# Patient Record
Sex: MALE | Race: BLACK OR AFRICAN AMERICAN | Employment: OTHER | URBAN - METROPOLITAN AREA
[De-identification: names, ages, dates, MRNs, and addresses within clinical notes are randomized per-mention and may not be internally consistent; named-entity substitution may affect disease eponyms.]

---

## 2017-05-22 ENCOUNTER — HOSPITAL ENCOUNTER (INPATIENT)
Age: 72
LOS: 3 days | Discharge: HOME HEALTH | DRG: 287 | End: 2017-05-25
Attending: EMERGENCY MEDICINE | Admitting: INTERNAL MEDICINE
Payer: MEDICARE

## 2017-05-22 ENCOUNTER — APPOINTMENT (OUTPATIENT)
Dept: GENERAL RADIOLOGY | Age: 72
DRG: 287 | End: 2017-05-22
Attending: EMERGENCY MEDICINE
Payer: MEDICARE

## 2017-05-22 DIAGNOSIS — I50.21 ACUTE SYSTOLIC CONGESTIVE HEART FAILURE (HCC): Primary | ICD-10-CM

## 2017-05-22 PROBLEM — I50.9 CHF EXACERBATION (HCC): Status: ACTIVE | Noted: 2017-05-22

## 2017-05-22 PROBLEM — J81.1 PULMONARY EDEMA: Status: ACTIVE | Noted: 2017-05-22

## 2017-05-22 LAB
ALBUMIN SERPL BCP-MCNC: 2.5 G/DL (ref 3.5–5)
ALBUMIN/GLOB SERPL: 0.5 {RATIO} (ref 1.1–2.2)
ALP SERPL-CCNC: 72 U/L (ref 45–117)
ALT SERPL-CCNC: 20 U/L (ref 12–78)
ANION GAP BLD CALC-SCNC: 6 MMOL/L (ref 5–15)
AST SERPL W P-5'-P-CCNC: 19 U/L (ref 15–37)
ATRIAL RATE: 80 BPM
BASOPHILS # BLD AUTO: 0 K/UL (ref 0–0.1)
BASOPHILS # BLD: 0 % (ref 0–1)
BILIRUB SERPL-MCNC: 0.6 MG/DL (ref 0.2–1)
BNP SERPL-MCNC: 1592 PG/ML (ref 0–125)
BUN SERPL-MCNC: 16 MG/DL (ref 6–20)
BUN/CREAT SERPL: 18 (ref 12–20)
CALCIUM SERPL-MCNC: 8.3 MG/DL (ref 8.5–10.1)
CALCULATED P AXIS, ECG09: 75 DEGREES
CALCULATED R AXIS, ECG10: -38 DEGREES
CALCULATED T AXIS, ECG11: 101 DEGREES
CHLORIDE SERPL-SCNC: 106 MMOL/L (ref 97–108)
CO2 SERPL-SCNC: 28 MMOL/L (ref 21–32)
CREAT SERPL-MCNC: 0.88 MG/DL (ref 0.7–1.3)
DIAGNOSIS, 93000: NORMAL
EOSINOPHIL # BLD: 0.1 K/UL (ref 0–0.4)
EOSINOPHIL NFR BLD: 2 % (ref 0–7)
ERYTHROCYTE [DISTWIDTH] IN BLOOD BY AUTOMATED COUNT: 13.3 % (ref 11.5–14.5)
EST. AVERAGE GLUCOSE BLD GHB EST-MCNC: 209 MG/DL
GLOBULIN SER CALC-MCNC: 5.2 G/DL (ref 2–4)
GLUCOSE BLD STRIP.AUTO-MCNC: 219 MG/DL (ref 65–100)
GLUCOSE BLD STRIP.AUTO-MCNC: 244 MG/DL (ref 65–100)
GLUCOSE BLD STRIP.AUTO-MCNC: 89 MG/DL (ref 65–100)
GLUCOSE SERPL-MCNC: 276 MG/DL (ref 65–100)
HBA1C MFR BLD: 8.9 % (ref 4.2–6.3)
HCT VFR BLD AUTO: 36.4 % (ref 36.6–50.3)
HGB BLD-MCNC: 11.6 G/DL (ref 12.1–17)
LYMPHOCYTES # BLD AUTO: 27 % (ref 12–49)
LYMPHOCYTES # BLD: 1.6 K/UL (ref 0.8–3.5)
MCH RBC QN AUTO: 32.1 PG (ref 26–34)
MCHC RBC AUTO-ENTMCNC: 31.9 G/DL (ref 30–36.5)
MCV RBC AUTO: 100.8 FL (ref 80–99)
MONOCYTES # BLD: 0.5 K/UL (ref 0–1)
MONOCYTES NFR BLD AUTO: 8 % (ref 5–13)
NEUTS SEG # BLD: 3.8 K/UL (ref 1.8–8)
NEUTS SEG NFR BLD AUTO: 63 % (ref 32–75)
P-R INTERVAL, ECG05: 166 MS
PLATELET # BLD AUTO: 228 K/UL (ref 150–400)
POTASSIUM SERPL-SCNC: 4.1 MMOL/L (ref 3.5–5.1)
PROT SERPL-MCNC: 7.7 G/DL (ref 6.4–8.2)
Q-T INTERVAL, ECG07: 378 MS
QRS DURATION, ECG06: 106 MS
QTC CALCULATION (BEZET), ECG08: 435 MS
RBC # BLD AUTO: 3.61 M/UL (ref 4.1–5.7)
SERVICE CMNT-IMP: ABNORMAL
SERVICE CMNT-IMP: ABNORMAL
SERVICE CMNT-IMP: NORMAL
SODIUM SERPL-SCNC: 140 MMOL/L (ref 136–145)
TROPONIN I SERPL-MCNC: 0.04 NG/ML
TSH SERPL DL<=0.05 MIU/L-ACNC: 2.35 UIU/ML (ref 0.36–3.74)
VENTRICULAR RATE, ECG03: 80 BPM
WBC # BLD AUTO: 6 K/UL (ref 4.1–11.1)

## 2017-05-22 PROCEDURE — 74011250636 HC RX REV CODE- 250/636: Performed by: INTERNAL MEDICINE

## 2017-05-22 PROCEDURE — 71010 XR CHEST PORT: CPT

## 2017-05-22 PROCEDURE — 82962 GLUCOSE BLOOD TEST: CPT

## 2017-05-22 PROCEDURE — 93005 ELECTROCARDIOGRAM TRACING: CPT

## 2017-05-22 PROCEDURE — 99284 EMERGENCY DEPT VISIT MOD MDM: CPT

## 2017-05-22 PROCEDURE — 74011250637 HC RX REV CODE- 250/637: Performed by: INTERNAL MEDICINE

## 2017-05-22 PROCEDURE — 74011000250 HC RX REV CODE- 250: Performed by: INTERNAL MEDICINE

## 2017-05-22 PROCEDURE — 74011250636 HC RX REV CODE- 250/636: Performed by: EMERGENCY MEDICINE

## 2017-05-22 PROCEDURE — C8924 2D TTE W OR W/O FOL W/CON,FU: HCPCS

## 2017-05-22 PROCEDURE — 74011636637 HC RX REV CODE- 636/637: Performed by: INTERNAL MEDICINE

## 2017-05-22 PROCEDURE — 36415 COLL VENOUS BLD VENIPUNCTURE: CPT | Performed by: EMERGENCY MEDICINE

## 2017-05-22 PROCEDURE — 83036 HEMOGLOBIN GLYCOSYLATED A1C: CPT | Performed by: INTERNAL MEDICINE

## 2017-05-22 PROCEDURE — 96374 THER/PROPH/DIAG INJ IV PUSH: CPT

## 2017-05-22 PROCEDURE — 85025 COMPLETE CBC W/AUTO DIFF WBC: CPT | Performed by: EMERGENCY MEDICINE

## 2017-05-22 PROCEDURE — 80053 COMPREHEN METABOLIC PANEL: CPT | Performed by: EMERGENCY MEDICINE

## 2017-05-22 PROCEDURE — 84484 ASSAY OF TROPONIN QUANT: CPT | Performed by: EMERGENCY MEDICINE

## 2017-05-22 PROCEDURE — 84443 ASSAY THYROID STIM HORMONE: CPT | Performed by: NURSE PRACTITIONER

## 2017-05-22 PROCEDURE — 74011250637 HC RX REV CODE- 250/637: Performed by: EMERGENCY MEDICINE

## 2017-05-22 PROCEDURE — 94762 N-INVAS EAR/PLS OXIMTRY CONT: CPT

## 2017-05-22 PROCEDURE — 83880 ASSAY OF NATRIURETIC PEPTIDE: CPT | Performed by: EMERGENCY MEDICINE

## 2017-05-22 PROCEDURE — 74011250636 HC RX REV CODE- 250/636: Performed by: SPECIALIST

## 2017-05-22 PROCEDURE — 65660000000 HC RM CCU STEPDOWN

## 2017-05-22 PROCEDURE — 74011636637 HC RX REV CODE- 636/637

## 2017-05-22 RX ORDER — DEXTROSE 50 % IN WATER (D50W) INTRAVENOUS SYRINGE
12.5-25 AS NEEDED
Status: DISCONTINUED | OUTPATIENT
Start: 2017-05-22 | End: 2017-05-25 | Stop reason: HOSPADM

## 2017-05-22 RX ORDER — LISINOPRIL 5 MG/1
2.5 TABLET ORAL DAILY
Status: DISCONTINUED | OUTPATIENT
Start: 2017-05-22 | End: 2017-05-23

## 2017-05-22 RX ORDER — BUMETANIDE 0.25 MG/ML
1 INJECTION INTRAMUSCULAR; INTRAVENOUS 3 TIMES DAILY
Status: DISCONTINUED | OUTPATIENT
Start: 2017-05-22 | End: 2017-05-25 | Stop reason: HOSPADM

## 2017-05-22 RX ORDER — FUROSEMIDE 10 MG/ML
40 INJECTION INTRAMUSCULAR; INTRAVENOUS
Status: COMPLETED | OUTPATIENT
Start: 2017-05-22 | End: 2017-05-22

## 2017-05-22 RX ORDER — ROSUVASTATIN CALCIUM 10 MG/1
10 TABLET, COATED ORAL
Status: DISCONTINUED | OUTPATIENT
Start: 2017-05-22 | End: 2017-05-25 | Stop reason: HOSPADM

## 2017-05-22 RX ORDER — HEPARIN SODIUM 5000 [USP'U]/ML
5000 INJECTION, SOLUTION INTRAVENOUS; SUBCUTANEOUS EVERY 8 HOURS
Status: DISCONTINUED | OUTPATIENT
Start: 2017-05-22 | End: 2017-05-25 | Stop reason: HOSPADM

## 2017-05-22 RX ORDER — SERTRALINE HYDROCHLORIDE 50 MG/1
50 TABLET, FILM COATED ORAL DAILY
Status: DISCONTINUED | OUTPATIENT
Start: 2017-05-22 | End: 2017-05-25 | Stop reason: HOSPADM

## 2017-05-22 RX ORDER — INSULIN LISPRO 100 [IU]/ML
INJECTION, SOLUTION INTRAVENOUS; SUBCUTANEOUS
Status: DISCONTINUED | OUTPATIENT
Start: 2017-05-22 | End: 2017-05-25 | Stop reason: HOSPADM

## 2017-05-22 RX ORDER — ASPIRIN 325 MG
325 TABLET ORAL DAILY
Status: DISCONTINUED | OUTPATIENT
Start: 2017-05-22 | End: 2017-05-25 | Stop reason: HOSPADM

## 2017-05-22 RX ORDER — INSULIN LISPRO 100 [IU]/ML
INJECTION, SOLUTION INTRAVENOUS; SUBCUTANEOUS
Status: COMPLETED
Start: 2017-05-22 | End: 2017-05-22

## 2017-05-22 RX ORDER — MAGNESIUM SULFATE 100 %
4 CRYSTALS MISCELLANEOUS AS NEEDED
Status: DISCONTINUED | OUTPATIENT
Start: 2017-05-22 | End: 2017-05-25 | Stop reason: HOSPADM

## 2017-05-22 RX ORDER — SODIUM CHLORIDE 0.9 % (FLUSH) 0.9 %
5-10 SYRINGE (ML) INJECTION EVERY 8 HOURS
Status: DISCONTINUED | OUTPATIENT
Start: 2017-05-22 | End: 2017-05-25 | Stop reason: HOSPADM

## 2017-05-22 RX ORDER — SODIUM CHLORIDE 0.9 % (FLUSH) 0.9 %
5-10 SYRINGE (ML) INJECTION AS NEEDED
Status: DISCONTINUED | OUTPATIENT
Start: 2017-05-22 | End: 2017-05-25 | Stop reason: HOSPADM

## 2017-05-22 RX ORDER — SODIUM CHLORIDE 0.9 % (FLUSH) 0.9 %
5-10 SYRINGE (ML) INJECTION AS NEEDED
Status: DISCONTINUED | OUTPATIENT
Start: 2017-05-22 | End: 2017-05-25 | Stop reason: SDUPTHER

## 2017-05-22 RX ORDER — PANTOPRAZOLE SODIUM 40 MG/1
40 TABLET, DELAYED RELEASE ORAL
Status: DISCONTINUED | OUTPATIENT
Start: 2017-05-22 | End: 2017-05-25 | Stop reason: HOSPADM

## 2017-05-22 RX ADMIN — PERFLUTREN 2 ML: 6.52 INJECTION, SUSPENSION INTRAVENOUS at 14:45

## 2017-05-22 RX ADMIN — INSULIN LISPRO 3 UNITS: 100 INJECTION, SOLUTION INTRAVENOUS; SUBCUTANEOUS at 17:26

## 2017-05-22 RX ADMIN — ROSUVASTATIN CALCIUM 10 MG: 10 TABLET, FILM COATED ORAL at 21:54

## 2017-05-22 RX ADMIN — FUROSEMIDE 40 MG: 10 INJECTION, SOLUTION INTRAMUSCULAR; INTRAVENOUS at 12:21

## 2017-05-22 RX ADMIN — NITROGLYCERIN 1 INCH: 20 OINTMENT TOPICAL at 12:21

## 2017-05-22 RX ADMIN — BUMETANIDE 1 MG: 0.25 INJECTION INTRAMUSCULAR; INTRAVENOUS at 22:49

## 2017-05-22 RX ADMIN — Medication 10 ML: at 21:58

## 2017-05-22 RX ADMIN — ASPIRIN 325 MG: 325 TABLET ORAL at 20:07

## 2017-05-22 RX ADMIN — HEPARIN SODIUM 5000 UNITS: 5000 INJECTION, SOLUTION INTRAVENOUS; SUBCUTANEOUS at 21:55

## 2017-05-22 RX ADMIN — SERTRALINE 50 MG: 50 TABLET, FILM COATED ORAL at 20:06

## 2017-05-22 RX ADMIN — INSULIN LISPRO 3 UNITS: 100 INJECTION, SOLUTION INTRAVENOUS; SUBCUTANEOUS at 14:22

## 2017-05-22 RX ADMIN — BUMETANIDE 1 MG: 0.25 INJECTION INTRAMUSCULAR; INTRAVENOUS at 17:26

## 2017-05-22 RX ADMIN — Medication 10 ML: at 14:00

## 2017-05-22 RX ADMIN — HEPARIN SODIUM 5000 UNITS: 5000 INJECTION, SOLUTION INTRAVENOUS; SUBCUTANEOUS at 14:07

## 2017-05-22 RX ADMIN — PANTOPRAZOLE SODIUM 40 MG: 40 TABLET, DELAYED RELEASE ORAL at 20:06

## 2017-05-22 RX ADMIN — INSULIN DETEMIR 65 UNITS: 100 INJECTION, SOLUTION SUBCUTANEOUS at 21:54

## 2017-05-22 RX ADMIN — LISINOPRIL 2.5 MG: 5 TABLET ORAL at 20:06

## 2017-05-22 NOTE — PROGRESS NOTES
[facility]: MED RECONCILIATION    Comments/Recommendations:     Patient awake, alert, and oriented to medications.  Patient reported to be taking only long acting insulin. Called Dr. Sangeetha Christiansen ([phone]) to confirm insulin regimen and was informed that patient is only on long acting insulin but should be switching from insulin detemir to insulin degludec 96 units per day starting today. Patient had not switched medications yet.  Patient confirmed no allergies.  Patient reported good medication adherence.  Patient confirmed preferred pharmacy to be [facility] [email or web address] [facility]    Medications added:   · none    Medications removed:  · Insulin lispro 100 units/mL 12 units SubQ before breakfast, lunch, and dinner  · Oxycodone-acetaminophen 5-325 mg 1 tab by mouth every 4 hours as needed for up to 20 doses. Max daily amount: 6   Tabs    Medications adjusted:  · Insulin detemir from 50 units to 65 units    Information obtained from: Patient, Rx Query, and endocrinologist    Allergies: Review of patient's allergies indicates no known allergies. Prior to Admission Medications:     Prior to Admission Medications   Prescriptions Last Dose Informant Patient Reported? Taking?   aspirin (ASPIRIN) 325 mg tablet 2017 at 1000 Self Yes Yes   Sig: Take 325 mg by mouth daily. furosemide (LASIX) 40 mg tablet 2017 at 1000 Self No Yes   Sig: Take 1 Tab by mouth daily. insulin detemir (LEVEMIR FLEXTOUCH) 100 unit/mL (3 mL) inpn 2017 at 1100 Self Yes Yes   Si Units by SubCUTAneous route every twelve (12) hours. 1100 and 2300   lisinopril (PRINIVIL, ZESTRIL) 2.5 mg tablet 2017 at am Self No Yes   Sig: Take 1 Tab by mouth daily. omeprazole (PRILOSEC) 20 mg capsule 2017 at am Self Yes Yes   Sig: Take 20 mg by mouth daily.    potassium chloride (K-DUR, KLOR-CON) 20 mEq tablet 2017 Self No Yes   Sig: Take one tablet daily   rosuvastatin (CRESTOR) 10 mg tablet 2017 at am Self No Yes   Sig: Take 1 Tab by mouth nightly. sertraline (ZOLOFT) 50 mg tablet 5/21/2017 at am Self No Yes   Sig: Take 1 Tab by mouth daily.       Facility-Administered Medications: None     Thank you,    Fransico Jacobsen, Pharmacy Student, Class of 3746

## 2017-05-22 NOTE — PROGRESS NOTES
Primary Nurse Blanca Garzon RN and HANNAH FELIX performed a dual skin assessment on this patient Impairment noted- see wound doc flow sheet  Shane score is 20

## 2017-05-22 NOTE — H&P
[place] [address]  [phone]    Admission History and Physical      NAME:  Guillermina Dutta. :   1945   MRN:  [id number]     PCP:  Zak Douglass MD     Date/Time:  2017         Subjective:     CHIEF COMPLAINT: SOB     HISTORY OF PRESENT ILLNESS:     Mr. Tyrone Winters is a 80 y.o.  male who is admitted with CHF exacerbation. Mr. Tyrone Winters with PMH of CHF, CAD s/p CABG, DM, GERD, morbid obesity presented to ER c/o SOB, which has been progressively worsening the last few days. The SOB is exertional. Denies chest pain or cough. Denies nausea, vomiting. Has weight gain about 20 lb in 3 months. Pt stated that he is compliant with his diet. His last evaluation by his cardiologist was more than a year ago. Past Medical History:   Diagnosis Date    CAD (coronary artery disease)     Cancer ([facility]     PROSTATE    CHF (congestive heart failure) ([facility]     Diabetes ([facility]     5yrs    GERD (gastroesophageal reflux disease)     Morbid obesity (HCC)     Prostate cancer (HCC)         Past Surgical History:   Procedure Laterality Date    HX CORONARY ARTERY BYPASS GRAFT      HX HEART CATHETERIZATION      HX ORTHOPAEDIC      right wrist carpal tunnel repair       Social History   Substance Use Topics    Smoking status: Former Smoker     Packs/day: 1.00     Years: 7.00     Quit date: 1991    Smokeless tobacco: Never Used    Alcohol use Yes      Comment: VERY RARE        Family History   Problem Relation Age of Onset    Heart Disease Father     Diabetes Father     Cancer Sister      BREAST        No Known Allergies     Prior to Admission medications    Medication Sig Start Date End Date Taking? Authorizing Provider   lisinopril (PRINIVIL, ZESTRIL) 2.5 mg tablet Take 1 Tab by mouth daily.  16   Yonny Jacob MD   oxyCODONE-acetaminophen (PERCOCET) 5-325 mg per tablet Take 1 Tab by mouth every four (4) hours as needed for up to 20 doses. Max Daily Amount: 6 Tabs. 3/29/16   Vel Carias MD   insulin detemir (LEVEMIR FLEXTOUCH) 100 unit/mL (3 mL) inpn 50U daily  Indications: TYPE 2 DIABETES MELLITUS 3/29/16   Vel Carias MD   aspirin (ASPIRIN) 325 mg tablet Take 325 mg by mouth daily. Leonidas Cervantes MD   omeprazole (PRILOSEC) 20 mg capsule Take 20 mg by mouth daily. Historical Provider   furosemide (LASIX) 40 mg tablet Take 1 Tab by mouth daily. 2/29/16   [place]ell Bradford MD   sertraline (ZOLOFT) 50 mg tablet Take 1 Tab by mouth daily. 8/25/15   Armen Kelsey MD   rosuvastatin (CRESTOR) 10 mg tablet Take 1 Tab by mouth nightly.  8/25/15   Armen Kelsey MD   potassium chloride (K-DUR, KLOR-CON) 20 mEq tablet Take one tablet daily 8/25/15   Armen Kelsey MD   insulin lispro (HUMALOG) 100 unit/mL kwikpen 12 Units before breakfast, lunch, and dinner 2/6/15   [place] Bradford MD         Review of Systems:  (bold if positive, if negative)    Gen:  Eyes:  ENT:  CVS:  Pulm:   dyspnea,GI:    :    MS:  Skin:  Psych:  Endo:    Hem:  Renal:    Neuro:            Objective:      VITALS:    Vital signs reviewed; most recent are:    Visit Vitals    /69    Pulse 62    Temp 97.8 °F (36.6 °C)    Resp 20    Ht 5' 9\" (1.753 m)    Wt (!) 165.6 kg (365 lb)    SpO2 96%    BMI 53.9 kg/m2     SpO2 Readings from Last 6 Encounters:   05/22/17 96%   11/18/16 96%   03/30/16 96%   08/06/15 97%   05/05/15 94%   04/17/15 98%        No intake or output data in the 24 hours ending 05/22/17 1310         Exam:     Physical Exam:    Gen:  Morbidly obese, in no acute distress  HEENT:  Pink conjunctivae, PERRL, hearing intact to voice, moist mucous membranes  Neck:  Supple, without masses, thyroid non-tender  Resp:  No accessory muscle use,  rales BL  Card:  No murmurs, normal S1, S2 without thrills, bruits or peripheral edema  Abd:  Soft, non-tender, non-distended, normoactive bowel sounds are present, no palpable organomegaly and no detectable hernias  Lymph:  No cervical or inguinal adenopathy  Musc:  No cyanosis or clubbing  Skin:  No rashes or ulcers, skin turgor is good  Neuro:  Cranial nerves are grossly intact, no focal motor weakness, follows commands appropriately  Psych:  Good insight, oriented to person, place and time, alert       Labs:    Recent Labs      05/22/17   1126   WBC  6.0   HGB  11.6*   HCT  36.4*   PLT  228     Recent Labs      05/22/17   1126   NA  140   K  4.1   CL  106   CO2  28   GLU  276*   BUN  16   CREA  0.88   CA  8.3*   ALB  2.5*   TBILI  0.6   SGOT  19   ALT  20     Lab Results   Component Value Date/Time    Glucose (POC) 236 03/30/2016 11:16 AM    Glucose (POC) 164 03/30/2016 07:16 AM     No results for input(s): PH, PCO2, PO2, HCO3, FIO2 in the last 72 hours. No results for input(s): INR in the last 72 hours. No lab exists for component: INREXT    Telemetry reviewed:   frequent PVCs       Assessment/Plan:    1. Acute on chronic systolic CHF exacerbation (HCC) (5/22/2017)/ acute pulmonary edema. Admit to telemetry. His last Ecocardiogram in 2014 EF of 45%. Will check echo and start IV bumex. Monitor I/O and daily weight. Continue ACEi. Consult Dr Erika Rosales, cardiology. 2.    Coronary atherosclerosis of native coronary artery (11/21/2011)  S/P CABG x 2 (12/14/2011). Continue ASA          3. Type 2 diabetes mellitus without complication ([facility] (7/08/7201). Continue levemir and cover with SSI. Check A1C     4. HTN. Continue lisinopril. BP stable. 5.  Hyperlipidemia. On statin     6. PAF. Rate is controlled. Continue ASA.       7. Morbid obesity. Counseled on weight loss. Nutrition consult.      Code status: Full          Previous medical records reviewed     Risk of deterioration: high      Total time spent with patient: [address] discussed with: Patient, Nursing Staff and >50% of time spent in counseling and coordination of care    Discussed:  Care Plan    Prophylaxis:  Hep SQ    Probable Disposition:  Home w/Family           ___________________________________________________    Attending Physician: Vanessa Chino MD

## 2017-05-22 NOTE — IP AVS SNAPSHOT
Current Discharge Medication List  
  
START taking these medications Dose & Instructions Dispensing Information Comments Morning Noon Evening Bedtime  
 bumetanide 1 mg tablet Commonly known as:  Kristina Leung Your last dose was: Your next dose is:    
   
   
 Dose:  1 mg Take 1 Tab by mouth two (2) times a day. Quantity:  60 Tab Refills:  0  
     
   
   
   
  
 carvedilol 6.25 mg tablet Commonly known as:  Justin Nixon Your last dose was: Your next dose is:    
   
   
 Dose:  6.25 mg Take 1 Tab by mouth two (2) times daily (with meals). Quantity:  60 Tab Refills:  0  
     
   
   
   
  
 valsartan 80 mg tablet Commonly known as:  DIOVAN Your last dose was: Your next dose is:    
   
   
 Dose:  80 mg Take 1 Tab by mouth daily. Quantity:  30 Tab Refills:  0 CONTINUE these medications which have NOT CHANGED Dose & Instructions Dispensing Information Comments Morning Noon Evening Bedtime  
 aspirin 325 mg tablet Commonly known as:  ASPIRIN Your last dose was: Your next dose is:    
   
   
 Dose:  325 mg Take 325 mg by mouth daily. Refills:  0  
     
   
   
   
  
 insulin detemir 100 unit/mL (3 mL) Inpn Commonly known as:  Andrew Martinez Your last dose was: Your next dose is:    
   
   
 Dose:  65 Units 65 Units by SubCUTAneous route every twelve (12) hours. 1100 and 2300 Refills:  0  
     
   
   
   
  
 omeprazole 20 mg capsule Commonly known as:  PRILOSEC Your last dose was: Your next dose is:    
   
   
 Dose:  20 mg Take 20 mg by mouth daily. Refills:  0  
     
   
   
   
  
 potassium chloride 20 mEq tablet Commonly known as:  K-DUR, KLOR-CON Your last dose was: Your next dose is: Take one tablet daily Quantity:  30 Tab Refills:  1  
     
   
   
   
  
 rosuvastatin 10 mg tablet Commonly known as:  CRESTOR Your last dose was: Your next dose is:    
   
   
 Dose:  10 mg Take 1 Tab by mouth nightly. Quantity:  30 Tab Refills:  1  
     
   
   
   
  
 sertraline 50 mg tablet Commonly known as:  ZOLOFT Your last dose was: Your next dose is:    
   
   
 Dose:  50 mg Take 1 Tab by mouth daily. Quantity:  30 Tab Refills:  1 STOP taking these medications   
 furosemide 40 mg tablet Commonly known as:  LASIX  
   
  
 lisinopril 2.5 mg tablet Commonly known as:  Ruthy Vickers Where to Get Your Medications These medications were sent to [address], [address] [place] [id number] Phone:  [phone]  
  bumetanide 1 mg tablet  
 carvedilol 6.25 mg tablet  
 valsartan 80 mg tablet
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
## 2017-05-22 NOTE — Clinical Note
Status[de-identified] Inpatient [101] Type of Bed: Telemetry [19] Inpatient Hospitalization Certified Necessary for the Following Reasons: 3. Patient receiving treatment that can only be provided in an inpatient setting (further clarification in H&P documentation) Admitting Diagnosis: CHF exacerbation ([facility] [[id number]] Admitting Physician: Teresa Atkinson Attending Physician: Teresa Atkinson Estimated Length of Stay: > or = to 2 Midnights Discharge Plan[de-identified] Home with Office Follow-up

## 2017-05-22 NOTE — PROGRESS NOTES
Bedside and Verbal shift change report given to [facility] RN (oncoming nurse) by Shanna Martinez RN (offgoing nurse). Report included the following information SBAR, Kardex, MAR and Recent Results.

## 2017-05-22 NOTE — IP AVS SNAPSHOT
[address] [facility] 
 
 
 [address] [facility] [address] 1007 [facility] 
[phone] Patient: Marcia Rodriguez. MRN: [id number] :1945 You are allergic to the following No active allergies Recent Documentation Height Weight BMI Smoking Status 1.753 m (!) 161 kg 52.42 kg/m2 Former Smoker Emergency Contacts Name Discharge Info Relation Home Work Mobile Edna Castro DISCHARGE CAREGIVER [3] Spouse [3] [phone] Christal Medina DISCHARGE CAREGIVER [3] Daughter [21] About your hospitalization You were admitted on:  May 22, 2017 You last received care in the:  [facility] 3 PRO CARE TELE 2 You were discharged on:  May 25, 2017 Unit phone number:  [phone] Why you were hospitalized Your primary diagnosis was:  Systolic Chf, Acute On Chronic (Hcc) Your diagnoses also included:  Type 2 Diabetes Mellitus Without Complication (Hcc), Paf (Paroxysmal Atrial Fibrillation) (Hcc), Hyperlipidemia, Coronary Atherosclerosis Of Native Coronary Artery, Anemia Providers Seen During Your Hospitalizations Provider Role Specialty Primary office phone Bria Olguin MD Attending Provider Emergency Medicine [phone] Kin Garsia MD Attending Provider Hospitalist [phone] Marylene Police, MD Attending Provider Internal Medicine [phone] Your Primary Care Physician (PCP) Primary Care Physician Office Phone Office Fax Donis LANDIS [phone] Follow-up Information Follow up With Details Comments Contact Info Betsey Ford MD On 2017 9:20 am  566 [facility] 600 1007 [facility] 
[phone] Anel Rivero MD In 2 weeks  [address] [place] [address] 
[phone]  [facility]  nocturnal 02 Call when you get home to make arrangements for delivery [phone] [place] [address] 
[phone] Orelia Hatchet, MD  call to arrange sleep testing [address] [facility] [address] [place] 
[phone] AT [address] and RN  777 [facility] [address] 
[phone] Your Appointments Thursday June 01, 2017  9:20 AM EDT Office Visit with Jamel Rivera MD  
[facility] ([address]) 320 [facility] [address] 600 1007 [facility]  
[phone] Current Discharge Medication List  
  
START taking these medications Dose & Instructions Dispensing Information Comments Morning Noon Evening Bedtime  
 bumetanide 1 mg tablet Commonly known as:  Tura Martha Your last dose was: Your next dose is:    
   
   
 Dose:  1 mg Take 1 Tab by mouth two (2) times a day. Quantity:  60 Tab Refills:  0  
     
   
   
   
  
 carvedilol 6.25 mg tablet Commonly known as:  Yoshi Banger Your last dose was: Your next dose is:    
   
   
 Dose:  6.25 mg Take 1 Tab by mouth two (2) times daily (with meals). Quantity:  60 Tab Refills:  0  
     
   
   
   
  
 valsartan 80 mg tablet Commonly known as:  DIOVAN Your last dose was: Your next dose is:    
   
   
 Dose:  80 mg Take 1 Tab by mouth daily. Quantity:  30 Tab Refills:  0 CONTINUE these medications which have NOT CHANGED Dose & Instructions Dispensing Information Comments Morning Noon Evening Bedtime  
 aspirin 325 mg tablet Commonly known as:  ASPIRIN Your last dose was: Your next dose is:    
   
   
 Dose:  325 mg Take 325 mg by mouth daily. Refills:  0  
     
   
   
   
  
 insulin detemir 100 unit/mL (3 mL) Inpn Commonly known as:  Erling Davin Your last dose was: Your next dose is: Dose:  65 Units 65 Units by SubCUTAneous route every twelve (12) hours. 1100 and 2300 Refills:  0  
     
   
   
   
  
 omeprazole 20 mg capsule Commonly known as:  PRILOSEC Your last dose was: Your next dose is:    
   
   
 Dose:  20 mg Take 20 mg by mouth daily. Refills:  0  
     
   
   
   
  
 potassium chloride 20 mEq tablet Commonly known as:  K-DUR, KLOR-CON Your last dose was: Your next dose is: Take one tablet daily Quantity:  30 Tab Refills:  1  
     
   
   
   
  
 rosuvastatin 10 mg tablet Commonly known as:  CRESTOR Your last dose was: Your next dose is:    
   
   
 Dose:  10 mg Take 1 Tab by mouth nightly. Quantity:  30 Tab Refills:  1  
     
   
   
   
  
 sertraline 50 mg tablet Commonly known as:  ZOLOFT Your last dose was: Your next dose is:    
   
   
 Dose:  50 mg Take 1 Tab by mouth daily. Quantity:  30 Tab Refills:  1 STOP taking these medications   
 furosemide 40 mg tablet Commonly known as:  LASIX  
   
  
 lisinopril 2.5 mg tablet Commonly known as:  Raymond Marcelino Where to Get Your Medications These medications were sent to [place] [id number] Phone:  [phone]  
  bumetanide 1 mg tablet  
 carvedilol 6.25 mg tablet  
 valsartan 80 mg tablet Discharge Instructions HOSPITALIST DISCHARGE INSTRUCTIONS 
NAME: oPp Linares. :  1945 MRN:  [id number] Date/Time:  2017 10:47 AM 
 
ADMIT DATE: 2017 DISCHARGE DATE: 2017 DISCHARGE DIAGNOSIS: 
CHF MEDICATIONS: 
· It is important that you take the medication exactly as they are prescribed.   
· Keep your medication in the bottles provided by the pharmacist and keep a list of the medication names, dosages, and times to be taken in your wallet. · Do not take other medications without consulting your doctor. Pain Management: per above medications What to do at [facility] Your MUST wear your oxygen at night and when you are sleeping. You must follow up to have sleep testing for sleep apnea. Recommended diet:  Cardiac Diet and Diabetic Diet Recommended activity: Activity as tolerated If you experience any of the following symptoms then please call your primary care physician or return to the emergency room if you cannot get hold of your doctor: 
Fever, chills, nausea, vomiting, diarrhea, change in mentation, falling, bleeding, shortness of breath Follow Up: Follow-up Information Follow up With Details Comments Contact Info Ryan Tidwell MD On 6/1/2017 9:20 am  566 [facility] 600 1007 [facility] 
[phone] Burgess Kristina MD In 2 weeks  [address] [place] [address] 
[phone] [place] [facility]  nocturnal 02 Call when you get home to make arrangements for delivery [phone] [address] [address] [phone] Kirsten King MD  call to arrange sleep testing 3003 [facility] [address] [place] [address] 
[phone] Information obtained by : 
I understand that if any problems occur once I am at home I am to contact my physician. I understand and acknowledge receipt of the instructions indicated above. Physician's or R.N.'s Signature                                                                  Date/Time Patient or Representative Signature Date/Time Avoiding Triggers with Heart Failure: Your Care Instructions Triggers are anything that make your heart failure flare up. A flare-up is also called \"sudden heart failure\" or \"acute heart failure. \" When you have a flare-up, fluid builds up in your lungs, and you have problems breathing. You might need to go to the hospital. By watching for changes in your condition and avoiding triggers, you can prevent heart failure flare-ups. Follow-up care is a key part of your treatment and safety. Be sure to make and go to all appointments, and call your doctor if you are having problems. It's also a good idea to know your test results and keep a list of the medicines you take. How can you care for yourself at home? Watch for changes in your weight and condition · Weigh yourself without clothing at the same time each day. Record your weight. Call your doctor if you gain 3 pounds or more in 24 hrs or 5 pounds in one week. A sudden weight gain may mean that your heart failure is getting worse. · Keep a daily record of your symptoms. Write down any changes in how you feel, such as new shortness of breath, cough, or problems eating. Also record if your ankles are more swollen than usual and if you have to urinate in the night more often. Note anything that you ate or did that could have triggered these changes. Limit sodium Sodium causes your body to hold on to water, making it harder for your heart to pump. People get most of their sodium from processed foods. Fast food and restaurant meals also tend to be very high in sodium. · Your doctor may suggest that you limit sodium to 1,500 milligrams (mg) a day. That is less than 1 teaspoon of salt a day, including all the salt you eat in cooking or in packaged foods. · Read food labels on cans and food packages. They tell you how much sodium you get in one serving. Check the serving size.  If you eat more than one serving, you are getting more sodium. · Be aware that sodium can come in forms other than salt, including monosodium glutamate (MSG), sodium citrate, and sodium bicarbonate (baking soda). MSG is often added to Asian food. You can sometimes ask for food without MSG or salt. · Slowly reducing salt will help you adjust to the taste. Take the salt shaker off the table. · Flavor your food with garlic, lemon juice, onion, vinegar, herbs, and spices instead of salt. Do not use soy sauce, steak sauce, onion salt, garlic salt, mustard, or ketchup on your food, unless it is labeled \"low-sodium\" or \"low-salt. \" 
· Make your own salad dressings, sauces, and ketchup without adding salt. · Use fresh or frozen ingredients, instead of canned ones, whenever you can. Choose low-sodium canned goods. · Eat less processed food and food from restaurants, including fast food. Exercise as directed Moderate, regular exercise is very good for your heart. It improves your blood flow and helps control your weight. But too much exercise can stress your heart and cause a heart failure flare-up. · Check with your doctor before you start an exercise program. 
· Walking is an easy way to get exercise. Start out slowly. Gradually increase the length and pace of your walk. Swimming, riding a bike, and using a treadmill are also good forms of exercise. · When you exercise, watch for signs that your heart is working too hard. You are pushing yourself too hard if you cannot talk while you are exercising. If you become short of breath or dizzy or have chest pain, stop, sit down, and rest. 
· Do not exercise when you do not feel well. Take medicines correctly · Take your medicines exactly as prescribed. Call your doctor if you think you are having a problem with your medicine. · Make a list of all the medicines you take.  Include those prescribed to you by other doctors and any over-the-counter medicines, vitamins, or supplements you take. Take this list with you when you go to any doctor. · Take your medicines at the same time every day. It may help you to post a list of all the medicines you take every day and what time of day you take them. · Make taking your medicine as simple as you can. Plan times to take your medicines when you are doing other things, such as eating a meal or getting ready for bed. This will make it easier to remember to take your medicines. · Get organized. Use helpful tools, such as daily or weekly pill containers. When should you call for help? Call 911 if you have symptoms of sudden heart failure such as: 
· You have severe trouble breathing. · You cough up pink, foamy mucus. · You have a new irregular or rapid heartbeat. Call your doctor now or seek immediate medical care if: 
· You have new or increased shortness of breath. · You are dizzy or lightheaded, or you feel like you may faint. · You have sudden weight gain, such as 3 pounds in 24 hours, or 5 pounds in one week. · You have increased swelling in your legs, ankles, or feet. · You are suddenly so tired or weak that you cannot do your usual activities. Watch closely for changes in your health, and be sure to contact your doctor if you develop new symptoms. Where can you learn more? Go to http://anuja-anderson.info/ Enter H494 in the search box to learn more about \"Avoiding Triggers With Heart Failure: Care Instructions. \" 
© 0499-1608 [employer]wise, [employer]. Care instructions adapted under license by [employer] (which disclaims liability or warranty for this information). This care instruction is for use with your licensed healthcare professional. If you have questions about a medical condition or this instruction, always ask your healthcare professional. [address] any warranty or liability for your use of this information. Content Version: 63.2.467394; Current as of: January 27, 2016 (modified 10/10/16). Discharge Instructions Attachments/References CORONARY ANGIOGRAM: POST-OP (ENGLISH) MEFS - CARVEDILOL (COREG, COREG CR, HYPERTENEVIDE-12.5) - (BY MOUTH) (ENGLISH) MEFS - BUMETANIDE (BUMEX) - (BY MOUTH) (ENGLISH) MEFS - VALSARTAN (DIOVAN) - (BY MOUTH) (ENGLISH) Discharge Orders None [facility] Announcement We are excited to announce that we are making your provider's discharge notes available to you in Intelipost. You will see these notes when they are completed and signed by the physician that discharged you from your recent hospital stay. If you have any questions or concerns about any information you see in Intelipost, please call the Health Information Department where you were seen or reach out to your Primary Care Provider for more information about your plan of care. Introducing [facility] [place] [facility]! [employer] introduces Intelipost patient portal. Now you can access parts of your medical record, email your doctor's office, and request medication refills online. 1. In your internet browser, go to [email or web address] 2. Click on the First Time User? Click Here link in the Sign In box. You will see the New Member Sign Up page. 3. Enter your Intelipost Access Code exactly as it appears below. You will not need to use this code after youve completed the sign-up process. If you do not sign up before the expiration date, you must request a new code. · Intelipost Access Code: [email or web address] Expires: 8/21/2017 11:08 AM 
 
4. Enter the last four digits of your Social Security Number (xxxx) and Date of Birth (mm/dd/yyyy) as indicated and click Submit. You will be taken to the next sign-up page. 5. Create a Kalibrrt ID. This will be your Intelipost login ID and cannot be changed, so think of one that is secure and easy to remember. 6. Create a "[employer]" password. You can change your password at any time. 7. Enter your Password Reset Question and Answer. This can be used at a later time if you forget your password. 8. Enter your e-mail address. You will receive e-mail notification when new information is available in [address] 9. Click Sign Up. You can now view and download portions of your medical record. 10. Click the Download Summary menu link to download a portable copy of your medical information. If you have questions, please visit the Frequently Asked Questions section of the "[employer]" website. Remember, "[employer]" is NOT to be used for urgent needs. For medical emergencies, dial 911. Now available from your iPhone and Android! General Information Please provide this summary of care documentation to your next provider. Patient Signature:  ____________________________________________________________ Date:  ____________________________________________________________  
  
Wendy Siddiqi Provider Signature:  ____________________________________________________________ Date:  ____________________________________________________________ More Information Coronary Angiogram: What to Expect at [facility] A coronary angiogram is a test to examine the large blood vessel of your heart (coronary artery). The doctor inserted a thin, flexible tube (catheter) into a blood vessel in your groin. In some cases, the catheter is placed in a blood vessel in the arm. Your groin or arm may have a bruise and feel sore for a day or two after a coronary angiogram. You can do light activities around the house but nothing strenuous for several days. This care sheet gives you a general idea about how long it will take for you to recover. But each person recovers at a different pace. Follow the steps below to feel better as quickly as possible. How can you care for yourself at home? Activity · Do not do strenuous exercise and do not lift, pull, or push anything heavy until your doctor says it is okay. This may be for a day or two. You can walk around the house and do light activity, such as cooking. · You may shower 24 to 48 hours after the procedure, if your doctor okays it. Pat the incision dry. Do not take a bath for 1 week, or until your doctor tells you it is okay. · If the catheter was placed in your groin, try not to walk up stairs for the first couple of days. · If the catheter was placed in your arm near your wrist, do not bend your wrist deeply for the first couple of days. Be careful using your hand to get into and out of a chair or bed. · If your doctor recommends it, get more exercise. Walking is a good choice. Bit by bit, increase the amount you walk every day. Try for at least 30 minutes on most days of the week. Diet · Drink plenty of fluids to help your body flush out the dye. If you have kidney, heart, or liver disease and have to limit fluids, talk with your doctor before you increase the amount of fluids you drink. · Keep eating a heart-healthy diet that has lots of fruits, vegetables, and whole grains. If you have not been eating this way, talk to your doctor. You also may want to talk to a dietitian. This expert can help you to learn about healthy foods and plan meals. Medicines · Your doctor will tell you if and when you can restart your medicines. He or she will also give you instructions about taking any new medicines. · If you take blood thinners, such as warfarin (Coumadin), clopidogrel (Plavix), or aspirin, be sure to talk to your doctor. He or she will tell you if and when to start taking those medicines again. Make sure that you understand exactly what your doctor wants you to do. · Your doctor may prescribe a blood-thinning medicine like aspirin or clopidogrel (Plavix).  It is very important that you take these medicines exactly as directed in order to keep the coronary artery open and reduce your risk of a heart attack. Be safe with medicines. Call your doctor if you think you are having a problem with your medicine. Care of the catheter site · For the first 3 days, keep a bandage over the spot where the catheter was inserted. · Put ice or a cold pack on the area for 10 to 20 minutes at a time to help with soreness or swelling. Put a thin cloth between the ice and your skin. Follow-up care is a key part of your treatment and safety. Be sure to make and go to all appointments, and call your doctor if you are having problems. It's also a good idea to know your test results and keep a list of the medicines you take. When should you call for help? Call 911 anytime you think you may need emergency care. For example, call if: 
· You passed out (lost consciousness). · You have severe trouble breathing. · You have sudden chest pain and shortness of breath, or you cough up blood. · You have symptoms of a heart attack. These may include: ¨ Chest pain or pressure, or a strange feeling in the chest. 
¨ Sweating. ¨ Shortness of breath. ¨ Nausea or vomiting. ¨ Pain, pressure, or a strange feeling in the back, neck, jaw, or upper belly, or in one or both shoulders or arms. ¨ Lightheadedness or sudden weakness. ¨ A fast or irregular heartbeat. After you call 911, the  may tel you to chew 1 adult-strength or 2 to 4 low-dose aspirin. Wait for an ambulance. Do not try to drive yourself. · You have been diagnosed with angina, and you have symptoms that do not go away with rest or are not getting better within 5 minutes after you take a dose of nitroglycerin. Call your doctor now or seek immediate medical care if: 
· You are bleeding from the area where the catheter was put in your artery. · You have a fast-growing, painful lump at the catheter site. · You have signs of infection, such as: ¨ Increased pain, swelling, warmth, or redness. ¨ Red streaks leading from the catheter site. ¨ Pus draining from the catheter site. ¨ A fever. · Your leg or arm looks blue or feels cold, numb, or tingly. Watch closely for changes in your health, and be sure to contact your doctor if you have any problems. Where can you learn more? Go to http://anuja-anderson.info/. Enter Y214 in the search box to learn more about \"Coronary Angiogram: What to Expect at Home. \" Current as of: January 27, 2016 Content Version: 11.2 © 3256-4730 [employer]. Care instructions adapted under license by [employer] (which disclaims liability or warranty for this information). If you have questions about a medical condition or this instruction, always ask your healthcare professional. [address] any warranty or liability for your use of this information. Carvedilol (Coreg, Coreg CR, Hypertenevide-12.5) - (By mouth) Why this medicine is used:  
Treats high blood pressure and heart failure. Contact a nurse or doctor right away if you have: 
· Change in how much or how often you urinate · Leg pain when you walk, legs and feet that feel cold or numb · Lightheadedness, dizziness, fainting · Rapid weight gain, swelling in your hands, ankles, or feet Common side effects: · Mild dizziness · Tiredness · Trouble having sex · Diarrhea © 2017 [address] Information is for End User's use only and may not be sold, redistributed or otherwise used for commercial purposes. Bumetanide (Bumex) - (By mouth) Why this medicine is used:  
Treats edema (fluid retention) and high blood pressure. Contact a nurse or doctor right away if you have: · Blistering, peeling, red skin rash · Lightheadedness, fainting · Dry mouth, increased thirst, problems urinating · Nausea or vomiting · Hearing loss, ringing in the ears Common side effects: · Muscle cramps © 2017 [facility] Information is for End User's use only and may not be sold, redistributed or otherwise used for commercial purposes. Valsartan (Diovan) - (By mouth) Why this medicine is used:  
Treats high blood pressure and heart failure. Contact a nurse or doctor right away if you have: 
· Change in how much or how often you urinate · Lightheadedness, dizziness, fainting Common side effects: 
· Diarrhea 
· Headache © 2017 [facility] Information is for End User's use only and may not be sold, redistributed or otherwise used for commercial purposes.

## 2017-05-22 NOTE — ED TRIAGE NOTES
Shortness of breath, hx of CHF, work of breathing with exertion. Also c/o chest pain that began this morning.

## 2017-05-22 NOTE — ED NOTES
TRANSFER - OUT REPORT:    Verbal report given to [facility] RN(name) on [facility].  being transferred to [facility] (unit) for routine progression of care       Report consisted of patients Situation, Background, Assessment and   Recommendations(SBAR). Information from the following report(s) SBAR, ED Summary, [facility] and Recent Results was reviewed with the receiving nurse. Lines:   Peripheral IV 05/22/17 Right Forearm (Active)   Site Assessment Clean, dry, & intact 5/22/2017 11:25 AM   Phlebitis Assessment 0 5/22/2017 11:25 AM   Infiltration Assessment 0 5/22/2017 11:25 AM   Dressing Status Clean, dry, & intact 5/22/2017 11:25 AM   Dressing Type Tape;Transparent 5/22/2017 11:25 AM   Hub Color/Line Status Pink;Flushed;Patent 5/22/2017 11:25 AM   Action Taken Blood drawn 5/22/2017 11:25 AM        Opportunity for questions and clarification was provided.       Patient transported with:   [employer]

## 2017-05-22 NOTE — ED NOTES
In room to medicate patient with heparin, noted that patient is eating a tray of food. Blood glucose checked and is 244, medicated with mealtime insulin per orders at 3 units.

## 2017-05-22 NOTE — CARDIO/PULMONARY
[facility]:  5/22/2017 @1800:  Received cardiac rehab consult for CHF education. Will f/u.  5/23/2017 @ 1125:   Heart Failure education folder given to Saumya Ocampo. Prior to admission cardiac meds include:  Lisinopril,  mg, Lasix, Crestor, potassium CL. Educated using teach back method. Discussed diagnosis definition of sHF with LVEF of 35% and assessed patient understanding. Pt was aware of need to following low Na diet, weigh daily and take meds however he was not clear why. Discussed importance of right/left cardiac cath to f/u with heart function however pt will need to eliminate more fluid before cath can be done. Reviewed importance of daily weight monitoring. Pt reported he has not been weighing daily and has gain \"lots of weight\" (50 lbs) over short time. He reported being \"very SOB. \"  Discussed importance of daily weight to monitor for fluid volume and calling MD with 3 lb weight gain overnight or 5-7 lbs in a week. Pt reported he did not know to do that. Also discussed reported s/s to MD for f/u if needed. Discussed heart healthy/low sodium diet (less than 1500 mg. daily). Pt reported he eats 2 meals per day mostly because he does not like to cook. (hgb A1c-8.9; wt-354 lbs; BMI-52.3). His wife is presently in [facility] and she usually does th e cooking. Encourage 3-4 small meals daily with proper PRO/fat/CHO balance as well as low Na. Encouraged activity and rest periods within symptom limitations and as ordered by physician. Reviewed Bumex and Diovan, purpose of medication, potential side effects and what to do if dose is missed. Also discussed poss starting of Entresto upon D/C. Discussed stopping lisinopril and Lasix for new meds. Discussed importance of reporting signs and symptoms of exacerbation and when to report them to the doctor to prevent re-hospitalization. Saumya Ocampo. was encouraged to keep all appointments with doctor.     Discussed ability to obtain prescription meds and encouraged conversations with physician if unable to do so. Smoking history assessed. Pt is a former smoker, quit 1991. HF teach back questions answered by patient. Kyleigh Curran. could benefit from further education on the following HF topics. Cardiac cath procedure, heart health/low Na diet, daily weights. 5/25/2017 @ 1055: Et with pt sittng up in chair on [facility]. Pt is for d/c today, later as his dgt will be transporting him home. Purpose of visit was to f/u with CHF goals, discuss post-cath instruction and answer any questions. Discussed pt's understanding of procedure  Pt reported \"everything good no problem. \"    Explained cardiac cath results with native artery occlusion and 2/2 SVG patent. Reviewed post cath instructions via right femoral site, with emphasis on temporary restrictions, signs/symptoms of infection, f/u with MD, what to do if bleeding occurs, and importance of taking all meds as prescribed. Reviewed CHF goals. Pt has peanut butter, and alberto crackers at bedside and was good use of label reading and proper adherence to low Na diet management. Reviewed Na content in both produces and need for more nutritious snacking. Suggested carrot, celery, humus, supplement drink such as Glucerna. Reinforced need for daily weighs and recording to monitor for fluid volume. Pt reported awareness of his meds and stated he has \"way of organizing my meds. \"  Reviewed new d/c meds - Bumex, Diovan and Coreg - purpose and side effects. Pt reported wife is still in [facility] and hopefully will come home tomorrow. Spoke with ART Suazo and pt will need HH since both he and wife have been hospitalized (especially need to monitoring right femoral site post-cath). Dgt and granddgt are available but working and school in the day time. Pt verbalized understanding of information provided and all questions answered.

## 2017-05-22 NOTE — ED PROVIDER NOTES
HPI Comments: 80 y.o. male with past medical history significant for CHF, morbid obesity, diabetes, prostate cancer, CAD, and GERD who presents with chief complaint of SOB. Earlier this morning, patient began developing increased SOB. He especially complains of TALLEY, noting SOB, nausea, and lightheadedness after walking ~15 feet. Patient reports h/o chronic leg swelling secondary to h/o CHF. Patient has noticed increased leg swelling today. Patient denies being on any chronic O2 at home. Patient mentions h/o cardiac bypass several years ago. Patient denies any chest pain or LOC. There are no other acute medical concerns at this time. Social hx: former tobacco smoker; +EtOH (rare)  PCP: Mikhail Street MD  Cardiologist: Christopher Hooker MD    Note written by Abhishek Schulz, as dictated by Stephanie Orellana MD 11:14 AM    The history is provided by the patient. Past Medical History:   Diagnosis Date    CAD (coronary artery disease)     Cancer ([facility]     PROSTATE    CHF (congestive heart failure) ([facility] 2013    Diabetes ([facility]     5yrs    GERD (gastroesophageal reflux disease)     Morbid obesity (HCC)     Prostate cancer ([facility]        Past Surgical History:   Procedure Laterality Date    HX CORONARY ARTERY BYPASS GRAFT      HX HEART CATHETERIZATION      HX ORTHOPAEDIC      right wrist carpal tunnel repair         Family History:   Problem Relation Age of Onset    Heart Disease Father     Diabetes Father     Cancer Sister      BREAST       Social History     Social History    Marital status:      Spouse name: N/A    Number of children: N/A    Years of education: N/A     Occupational History    Not on file.      Social History Main Topics    Smoking status: Former Smoker     Packs/day: 1.00     Years: 7.00     Quit date: 11/8/1991    Smokeless tobacco: Never Used    Alcohol use Yes      Comment: VERY RARE    Drug use: No    Sexual activity: Not on file     Other Topics Concern    Not on file     Social History Narrative         ALLERGIES: Review of patient's allergies indicates no known allergies. Review of Systems   Constitutional: Negative for fever. Eyes: Negative for visual disturbance. Respiratory: Positive for shortness of breath. Negative for cough and wheezing. Cardiovascular: Positive for leg swelling. Negative for chest pain. Gastrointestinal: Positive for nausea. Negative for abdominal pain, diarrhea and vomiting. Genitourinary: Negative for dysuria. Musculoskeletal: Negative. Negative for back pain and neck stiffness. Skin: Negative for rash. Neurological: Positive for light-headedness. Negative for syncope and headaches. Psychiatric/Behavioral: Negative for confusion. All other systems reviewed and are negative. Vitals:    05/22/17 1100 05/22/17 1125 05/22/17 1200   BP: 174/80 157/73 178/89   Pulse: 80 71 69   Resp: 22 15 20   Temp: 97.8 °F (36.6 °C)     SpO2: 92% 96% 96%   Weight: (!) 165.6 kg (365 lb)     Height: 5' 9\" (1.753 m)              Physical Exam   Constitutional: He appears well-developed and well-nourished. No distress. Obese. HENT:   Head: Normocephalic. Eyes: Pupils are equal, round, and reactive to light. Neck: Normal range of motion. Cardiovascular: Normal rate and regular rhythm. No murmur heard. Pulmonary/Chest: He is in respiratory distress (mild). He has rales. Abdominal: Soft. There is no tenderness. Musculoskeletal: Normal range of motion. He exhibits edema. 3+ leg edema. Neurological: He is alert. He has normal strength. No cranial nerve deficit. Skin: Skin is warm and dry. Psychiatric: He has a normal mood and affect. His behavior is normal.   Nursing note and vitals reviewed. Note written by Abhishek Mccabe, as dictated by Sunil Torres MD 11:14 AM    [facility]  ED Course       Procedures      ED EKG interpretation:  Rhythm: normal sinus rhythm with frequent, unifocal PVC's.  Rate (approx.): 80; ST/T wave: no acute ST changes; no acute changes when compared to ECG from 18-NOV-2016. Note written by Abhishek Rowley, as dictated by Gilberto Argueta MD 11:00 AM      12:10 PM  Discussed with patient and his daughter regarding plans for admission. They are agreeable to this plan. Will place consult out to Hospitalist service. CONSULT NOTE:  12:15 PM Gilberto Argueta MD spoke with Dr. Cristopher Gutierrez, Consult for Hospitalist.  Discussed available diagnostic tests and clinical findings. Dr. Cristopher Gutierrze will admit the patient.

## 2017-05-23 LAB
ANION GAP BLD CALC-SCNC: 7 MMOL/L (ref 5–15)
BUN SERPL-MCNC: 16 MG/DL (ref 6–20)
BUN/CREAT SERPL: 16 (ref 12–20)
CALCIUM SERPL-MCNC: 8.3 MG/DL (ref 8.5–10.1)
CHLORIDE SERPL-SCNC: 105 MMOL/L (ref 97–108)
CO2 SERPL-SCNC: 30 MMOL/L (ref 21–32)
CREAT SERPL-MCNC: 0.97 MG/DL (ref 0.7–1.3)
ERYTHROCYTE [DISTWIDTH] IN BLOOD BY AUTOMATED COUNT: 12.9 % (ref 11.5–14.5)
GLUCOSE BLD STRIP.AUTO-MCNC: 104 MG/DL (ref 65–100)
GLUCOSE BLD STRIP.AUTO-MCNC: 120 MG/DL (ref 65–100)
GLUCOSE BLD STRIP.AUTO-MCNC: 128 MG/DL (ref 65–100)
GLUCOSE BLD STRIP.AUTO-MCNC: 58 MG/DL (ref 65–100)
GLUCOSE BLD STRIP.AUTO-MCNC: 81 MG/DL (ref 65–100)
GLUCOSE SERPL-MCNC: 119 MG/DL (ref 65–100)
HCT VFR BLD AUTO: 33.9 % (ref 36.6–50.3)
HGB BLD-MCNC: 11.2 G/DL (ref 12.1–17)
MCH RBC QN AUTO: 31.8 PG (ref 26–34)
MCHC RBC AUTO-ENTMCNC: 33 G/DL (ref 30–36.5)
MCV RBC AUTO: 96.3 FL (ref 80–99)
PLATELET # BLD AUTO: 227 K/UL (ref 150–400)
POTASSIUM SERPL-SCNC: 3.4 MMOL/L (ref 3.5–5.1)
RBC # BLD AUTO: 3.52 M/UL (ref 4.1–5.7)
SERVICE CMNT-IMP: ABNORMAL
SERVICE CMNT-IMP: NORMAL
SODIUM SERPL-SCNC: 142 MMOL/L (ref 136–145)
WBC # BLD AUTO: 6.1 K/UL (ref 4.1–11.1)

## 2017-05-23 PROCEDURE — 74011250637 HC RX REV CODE- 250/637: Performed by: INTERNAL MEDICINE

## 2017-05-23 PROCEDURE — 74011250637 HC RX REV CODE- 250/637: Performed by: NURSE PRACTITIONER

## 2017-05-23 PROCEDURE — 36415 COLL VENOUS BLD VENIPUNCTURE: CPT | Performed by: INTERNAL MEDICINE

## 2017-05-23 PROCEDURE — 74011250636 HC RX REV CODE- 250/636: Performed by: INTERNAL MEDICINE

## 2017-05-23 PROCEDURE — 85027 COMPLETE CBC AUTOMATED: CPT | Performed by: INTERNAL MEDICINE

## 2017-05-23 PROCEDURE — 74011000250 HC RX REV CODE- 250: Performed by: INTERNAL MEDICINE

## 2017-05-23 PROCEDURE — 82962 GLUCOSE BLOOD TEST: CPT

## 2017-05-23 PROCEDURE — 80048 BASIC METABOLIC PNL TOTAL CA: CPT | Performed by: INTERNAL MEDICINE

## 2017-05-23 PROCEDURE — 65660000000 HC RM CCU STEPDOWN

## 2017-05-23 PROCEDURE — 74011636637 HC RX REV CODE- 636/637: Performed by: INTERNAL MEDICINE

## 2017-05-23 RX ORDER — CARVEDILOL 6.25 MG/1
6.25 TABLET ORAL 2 TIMES DAILY WITH MEALS
Status: DISCONTINUED | OUTPATIENT
Start: 2017-05-23 | End: 2017-05-25 | Stop reason: HOSPADM

## 2017-05-23 RX ORDER — POTASSIUM CHLORIDE 750 MG/1
40 TABLET, FILM COATED, EXTENDED RELEASE ORAL
Status: COMPLETED | OUTPATIENT
Start: 2017-05-23 | End: 2017-05-23

## 2017-05-23 RX ORDER — VALSARTAN 40 MG/1
40 TABLET ORAL DAILY
Status: DISCONTINUED | OUTPATIENT
Start: 2017-05-23 | End: 2017-05-25

## 2017-05-23 RX ADMIN — CARVEDILOL 6.25 MG: 6.25 TABLET, FILM COATED ORAL at 16:01

## 2017-05-23 RX ADMIN — VALSARTAN 40 MG: 40 TABLET ORAL at 21:38

## 2017-05-23 RX ADMIN — ROSUVASTATIN CALCIUM 10 MG: 10 TABLET, FILM COATED ORAL at 21:38

## 2017-05-23 RX ADMIN — HEPARIN SODIUM 5000 UNITS: 5000 INJECTION, SOLUTION INTRAVENOUS; SUBCUTANEOUS at 13:59

## 2017-05-23 RX ADMIN — BUMETANIDE 1 MG: 0.25 INJECTION INTRAMUSCULAR; INTRAVENOUS at 21:38

## 2017-05-23 RX ADMIN — Medication 10 ML: at 14:00

## 2017-05-23 RX ADMIN — POTASSIUM CHLORIDE 40 MEQ: 750 TABLET, FILM COATED, EXTENDED RELEASE ORAL at 07:57

## 2017-05-23 RX ADMIN — Medication 10 ML: at 21:40

## 2017-05-23 RX ADMIN — INSULIN DETEMIR 45 UNITS: 100 INJECTION, SOLUTION SUBCUTANEOUS at 11:27

## 2017-05-23 RX ADMIN — HEPARIN SODIUM 5000 UNITS: 5000 INJECTION, SOLUTION INTRAVENOUS; SUBCUTANEOUS at 05:15

## 2017-05-23 RX ADMIN — INSULIN DETEMIR 45 UNITS: 100 INJECTION, SOLUTION SUBCUTANEOUS at 21:38

## 2017-05-23 RX ADMIN — Medication 10 ML: at 05:15

## 2017-05-23 RX ADMIN — BUMETANIDE 1 MG: 0.25 INJECTION INTRAMUSCULAR; INTRAVENOUS at 16:00

## 2017-05-23 RX ADMIN — ASPIRIN 325 MG: 325 TABLET ORAL at 07:58

## 2017-05-23 RX ADMIN — BUMETANIDE 1 MG: 0.25 INJECTION INTRAMUSCULAR; INTRAVENOUS at 07:57

## 2017-05-23 RX ADMIN — PANTOPRAZOLE SODIUM 40 MG: 40 TABLET, DELAYED RELEASE ORAL at 07:57

## 2017-05-23 RX ADMIN — SERTRALINE 50 MG: 50 TABLET, FILM COATED ORAL at 07:58

## 2017-05-23 RX ADMIN — HEPARIN SODIUM 5000 UNITS: 5000 INJECTION, SOLUTION INTRAVENOUS; SUBCUTANEOUS at 21:39

## 2017-05-23 NOTE — PROGRESS NOTES
Bedside and Verbal shift change report given to Amando Mcdonald RN (oncoming nurse) by Thiago Parra RN (offgoing nurse). Report included the following information SBAR, Kardex, MAR and Recent Results.

## 2017-05-23 NOTE — NURSE NAVIGATOR
Chart reviewed by Heart Failure Nurse Navigator. Heart Failure database completed. Current Echos shows  EF 35% with LV dilated and hypokinesis noted. ACEi/ARB: valsartan 40 mg daily. BB: plan to begin carvedilol noted    CRT not currenlty indicated. QRS width 106 ms. NYHA Functional Class III-IV. Heart Failure Teach Back in Patient Education. Heart Failure Avoiding Triggers on Discharge Instructions.   Usual cardiologist: Dr Erika Rosales

## 2017-05-23 NOTE — DIABETES MGMT
NURSING: HYPOGLYCEMIC RISK ASSESSMENT    Dario Gan has an increased risk for hypoglycemia due to to the following conditions: past episodes of hypoglycemima    Noted glucose events: 58 on 5/23 3725    Please continue to monitor BG levels, document po intake and follow the hypoglycemia protocol for treatment. Please document treatment, rechecked value, and physician notified in progress notes. You can use the smart text \". hypoglyce\" to document each episode. Any questions please call [facility] at [phone] (pager). Thank you. Nik Palma.  Arthur Yeung, RN, BSN, MPH  Diabetes [id number] [facility]

## 2017-05-23 NOTE — PROGRESS NOTES
8093  Patient woke up diaphoretic and feeling \"off\", asked for blood sugar to be checked. Blood sugar 58, gave patient two orange juices which brought sugar up to 81. Patient received breakfast shortly after. 0730  Bedside and Verbal shift change report given to Kailash Adler (oncoming nurse) by Shaggy Barrera (offgoing nurse). Report included the following information SBAR, Kardex, Intake/Output, Accordion and Cardiac Rhythm NSR.

## 2017-05-23 NOTE — PROGRESS NOTES
NUTRITION    RECOMMENDATIONS:     1. Encourage to follow Low Salt and Diabetic diet at discharge    ASSESSMENT:   Consult received for diet education. Admitted with CHF exacerbation. Pt has had ADA diet education int the past.  Visited with pt, who states he cooks at home, eats 3 meals per day. He likes to snack on Pop crackers. I reviewed 2 Gm Na diet, encouraging pt to eat more fresh food and less pre-packaged food. Written information provided to patient. Portion Control also stressed with pt for weight control. Unsure of patient's UBW: possibly 340 lbs, noted ~30 lb weight gain past several months. Contact information provided if needed. Past Medical History:   Diagnosis Date    CAD (coronary artery disease)     Cancer ([facility]     PROSTATE    CHF (congestive heart failure) ([facility] 2013    Diabetes ([facility]     5yrs    GERD (gastroesophageal reflux disease)     Morbid obesity ([facility]     Prostate cancer ([facility]        Diet: Diabetic Consistent Carb 2 Gm NA    Abd:  Intact, obese, active bs          BM: 5/22    Skin Integrity: [x]Intact  []Other  Edema: []None [x]Other: LLE, RLE    Nutritionally Significant Medications: [x] Reviewed & Includes:Bumex, Levemir, SSI, Protonix    Labs:    Lab Results   Component Value Date/Time    Sodium 142 05/23/2017 01:45 AM    Potassium 3.4 05/23/2017 01:45 AM    Chloride 105 05/23/2017 01:45 AM    CO2 30 05/23/2017 01:45 AM    Anion gap 7 05/23/2017 01:45 AM    Glucose 119 05/23/2017 01:45 AM    BUN 16 05/23/2017 01:45 AM    Creatinine 0.97 05/23/2017 01:45 AM    Calcium 8.3 05/23/2017 01:45 AM    Magnesium 2.2 08/25/2015 12:00 AM    Phosphorus 3.3 11/26/2014 04:20 AM    Albumin 2.5 05/22/2017 11:26 AM       Anthropometrics:   Weight Source: Standing scale (comment)  Height: 5' 9\" (175.3 cm),    Body mass index is 52.42 kg/(m^2).   IBW : 72.6 kg (160 lb), % IBW (Calculated): 221.84 %, Usual Body Weight:  (?340 lbs, weight last Thursday 365 lbs),    Wt Readings from Last 5 Encounters:   05/23/17 (!) 161 kg (354 lb 15.1 oz)   11/18/16 154.9 kg (341 lb 8 oz)   03/30/16 158.2 kg (348 lb 11.2 oz)   08/06/15 (!) 160.6 kg (354 lb)   05/05/15 158 kg (348 lb 6.4 oz)       Estimated Daily Nutrition Requirements:   Weight Used: UBW (340 lbs/154 kg)  Kcals: 2200 Kcals/day Based on:[address] (x 1.2 - 500 for weight loss)  Protein: 83 g (15% of kcals)   Fluid:  (per MD)      Education & Discharge Needs:   [] Pt discussed in ID rounds     Nutrition related discharge needs addressed:     [] Supplements (on d/c instruction &/or coupons provided)    [] Tube Feedings     [x] Education DONE   []No nutrition related discharge needs at this time     Cultural, Orthodoxy and ethnic food preferences identified    [] None   [] Yes     NUTRITION DIAGNOSIS:     Overweight/obesity related to excess kcal intake  as evidenced by BMI: 52.4 9 BMI with UBW: 50.3                     Pt is at Nutrition Risk:  [x]    No Nutrition Risk Identified:  []    RD INTERVENTION / PT GOALS:     Food/Nutrient Delivery:   ,  ,  ,  ,    Nutrition Education:Initial/Brief Nutrition Education: Purpose of nutrition education (ADA/Low Salt diet),    Nutrition Counseling:    Coordination of Care:      Goal: pt will have a good understanding of ADA/Low salt diet prior to discharge    MONITORING & EVALUATION:           Behavioral-Environmental Outcomes: Readiness to change, Food/nutrition knowledge  Previous Nutrition Goals:  Previous Goal Met: N/A  Previous Recommendations:      Previous Recommendations Implemented: N/A       Lizbeth Llanos RD

## 2017-05-23 NOTE — PROGRESS NOTES
I met with patient and discussed role of case management. Pt lives with his wife in a one story home with 3 steps enter. Pt drives and is independent with all of her ADLs. Pt has prescription coverage under his insurance plan, he gets his prescriptions filled at [facility]. Pt has never had home health before. DME - pt has access to a walker, w/c and a cane. Pt's PCP is Dr. Sally Ray. No other issues or concerns at this time. Thanks Zheng Salazar MSW  Care Management Interventions  PCP Verified by CM:  Yes  MyChart Signup: No  Discharge Durable Medical Equipment: No  Physical Therapy Consult: No  Occupational Therapy Consult: No  Speech Therapy Consult: No  Current Support Network: Lives with Spouse  Confirm Follow Up Transport: Family  Plan discussed with Pt/Family/Caregiver: Yes  Discharge Location  Discharge Placement: Home

## 2017-05-23 NOTE — PROGRESS NOTES
Danilo [address]ome[address] [place] [address]  [address] [facility], [address] [place] [address]  [phone]      Medical Progress Note      NAME: Shanti Almendarez. :  1945  MRM:  [id number]    Date/Time: 2017  7:08 AM       Assessment and Plan:   1. Acute on chronic systolic CHF exacerbation (HCC) (2017)/ acute pulmonary edema. His last Ecocardiogram in  EF of 45% and echo from yesterday EF of 35%. Continue IV bumex. Monitor I/O and daily weight. Continue ACEi. Cardiology evaluation appreciated.      2. Coronary atherosclerosis of native coronary artery (2011)  S/P CABG x 2 (2011). Continue ASA.        3. Type 2 diabetes mellitus without complication ([facility] (). Continue levemir and cover with SSI. A1C 8.9. Episode of hypoglycemia this morning. Adjust insulin dose.       4. HTN. Continue lisinopril. BP stable.      5. Hyperlipidemia. On statin      6. PAF. Rate is controlled. Continue ASA.       7. Morbid obesity. Counseled on weight loss. Nutrition consult.                Subjective:     Chief Complaint:  Follow up of pt with CHF exacerbation. SOB is a little better     ROS:  (bold if positive, if negative)    SOB/  Tolerating PT  Tolerating Diet        Objective:     Last 24hrs VS reviewed since prior progress note.  Most recent are:    Visit Vitals    /53    Pulse 60    Temp 98.1 °F (36.7 °C)    Resp 20    Ht 5' 9\" (1.753 m)    Wt (!) 162.4 kg (358 lb)    SpO2 97%    BMI 52.87 kg/m2     SpO2 Readings from Last 6 Encounters:   17 97%   16 96%   16 96%   08/06/15 97%   05/05/15 94%   04/17/15 98%          Intake/Output Summary (Last 24 hours) at 17 0708  Last data filed at 17 0400   Gross per 24 hour   Intake              350 ml   Output             2650 ml   Net            -2300 ml        Physical Exam:    Gen:  Morbidly obese, in no acute distress  HEENT:  Pink conjunctivae, PERRL, hearing intact to voice, moist mucous membranes  Neck: Supple, without masses, thyroid non-tender  Resp:  No accessory muscle use,  rales BL  Card:  No murmurs, normal S1, S2 without thrills, bruits or peripheral edema  Abd:  Soft, non-tender, non-distended, normoactive bowel sounds are present, no palpable organomegaly and no detectable hernias  Lymph:  No cervical or inguinal adenopathy  Musc:  No cyanosis or clubbing  Skin:  No rashes or ulcers, skin turgor is good  Neuro:  Cranial nerves are grossly intact, no focal motor weakness, follows commands appropriately  Psych:  Good insight, oriented to person, place and time, alert  __________________________________________________________________  Medications Reviewed: (see below)  Medications:     Current Facility-Administered Medications   Medication Dose Route Frequency    potassium chloride SR (KLOR-CON 10) tablet 40 mEq  40 mEq Oral NOW    sodium chloride (NS) flush 5-10 mL  5-10 mL IntraVENous PRN    sodium chloride (NS) flush 5-10 mL  5-10 mL IntraVENous Q8H    sodium chloride (NS) flush 5-10 mL  5-10 mL IntraVENous PRN    heparin (porcine) injection 5,000 Units  5,000 Units SubCUTAneous Q8H    bumetanide (BUMEX) injection 1 mg  1 mg IntraVENous TID    insulin lispro (HUMALOG) injection   SubCUTAneous AC&HS    glucose chewable tablet 16 g  4 Tab Oral PRN    dextrose (D50W) injection syrg 12.5-25 g  12.5-25 g IntraVENous PRN    glucagon (GLUCAGEN) injection 1 mg  1 mg IntraMUSCular PRN    aspirin (ASPIRIN) tablet 325 mg  325 mg Oral DAILY    insulin detemir (LEVEMIR) injection 65 Units  65 Units SubCUTAneous Q12H    lisinopril (PRINIVIL, ZESTRIL) tablet 2.5 mg  2.5 mg Oral DAILY    pantoprazole (PROTONIX) tablet 40 mg  40 mg Oral ACB    rosuvastatin (CRESTOR) tablet 10 mg  10 mg Oral QHS    sertraline (ZOLOFT) tablet 50 mg  50 mg Oral DAILY        Lab Data Reviewed: (see below)  Lab Review:     Recent Labs      05/23/17   0145  05/22/17   1126   WBC  6.1  6.0   HGB  11.2*  11.6*   HCT  33.9*  36.4* PLT  227  228     Recent Labs      05/23/17   0145  05/22/17   1126   NA  142  140   K  3.4*  4.1   CL  105  106   CO2  30  28   GLU  119*  276*   BUN  16  16   CREA  0.97  0.88   CA  8.3*  8.3*   ALB   --   2.5*   TBILI   --   0.6   SGOT   --   19   ALT   --   20     Lab Results   Component Value Date/Time    Glucose (POC) 58 05/23/2017 06:37 AM    Glucose (POC) 89 05/22/2017 10:01 PM    Glucose (POC) 219 05/22/2017 04:35 PM    Glucose (POC) 244 05/22/2017 02:06 PM    Glucose (POC) 236 03/30/2016 11:16 AM     No results for input(s): PH, PCO2, PO2, HCO3, FIO2 in the last 72 hours. No results for input(s): INR in the last 72 hours. No lab exists for component: INREXT  All Micro Results     None          I have reviewed notes of prior 24hr. Other pertinent lab:       Total time spent with patient: Brooke 59 discussed with: Patient, Nursing Staff and >50% of time spent in counseling and coordination of care    Discussed:  Care Plan    Prophylaxis:  Hep SQ    Disposition:  Home w/Family           ___________________________________________________    Attending Physician: Vargas James MD

## 2017-05-23 NOTE — CONSULTS
Name: Kellie Camilo Hospital: [facility] Cornelia Syed   : 1945 Admit Date: 2017   Phone: [phone]  Room: [facility]/01   PCP: Andreia Riley MD  MRN: [id number]   Date: 2017  Code: Full Code        HPI:    Chart and notes reviewed. Data reviewed. I review the patient's current medications in the medical record at each encounter. I have evaluated and examined the patient. 11:59 AM       History was obtained from patient. I was asked by Maury Cantrell NP to see Kellie Camilo in consultation for a chief complaint of potential [place]. Mr. Lc Jamil is a pleasant 80year old male admitted with acute/chronic biventricular heart failure with reduced EF. He also has history of CAD s/p CABG, ICM, and HTN. He reports history of loud snoring per his wife's complaints. Also reports nocturnal gasping/choking/coughig. States he is tired/fatigued during the day. Denies previous diagnosis or eval for [place]. Denies known history of lung disease. He is a former smoker, quitting over 25 years ago. Does not use home O2, nebs, or inhalers. He has not been hypoxic this admission. CXR is personally visualized. Images limited by body habitus. There is cardiomegaly and probable pulmonary edema.     Hgb 11.2  K 3.4  proBNP 1592  TSH 2.35    17 ECHO: LV dilated; EF 35%;  technically difficult study limited by poor acoustic window    Past Medical History:   Diagnosis Date    CAD (coronary artery disease)     Cancer ([address]     PROSTATE    CHF (congestive heart failure) ([address]     Diabetes ([address]     5yrs    GERD (gastroesophageal reflux disease)     Morbid obesity ([address]     Prostate cancer ([address]        Past Surgical History:   Procedure Laterality Date    HX CORONARY ARTERY BYPASS GRAFT      HX HEART CATHETERIZATION      HX ORTHOPAEDIC      right wrist carpal tunnel repair       Family History   Problem Relation Age of Onset    Heart Disease Father     Diabetes Father    [facility] Cancer Sister      BREAST       Social History   Substance Use Topics    Smoking status: Former Smoker     Packs/day: 1.00     Years: 7.00     Quit date: 11/8/1991    Smokeless tobacco: Never Used    Alcohol use Yes      Comment: VERY RARE       No Known Allergies    Current Facility-Administered Medications   Medication Dose Route Frequency    insulin detemir (LEVEMIR) injection 45 Units  45 Units SubCUTAneous Q12H    valsartan (DIOVAN) tablet 40 mg  40 mg Oral DAILY    carvedilol (COREG) tablet 6.25 mg  6.25 mg Oral BID WITH MEALS    sodium chloride (NS) flush 5-10 mL  5-10 mL IntraVENous PRN    sodium chloride (NS) flush 5-10 mL  5-10 mL IntraVENous Q8H    sodium chloride (NS) flush 5-10 mL  5-10 mL IntraVENous PRN    heparin (porcine) injection 5,000 Units  5,000 Units SubCUTAneous Q8H    bumetanide (BUMEX) injection 1 mg  1 mg IntraVENous TID    insulin lispro (HUMALOG) injection   SubCUTAneous AC&HS    glucose chewable tablet 16 g  4 Tab Oral PRN    dextrose (D50W) injection syrg 12.5-25 g  12.5-25 g IntraVENous PRN    glucagon (GLUCAGEN) injection 1 mg  1 mg IntraMUSCular PRN    aspirin (ASPIRIN) tablet 325 mg  325 mg Oral DAILY    pantoprazole (PROTONIX) tablet 40 mg  40 mg Oral ACB    rosuvastatin (CRESTOR) tablet 10 mg  10 mg Oral QHS    sertraline (ZOLOFT) tablet 50 mg  50 mg Oral DAILY         REVIEW OF SYSTEMS   Negative except as stated in the HPI. Physical Exam:   Visit Vitals    /75    Pulse (!) 56    Temp 97.9 °F (36.6 °C)    Resp 18    Ht 5' 9\" (1.753 m)    Wt (!) 161 kg (354 lb 15.1 oz)    SpO2 100%    BMI 52.42 kg/m2   on RA    General:  Alert, cooperative, no distress, appears stated age. Head:  Normocephalic, without obvious abnormality, atraumatic. Eyes:  Conjunctivae/corneas clear. Nose: Nares normal. Septum midline. Mucosa normal.    Throat: Lips, mucosa, and tongue normal.  Class 4 airway.     Neck: Thick, supple, symmetrical, trachea midline, no adenopathy. Lungs:   Clear to auscultation bilaterally. Chest wall:  No tenderness or deformity. Heart:  Regular rate and rhythm, S1, S2 normal, no murmur, click, rub or gallop. Abdomen: Morbidly obese, soft, non-tender. Bowel sounds normal. No masses,  No organomegaly. Extremities: Extremities normal, atraumatic, no cyanosis, 2+ LE edema. Pulses: 2+ and symmetric all extremities. Skin: Skin color, texture, turgor normal. No rashes or lesions   Lymph nodes: Cervical, supraclavicular nodes normal.   Neurologic: Grossly nonfocal       Lab Results   Component Value Date/Time    Sodium 142 05/23/2017 01:45 AM    Potassium 3.4 05/23/2017 01:45 AM    Chloride 105 05/23/2017 01:45 AM    CO2 30 05/23/2017 01:45 AM    BUN 16 05/23/2017 01:45 AM    Creatinine 0.97 05/23/2017 01:45 AM    Glucose 119 05/23/2017 01:45 AM    Calcium 8.3 05/23/2017 01:45 AM    Magnesium 2.2 08/25/2015 12:00 AM    Phosphorus 3.3 11/26/2014 04:20 AM    Lactic acid 1.6 03/25/2016 12:30 AM       Lab Results   Component Value Date/Time    WBC 6.1 05/23/2017 01:45 AM    HGB 11.2 05/23/2017 01:45 AM    PLATELET 924 07/55/5344 01:45 AM    MCV 96.3 05/23/2017 01:45 AM       Lab Results   Component Value Date/Time    INR 1.1 12/14/2011 04:00 AM    aPTT 29.6 12/14/2011 04:00 AM    AST (SGOT) 19 05/22/2017 11:26 AM    Alk.  phosphatase 72 05/22/2017 11:26 AM    Protein, total 7.7 05/22/2017 11:26 AM    Albumin 2.5 05/22/2017 11:26 AM    Globulin 5.2 05/22/2017 11:26 AM       Lab Results   Component Value Date/Time    Ferritin 305 11/09/2011 10:56 AM       Lab Results   Component Value Date/Time    C-Reactive protein 16.28 03/27/2016 11:53 AM    TSH 2.35 05/22/2017 11:26 AM        No results found for: PH, PHI, PCO2, PCO2I, PO2, PO2I, HCO3, HCO3I, FIO2, FIO2I    Lab Results   Component Value Date/Time     11/25/2014 10:00 PM    CK-MB Index 0.8 11/25/2014 10:00 PM    Troponin-I, Qt. 0.04 05/22/2017 11:26 AM    BNP 28 11/13/2011 02:47 AM Lab Results   Component Value Date/Time    Culture result: MRSA NOT PRESENT 03/25/2016 06:55 AM    Culture result:  03/25/2016 06:55 AM         Screening of patient nares for MRSA is for surveillance purposes and, if positive, to facilitate isolation considerations in high risk settings. It is not intended for automatic decolonization interventions per se as regimens are not sufficiently effective to warrant routine use. Culture result: NO GROWTH 6 DAYS 03/25/2016 12:30 AM       No results found for: TOXA1, RPR, HBCM, HBSAG, HAAB, HCAB, HCAB1, HAAT, G6PD, CRYAC, HIVGT, HIVR, HIV1, HIV12, HIVPC, HIVRPI    Lab Results   Component Value Date/Time     11/25/2014 10:00 PM     11/25/2014 03:42 PM       Lab Results   Component Value Date/Time    Color YELLOW/STRAW 03/27/2016 03:24 AM    Appearance CLEAR 03/27/2016 03:24 AM    pH (UA) 6.0 03/27/2016 03:24 AM    Protein 30 03/27/2016 03:24 AM    Glucose NEGATIVE  03/27/2016 03:24 AM    Ketone NEGATIVE  03/27/2016 03:24 AM    Bilirubin NEGATIVE  03/27/2016 03:24 AM    Blood SMALL 03/27/2016 03:24 AM    Urobilinogen 1.0 03/27/2016 03:24 AM    Nitrites NEGATIVE  03/27/2016 03:24 AM    Leukocyte Esterase NEGATIVE  03/27/2016 03:24 AM    WBC 0-4 03/27/2016 03:24 AM    RBC 5-10 03/27/2016 03:24 AM    Bacteria NEGATIVE  03/27/2016 03:24 AM       IMPRESSION  · Acute/chronic biventricular heart failure with reduced EF  · Snoring with nocturnal gasping  · Daytime somnolence  · Morbid obesity  · CAD s/p CABG  · ICM  · HTN    PLAN  · Overnight oximetry ordered for tonight  · 6 MWT ordered  · Patient would benefit from outpatient sleep evaluation with PSG; discussed and patient agreeable  · Diuresis with Bumex per cardiology  · Bblocker and ARB  · Levemir and SSI  · GI prophylaxis: Protonix  · DVT prophylaxis: sub q heparin      Thank you for allowing us to participate in the care of this patient. We will be happy to follow along in his progress with you.     Christal Quigley Ayala Guevara, PA

## 2017-05-23 NOTE — PROGRESS NOTES
Cardiology Progress Note       [facility]  NAME:  Dane Rao. :   1945   MRN:   [id number]     Assessment/Plan:   1. A/C BiVHFrEF  - good response to IV  loops- would cont TID  - ECHO show worsening LV function 45% > 35%. - 6 MWT prior to discharge.   - may need Life Vest at discharge  - close OP follow up   2. CAD/CABG  - needs CAD eval-  need R/L heart cath - hesitant to proceed as worried about pain. He will need more diuresis before he will be able to lie flat. - ASA, statin, BB  3. ICM  - cont coreg  - changed ACE-I > ARB, allow 36 hour washout then start Entresto  - daily wts, strict I& O, cardiac rehab ed, 2 G NA diet   4. HTN  - BB, ARB  5. Suspected [place]  - pulm eval   - overnight oximetry  6. XOL- statin   7. Dysphagia- -per attending  8. Anemia:     Cardiology Attending:    Patient personally seen and examined. All the elements of history and examination were personally performed. Assessment and plan was discussed and agree as written above. - Discussed Cath and he is willing to proceed. Will likely be able to undergo tomorrow or Thursday if he is able to lie flat. Will keep NPO PMN to evaluate in am and see if he can proceed. Marcell Dimas MD, [facility]         Subjective:   Dane Rao. is a 80 y.o.  male PMH significant for CAB/CABG () cx by HTN w/ ICM (EF previously 35% improved to 45-50%) , chronic BiVHF, untx [place], T2DM (neuropathy), morbid obesity, anemia, GERD, who PW 2-3 week hx worsening TALLEY/SOB, worsening LE edema, PND, and exertional chest tightness. He reports a 30# wt gain over the past several months. He has chronic class 2-3 dyspnea which has progressed to class 3-4. Some nausea, fatigue, poor appetite, early satiety.  Denies home O2 or [place] eval. compliant w/ meds - takes lasix 40 mg BID, and has not taken any additional diuretics.         He is generally sedentary and admits to not taking care of himself while his wife has been ill. He ate [employer] fried chicken yesterday. She is currently in [facility] for rehab. He last saw Dr Abe Blancas in 2016.          Cardiac ROS: Patient denies any exertional chest pain, dyspnea, palpitations, syncope, orthopnea, edema or paroxysmal nocturnal dyspnea. Previous Cardiac Eval:  ECHO 2011 LVEF 35-40%, LV- mod dilated, mod HK (basal-mid inferior/  basal-mid inferolateral walls) RV-dilated; mild LAE  ECHO:2012-LVEF 50%, DD, Mild RV dysfuction. ECHO:14: TDS, - EF 45-50%. LVH, RV mild dilated - reduced RVEF   ECHO: 17 ECHO: LVEF 35%     CATH : pLAD: 95%-> PCi-> 80% residual stenosis     CAB-  V, LIMA -> LAD    Review of Systems: No nausea, indigestion, vomiting, pain, cough, sputum. No bleeding. Taking po. OOB in room. Objective:     Visit Vitals    /70    Pulse 72    Temp 98 °F (36.7 °C)    Resp 20    Ht 5' 9\" (1.753 m)    Wt (!) 354 lb 15.1 oz (161 kg)    SpO2 95%    BMI 52.42 kg/m2      O2 Device: Room air    Temp (24hrs), Av.9 °F (36.6 °C), Min:97.6 °F (36.4 °C), Max:98.1 °F (36.7 °C)      701 - 1900  In: 300 [P.O.:300]  Out: -     1901 -  0700  In: 350 [P.O.:350]  Out: 2650 [Urine:2650]     TELE: SR PVC's     General: AAOx3 cooperative, no acute distress. HEENT: Atraumatic. Pink and moist.  Anicteric sclerae. Neck : Supple, no thyromegaly. Lungs: CTA bilaterally. No wheezing/rhonchi/rales. Heart: Regular rhythm, Dim heart sounds  no murmur, no rubs, no gallops. + JVD. No carotid bruits. Abdomen: morbidly obese, soft, non-distended, non-tender. + Bowel sounds. No bruits. Extremities: 2-3 + LE edema, no clubbing, no cyanosis. No calf tenderness  Neurologic: Grossly intact. Alert and oriented X 3. No acute neurological distress. Psych: Limited insight. Not anxious or agitated.         Care Plan discussed with:    Comments   Patient x    Family      RN x    Care Manager                    Consultant: Data Review:     No lab exists for component: ITNL   Recent Labs      05/22/17   1126   TROIQ  0.04     Recent Labs      05/23/17   0145  05/22/17   1126   NA  142  140   K  3.4*  4.1   CL  105  106   CO2  30  28   BUN  16  16   CREA  0.97  0.88   GLU  119*  276*   ALB   --   2.5*   WBC  6.1  6.0   HGB  11.2*  11.6*   HCT  33.9*  36.4*   PLT  227  228     No results for input(s): INR, PTP, APTT in the last 72 hours.     No lab exists for component: INREXT    Medications reviewed  Current Facility-Administered Medications   Medication Dose Route Frequency    insulin detemir (LEVEMIR) injection 45 Units  45 Units SubCUTAneous Q12H    valsartan (DIOVAN) tablet 40 mg  40 mg Oral DAILY    sodium chloride (NS) flush 5-10 mL  5-10 mL IntraVENous PRN    sodium chloride (NS) flush 5-10 mL  5-10 mL IntraVENous Q8H    sodium chloride (NS) flush 5-10 mL  5-10 mL IntraVENous PRN    heparin (porcine) injection 5,000 Units  5,000 Units SubCUTAneous Q8H    bumetanide (BUMEX) injection 1 mg  1 mg IntraVENous TID    insulin lispro (HUMALOG) injection   SubCUTAneous AC&HS    glucose chewable tablet 16 g  4 Tab Oral PRN    dextrose (D50W) injection syrg 12.5-25 g  12.5-25 g IntraVENous PRN    glucagon (GLUCAGEN) injection 1 mg  1 mg IntraMUSCular PRN    aspirin (ASPIRIN) tablet 325 mg  325 mg Oral DAILY    pantoprazole (PROTONIX) tablet 40 mg  40 mg Oral ACB    rosuvastatin (CRESTOR) tablet 10 mg  10 mg Oral QHS    sertraline (ZOLOFT) tablet 50 mg  50 mg Oral DAILY         Patria Osman NP

## 2017-05-24 PROBLEM — I50.23 SYSTOLIC CHF, ACUTE ON CHRONIC (HCC): Status: ACTIVE | Noted: 2017-05-24

## 2017-05-24 LAB
ANION GAP BLD CALC-SCNC: 6 MMOL/L (ref 5–15)
BASOPHILS # BLD AUTO: 0 K/UL (ref 0–0.1)
BASOPHILS # BLD: 0 % (ref 0–1)
BUN SERPL-MCNC: 22 MG/DL (ref 6–20)
BUN/CREAT SERPL: 20 (ref 12–20)
CALCIUM SERPL-MCNC: 8.3 MG/DL (ref 8.5–10.1)
CHLORIDE SERPL-SCNC: 103 MMOL/L (ref 97–108)
CO2 SERPL-SCNC: 31 MMOL/L (ref 21–32)
CREAT SERPL-MCNC: 1.09 MG/DL (ref 0.7–1.3)
EOSINOPHIL # BLD: 0.2 K/UL (ref 0–0.4)
EOSINOPHIL NFR BLD: 3 % (ref 0–7)
ERYTHROCYTE [DISTWIDTH] IN BLOOD BY AUTOMATED COUNT: 12.8 % (ref 11.5–14.5)
GLUCOSE BLD STRIP.AUTO-MCNC: 100 MG/DL (ref 65–100)
GLUCOSE BLD STRIP.AUTO-MCNC: 124 MG/DL (ref 65–100)
GLUCOSE BLD STRIP.AUTO-MCNC: 207 MG/DL (ref 65–100)
GLUCOSE BLD STRIP.AUTO-MCNC: 53 MG/DL (ref 65–100)
GLUCOSE BLD STRIP.AUTO-MCNC: 60 MG/DL (ref 65–100)
GLUCOSE BLD STRIP.AUTO-MCNC: 64 MG/DL (ref 65–100)
GLUCOSE BLD STRIP.AUTO-MCNC: 72 MG/DL (ref 65–100)
GLUCOSE BLD STRIP.AUTO-MCNC: 84 MG/DL (ref 65–100)
GLUCOSE SERPL-MCNC: 123 MG/DL (ref 65–100)
HCT VFR BLD AUTO: 36.4 % (ref 36.6–50.3)
HGB BLD-MCNC: 11.9 G/DL (ref 12.1–17)
LYMPHOCYTES # BLD AUTO: 34 % (ref 12–49)
LYMPHOCYTES # BLD: 2 K/UL (ref 0.8–3.5)
MCH RBC QN AUTO: 31.8 PG (ref 26–34)
MCHC RBC AUTO-ENTMCNC: 32.7 G/DL (ref 30–36.5)
MCV RBC AUTO: 97.3 FL (ref 80–99)
MONOCYTES # BLD: 0.5 K/UL (ref 0–1)
MONOCYTES NFR BLD AUTO: 9 % (ref 5–13)
NEUTS SEG # BLD: 3.1 K/UL (ref 1.8–8)
NEUTS SEG NFR BLD AUTO: 54 % (ref 32–75)
PLATELET # BLD AUTO: 230 K/UL (ref 150–400)
POTASSIUM SERPL-SCNC: 3.5 MMOL/L (ref 3.5–5.1)
RBC # BLD AUTO: 3.74 M/UL (ref 4.1–5.7)
SERVICE CMNT-IMP: ABNORMAL
SERVICE CMNT-IMP: NORMAL
SODIUM SERPL-SCNC: 140 MMOL/L (ref 136–145)
WBC # BLD AUTO: 5.8 K/UL (ref 4.1–11.1)

## 2017-05-24 PROCEDURE — 74011636637 HC RX REV CODE- 636/637: Performed by: INTERNAL MEDICINE

## 2017-05-24 PROCEDURE — C1760 CLOSURE DEV, VASC: HCPCS

## 2017-05-24 PROCEDURE — 82962 GLUCOSE BLOOD TEST: CPT

## 2017-05-24 PROCEDURE — 74011250636 HC RX REV CODE- 250/636: Performed by: INTERNAL MEDICINE

## 2017-05-24 PROCEDURE — 74011250637 HC RX REV CODE- 250/637: Performed by: NURSE PRACTITIONER

## 2017-05-24 PROCEDURE — 74011636320 HC RX REV CODE- 636/320: Performed by: SPECIALIST

## 2017-05-24 PROCEDURE — 74011250637 HC RX REV CODE- 250/637: Performed by: INTERNAL MEDICINE

## 2017-05-24 PROCEDURE — 74011250636 HC RX REV CODE- 250/636: Performed by: SPECIALIST

## 2017-05-24 PROCEDURE — 74011000250 HC RX REV CODE- 250: Performed by: SPECIALIST

## 2017-05-24 PROCEDURE — 77030004532 HC CATH ANGI DX IMP BSC -A

## 2017-05-24 PROCEDURE — 36415 COLL VENOUS BLD VENIPUNCTURE: CPT | Performed by: INTERNAL MEDICINE

## 2017-05-24 PROCEDURE — 74011000250 HC RX REV CODE- 250: Performed by: INTERNAL MEDICINE

## 2017-05-24 PROCEDURE — 4A023N8 MEASUREMENT OF CARDIAC SAMPLING AND PRESSURE, BILATERAL, PERCUTANEOUS APPROACH: ICD-10-PCS | Performed by: SPECIALIST

## 2017-05-24 PROCEDURE — 85025 COMPLETE CBC W/AUTO DIFF WBC: CPT | Performed by: INTERNAL MEDICINE

## 2017-05-24 PROCEDURE — B2111ZZ FLUOROSCOPY OF MULTIPLE CORONARY ARTERIES USING LOW OSMOLAR CONTRAST: ICD-10-PCS | Performed by: SPECIALIST

## 2017-05-24 PROCEDURE — 93459 L HRT ART/GRFT ANGIO: CPT

## 2017-05-24 PROCEDURE — 65660000000 HC RM CCU STEPDOWN

## 2017-05-24 PROCEDURE — C1894 INTRO/SHEATH, NON-LASER: HCPCS

## 2017-05-24 PROCEDURE — B2131ZZ FLUOROSCOPY OF MULTIPLE CORONARY ARTERY BYPASS GRAFTS USING LOW OSMOLAR CONTRAST: ICD-10-PCS | Performed by: SPECIALIST

## 2017-05-24 PROCEDURE — 80048 BASIC METABOLIC PNL TOTAL CA: CPT | Performed by: INTERNAL MEDICINE

## 2017-05-24 PROCEDURE — 77030028837 HC SYR ANGI PWR INJ COEU -A

## 2017-05-24 RX ORDER — FAMOTIDINE 10 MG/ML
20 INJECTION INTRAVENOUS
Status: DISCONTINUED | OUTPATIENT
Start: 2017-05-24 | End: 2017-05-25 | Stop reason: HOSPADM

## 2017-05-24 RX ORDER — HYDROCORTISONE SODIUM SUCCINATE 100 MG/2ML
100 INJECTION, POWDER, FOR SOLUTION INTRAMUSCULAR; INTRAVENOUS
Status: DISCONTINUED | OUTPATIENT
Start: 2017-05-24 | End: 2017-05-25 | Stop reason: HOSPADM

## 2017-05-24 RX ORDER — FENTANYL CITRATE 50 UG/ML
25-200 INJECTION, SOLUTION INTRAMUSCULAR; INTRAVENOUS
Status: DISCONTINUED | OUTPATIENT
Start: 2017-05-24 | End: 2017-05-24 | Stop reason: HOSPADM

## 2017-05-24 RX ORDER — LIDOCAINE HYDROCHLORIDE 10 MG/ML
10-30 INJECTION INFILTRATION; PERINEURAL
Status: DISCONTINUED | OUTPATIENT
Start: 2017-05-24 | End: 2017-05-24 | Stop reason: HOSPADM

## 2017-05-24 RX ORDER — SODIUM CHLORIDE 0.9 % (FLUSH) 0.9 %
5-10 SYRINGE (ML) INJECTION AS NEEDED
Status: DISCONTINUED | OUTPATIENT
Start: 2017-05-24 | End: 2017-05-25 | Stop reason: SDUPTHER

## 2017-05-24 RX ORDER — SODIUM CHLORIDE 0.9 % (FLUSH) 0.9 %
5-10 SYRINGE (ML) INJECTION EVERY 8 HOURS
Status: DISCONTINUED | OUTPATIENT
Start: 2017-05-24 | End: 2017-05-25 | Stop reason: SDUPTHER

## 2017-05-24 RX ORDER — DIPHENHYDRAMINE HYDROCHLORIDE 50 MG/ML
25 INJECTION, SOLUTION INTRAMUSCULAR; INTRAVENOUS
Status: DISCONTINUED | OUTPATIENT
Start: 2017-05-24 | End: 2017-05-25 | Stop reason: HOSPADM

## 2017-05-24 RX ORDER — HEPARIN SODIUM 200 [USP'U]/100ML
500 INJECTION, SOLUTION INTRAVENOUS ONCE
Status: DISCONTINUED | OUTPATIENT
Start: 2017-05-24 | End: 2017-05-24 | Stop reason: HOSPADM

## 2017-05-24 RX ORDER — MIDAZOLAM HYDROCHLORIDE 1 MG/ML
.5-1 INJECTION, SOLUTION INTRAMUSCULAR; INTRAVENOUS
Status: DISCONTINUED | OUTPATIENT
Start: 2017-05-24 | End: 2017-05-24 | Stop reason: HOSPADM

## 2017-05-24 RX ORDER — SODIUM CHLORIDE 9 MG/ML
25 INJECTION, SOLUTION INTRAVENOUS
Status: DISPENSED | OUTPATIENT
Start: 2017-05-24 | End: 2017-05-25

## 2017-05-24 RX ORDER — SODIUM CHLORIDE 9 MG/ML
75 INJECTION, SOLUTION INTRAVENOUS CONTINUOUS
Status: DISPENSED | OUTPATIENT
Start: 2017-05-24 | End: 2017-05-24

## 2017-05-24 RX ORDER — HEPARIN SODIUM 200 [USP'U]/100ML
500 INJECTION, SOLUTION INTRAVENOUS
Status: DISCONTINUED | OUTPATIENT
Start: 2017-05-24 | End: 2017-05-24 | Stop reason: HOSPADM

## 2017-05-24 RX ADMIN — MIDAZOLAM HYDROCHLORIDE 0.5 MG: 1 INJECTION, SOLUTION INTRAMUSCULAR; INTRAVENOUS at 13:51

## 2017-05-24 RX ADMIN — ROSUVASTATIN CALCIUM 10 MG: 10 TABLET, FILM COATED ORAL at 22:37

## 2017-05-24 RX ADMIN — BUMETANIDE 1 MG: 0.25 INJECTION INTRAMUSCULAR; INTRAVENOUS at 09:35

## 2017-05-24 RX ADMIN — Medication 10 ML: at 22:48

## 2017-05-24 RX ADMIN — SERTRALINE 50 MG: 50 TABLET, FILM COATED ORAL at 09:36

## 2017-05-24 RX ADMIN — HEPARIN SODIUM 5000 UNITS: 5000 INJECTION, SOLUTION INTRAVENOUS; SUBCUTANEOUS at 22:37

## 2017-05-24 RX ADMIN — PANTOPRAZOLE SODIUM 40 MG: 40 TABLET, DELAYED RELEASE ORAL at 07:51

## 2017-05-24 RX ADMIN — ASPIRIN 325 MG: 325 TABLET ORAL at 09:36

## 2017-05-24 RX ADMIN — HEPARIN SODIUM 1000 UNITS: 200 INJECTION, SOLUTION INTRAVENOUS at 13:37

## 2017-05-24 RX ADMIN — FENTANYL CITRATE 25 MCG: 50 INJECTION, SOLUTION INTRAMUSCULAR; INTRAVENOUS at 13:54

## 2017-05-24 RX ADMIN — IOPAMIDOL 150 ML: 755 INJECTION, SOLUTION INTRAVENOUS at 14:22

## 2017-05-24 RX ADMIN — BUMETANIDE 1 MG: 0.25 INJECTION INTRAMUSCULAR; INTRAVENOUS at 16:22

## 2017-05-24 RX ADMIN — FENTANYL CITRATE 25 MCG: 50 INJECTION, SOLUTION INTRAMUSCULAR; INTRAVENOUS at 13:43

## 2017-05-24 RX ADMIN — LIDOCAINE HYDROCHLORIDE 10 ML: 10 INJECTION, SOLUTION INFILTRATION; PERINEURAL at 13:50

## 2017-05-24 RX ADMIN — INSULIN LISPRO 2 UNITS: 100 INJECTION, SOLUTION INTRAVENOUS; SUBCUTANEOUS at 22:40

## 2017-05-24 RX ADMIN — INSULIN DETEMIR 40 UNITS: 100 INJECTION, SOLUTION SUBCUTANEOUS at 22:37

## 2017-05-24 RX ADMIN — BUMETANIDE 1 MG: 0.25 INJECTION INTRAMUSCULAR; INTRAVENOUS at 22:37

## 2017-05-24 RX ADMIN — MIDAZOLAM HYDROCHLORIDE 1 MG: 1 INJECTION, SOLUTION INTRAMUSCULAR; INTRAVENOUS at 13:54

## 2017-05-24 RX ADMIN — FENTANYL CITRATE 25 MCG: 50 INJECTION, SOLUTION INTRAMUSCULAR; INTRAVENOUS at 13:51

## 2017-05-24 RX ADMIN — CARVEDILOL 6.25 MG: 6.25 TABLET, FILM COATED ORAL at 07:51

## 2017-05-24 RX ADMIN — INSULIN DETEMIR 45 UNITS: 100 INJECTION, SOLUTION SUBCUTANEOUS at 09:36

## 2017-05-24 RX ADMIN — CEFAZOLIN 3 G: 1 INJECTION, POWDER, FOR SOLUTION INTRAMUSCULAR; INTRAVENOUS; PARENTERAL at 15:34

## 2017-05-24 RX ADMIN — HEPARIN SODIUM 5000 UNITS: 5000 INJECTION, SOLUTION INTRAVENOUS; SUBCUTANEOUS at 05:47

## 2017-05-24 RX ADMIN — MIDAZOLAM HYDROCHLORIDE 0.5 MG: 1 INJECTION, SOLUTION INTRAMUSCULAR; INTRAVENOUS at 13:43

## 2017-05-24 RX ADMIN — Medication 10 ML: at 05:47

## 2017-05-24 RX ADMIN — VALSARTAN 40 MG: 40 TABLET ORAL at 22:37

## 2017-05-24 NOTE — ROUTINE PROCESS
Bedside and Verbal shift change report given to Donna Webb RN (oncoming nurse) by [employer], RN (offgoing nurse). Report included the following information SBAR, Kardex, Procedure Summary, Intake/Output, Accordion, Med Rec Status and Cardiac Rhythm NSR .

## 2017-05-24 NOTE — PROCEDURES
Cath:  Obstructive 1VD:     LAD p80, m100, dist very small vessel; D1 patent. LCx p30 (co-dom). RCA patent (co-dom). 2/2 Grafts patent     LIMA-LAD patent. SVG-D1 patent. Patent L SC. No AVG. No LV gram done due to technical issues with new room. RFA mynx.

## 2017-05-24 NOTE — PROGRESS NOTES
0700: Bedside and Verbal shift change report given to [employer] (oncoming nurse) by Maryann Santos RN (offgoing nurse). Report included the following information SBAR, Kardex, ED Summary, Intake/Output, MAR and Cardiac Rhythm Sinus rhythm. 7784: HYPOGLYCEMIC EPISODE DOCUMENTATION    Patient with hypoglycemic episode(s) at [phone] (time) on 5/24/17(date). BG value(s) pre-treatment 48    Was patient symptomatic? [] yes, [x] no  Patient was treated with the following rescue medications/treatments: [] D50                [] Glucose tablets                [] Glucagon                [x] 4oz juice                [] 6oz reg soda                [] 8oz low fat milk  BG value post-treatment: 60, 4oz juice given again, BS increased to 84. Once BG treated and value greater than 80mg/dl, pt was provided with the following:  [] snack  [x] meal          0730: Per Jackie Balling and cath lab, ok for pt to eat light breakfast before 9am. Plan for cath at [facility].      1310: Pt taken down for cath. 1555: Pt returned to unit. Right groin site is intact, no bleeding or hematoma noted. Pt on 2L NC, drowsy, will continue to monitor. 1613: [address]    Patient with hypoglycemic episode(s) at 1613(time) on 5/24/17(date). BG value(s) pre-treatment 59     Was patient symptomatic? [] yes, [x] no  Patient was treated with the following rescue medications/treatments: [] D50                [] Glucose tablets                [] Glucagon                [x] 4oz juice                [] 6oz reg soda                [] 8oz low fat milk  BG value post-treatment: 72, 4 oz juice given and BS increased to 100. Once BG treated and value greater than 80mg/dl, pt was provided with the following:  [] snack  [x] meal    1900: Bedside and Verbal shift change report given to Maryann Santos RN (oncoming nurse) by Sharon Farias RN (offgoing nurse).  Report included the following information SBAR, Kardex, ED Summary, Procedure Summary, Intake/Output, MAR and Cardiac Rhythm Sinus rhythm.

## 2017-05-24 NOTE — PROGRESS NOTES
TRANSFER - OUT REPORT:    Verbal report given to Deirdre(name) on Dario Alfonso.  being transferred to [facility](unit) for routine progression of care       Report consisted of patients Situation, Background, Assessment and   Recommendations(SBAR). Information from the following report(s) SBAR was reviewed with the receiving nurse. Lines:   Peripheral IV 05/22/17 Right Forearm (Active)   Site Assessment Clean, dry, & intact 5/24/2017  7:50 AM   Phlebitis Assessment 0 5/24/2017  7:50 AM   Infiltration Assessment 0 5/24/2017  7:50 AM   Dressing Status Clean, dry, & intact 5/24/2017  7:50 AM   Dressing Type Transparent 5/24/2017  7:50 AM   Hub Color/Line Status Pink 5/24/2017  7:50 AM   Action Taken Open ports on tubing capped 5/23/2017  7:47 PM   Alcohol Cap Used Yes 5/24/2017  7:50 AM        Opportunity for questions and clarification was provided.       Patient transported with:   Registered Nurse

## 2017-05-24 NOTE — PROGRESS NOTES
Danilo Curry [address] [place] 80  [address] [facility], [address] [place] [address]  [phone]      Medical Progress Note      NAME: Steffi Almonte. :  1945  MRM:  [id number]    Date/Time: 2017  9:01 AM         Subjective:     Chief Complaint:  SOB: mild, intermittent, better since admission    ROS:  (bold if positive, if negative)                SOB/TALLEY        Tolerating Diet          Objective:       Vitals:          Last 24hrs VS reviewed since prior progress note.  Most recent are:    Visit Vitals    /63 (BP Patient Position: Lying left side)    Pulse 61    Temp 97.8 °F (36.6 °C)    Resp 18    Ht 5' 9\" (1.753 m)    Wt (!) 159.7 kg (352 lb)    SpO2 95%    BMI 51.98 kg/m2     SpO2 Readings from Last 6 Encounters:   17 95%   16 96%   16 96%   08/06/15 97%   05/05/15 94%   04/17/15 98%          Intake/Output Summary (Last 24 hours) at 17 0901  Last data filed at 17 0801   Gross per 24 hour   Intake              650 ml   Output             3800 ml   Net            -3150 ml          Exam:     Physical Exam:    Gen:  Well-developed, morbidly obese, in no acute distress  HEENT:  Pink conjunctivae, PERRL, hearing intact to voice, moist mucous membranes  Neck:  Supple, without masses, thyroid non-tender  Resp:  No accessory muscle use, clear breath sounds without wheezes rales or rhonchi  Card:  No murmurs, normal S1, S2 without thrills, bruits; 2+ pitting, peripheral edema  Abd:  Soft, non-tender, non-distended, normoactive bowel sounds are present, no palpable organomegaly and no detectable hernias  Lymph:  No cervical or inguinal adenopathy  Musc:  No cyanosis or clubbing  Skin:  No rashes or ulcers, skin turgor is good, cap refill <2 sec  Neuro:  Cranial nerves are grossly intact, no focal motor weakness, follows commands appropriately  Psych:  Good insight, oriented to person, place and time, alert       Telemetry reviewed:   normal sinus rhythm    Medications Reviewed: (see below)    Lab Data Reviewed: (see below)    ______________________________________________________________________    Medications:     Current Facility-Administered Medications   Medication Dose Route Frequency    insulin detemir (LEVEMIR) injection 45 Units  45 Units SubCUTAneous Q12H    valsartan (DIOVAN) tablet 40 mg  40 mg Oral DAILY    carvedilol (COREG) tablet 6.25 mg  6.25 mg Oral BID WITH MEALS    sodium chloride (NS) flush 5-10 mL  5-10 mL IntraVENous PRN    sodium chloride (NS) flush 5-10 mL  5-10 mL IntraVENous Q8H    sodium chloride (NS) flush 5-10 mL  5-10 mL IntraVENous PRN    heparin (porcine) injection 5,000 Units  5,000 Units SubCUTAneous Q8H    bumetanide (BUMEX) injection 1 mg  1 mg IntraVENous TID    insulin lispro (HUMALOG) injection   SubCUTAneous AC&HS    glucose chewable tablet 16 g  4 Tab Oral PRN    dextrose (D50W) injection syrg 12.5-25 g  12.5-25 g IntraVENous PRN    glucagon (GLUCAGEN) injection 1 mg  1 mg IntraMUSCular PRN    aspirin (ASPIRIN) tablet 325 mg  325 mg Oral DAILY    pantoprazole (PROTONIX) tablet 40 mg  40 mg Oral ACB    rosuvastatin (CRESTOR) tablet 10 mg  10 mg Oral QHS    sertraline (ZOLOFT) tablet 50 mg  50 mg Oral DAILY            Lab Review:     Recent Labs      05/24/17   0126  05/23/17   0145  05/22/17   1126   WBC  5.8  6.1  6.0   HGB  11.9*  11.2*  11.6*   HCT  36.4*  33.9*  36.4*   PLT  230  227  228     Recent Labs      05/24/17   0126 05/23/17   0145  05/22/17   1126   NA  140  142  140   K  3.5  3.4*  4.1   CL  103  105  106   CO2  31  30  28   GLU  123*  119*  276*   BUN  22*  16  16   CREA  1.09  0.97  0.88   CA  8.3*  8.3*  8.3*   ALB   --    --   2.5*   TBILI   --    --   0.6   SGOT   --    --   19   ALT   --    --   20     Lab Results   Component Value Date/Time    Glucose (POC) 84 05/24/2017 07:57 AM    Glucose (POC) 60 05/24/2017 07:42 AM    Glucose (POC) 53 05/24/2017 07:27 AM    Glucose (POC) 128 05/23/2017 09:16 PM    Glucose (POC) 104 05/23/2017 03:52 PM     No results for input(s): PH, PCO2, PO2, HCO3, FIO2 in the last 72 hours. No results for input(s): INR in the last 72 hours. No lab exists for component: INREXT  Lab Results   Component Value Date/Time    Specimen Description: BLOOD 11/08/2011 07:07 AM     Lab Results   Component Value Date/Time    Culture result: MRSA NOT PRESENT 03/25/2016 06:55 AM    Culture result:  03/25/2016 06:55 AM         Screening of patient nares for MRSA is for surveillance purposes and, if positive, to facilitate isolation considerations in high risk settings. It is not intended for automatic decolonization interventions per se as regimens are not sufficiently effective to warrant routine use. Culture result: NO GROWTH 6 DAYS 03/25/2016 12:30 AM            Assessment:     Principal Problem:    Systolic CHF, acute on chronic (HCC) (5/24/2017)    Active Problems:    Coronary atherosclerosis of native coronary artery (11/21/2011)      Overview: 90% MID LAD.        Hyperlipidemia (11/25/2014)      Anemia (3/25/2016)      Type 2 diabetes mellitus without complication ([address] (6/90/6163)      PAF (paroxysmal atrial fibrillation) ([address] (3/27/2016)           Plan:     Principal Problem:    Systolic CHF, acute on chronic ([address] (5/24/2017)   - improved with diuresis   - for cath today   - may need LifeVest, etc per Cardiology    Active Problems:    Coronary atherosclerosis of native coronary artery (11/21/2011)   - continue cardiac meds, cath today      Hyperlipidemia (11/25/2014)   - continue statin      Anemia (3/25/2016)   - Hgb mildly low, stable      Type 2 diabetes mellitus without complication ([address] (5/88/4078)   - BS low, made sure he ate some breakfast, cath this afternoon, NPO now      PAF (paroxysmal atrial fibrillation) ([address] (3/27/2016)   - in sinus currently   - continue rate control meds   - continue ASA      Obesity   - overnight oximetry tonight      Total time spent with patient: 35 minutes                  Care Plan discussed with: Patient, Care Manager, Nursing Staff and Elda Funes NP    Discussed:  Code Status, Care Plan and D/C Planning    Prophylaxis:  Hep SQ    Disposition:  Home w/Family           ___________________________________________________    Attending Physician: Salma Sadler MD

## 2017-05-24 NOTE — DISCHARGE INSTRUCTIONS
HOSPITALIST DISCHARGE INSTRUCTIONS  NAME: Leatha Thompson :  1945   MRN:  [id number]     Date/Time:  2017 10:47 AM    ADMIT DATE: 2017     DISCHARGE DATE: 2017     DISCHARGE DIAGNOSIS:  CHF    MEDICATIONS:  · It is important that you take the medication exactly as they are prescribed. · Keep your medication in the bottles provided by the pharmacist and keep a list of the medication names, dosages, and times to be taken in your wallet. · Do not take other medications without consulting your doctor. Pain Management: per above medications    What to do at Home    Your MUST wear your oxygen at night and when you are sleeping. You must follow up to have sleep testing for sleep apnea. Recommended diet:  Cardiac Diet and Diabetic Diet    Recommended activity: Activity as tolerated    If you experience any of the following symptoms then please call your primary care physician or return to the emergency room if you cannot get hold of your doctor:  Fever, chills, nausea, vomiting, diarrhea, change in mentation, falling, bleeding, shortness of breath    Follow Up: Follow-up Information     Follow up With Details Comments Rolf Webb MD On 2017 9:20 am  [address]      Sari Sweeney MD In 2 weeks  [address] [facility]  [phone]      [facility]  nocturnal 02 Call when you get home to make arrangements for delivery [phone] [facility]  [phone]    Leona De La Rosa MD  call to arrange sleep testing [address] [phone]              Information obtained by :  I understand that if any problems occur once I am at home I am to contact my physician. I understand and acknowledge receipt of the instructions indicated above. Physician's or R.N.'s Signature                                                                  Date/Time                                                                                                                                              Patient or Representative Signature                                                          Date/Time     Avoiding Triggers with Heart Failure: Your Care Instructions   Triggers are anything that make your heart failure flare up. A flare-up is also called \"sudden heart failure\" or \"acute heart failure. \" When you have a flare-up, fluid builds up in your lungs, and you have problems breathing. You might need to go to the hospital. By watching for changes in your condition and avoiding triggers, you can prevent heart failure flare-ups. Follow-up care is a key part of your treatment and safety. Be sure to make and go to all appointments, and call your doctor if you are having problems. It's also a good idea to know your test results and keep a list of the medicines you take. How can you care for yourself at home? Watch for changes in your weight and condition  · Weigh yourself without clothing at the same time each day. Record your weight. Call your doctor if you gain 3 pounds or more in 24 hrs or 5 pounds in one week. A sudden weight gain may mean that your heart failure is getting worse. · Keep a daily record of your symptoms. Write down any changes in how you feel, such as new shortness of breath, cough, or problems eating. Also record if your ankles are more swollen than usual and if you have to urinate in the night more often. Note anything that you ate or did that could have triggered these changes. Limit sodium  Sodium causes your body to hold on to water, making it harder for your heart to pump. People get most of their sodium from processed foods. Fast food and restaurant meals also tend to be very high in sodium. · Your doctor may suggest that you limit sodium to 1,500 milligrams (mg) a day. That is less than 1 teaspoon of salt a day, including all the salt you eat in cooking or in packaged foods. · Read food labels on cans and food packages. They tell you how much sodium you get in one serving. Check the serving size. If you eat more than one serving, you are getting more sodium. · Be aware that sodium can come in forms other than salt, including monosodium glutamate (MSG), sodium citrate, and sodium bicarbonate (baking soda). MSG is often added to Asian food. You can sometimes ask for food without MSG or salt. · Slowly reducing salt will help you adjust to the taste. Take the salt shaker off the table. · Flavor your food with garlic, lemon juice, onion, vinegar, herbs, and spices instead of salt. Do not use soy sauce, steak sauce, onion salt, garlic salt, mustard, or ketchup on your food, unless it is labeled \"low-sodium\" or \"low-salt. \"  · Make your own salad dressings, sauces, and ketchup without adding salt. · Use fresh or frozen ingredients, instead of canned ones, whenever you can. Choose low-sodium canned goods. · Eat less processed food and food from restaurants, including fast food. Exercise as directed  Moderate, regular exercise is very good for your heart. It improves your blood flow and helps control your weight. But too much exercise can stress your heart and cause a heart failure flare-up. · Check with your doctor before you start an exercise program.  · Walking is an easy way to get exercise. Start out slowly. Gradually increase the length and pace of your walk. Swimming, riding a bike, and using a treadmill are also good forms of exercise. · When you exercise, watch for signs that your heart is working too hard. You are pushing yourself too hard if you cannot talk while you are exercising.  If you become short of breath or dizzy or have chest pain, stop, sit down, and rest.  · Do not exercise when you do not feel well. Take medicines correctly  · Take your medicines exactly as prescribed. Call your doctor if you think you are having a problem with your medicine. · Make a list of all the medicines you take. Include those prescribed to you by other doctors and any over-the-counter medicines, vitamins, or supplements you take. Take this list with you when you go to any doctor. · Take your medicines at the same time every day. It may help you to post a list of all the medicines you take every day and what time of day you take them. · Make taking your medicine as simple as you can. Plan times to take your medicines when you are doing other things, such as eating a meal or getting ready for bed. This will make it easier to remember to take your medicines. · Get organized. Use helpful tools, such as daily or weekly pill containers. When should you call for help? Call 911 if you have symptoms of sudden heart failure such as:  · You have severe trouble breathing. · You cough up pink, foamy mucus. · You have a new irregular or rapid heartbeat. Call your doctor now or seek immediate medical care if:  · You have new or increased shortness of breath. · You are dizzy or lightheaded, or you feel like you may faint. · You have sudden weight gain, such as 3 pounds in 24 hours, or 5 pounds in one week. · You have increased swelling in your legs, ankles, or feet. · You are suddenly so tired or weak that you cannot do your usual activities. Watch closely for changes in your health, and be sure to contact your doctor if you develop new symptoms. Where can you learn more? Go to http://anuja-anderson.info/  Enter V089 in the search box to learn more about \"Avoiding Triggers With Heart Failure: Care Instructions. \"  © 2216-1376 [employer], [employer].  Care instructions adapted under license by [employer] (which disclaims liability or warranty for this information). This care instruction is for use with your licensed healthcare professional. If you have questions about a medical condition or this instruction, always ask your healthcare professional. [address] any warranty or liability for your use of this information. Content Version: 34.5.435906; Current as of: January 27, 2016 (modified 10/10/16).

## 2017-05-24 NOTE — PROGRESS NOTES
Cardiology Progress Note       [facility]  NAME:  Idalia White :   1945   MRN:   [id number]     Assessment/Plan:   1. A/C BiVHFrEF  - good response to IV  loops- would cont TID  - ECHO show worsening LV function 45% > 35%. Plan for ischemia eval with cath today  - 6 MWT prior to discharge.   - may need Life Vest at discharge  - close OP follow up   2. CAD/CABG  - CAD eval today 2 pm Dr Sukhi Valencia. NPO after 9 am    - ASA, statin, BB  3. ICM  - cont coreg  - changed ACE-I > ARB, allow 36 hour washout then start Entresto  - daily wts, strict I& O, cardiac rehab ed, 2 G NA diet   4. HTN  - BB, ARB  5. Suspected [place]  - appreciate pulm eval   - overnight oximetry, 6 MWT  6. XOL- statin   7. Dysphagia- -per attending  8. Anemia:     Cardiology Attending:    Patient personally seen and examined. All the elements of history and examination were personally performed. Assessment and plan was discussed and agree as written above. Grafts are patent. Likely fall in LVEF and CHF exacerbation were due to non compliance. Continue. Diuresis. Change to PO tomorrow am. Possible home tomorrow am.       Jatin Grimes MD, [facility]           Subjective:   Idalia White is a 80 y.o.  male PMH significant for CAB/CABG () cx by HTN w/ ICM (EF previously 35% improved to 45-50%) , chronic BiVHF, untx [place], T2DM (neuropathy), morbid obesity, anemia, GERD, who PW 2-3 week hx worsening TALLEY/SOB, worsening LE edema, PND, and exertional chest tightness. He reports a 30# wt gain over the past several months. He has chronic class 2-3 dyspnea which has progressed to class 3-4. Some nausea, fatigue, poor appetite, early satiety. Denies home O2 or [place] eval. compliant w/ meds - takes lasix 40 mg BID, and has not taken any additional diuretics.         He is generally sedentary and admits to not taking care of himself while his wife has been ill. He ate [employer] fried chicken yesterday.  She is currently in [facility] for rehab. He last saw Dr Sebastien Cloud in 2016.          Cardiac ROS: Patient denies any exertional chest pain, dyspnea, palpitations, syncope, orthopnea, edema or paroxysmal nocturnal dyspnea. Previous Cardiac Eval:  ECHO 2011 LVEF 35-40%, LV- mod dilated, mod HK (basal-mid inferior/  basal-mid inferolateral walls) RV-dilated; mild LAE  ECHO:2012-LVEF 50%, DD, Mild RV dysfuction. ECHO:14: TDS, - EF 45-50%. LVH, RV mild dilated - reduced RVEF   ECHO: 17 ECHO: LVEF 35%     CATH : pLAD: 95%-> PCi-> 80% residual stenosis     CAB-  V, LIMA -> LAD    Review of Systems: No nausea, indigestion, vomiting, pain, cough, sputum. No bleeding. Taking po. OOB in room. Objective:     Visit Vitals    /63 (BP Patient Position: Lying left side)    Pulse 61    Temp 97.8 °F (36.6 °C)    Resp 18    Ht 5' 9\" (1.753 m)    Wt (!) 352 lb (159.7 kg)    SpO2 95%    BMI 51.98 kg/m2      O2 Device: Room air    Temp (24hrs), Av.1 °F (36.7 °C), Min:97.8 °F (36.6 °C), Max:98.4 °F (36.9 °C)       07 -  190  In: 480 [P.O.:480]  Out: 575 [Urine:575]    1901 -  0700  In: 36 [P.O.:820]  Out: 5052 [Urine:4875]     TELE: SR PVC's     General: AAOx3 cooperative, no acute distress. HEENT: Atraumatic. Pink and moist.  Anicteric sclerae. Neck : Supple, no thyromegaly. Lungs: CTA bilaterally. Dim bases. Heart: Regular rhythm, Dim heart sounds  no murmur, no rubs, no gallops. + JVD. No carotid bruits. Abdomen: morbidly obese, soft, non-distended, non-tender. + Bowel sounds. No bruits. Extremities: 2-3 + LE edema, no clubbing, no cyanosis. No calf tenderness  Neurologic: Grossly intact. Alert and oriented X 3. No acute neurological distress. Psych: Limited insight. Not anxious or agitated.         Care Plan discussed with:    Comments   Patient x    Family      RN x    Care Manager                    Consultant:  x attending       Data Review: No lab exists for component: ITNL   Recent Labs      05/22/17   1126   TROIQ  0.04     Recent Labs      05/24/17   0126  05/23/17   0145  05/22/17   1126   NA  140  142  140   K  3.5  3.4*  4.1   CL  103  105  106   CO2  31  30  28   BUN  22*  16  16   CREA  1.09  0.97  0.88   GLU  123*  119*  276*   ALB   --    --   2.5*   WBC  5.8  6.1  6.0   HGB  11.9*  11.2*  11.6*   HCT  36.4*  33.9*  36.4*   PLT  230  227  228     No results for input(s): INR, PTP, APTT in the last 72 hours.     No lab exists for component: INREXT, INREXT    Medications reviewed  Current Facility-Administered Medications   Medication Dose Route Frequency    insulin detemir (LEVEMIR) injection 45 Units  45 Units SubCUTAneous Q12H    valsartan (DIOVAN) tablet 40 mg  40 mg Oral DAILY    carvedilol (COREG) tablet 6.25 mg  6.25 mg Oral BID WITH MEALS    sodium chloride (NS) flush 5-10 mL  5-10 mL IntraVENous PRN    sodium chloride (NS) flush 5-10 mL  5-10 mL IntraVENous Q8H    sodium chloride (NS) flush 5-10 mL  5-10 mL IntraVENous PRN    heparin (porcine) injection 5,000 Units  5,000 Units SubCUTAneous Q8H    bumetanide (BUMEX) injection 1 mg  1 mg IntraVENous TID    insulin lispro (HUMALOG) injection   SubCUTAneous AC&HS    glucose chewable tablet 16 g  4 Tab Oral PRN    dextrose (D50W) injection syrg 12.5-25 g  12.5-25 g IntraVENous PRN    glucagon (GLUCAGEN) injection 1 mg  1 mg IntraMUSCular PRN    aspirin (ASPIRIN) tablet 325 mg  325 mg Oral DAILY    pantoprazole (PROTONIX) tablet 40 mg  40 mg Oral ACB    rosuvastatin (CRESTOR) tablet 10 mg  10 mg Oral QHS    sertraline (ZOLOFT) tablet 50 mg  50 mg Oral DAILY         Evan Shields NP

## 2017-05-24 NOTE — PROGRESS NOTES
Pt presents with CHF exacerbation ([facility]  CHF (congestive heart failure) ([facility]   Days in  LOS at this time is 2    Problem List  Date Reviewed: 5/22/2017          Codes Class Noted    * (Principal)Systolic CHF, acute on chronic [facility]) ICD-10-CM: I50.23  ICD-9-CM: 428.23, 428.0  5/24/2017        Type 2 diabetes mellitus without complication (HCC) (Chronic) ICD-10-CM: E11.9  ICD-9-CM: 250.00  3/27/2016        PAF (paroxysmal atrial fibrillation) (HCC) (Chronic) ICD-10-CM: I48.0  ICD-9-CM: 427.31  3/27/2016        Anemia (Chronic) ICD-10-CM: D64.9  ICD-9-CM: 285.9  2/12/8363        Systolic CHF, chronic (HCC) (Chronic) ICD-10-CM: I50.22  ICD-9-CM: 428.22, 428.0  3/25/2016        Hyperlipidemia (Chronic) ICD-10-CM: E78.5  ICD-9-CM: 272.4  11/25/2014        S/P CABG x 2 (Chronic) ICD-10-CM: Z95.1  ICD-9-CM: V45.81  12/14/2011        Coronary atherosclerosis of native coronary artery (Chronic) ICD-10-CM: I25.10  ICD-9-CM: 414.01  11/21/2011    Overview Signed 11/21/2011  4:07 PM by Karen Lo MD     90% MID LAD. Morbid obesity ([facility] (Chronic) ICD-10-CM: E66.01  ICD-9-CM: 278.01  11/8/2011              Principal Problem:    Systolic CHF, acute on chronic ([facility] (5/24/2017)    Active Problems:    Coronary atherosclerosis of native coronary artery (11/21/2011)      Overview: 90% MID LAD.        Hyperlipidemia (11/25/2014)      Anemia (3/25/2016)      Type 2 diabetes mellitus without complication ([facility] (2/73/8001)      PAF (paroxysmal atrial fibrillation) ([facility] (3/27/2016)        Pt cardiac rhythm at this time is  SR     Last documented Troponin   Recent Labs      05/22/17   1126   TROIQ  0.04       Pt last three weights    Last 3 Recorded Weights in this Encounter    05/23/17 0727 05/23/17 1210 05/24/17 0550   Weight: (!) 161 kg (354 lb 15.1 oz) (!) 161 kg (354 lb 15.1 oz) (!) 159.7 kg (352 lb)     Pt has the following consults Consult Cardiology for cardiac management    Pt is currently taking   Current Facility-Administered Medications   Medication Dose Route Frequency    insulin detemir (LEVEMIR) injection 40 Units  40 Units SubCUTAneous Q12H    0.9% sodium chloride infusion  25 mL/hr IntraVENous RAD ONCE    sodium chloride (NS) flush 5-10 mL  5-10 mL IntraVENous Q8H    sodium chloride (NS) flush 5-10 mL  5-10 mL IntraVENous PRN    diphenhydrAMINE (BENADRYL) injection 25 mg  25 mg IntraVENous ONCE PRN    famotidine (PF) (PEPCID) injection 20 mg  20 mg IntraVENous ONCE PRN    hydrocortisone Sod Succ (PF) (SOLU-CORTEF) injection 100 mg  100 mg IntraVENous ONCE PRN    0.9% sodium chloride infusion  75 mL/hr IntraVENous CONTINUOUS    valsartan (DIOVAN) tablet 40 mg  40 mg Oral DAILY    carvedilol (COREG) tablet 6.25 mg  6.25 mg Oral BID WITH MEALS    sodium chloride (NS) flush 5-10 mL  5-10 mL IntraVENous PRN    sodium chloride (NS) flush 5-10 mL  5-10 mL IntraVENous Q8H    sodium chloride (NS) flush 5-10 mL  5-10 mL IntraVENous PRN    heparin (porcine) injection 5,000 Units  5,000 Units SubCUTAneous Q8H    bumetanide (BUMEX) injection 1 mg  1 mg IntraVENous TID    insulin lispro (HUMALOG) injection   SubCUTAneous AC&HS    glucose chewable tablet 16 g  4 Tab Oral PRN    dextrose (D50W) injection syrg 12.5-25 g  12.5-25 g IntraVENous PRN    glucagon (GLUCAGEN) injection 1 mg  1 mg IntraMUSCular PRN    aspirin (ASPIRIN) tablet 325 mg  325 mg Oral DAILY    pantoprazole (PROTONIX) tablet 40 mg  40 mg Oral ACB    rosuvastatin (CRESTOR) tablet 10 mg  10 mg Oral QHS    sertraline (ZOLOFT) tablet 50 mg  50 mg Oral DAILY       Meds held in the past 12 HOURS hours are: none  Due to: no change    UPDATE INTAKE AND OUTPUT    Intake/Output Summary (Last 24 hours) at 05/24/17 1945  Last data filed at 05/24/17 1928   Gross per 24 hour   Intake              650 ml   Output             3375 ml   Net            -2725 ml       12 HOURS CHART SIGN OFF DONE BY Joaquina Carver RN        Patient VERBALexpected daily goal for today is:    1. CHF exacerbation diuresing. 2.Planned Cath today. Nurse goal for patient today:  Cath lab. IDR rounds completed with Dr. Garo Beyer.

## 2017-05-24 NOTE — ROUTINE PROCESS
TRANSFER - OUT REPORT:    Verbal report given to devaughn monge (name) on [facility].  being transferred to [facility] [place] [place](unit) for routine post - op       Report consisted of patients Situation, Background, Assessment and   Recommendations(SBAR). Information from the following report(s) SBAR and Dicie Muss reviewed with the receiving nurse. Lines:   Peripheral IV 05/22/17 Right Forearm (Active)   Site Assessment Clean, dry, & intact 5/24/2017  7:50 AM   Phlebitis Assessment 0 5/24/2017  7:50 AM   Infiltration Assessment 0 5/24/2017  7:50 AM   Dressing Status Clean, dry, & intact 5/24/2017  7:50 AM   Dressing Type Transparent 5/24/2017  7:50 AM   Hub Color/Line Status Pink 5/24/2017  7:50 AM   Action Taken Open ports on tubing capped 5/23/2017  7:47 PM   Alcohol Cap Used Yes 5/24/2017  7:50 AM        Opportunity for questions and clarification was provided.       Patient transported with:   Registered Nurse

## 2017-05-24 NOTE — PROGRESS NOTES
Name: Guillermina Dutta. Hospital: [address]   : 1945 Admit Date: 2017   Phone: [phone]  Room: 330/01   PCP: Zak Douglass MD  MRN: [id number]   Date: 2017  Code: Full Code          Chart and notes reviewed. Data reviewed. I review the patient's current medications in the medical record at each encounter. I have evaluated and examined the patient. Overnight events  Afebrile  Sats 95% on RA  No overnight events  I/O: - 2250 ml    ROS: Patient has no complaints this morning. Denies SOB currently at rest.  States breathing is improved. Denies CP. Denies abd pain. States LE swelling improved as well. Current Facility-Administered Medications   Medication Dose Route Frequency    insulin detemir (LEVEMIR) injection 45 Units  45 Units SubCUTAneous Q12H    valsartan (DIOVAN) tablet 40 mg  40 mg Oral DAILY    carvedilol (COREG) tablet 6.25 mg  6.25 mg Oral BID WITH MEALS    sodium chloride (NS) flush 5-10 mL  5-10 mL IntraVENous PRN    sodium chloride (NS) flush 5-10 mL  5-10 mL IntraVENous Q8H    sodium chloride (NS) flush 5-10 mL  5-10 mL IntraVENous PRN    heparin (porcine) injection 5,000 Units  5,000 Units SubCUTAneous Q8H    bumetanide (BUMEX) injection 1 mg  1 mg IntraVENous TID    insulin lispro (HUMALOG) injection   SubCUTAneous AC&HS    glucose chewable tablet 16 g  4 Tab Oral PRN    dextrose (D50W) injection syrg 12.5-25 g  12.5-25 g IntraVENous PRN    glucagon (GLUCAGEN) injection 1 mg  1 mg IntraMUSCular PRN    aspirin (ASPIRIN) tablet 325 mg  325 mg Oral DAILY    pantoprazole (PROTONIX) tablet 40 mg  40 mg Oral ACB    rosuvastatin (CRESTOR) tablet 10 mg  10 mg Oral QHS    sertraline (ZOLOFT) tablet 50 mg  50 mg Oral DAILY         REVIEW OF SYSTEMS   Negative except as stated in the HPI.       Physical Exam:   Visit Vitals    /63 (BP Patient Position: Lying left side)    Pulse 61    Temp 97.8 °F (36.6 °C)    Resp 18    Ht 5' 9\" (1.753 m)    Wt (!) 159.7 kg (352 lb)    SpO2 95%    BMI 51.98 kg/m2       General:  Alert, cooperative, no distress, appears stated age. Head:  Normocephalic, without obvious abnormality, atraumatic. Eyes:  Conjunctivae/corneas clear. Nose: Nares normal. Septum midline. Mucosa normal.    Throat: Lips, mucosa, and tongue normal.  Class 4 airway. Neck: Thick, supple, symmetrical, trachea midline, no adenopathy. Lungs:   Clear to auscultation bilaterally. Chest wall:  No tenderness or deformity. Heart:  Regular rate and rhythm, S1, S2 normal, no murmur, click, rub or gallop. Abdomen: Morbidly obese, soft, non-tender. Bowel sounds normal. No masses,  No organomegaly. Extremities: Extremities normal, atraumatic, no cyanosis, 1-2+ LE edema. Pulses: 2+ and symmetric all extremities. Skin: Skin color, texture, turgor normal. No rashes or lesions   Lymph nodes: Cervical, supraclavicular nodes normal.   Neurologic: Grossly nonfocal       Lab Results   Component Value Date/Time    Sodium 140 05/24/2017 01:26 AM    Potassium 3.5 05/24/2017 01:26 AM    Chloride 103 05/24/2017 01:26 AM    CO2 31 05/24/2017 01:26 AM    BUN 22 05/24/2017 01:26 AM    Creatinine 1.09 05/24/2017 01:26 AM    Glucose 123 05/24/2017 01:26 AM    Calcium 8.3 05/24/2017 01:26 AM    Magnesium 2.2 08/25/2015 12:00 AM    Phosphorus 3.3 11/26/2014 04:20 AM    Lactic acid 1.6 03/25/2016 12:30 AM       Lab Results   Component Value Date/Time    WBC 5.8 05/24/2017 01:26 AM    HGB 11.9 05/24/2017 01:26 AM    PLATELET 418 10/28/4549 01:26 AM    MCV 97.3 05/24/2017 01:26 AM       Lab Results   Component Value Date/Time    INR 1.1 12/14/2011 04:00 AM    aPTT 29.6 12/14/2011 04:00 AM    AST (SGOT) 19 05/22/2017 11:26 AM    Alk.  phosphatase 72 05/22/2017 11:26 AM    Protein, total 7.7 05/22/2017 11:26 AM    Albumin 2.5 05/22/2017 11:26 AM    Globulin 5.2 05/22/2017 11:26 AM       Lab Results   Component Value Date/Time    Ferritin 305 11/09/2011 10:56 AM       Lab Results   Component Value Date/Time    C-Reactive protein 16.28 03/27/2016 11:53 AM    TSH 2.35 05/22/2017 11:26 AM        No results found for: PH, PHI, PCO2, PCO2I, PO2, PO2I, HCO3, HCO3I, FIO2, FIO2I    Lab Results   Component Value Date/Time     11/25/2014 10:00 PM    CK-MB Index 0.8 11/25/2014 10:00 PM    Troponin-I, Qt. 0.04 05/22/2017 11:26 AM    BNP 28 11/13/2011 02:47 AM        Lab Results   Component Value Date/Time    Culture result: MRSA NOT PRESENT 03/25/2016 06:55 AM    Culture result:  03/25/2016 06:55 AM         Screening of patient nares for MRSA is for surveillance purposes and, if positive, to facilitate isolation considerations in high risk settings. It is not intended for automatic decolonization interventions per se as regimens are not sufficiently effective to warrant routine use.     Culture result: NO GROWTH 6 DAYS 03/25/2016 12:30 AM       No results found for: TOXA1, RPR, HBCM, HBSAG, HAAB, HCAB, HCAB1, HAAT, G6PD, CRYAC, HIVGT, HIVR, HIV1, HIV12, HIVPC, HIVRPI    Lab Results   Component Value Date/Time     11/25/2014 10:00 PM     11/25/2014 03:42 PM       Lab Results   Component Value Date/Time    Color YELLOW/STRAW 03/27/2016 03:24 AM    Appearance CLEAR 03/27/2016 03:24 AM    pH (UA) 6.0 03/27/2016 03:24 AM    Protein 30 03/27/2016 03:24 AM    Glucose NEGATIVE  03/27/2016 03:24 AM    Ketone NEGATIVE  03/27/2016 03:24 AM    Bilirubin NEGATIVE  03/27/2016 03:24 AM    Blood SMALL 03/27/2016 03:24 AM    Urobilinogen 1.0 03/27/2016 03:24 AM    Nitrites NEGATIVE  03/27/2016 03:24 AM    Leukocyte Esterase NEGATIVE  03/27/2016 03:24 AM    WBC 0-4 03/27/2016 03:24 AM    RBC 5-10 03/27/2016 03:24 AM    Bacteria NEGATIVE  03/27/2016 03:24 AM       Images: no new images this morning    IMPRESSION  · Acute/chronic biventricular heart failure with reduced EF  · Snoring with nocturnal gasping  · Daytime somnolence  · Morbid obesity  · CAD s/p CABG  · ICM  · HTN    PLAN  · Cath this afternoon  · Overnight oximetry ordered for tonight as discharged tentatively planned for Thursday  · 6 MWT ordered for tomorrow  · Patient would benefit from outpatient sleep evaluation with PSG; discussed and patient agreeable  · Diuresis with Bumex per cardiology  · ? Life Vest at discharge  · Bblocker and ARB  · Levemir and SSI  · GI prophylaxis: Protonix  · DVT prophylaxis: sub q heparin    Patient is stable from a pulmonary standpoint. We will sign off and arrange for outpatient follow up in the sleep clinic as above. Please call with questions.     Lyla Burroughs

## 2017-05-24 NOTE — PROGRESS NOTES
Nutrition:  Noted new consult for Diabetic/Low Na+ diet education. Previously consulted and education was provided yesterday 5/23 by RD. Handouts and contact information was provided. Please review progress notes if needed. Thank you.     Yu Garcias, [address] [place] [address]  Clinical Dietitian  Pager [phone]

## 2017-05-24 NOTE — ROUTINE PROCESS
TRANSFER - IN REPORT:    Verbal report received from floor nurse  (name) on Leatha Moment.  being received from 330(unit) for ordered procedure      Report consisted of patients Situation, Background, Assessment and   Recommendations(SBAR). Information from the following report(s) SBAR was reviewed with the receiving nurse. Opportunity for questions and clarification was provided. Assessment completed upon patients arrival to unit and care assumed.

## 2017-05-25 VITALS
HEART RATE: 57 BPM | OXYGEN SATURATION: 96 % | WEIGHT: 315 LBS | TEMPERATURE: 98.4 F | RESPIRATION RATE: 18 BRPM | HEIGHT: 69 IN | SYSTOLIC BLOOD PRESSURE: 139 MMHG | BODY MASS INDEX: 46.65 KG/M2 | DIASTOLIC BLOOD PRESSURE: 68 MMHG

## 2017-05-25 LAB
ANION GAP BLD CALC-SCNC: 5 MMOL/L (ref 5–15)
BNP SERPL-MCNC: 849 PG/ML (ref 0–125)
BUN SERPL-MCNC: 24 MG/DL (ref 6–20)
BUN/CREAT SERPL: 20 (ref 12–20)
CALCIUM SERPL-MCNC: 7.8 MG/DL (ref 8.5–10.1)
CHLORIDE SERPL-SCNC: 101 MMOL/L (ref 97–108)
CO2 SERPL-SCNC: 32 MMOL/L (ref 21–32)
CREAT SERPL-MCNC: 1.22 MG/DL (ref 0.7–1.3)
GLUCOSE BLD STRIP.AUTO-MCNC: 123 MG/DL (ref 65–100)
GLUCOSE BLD STRIP.AUTO-MCNC: 157 MG/DL (ref 65–100)
GLUCOSE BLD STRIP.AUTO-MCNC: 165 MG/DL (ref 65–100)
GLUCOSE SERPL-MCNC: 194 MG/DL (ref 65–100)
MAGNESIUM SERPL-MCNC: 1.7 MG/DL (ref 1.6–2.4)
POTASSIUM SERPL-SCNC: 3.9 MMOL/L (ref 3.5–5.1)
SERVICE CMNT-IMP: ABNORMAL
SODIUM SERPL-SCNC: 138 MMOL/L (ref 136–145)

## 2017-05-25 PROCEDURE — 74011000250 HC RX REV CODE- 250: Performed by: INTERNAL MEDICINE

## 2017-05-25 PROCEDURE — 83735 ASSAY OF MAGNESIUM: CPT | Performed by: NURSE PRACTITIONER

## 2017-05-25 PROCEDURE — 94762 N-INVAS EAR/PLS OXIMTRY CONT: CPT

## 2017-05-25 PROCEDURE — 83880 ASSAY OF NATRIURETIC PEPTIDE: CPT | Performed by: NURSE PRACTITIONER

## 2017-05-25 PROCEDURE — 82962 GLUCOSE BLOOD TEST: CPT

## 2017-05-25 PROCEDURE — 74011636637 HC RX REV CODE- 636/637: Performed by: INTERNAL MEDICINE

## 2017-05-25 PROCEDURE — 36415 COLL VENOUS BLD VENIPUNCTURE: CPT | Performed by: NURSE PRACTITIONER

## 2017-05-25 PROCEDURE — 74011250637 HC RX REV CODE- 250/637: Performed by: INTERNAL MEDICINE

## 2017-05-25 PROCEDURE — 77010033678 HC OXYGEN DAILY

## 2017-05-25 PROCEDURE — 80048 BASIC METABOLIC PNL TOTAL CA: CPT | Performed by: NURSE PRACTITIONER

## 2017-05-25 PROCEDURE — 94761 N-INVAS EAR/PLS OXIMETRY MLT: CPT

## 2017-05-25 PROCEDURE — 74011250636 HC RX REV CODE- 250/636: Performed by: INTERNAL MEDICINE

## 2017-05-25 PROCEDURE — 74011250637 HC RX REV CODE- 250/637: Performed by: NURSE PRACTITIONER

## 2017-05-25 RX ORDER — CARVEDILOL 6.25 MG/1
6.25 TABLET ORAL 2 TIMES DAILY WITH MEALS
Qty: 60 TAB | Refills: 0 | Status: SHIPPED | OUTPATIENT
Start: 2017-05-25 | End: 2018-09-02

## 2017-05-25 RX ORDER — VALSARTAN 80 MG/1
80 TABLET ORAL DAILY
Qty: 30 TAB | Refills: 0 | Status: SHIPPED | OUTPATIENT
Start: 2017-05-25 | End: 2018-09-02

## 2017-05-25 RX ORDER — BUMETANIDE 1 MG/1
1 TABLET ORAL 2 TIMES DAILY
Qty: 60 TAB | Refills: 0 | Status: SHIPPED | OUTPATIENT
Start: 2017-05-25 | End: 2018-09-02

## 2017-05-25 RX ORDER — VALSARTAN 80 MG/1
80 TABLET ORAL DAILY
Status: DISCONTINUED | OUTPATIENT
Start: 2017-05-25 | End: 2017-05-25 | Stop reason: HOSPADM

## 2017-05-25 RX ADMIN — Medication 10 ML: at 05:30

## 2017-05-25 RX ADMIN — CARVEDILOL 6.25 MG: 6.25 TABLET, FILM COATED ORAL at 08:47

## 2017-05-25 RX ADMIN — Medication 10 ML: at 13:08

## 2017-05-25 RX ADMIN — CARVEDILOL 6.25 MG: 6.25 TABLET, FILM COATED ORAL at 17:04

## 2017-05-25 RX ADMIN — BUMETANIDE 1 MG: 0.25 INJECTION INTRAMUSCULAR; INTRAVENOUS at 08:47

## 2017-05-25 RX ADMIN — INSULIN DETEMIR 40 UNITS: 100 INJECTION, SOLUTION SUBCUTANEOUS at 09:16

## 2017-05-25 RX ADMIN — HEPARIN SODIUM 5000 UNITS: 5000 INJECTION, SOLUTION INTRAVENOUS; SUBCUTANEOUS at 13:07

## 2017-05-25 RX ADMIN — PANTOPRAZOLE SODIUM 40 MG: 40 TABLET, DELAYED RELEASE ORAL at 08:47

## 2017-05-25 RX ADMIN — HEPARIN SODIUM 5000 UNITS: 5000 INJECTION, SOLUTION INTRAVENOUS; SUBCUTANEOUS at 05:24

## 2017-05-25 RX ADMIN — INSULIN LISPRO 2 UNITS: 100 INJECTION, SOLUTION INTRAVENOUS; SUBCUTANEOUS at 11:56

## 2017-05-25 RX ADMIN — SERTRALINE 50 MG: 50 TABLET, FILM COATED ORAL at 08:47

## 2017-05-25 RX ADMIN — INSULIN LISPRO 2 UNITS: 100 INJECTION, SOLUTION INTRAVENOUS; SUBCUTANEOUS at 07:15

## 2017-05-25 RX ADMIN — ASPIRIN 325 MG: 325 TABLET ORAL at 08:47

## 2017-05-25 NOTE — PROGRESS NOTES
Cardiology Progress Note         NAME:  Kellie Camilo :   1945   MRN:   [id number]     Assessment/Plan:   1. A/C BiVHFrEF  - change bumex to 1 mg BID  - ECHO show worsening LV function 45% > 35%. Cath shows 1 V CAD , 2/2 grafts patent  - no Life Vest due to non compliance  - OP follow up arranged for next week   - if he attends appt will plan to change to [facility]  2. CAD/CABG:   - ASA, statin, BB  3. ICM  - coreg  - ARB  - daily wts, 2 gm Na diet  4. HTN  - BB, ARB  5. Suspected V[place]  - appreciate pulm eval   - needs nocturnal oxygen on discharge  6. XOL- statin   7. Dysphagia- -per attending  8. Anemia: hgb 11.9     Cardiology Attending:    Patient personally seen and examined. All the elements of history and examination were personally performed. Assessment and plan was discussed and agree as written above. Marcell Blancas MD, [facility]         Subjective:   Kellie Camilo is a 80 y.o.  male PMH significant for CAB/CABG () cx by HTN w/ ICM (EF previously 35% improved to 45-50%) , chronic BiVHF, untx VIR[place]IA, T2DM (neuropathy), morbid obesity, anemia, GERD, who PW 2-3 week hx worsening TALLEY/SOB, worsening LE edema, PND, and exertional chest tightness. He reports a 30# wt gain over the past several months. He has chronic class 2-3 dyspnea which has progressed to class 3-4. Some nausea, fatigue, poor appetite, early satiety. Denies home O2 or VIR[place]IA eval. compliant w/ meds - takes lasix 40 mg BID, and has not taken any additional diuretics. He is generally sedentary and admits to not taking care of himself while his wife has been ill. He ate [employer]'s fried chicken yesterday. She is currently in [facility] for rehab. He last saw Dr Abe Blancas in 2016.          Cardiac ROS: Patient denies any exertional chest pain, dyspnea, palpitations, syncope, orthopnea, edema or paroxysmal nocturnal dyspnea.     Previous Cardiac Eval:  ECHO 2011 LVEF 35-40%, LV- mod dilated, mod HK (basal-mid inferior/  basal-mid inferolateral walls) RV-dilated; mild LAE  ECHO:2012-LVEF 50%, DD, Mild RV dysfuction. ECHO:14: TDS, - EF 45-50%. LVH, RV mild dilated - reduced RVEF   ECHO: 17 ECHO: LVEF 35%     CATH : pLAD: 95%-> PCi-> 80% residual stenosis     CAB- 2 V, LIMA -> LAD    Review of Systems: No nausea, indigestion, vomiting, pain, cough, sputum. No bleeding. Taking po. OOB in room. Objective:     Visit Vitals    /67    Pulse 63    Temp 98.7 °F (37.1 °C)    Resp 17    Ht 5' 9\" (1.753 m)    Wt (!) 355 lb (161 kg)    SpO2 96%    BMI 52.42 kg/m2    O2 Flow Rate (L/min): 1 l/min O2 Device: Room air    Temp (24hrs), Av °F (36.7 °C), Min:96.5 °F (35.8 °C), Max:98.7 °F (37.1 °C)            1901 -  0700  In: 1150 [P.O.:480; I.V.:670]  Out: 4150 [Urine:4150]     TELE: SR PVC's     General: AAOx3 cooperative, no acute distress. HEENT: Atraumatic. Pink and moist.  Anicteric sclerae. Neck : Supple, no thyromegaly. Lungs: CTA bilaterally. Dim bases. Heart: Regular rhythm, Dim heart sounds  no murmur, no rubs, no gallops. + JVD. No carotid bruits. Abdomen: morbidly obese, soft, non-distended, non-tender. + Bowel sounds. No bruits. Extremities: 2 +  LE edema, no clubbing, no cyanosis. No calf tenderness  Neurologic: Grossly intact. Alert and oriented X 3. No acute neurological distress. Psych: Limited insight. Not anxious or agitated.         Care Plan discussed with:    Comments   Patient x    Family      RN x    Care Manager                    Consultant:  kenny attending       Data Review:     No lab exists for component: ITNL   Recent Labs      17   1126   TROIQ  0.04     Recent Labs      17   0119  17   0126  17   0145  17   1126   NA  138  140  142  140   K  3.9  3.5  3.4*  4.1   CL  101  103  105  106   CO2  32  31  30  28   BUN  24*  22*  16  16   CREA  1.22  1.09  0.97  0.88   GLU  194*  123*  119* 276*   MG  1.7   --    --    --    ALB   --    --    --   2.5*   WBC   --   5.8  6.1  6.0   HGB   --   11.9*  11.2*  11.6*   HCT   --   36.4*  33.9*  36.4*   PLT   --   230  227  228     No results for input(s): INR, PTP, APTT in the last 72 hours.     No lab exists for component: INREXT, INREXT    Medications reviewed  Current Facility-Administered Medications   Medication Dose Route Frequency    valsartan (DIOVAN) tablet 80 mg  80 mg Oral DAILY    insulin detemir (LEVEMIR) injection 40 Units  40 Units SubCUTAneous Q12H    diphenhydrAMINE (BENADRYL) injection 25 mg  25 mg IntraVENous ONCE PRN    famotidine (PF) (PEPCID) injection 20 mg  20 mg IntraVENous ONCE PRN    hydrocortisone Sod Succ (PF) (SOLU-CORTEF) injection 100 mg  100 mg IntraVENous ONCE PRN    carvedilol (COREG) tablet 6.25 mg  6.25 mg Oral BID WITH MEALS    sodium chloride (NS) flush 5-10 mL  5-10 mL IntraVENous PRN    sodium chloride (NS) flush 5-10 mL  5-10 mL IntraVENous Q8H    heparin (porcine) injection 5,000 Units  5,000 Units SubCUTAneous Q8H    bumetanide (BUMEX) injection 1 mg  1 mg IntraVENous TID    insulin lispro (HUMALOG) injection   SubCUTAneous AC&HS    glucose chewable tablet 16 g  4 Tab Oral PRN    dextrose (D50W) injection syrg 12.5-25 g  12.5-25 g IntraVENous PRN    glucagon (GLUCAGEN) injection 1 mg  1 mg IntraMUSCular PRN    aspirin (ASPIRIN) tablet 325 mg  325 mg Oral DAILY    pantoprazole (PROTONIX) tablet 40 mg  40 mg Oral ACB    rosuvastatin (CRESTOR) tablet 10 mg  10 mg Oral QHS    sertraline (ZOLOFT) tablet 50 mg  50 mg Oral DAILY         Joan Ingram NP

## 2017-05-25 NOTE — PROGRESS NOTES
I met with pt and discussed need for nocturnal 02, he would like to use [facility].  I have faxed the referral. Thanks KINZA Watts
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
## 2017-05-25 NOTE — PROGRESS NOTES
Discharge instructions, including information on new medications and post cath care, were all reviewed with patient. All questions were answered. His IV site and heart monitor were removed. Case Management has made arrangements for [facility] through [address] for nocturnal home oxygen. [facility] has contacted patient and told him that they would bring by a portable tank for the patient to use tonight because he is not expected to leave the hospital until after 5pm today. They will set up his home O2 tomorrow. Patient received a copy of his discharge instructions and his prescriptions were electronically sent to his pharmacy. Patient will be discharged home with his daughter later today.

## 2017-05-25 NOTE — PROGRESS NOTES
Danilo Patricio [facility] [address], [place], [address] [facility]  [phone]      Medical Progress Note      NAME: Pearl Hinojosa. :  1945  MRM:  [id number]    Date/Time: 2017  9:01 AM         Subjective:     Chief Complaint:  SOB: mild, intermittent, better since admission. Had catheterization yesterday without problems    ROS:  (bold if positive, if negative)                SOB/TALLEY        Tolerating Diet          Objective:       Vitals:          Last 24hrs VS reviewed since prior progress note.  Most recent are:    Visit Vitals    /67    Pulse 78    Temp 98.7 °F (37.1 °C)    Resp 17    Ht 5' 9\" (1.753 m)    Wt (!) 161 kg (355 lb)    SpO2 96%    BMI 52.42 kg/m2     SpO2 Readings from Last 6 Encounters:   17 96%   16 96%   16 96%   08/06/15 97%   05/05/15 94%   04/17/15 98%    O2 Flow Rate (L/min): 1 l/min       Intake/Output Summary (Last 24 hours) at 17 0801  Last data filed at 17 6260   Gross per 24 hour   Intake              670 ml   Output             1525 ml   Net             -855 ml          Exam:     Physical Exam:    Gen:  Well-developed, morbidly obese, in no acute distress  HEENT:  Pink conjunctivae, PERRL, hearing intact to voice, moist mucous membranes  Neck:  Supple, without masses, thyroid non-tender  Resp:  No accessory muscle use, clear breath sounds without wheezes rales or rhonchi  Card:  No murmurs, normal S1, S2 without thrills, bruits; 2+ pitting, peripheral edema  Abd:  Soft, non-tender, non-distended, normoactive bowel sounds are present, no palpable organomegaly and no detectable hernias  Lymph:  No cervical or inguinal adenopathy  Musc:  No cyanosis or clubbing  Skin:  No rashes or ulcers, skin turgor is good, cap refill <2 sec  Neuro:  Cranial nerves are grossly intact, no focal motor weakness, follows commands appropriately  Psych:  Good insight, oriented to person, place and time, alert Telemetry reviewed:   normal sinus rhythm    Medications Reviewed: (see below)    Lab Data Reviewed: (see below)    ______________________________________________________________________    Medications:     Current Facility-Administered Medications   Medication Dose Route Frequency    insulin detemir (LEVEMIR) injection 40 Units  40 Units SubCUTAneous Q12H    sodium chloride (NS) flush 5-10 mL  5-10 mL IntraVENous Q8H    sodium chloride (NS) flush 5-10 mL  5-10 mL IntraVENous PRN    diphenhydrAMINE (BENADRYL) injection 25 mg  25 mg IntraVENous ONCE PRN    famotidine (PF) (PEPCID) injection 20 mg  20 mg IntraVENous ONCE PRN    hydrocortisone Sod Succ (PF) (SOLU-CORTEF) injection 100 mg  100 mg IntraVENous ONCE PRN    valsartan (DIOVAN) tablet 40 mg  40 mg Oral DAILY    carvedilol (COREG) tablet 6.25 mg  6.25 mg Oral BID WITH MEALS    sodium chloride (NS) flush 5-10 mL  5-10 mL IntraVENous PRN    sodium chloride (NS) flush 5-10 mL  5-10 mL IntraVENous Q8H    sodium chloride (NS) flush 5-10 mL  5-10 mL IntraVENous PRN    heparin (porcine) injection 5,000 Units  5,000 Units SubCUTAneous Q8H    bumetanide (BUMEX) injection 1 mg  1 mg IntraVENous TID    insulin lispro (HUMALOG) injection   SubCUTAneous AC&HS    glucose chewable tablet 16 g  4 Tab Oral PRN    dextrose (D50W) injection syrg 12.5-25 g  12.5-25 g IntraVENous PRN    glucagon (GLUCAGEN) injection 1 mg  1 mg IntraMUSCular PRN    aspirin (ASPIRIN) tablet 325 mg  325 mg Oral DAILY    pantoprazole (PROTONIX) tablet 40 mg  40 mg Oral ACB    rosuvastatin (CRESTOR) tablet 10 mg  10 mg Oral QHS    sertraline (ZOLOFT) tablet 50 mg  50 mg Oral DAILY            Lab Review:     Recent Labs      05/24/17   0126  05/23/17   0145  05/22/17   1126   WBC  5.8  6.1  6.0   HGB  11.9*  11.2*  11.6*   HCT  36.4*  33.9*  36.4*   PLT  230  227  228     Recent Labs      05/25/17   0119  05/24/17   0126  05/23/17   0145  05/22/17   1126   NA  138  140  142  140 K  3.9  3.5  3.4*  4.1   CL  101  103  105  106   CO2  32  31  30  28   GLU  194*  123*  119*  276*   BUN  24*  22*  16  16   CREA  1.22  1.09  0.97  0.88   CA  7.8*  8.3*  8.3*  8.3*   MG  1.7   --    --    --    ALB   --    --    --   2.5*   TBILI   --    --    --   0.6   SGOT   --    --    --   19   ALT   --    --    --   20     Lab Results   Component Value Date/Time    Glucose (POC) 165 05/25/2017 06:43 AM    Glucose (POC) 207 05/24/2017 09:31 PM    Glucose (POC) 100 05/24/2017 04:39 PM    Glucose (POC) 72 05/24/2017 04:26 PM    Glucose (POC) 64 05/24/2017 04:13 PM     No results for input(s): PH, PCO2, PO2, HCO3, FIO2 in the last 72 hours. No results for input(s): INR in the last 72 hours. No lab exists for component: INREXT, INREXT  Lab Results   Component Value Date/Time    Specimen Description: BLOOD 11/08/2011 07:07 AM     Lab Results   Component Value Date/Time    Culture result: MRSA NOT PRESENT 03/25/2016 06:55 AM    Culture result:  03/25/2016 06:55 AM         Screening of patient nares for MRSA is for surveillance purposes and, if positive, to facilitate isolation considerations in high risk settings. It is not intended for automatic decolonization interventions per se as regimens are not sufficiently effective to warrant routine use. Culture result: NO GROWTH 6 DAYS 03/25/2016 12:30 AM            Assessment:     Principal Problem:    Systolic CHF, acute on chronic (HCC) (5/24/2017)    Active Problems:    Coronary atherosclerosis of native coronary artery (11/21/2011)      Overview: 90% MID LAD.        Hyperlipidemia (11/25/2014)      Anemia (3/25/2016)      Type 2 diabetes mellitus without complication ([facility] (6/83/1298)      PAF (paroxysmal atrial fibrillation) ([facility] (3/27/2016)           Plan:     Principal Problem:    Systolic CHF, acute on chronic ([facility] (5/24/2017)   - improved with diuresis   - cath without disease requiring intervention   - may need LifeVest, etc per Cardiology    Active Problems:    Coronary atherosclerosis of native coronary artery (11/21/2011)   - continue cardiac meds      Hyperlipidemia (11/25/2014)   - continue statin      Anemia (3/25/2016)   - Hgb mildly low, stable      Type 2 diabetes mellitus without complication ([facility] (4/68/7772)   - BS up after cath, continue current regimen      PAF (paroxysmal atrial fibrillation) ([facility] (3/27/2016)   - in sinus currently   - continue rate control meds   - continue ASA      Obesity   - impressive desaturation   - arrange home oxygen, I discussed this face to face with the patient and he is agreeable   - needs sleep testing ASAP      Total time spent with patient: 35 minutes                  Care Plan discussed with: Patient, Care Manager, Nursing Staff and Ellie Arthur NP    Discussed:  Code Status, Care Plan and D/C Planning    Prophylaxis:  Hep SQ    Disposition:  Home w/Family           ___________________________________________________    Attending Physician: Natty Raines MD

## 2017-05-25 NOTE — PROGRESS NOTES
1500 Bedside and Verbal shift change report given to [address] [place] [address] (oncoming nurse) by Cristian Becker (offgoing nurse). Report included the following information SBAR, Kardex, Intake/Output, MAR and Recent Results. 1805 pt transported downstairs via w/c by nurse to main entrance. Pt's portable oxygen was delivered. Pt's daughter has belongings and will drive pt home.

## 2017-05-25 NOTE — PROGRESS NOTES
5/24/2017  2100- Patient drowsy this evening after cardiac cath in afternoon, on 2 L NC. Alert and oriented. No bleeding or pain at cath site. NS discontinued. 2300- Sleep study now in progress. O2 disconnected. 0530- Patient oxygen dropped into 80s while sleeping without O2 during sleep study. Beth to 96% on room air when roused. Patient much more alert this AM.    0645- due to hypoglycemia last few mornings, took POC glucose early. No hypoglycemia this AM.     0730- Bedside and Verbal shift change report given to Audrey Arshad RN (oncoming nurse) by Kia Aguillon RN (offgoing nurse). Report included the following information SBAR, Kardex, ED Summary, Procedure Summary, Intake/Output, MAR, Accordion, Recent Results and Cardiac Rhythm NSR.

## 2017-05-25 NOTE — PROGRESS NOTES
Consult noted for home health. I spoke with pt, he would like to use [facility]. [facility] have [facility].  Thanks KINZA Nelson

## 2017-05-25 NOTE — PROGRESS NOTES
05/25/17 1010 05/25/17 1012 05/25/17 1014   RT Walking Oximetry   Stage Resting (Room Air) During Walk (Room Air) During Walk (Room Air)   SpO2 97 % 95 % 94 %   HR 72 bpm 77 bpm 84 bpm   Rate of Dyspnea 0 1 1   O2 Device None (Room air) None (Room air) None (Room air)       05/25/17 1016 05/25/17 1019   RT Walking Oximetry   Stage During Walk (Room Air) After Walk   SpO2 95 % 96 %   HR 89 bpm 74 bpm   Rate of Dyspnea 1 0   O2 Device None (Room air) None (Room air)

## 2017-05-26 ENCOUNTER — PATIENT OUTREACH (OUTPATIENT)
Dept: CARDIOLOGY CLINIC | Age: 72
End: 2017-05-26

## 2017-05-26 NOTE — PROGRESS NOTES
[address] GEORGE Guzman:    Transitional Care Nurse Navigator Note:  Hospital Follow Up for hospital visit to : [address] Admission from 5/22/17 - 5/25/17 for CHF. RRAT score: 18 High    This represents Transitions of Care because NN spoke with patient and/or caregiver within 1 business days of discharge. Pt's TCM follow up appt is scheduled with Dr. Leydi Perez on Thursday 6/1/17 @ [phone]. Called and spoke to Mr. Aurelio Clarke. Patient lives at home with his wife- his wife is currently hospitalized at the moment. He has a daughter that lives nearby that checks on him often as well as his neighbor. Discussed the importance of daily weights and keeping a log of the weights. Patient was educated on when to call the office with a weight gain of 2-3lbs in a day or 5lbs in a week. Also discussed low NA diet. Patient does eat canned vegetables- educated patient on sodium in canned foods and recommended frozen or fresh vegetables. Reviewed all medications with patient and they are up-to-date. Per Mr. Orellanaardo Runner is coming to see him at 2pm today and his oxygen is all set-up for him. Gave patient my contact information for any assistance. Medical History:     Past Medical History:   Diagnosis Date    CAD (coronary artery disease)     Cancer ([facility]     PROSTATE    CHF (congestive heart failure) ([address] 2013    Diabetes ([address]     5yrs    GERD (gastroesophageal reflux disease)     Morbid obesity ([facility]     Prostate cancer [facility])      Patient presenting symptoms per Dr. Cayt Davis 5/22/17:  Assessment/Plan:   1. A/C BiVHFrEF  - IV loops- he probably has at least 50# of fluid   - ck echo   - needs [place] eval/pulm eval  2. CAD/CABG  - serial troponins  - needs CAD eval- cj need R/L heart cath - depending on echo/labs   - ASA, statin, BB  3. ICM  - arevalo BB-> coreg given his DM  - change ACE-I to ARB if EF < 35% use Entresto   - daily wts, strict I& O, cardiac rehab ed, 2 G NA diet   4. HTN  - BB, ACE-i/ARB-   5.  Suspected [place]  - pulm eval   6. XOL- statin   7. Dysphagia- -per attending  8. Anemia - per attending   ·        ATTENDING CARDIOLOGIST  Talked with patient and he has been taking care of his wife and neglected himself. He also states he will wake up in the middle of the night with coughing spells and regurgitate food. He may be aspirating and I believe this should be evaluated. other consideratoins include H/H. He has been using 4 pillows to raise head at night. Wolfgang Expose to see tomorrow. PATIENT was Personally examined and chart reviewed. All the elements of history and examination were personally performed and I agree with the plan as listed above. Treatment plan was addressed with the patient.     Diagnosed with CHF. Admitted to Hospitalist Service with consults from Cardiology, Pulmonary     Course of current Hospitalization (referenced by Dr. Verónica Baum note 5/25/17): Assessment/Plan:   1. A/C BiVHFrEF  - change bumex to 1 mg BID  - ECHO show worsening LV function 45% > 35%. Cath shows 1 V CAD , 2/2 grafts patent  - no Life Vest due to non compliance  - OP follow up arranged for next week   - if he attends appt will plan to change to [facility]  2. CAD/CABG:   - ASA, statin, BB  3. ICM  - coreg  - ARB  - daily wts, 2 gm Na diet  4. HTN  - BB, ARB  5. Suspected [place]  - appreciate pulm eval   - needs nocturnal oxygen on discharge  6. XOL- statin   7. Dysphagia- -per attending  8.  Anemia: hgb 11.9        Significant Lab/Diagnostic Findings:   Lab Results  Component Value Date/Time   WBC 5.8 05/24/2017 01:26 AM   Hemoglobin (POC) 10.9 03/25/2016 02:15 AM   HGB 11.9 05/24/2017 01:26 AM   Hematocrit (POC) 32 03/25/2016 02:15 AM   HCT 36.4 05/24/2017 01:26 AM   PLATELET 261 13/49/0680 01:26 AM   MCV 97.3 05/24/2017 01:26 AM     Lab Results  Component Value Date/Time   Hemoglobin A1c 8.9 05/22/2017 11:26 AM   Hemoglobin A1c 8.9 03/25/2016 12:30 AM   Hemoglobin A1c 11.9 11/26/2014 04:20 AM   Glucose 194 05/25/2017 01:19 AM Glucose (POC) 123 05/25/2017 04:20 PM   LDL, calculated 118 08/25/2015 12:00 AM   Creatinine (POC) 1.4 03/25/2016 02:15 AM   Creatinine 1.22 05/25/2017 01:19 AM      Lab Results  Component Value Date/Time   Cholesterol, total 192 08/25/2015 12:00 AM   HDL Cholesterol 62 08/25/2015 12:00 AM   LDL, calculated 118 08/25/2015 12:00 AM   Triglyceride 60 08/25/2015 12:00 AM   CHOL/HDL Ratio 2.4 11/26/2014 04:20 AM     Lab Results  Component Value Date/Time   ALT (SGPT) 20 05/22/2017 11:26 AM   AST (SGOT) 19 05/22/2017 11:26 AM   Alk. phosphatase 72 05/22/2017 11:26 AM   Bilirubin, total 0.6 05/22/2017 11:26 AM     Lab Results   Component Value Date/Time    INR 1.1 12/14/2011 04:00 AM    INR 1.2 12/12/2011 12:35 PM    INR 1.1 12/07/2011 01:42 PM    Prothrombin time 10.8 12/14/2011 04:00 AM    Prothrombin time 12.0 12/12/2011 12:35 PM    Prothrombin time 11.1 12/07/2011 01:42 PM      Lab Results  Component Value Date/Time   GFR est AA >60 05/25/2017 01:19 AM   GFR est non-AA 59 05/25/2017 01:19 AM   Creatinine (POC) 1.4 03/25/2016 02:15 AM   Creatinine 1.22 05/25/2017 01:19 AM   BUN 24 05/25/2017 01:19 AM   BUN (POC) 36 03/25/2016 02:15 AM   Sodium (POC) 136 03/25/2016 02:15 AM   Sodium 138 05/25/2017 01:19 AM   Potassium 3.9 05/25/2017 01:19 AM   Potassium (POC) 4.1 03/25/2016 02:15 AM   Chloride (POC) 98 03/25/2016 02:15 AM   Chloride 101 05/25/2017 01:19 AM   CO2 32 05/25/2017 01:19 AM      Lab Results  Component Value Date/Time   Prostate Specific Ag <0.1 08/25/2015 12:00 AM     Lab Results  Component Value Date/Time   TSH 2.35 05/22/2017 11:26 AM      Advance Medical Directive on file in EMR? no     Total Hospitalizations/ED visits last 12 months? [facility] orders at discharge? yes If yes, what agency and what services? At [facility] PT/OT/SN [facility]- Home O2     Called patient on 5/26/17 and verified with 2 identifiers.      Medication Reconciliation completed: yes New medications at discharge include STARTED: Bumex, coreg, diovan STOPPED: Lasix, lisinopril     Support System consists of: wife/daughter    Plan: MD on call at [phone] 24/7.    Follow up appt with cardiology on 6/1/17   Continue with daily weights and log   Continue with low NA diet   [facility]

## 2017-06-01 ENCOUNTER — OFFICE VISIT (OUTPATIENT)
Dept: CARDIOLOGY CLINIC | Age: 72
End: 2017-06-01

## 2017-06-01 VITALS
OXYGEN SATURATION: 97 % | HEART RATE: 68 BPM | RESPIRATION RATE: 24 BRPM | WEIGHT: 315 LBS | SYSTOLIC BLOOD PRESSURE: 92 MMHG | HEIGHT: 69 IN | DIASTOLIC BLOOD PRESSURE: 52 MMHG | BODY MASS INDEX: 46.65 KG/M2

## 2017-06-01 DIAGNOSIS — I48.0 PAF (PAROXYSMAL ATRIAL FIBRILLATION) (HCC): Primary | Chronic | ICD-10-CM

## 2017-06-01 DIAGNOSIS — I25.119 ATHEROSCLEROSIS OF NATIVE CORONARY ARTERY OF NATIVE HEART WITH ANGINA PECTORIS (HCC): Chronic | ICD-10-CM

## 2017-06-01 DIAGNOSIS — I50.22 SYSTOLIC CHF, CHRONIC (HCC): Chronic | ICD-10-CM

## 2017-06-01 NOTE — MR AVS SNAPSHOT
Visit Information Date & Time Provider Department Dept. Phone Encounter #  
 6/1/2017  9:20 AM Travis Ness MD [facility] [place] [phone] [id number] Upcoming Health Maintenance Date Due Hepatitis C Screening 1945 FOOT EXAM Q1 12/4/1956 MICROALBUMIN Q1 12/4/1956 COLONOSCOPY 12/4/1964 ZOSTER VACCINE AGE 60> 12/4/2006 GLAUCOMA SCREENING Q2Y 12/4/2011 MEDICARE YEARLY EXAM 12/4/2011 EYE EXAM RETINAL OR DILATED Q1 6/16/2015 LIPID PANEL Q1 8/25/2016 Pneumococcal 65+ Low/Medium Risk (2 of 2 - PPSV23) 11/14/2016 INFLUENZA AGE 9 TO ADULT 8/1/2017 HEMOGLOBIN A1C Q6M 11/22/2017 DTaP/Tdap/Td series (2 - Td) 11/25/2024 Allergies as of 6/1/2017  Review Complete On: 6/1/2017 By: Travis Ness MD  
 No Known Allergies Current Immunizations  Reviewed on 11/8/2011 Name Date Influenza Vaccine Whole 10/8/2011 Pneumococcal Vaccine (Unspecified Type) 11/14/2011  3:57 PM  
 Tdap 11/25/2014 10:07 AM  
  
 Not reviewed this visit Vitals BP Pulse Resp Height(growth percentile) Weight(growth percentile) SpO2  
 92/52 (BP 1 Location: Left arm, BP Patient Position: Sitting) 68 24 5' 9\" (1.753 m) (!) 351 lb 3.2 oz (159.3 kg) 97% BMI Smoking Status 51.86 kg/m2 Former Smoker BMI and BSA Data Body Mass Index Body Surface Area  
 51.86 kg/m 2 2.78 m 2 Preferred Pharmacy Pharmacy Name Phone [facility] [address] Deysi Neal [phone] Your Updated Medication List  
  
   
This list is accurate as of: 6/1/17 10:15 AM.  Always use your most recent med list.  
  
  
  
  
 aspirin 325 mg tablet Commonly known as:  ASPIRIN Take 325 mg by mouth daily. bumetanide 1 mg tablet Commonly known as:  Jose Cruz Finders Take 1 Tab by mouth two (2) times a day. carvedilol 6.25 mg tablet Commonly known as:  Karen Diallo  
 Take 1 Tab by mouth two (2) times daily (with meals). insulin detemir 100 unit/mL (3 mL) Inpn Commonly known as:  LEVEMIR FLEXTOUCH  
65 Units by SubCUTAneous route every twelve (12) hours. 1100 and 2300  
  
 omeprazole 20 mg capsule Commonly known as:  PRILOSEC Take 20 mg by mouth daily. potassium chloride 20 mEq tablet Commonly known as:  K-DUR, KLOR-CON Take one tablet daily  
  
 rosuvastatin 10 mg tablet Commonly known as:  CRESTOR Take 1 Tab by mouth nightly. sertraline 50 mg tablet Commonly known as:  ZOLOFT Take 1 Tab by mouth daily. valsartan 80 mg tablet Commonly known as:  DIOVAN Take 1 Tab by mouth daily. Patient Instructions Please see Dr. Katherin Samuels in 3 months Get your blood work for CMP and Mg levels today. Introducing [facility]! Elva Perez introduces [employer] patient portal. Now you can access parts of your medical record, email your doctor's office, and request medication refills online. 1. In your internet browser, go to [email or web address] 2. Click on the First Time User? Click Here link in the Sign In box. You will see the New Member Sign Up page. 3. Enter your [employer] Access Code exactly as it appears below. You will not need to use this code after youve completed the sign-up process. If you do not sign up before the expiration date, you must request a new code. · [employer] Access Code: [email or web address] Expires: 8/21/2017 11:08 AM 
 
4. Enter the last four digits of your Social Security Number (xxxx) and Date of Birth (mm/dd/yyyy) as indicated and click Submit. You will be taken to the next sign-up page. 5. Create a [employer] ID. This will be your [employer] login ID and cannot be changed, so think of one that is secure and easy to remember. 6. Create a [employer] password. You can change your password at any time. 7. Enter your Password Reset Question and Answer.  This can be used at a later time if you forget your password. 8. Enter your e-mail address. You will receive e-mail notification when new information is available in [address] 9. Click Sign Up. You can now view and download portions of your medical record. 10. Click the Download Summary menu link to download a portable copy of your medical information. If you have questions, please visit the Frequently Asked Questions section of the [employer] website. Remember, [employer] is NOT to be used for urgent needs. For medical emergencies, dial 911. Now available from your iPhone and Android! Please provide this summary of care documentation to your next provider. Your primary care clinician is listed as Fam Brown. If you have any questions after today's visit, please call [phone].

## 2017-06-01 NOTE — PROGRESS NOTES
Chief Complaint   Patient presents with    CHF    Coronary Artery Disease   [address]       Visit Vitals    BP 92/52 (BP 1 Location: Left arm, BP Patient Position: Sitting)    Pulse 68    Resp 24    Ht 5' 9\" (1.753 m)    Wt (!) 351 lb 3.2 oz (159.3 kg)    SpO2 97%    BMI 51.86 kg/m2       Pt presents to office today with c/o SOB/TALLEY, generalized edema, occasional sharp cp, and fatigue. Pt presents today without medication list. Pt states he has no idea what he is taking.

## 2017-06-02 LAB
ALBUMIN SERPL-MCNC: 3.4 G/DL (ref 3.5–4.8)
ALBUMIN/GLOB SERPL: 0.9 {RATIO} (ref 1.2–2.2)
ALP SERPL-CCNC: 71 IU/L (ref 39–117)
ALT SERPL-CCNC: 12 IU/L (ref 0–44)
AST SERPL-CCNC: 11 IU/L (ref 0–40)
BILIRUB SERPL-MCNC: 0.5 MG/DL (ref 0–1.2)
BUN SERPL-MCNC: 38 MG/DL (ref 8–27)
BUN/CREAT SERPL: 33 (ref 10–24)
CALCIUM SERPL-MCNC: 8.9 MG/DL (ref 8.6–10.2)
CHLORIDE SERPL-SCNC: 96 MMOL/L (ref 96–106)
CO2 SERPL-SCNC: 24 MMOL/L (ref 18–29)
CREAT SERPL-MCNC: 1.16 MG/DL (ref 0.76–1.27)
GLOBULIN SER CALC-MCNC: 3.7 G/DL (ref 1.5–4.5)
GLUCOSE SERPL-MCNC: 345 MG/DL (ref 65–99)
MAGNESIUM SERPL-MCNC: 1.9 MG/DL (ref 1.6–2.3)
POTASSIUM SERPL-SCNC: 4.4 MMOL/L (ref 3.5–5.2)
PROT SERPL-MCNC: 7.1 G/DL (ref 6–8.5)
SODIUM SERPL-SCNC: 137 MMOL/L (ref 134–144)

## 2017-06-02 NOTE — PROGRESS NOTES
Office Follow-up    NAME: Erika Li :  1945  MRM:  [id number]    Date:  2017            Assessment:     Problem List  Date Reviewed: 2017          Codes Class Noted    Systolic CHF, acute on chronic [facility]) ICD-10-CM: I50.23  ICD-9-CM: 428.23, 428.0  2017        Type 2 diabetes mellitus without complication (HCC) (Chronic) ICD-10-CM: E11.9  ICD-9-CM: 250.00  3/27/2016        PAF (paroxysmal atrial fibrillation) (HCC) (Chronic) ICD-10-CM: I48.0  ICD-9-CM: 427.31  3/27/2016        Anemia (Chronic) ICD-10-CM: D64.9  ICD-9-CM: 285.9          Systolic CHF, chronic (HCC) (Chronic) ICD-10-CM: I50.22  ICD-9-CM: 428.22, 428.0  3/25/2016        Hyperlipidemia (Chronic) ICD-10-CM: E78.5  ICD-9-CM: 272.4  2014        S/P CABG x 2 (Chronic) ICD-10-CM: Z95.1  ICD-9-CM: V45.81  2011        Coronary atherosclerosis of native coronary artery (Chronic) ICD-10-CM: I25.10  ICD-9-CM: 414.01  2011    Overview Signed 2011  4:07 PM by Michael Matos MD     90% MID LAD. Morbid obesity ([facility] (Chronic) ICD-10-CM: E66.01  ICD-9-CM: 278.01  2011                 Plan:     1. Acute on chronic congestive heart failure systolic: Continue guideline directed medical therapy. Continue diuretics. Despite his LVEF less than 35% he is not a candidate for LifeVest due to noncompliance. We will continue to closely monitor him. Recheck an echocardiogram in 3 months. 2. CAD/CABG: Continue aspirin, statins and beta blockers. 3. Ischemic cardiomyopathy: Continue Coreg, losartan and low-salt diet. 4. Hypertension: Pressure controlled. Continue beta blockers and losartan. 5. Possible sleep apnea: Continue home nocturnal oxygen. 6. Follow-up with me in 3 months. Subjective:       Mr. Andriy Saenz is a 80y.o. year old male, he is seen today for Transition of Care services following a hospital discharge for congestive heart failure on May 25, 2017.   Our office Nurse Navigator performed an outreach to Mr. Rasheed Yi on May 26, 2017 (within 2 business days of discharge) to complete medication reconciliation and a telephonic assessment of his condition. Since his discharge he has been feeling much better with a lot less shortness of breath. He has lost about 15 pounds since he was initiated on diuretics. He conveys that he is compliant with his diet, salt intake as well as medications. Exam:     Physical Exam:  Visit Vitals    BP 92/52 (BP 1 Location: Left arm, BP Patient Position: Sitting)    Pulse 68    Resp 24    Ht 5' 9\" (1.753 m)    Wt (!) 351 lb 3.2 oz (159.3 kg)    SpO2 97%    BMI 51.86 kg/m2     General appearance - alert, well appearing, and in no distress  Mental status - affect appropriate to mood  Eyes - sclera anicteric, moist mucous membranes  Neck - supple, no significant adenopathy  Chest - clear to auscultation, no wheezes, rales or rhonchi  Heart - normal rate, regular rhythm, normal S1, S2, no murmurs, rubs, clicks or gallops  Abdomen - soft, nontender, nondistended, no masses or organomegaly  Extremities - peripheral pulses normal, no pedal edema  Skin - normal coloration  no rashes    Medications:     Current Outpatient Prescriptions   Medication Sig    valsartan (DIOVAN) 80 mg tablet Take 1 Tab by mouth daily.  carvedilol (COREG) 6.25 mg tablet Take 1 Tab by mouth two (2) times daily (with meals).  bumetanide (BUMEX) 1 mg tablet Take 1 Tab by mouth two (2) times a day.  insulin detemir (LEVEMIR FLEXTOUCH) 100 unit/mL (3 mL) inpn 65 Units by SubCUTAneous route every twelve (12) hours. 1100 and 2300    aspirin (ASPIRIN) 325 mg tablet Take 325 mg by mouth daily.  omeprazole (PRILOSEC) 20 mg capsule Take 20 mg by mouth daily.  sertraline (ZOLOFT) 50 mg tablet Take 1 Tab by mouth daily.  rosuvastatin (CRESTOR) 10 mg tablet Take 1 Tab by mouth nightly.     potassium chloride (K-DUR, KLOR-CON) 20 mEq tablet Take one tablet daily No current facility-administered medications for this visit. Diagnostic Data Review:       5/24/2017: CATH- Obstructive 1VD:LAD p80, m100, dist very small vessel; D1 patent. LCx p30 (co-dom). RCA patent (co-dom). 2/2 Grafts patent: LIMA-LAD patent.; SVG-D1 patent. 5/22/17: ECHO- Comparison was made 25-Nov-2014 and LV overall  function has decreased. LV wall hypokinesis, ventricle wall dilated, EF 35%;  SYSTEM MEASUREMENT TABLES  2D  LVOT Diam: 2.1 cm  Ao Diam: 3.2 cm  LA Diam: 4.6 cm  IVSd: 1.1 cm  LVIDd: 5.9 cm  LVIDs: 5.1 cm  LVPWd: 1 cm  SV(Teich): 53.9 ml      11/25/14: ECHO- TDS, - EF 45-50%. LVH, RV mild dilated - reduced RVEF   11/5/2012: ECHO- LVEF 50%, DD, Mild RV dysfuction. 11/8/2011: ECHO- LVEF 35-40%, LV- mod dilated, mod HK (basal-mid inferior/  basal-mid inferolateral walls) RV-dilated; mild LAE  2011: CATH- pLAD: 95%-> PCi-> 80% residual stenosis   2011: CABG- 2 V, LIMA -> LAD      Lab Review:     Lab Results   Component Value Date/Time    Cholesterol, total 192 08/25/2015 12:00 AM    HDL Cholesterol 62 08/25/2015 12:00 AM    LDL, calculated 118 08/25/2015 12:00 AM    Triglyceride 60 08/25/2015 12:00 AM    CHOL/HDL Ratio 2.4 11/26/2014 04:20 AM     Lab Results   Component Value Date/Time    Creatinine (POC) 1.4 03/25/2016 02:15 AM    Creatinine 1.16 06/01/2017 11:18 AM     Lab Results   Component Value Date/Time    BUN 38 06/01/2017 11:18 AM    BUN (POC) 36 03/25/2016 02:15 AM     Lab Results   Component Value Date/Time    Potassium 4.4 06/01/2017 11:18 AM     Lab Results   Component Value Date/Time    Hemoglobin A1c 8.9 05/22/2017 11:26 AM     Lab Results   Component Value Date/Time    Hemoglobin (POC) 10.9 03/25/2016 02:15 AM    HGB 11.9 05/24/2017 01:26 AM     Lab Results   Component Value Date/Time    PLATELET 599 36/34/8468 01:26 AM     No results for input(s): CPK, CKMB, TROIQ in the last 72 hours.     No lab exists for component: CKQMB, CPKMB             ___________________________________________________    Marvel Daniels.  Sarah Mc MD, [facility]

## 2017-09-22 ENCOUNTER — OFFICE VISIT (OUTPATIENT)
Dept: CARDIOLOGY CLINIC | Age: 72
End: 2017-09-22

## 2017-09-22 VITALS
HEIGHT: 69 IN | BODY MASS INDEX: 46.65 KG/M2 | DIASTOLIC BLOOD PRESSURE: 60 MMHG | WEIGHT: 315 LBS | SYSTOLIC BLOOD PRESSURE: 120 MMHG | RESPIRATION RATE: 16 BRPM | HEART RATE: 72 BPM | OXYGEN SATURATION: 99 %

## 2017-09-22 DIAGNOSIS — I50.22 SYSTOLIC CHF, CHRONIC (HCC): ICD-10-CM

## 2017-09-22 DIAGNOSIS — I48.0 PAF (PAROXYSMAL ATRIAL FIBRILLATION) (HCC): Primary | ICD-10-CM

## 2017-09-22 DIAGNOSIS — E66.01 MORBID OBESITY DUE TO EXCESS CALORIES (HCC): ICD-10-CM

## 2017-09-22 DIAGNOSIS — Z95.1 S/P CABG X 2: ICD-10-CM

## 2017-09-22 DIAGNOSIS — E11.9 TYPE 2 DIABETES MELLITUS WITHOUT COMPLICATION, WITHOUT LONG-TERM CURRENT USE OF INSULIN (HCC): ICD-10-CM

## 2017-09-22 DIAGNOSIS — E78.5 HYPERLIPIDEMIA, UNSPECIFIED HYPERLIPIDEMIA TYPE: ICD-10-CM

## 2017-09-22 DIAGNOSIS — I25.119 ATHEROSCLEROSIS OF NATIVE CORONARY ARTERY OF NATIVE HEART WITH ANGINA PECTORIS (HCC): ICD-10-CM

## 2017-09-22 NOTE — MR AVS SNAPSHOT
Visit Information Date & Time Provider Department Dept. Phone Encounter #  
 9/22/2017  2:20 PM Yashira Galindo MD [facility] [phone] [id number] Follow-up Instructions Follow-up and Disposition History Your Appointments 9/22/2017  4:00 PM  
ECHO CARDIOGRAMS 2D with ECHO, [facility]  
[facility] ([facility] SCHEDULING) Appt Note: same day add on per dr Joyce Bhandari [facility] [address] [place] [address] Road  
[address] [place] [address] [id number] [place] [address] Upcoming Health Maintenance Date Due Hepatitis C Screening 1945 FOOT EXAM Q1 12/4/1956 MICROALBUMIN Q1 12/4/1956 COLONOSCOPY 12/4/1964 ZOSTER VACCINE AGE 60> 10/4/2006 GLAUCOMA SCREENING Q2Y 12/4/2011 MEDICARE YEARLY EXAM 12/4/2011 EYE EXAM RETINAL OR DILATED Q1 6/16/2015 LIPID PANEL Q1 8/25/2016 Pneumococcal 65+ Low/Medium Risk (2 of 2 - PPSV23) 11/14/2016 INFLUENZA AGE 9 TO ADULT 8/1/2017 HEMOGLOBIN A1C Q6M 11/22/2017 DTaP/Tdap/Td series (2 - Td) 11/25/2024 Allergies as of 9/22/2017  Review Complete On: 9/22/2017 By: Yashira Galindo MD  
 No Known Allergies Current Immunizations  Reviewed on 11/8/2011 Name Date Influenza Vaccine Whole 10/8/2011 Tdap 11/25/2014 10:07 AM  
 ZZZ-RETIRED (DO NOT USE) Pneumococcal Vaccine (Unspecified Type) 11/14/2011  3:57 PM  
  
 Not reviewed this visit You Were Diagnosed With   
  
 Codes Comments PAF (paroxysmal atrial fibrillation) ([facility]    -  Primary ICD-10-CM: I48.0 ICD-9-CM: 427.31 Systolic CHF, chronic (HCC)     ICD-10-CM: I50.22 ICD-9-CM: 428.22, 428.0 Hyperlipidemia, unspecified hyperlipidemia type     ICD-10-CM: E78.5 ICD-9-CM: 272.4 Atherosclerosis of native coronary artery of native heart with angina pector[facility]     ICD-10-CM: I25.119 ICD-9-CM: 414.01, 413.9 S/P CABG x 2     ICD-10-CM: Z95.1 ICD-9-CM: V45.81 Type 2 diabetes mellitus without complication, without long-term current use of insulin (HCC)     ICD-10-CM: E11.9 ICD-9-CM: 250.00 Morbid obesity due to excess calories (HCC)     ICD-10-CM: E66.01 
ICD-9-CM: 278.01 Vitals BP Pulse Resp Height(growth percentile) Weight(growth percentile) SpO2  
 120/60 (BP 1 Location: Left arm, BP Patient Position: Sitting) 72 16 5' 9\" (1.753 m) 327 lb (148.3 kg) 99% BMI Smoking Status 48.29 kg/m2 Former Smoker Vitals History BMI and BSA Data Body Mass Index Body Surface Area  
 48.29 kg/m 2 2.69 m 2 Preferred Pharmacy Pharmacy Name Phone [facility] [address] [phone] Your Updated Medication List  
  
   
This list is accurate as of: 9/22/17  3:24 PM.  Always use your most recent med list.  
  
  
  
  
 aspirin 325 mg tablet Commonly known as:  ASPIRIN Take 325 mg by mouth daily. bumetanide 1 mg tablet Commonly known as:  Norma Rodriguez Take 1 Tab by mouth two (2) times a day. carvedilol 6.25 mg tablet Commonly known as:  Chris Place Take 1 Tab by mouth two (2) times daily (with meals). insulin detemir 100 unit/mL (3 mL) Inpn Commonly known as:  LEVEMIR FLEXTOUCH  
65 Units by SubCUTAneous route every twelve (12) hours. 1100 and 2300  
  
 omeprazole 20 mg capsule Commonly known as:  PRILOSEC Take 20 mg by mouth daily. potassium chloride 20 mEq tablet Commonly known as:  K-DUR, KLOR-CON Take one tablet daily  
  
 rosuvastatin 10 mg tablet Commonly known as:  CRESTOR Take 1 Tab by mouth nightly. sertraline 50 mg tablet Commonly known as:  ZOLOFT Take 1 Tab by mouth daily. valsartan 80 mg tablet Commonly known as:  DIOVAN Take 1 Tab by mouth daily. Introducing [facility]!    
 [employer] introduces [facility] patient portal. Now you can access parts of your medical record, email your doctor's office, and request medication refills online. 1. In your internet browser, go to [email or web address] 2. Click on the First Time User? Click Here link in the Sign In box. You will see the New Member Sign Up page. 3. Enter your Proofpoint Access Code exactly as it appears below. You will not need to use this code after youve completed the sign-up process. If you do not sign up before the expiration date, you must request a new code. · Proofpoint Access Code: [email or web address] Expires: 12/21/2017  2:17 PM 
 
4. Enter the last four digits of your Social Security Number (xxxx) and Date of Birth (mm/dd/yyyy) as indicated and click Submit. You will be taken to the next sign-up page. 5. Create a Proofpoint ID. This will be your Proofpoint login ID and cannot be changed, so think of one that is secure and easy to remember. 6. Create a Proofpoint password. You can change your password at any time. 7. Enter your Password Reset Question and Answer. This can be used at a later time if you forget your password. 8. Enter your e-mail address. You will receive e-mail notification when new information is available in [address] 9. Click Sign Up. You can now view and download portions of your medical record. 10. Click the Download Summary menu link to download a portable copy of your medical information. If you have questions, please visit the Frequently Asked Questions section of the Proofpoint website. Remember, Proofpoint is NOT to be used for urgent needs. For medical emergencies, dial 911. Now available from your iPhone and Android! Please provide this summary of care documentation to your next provider. Your primary care clinician is listed as Cris Cobb. If you have any questions after today's visit, please call [phone].

## 2017-09-22 NOTE — PROGRESS NOTES
Office Follow-up    NAME: Guillermina Dutta. :  1945  MRM:  [id number]    Date:  2017            Assessment:     Problem List  Date Reviewed: 2017          Codes Class Noted    Systolic CHF, acute on chronic [facility]) ICD-10-CM: I50.23  ICD-9-CM: 428.23, 428.0  2017        Type 2 diabetes mellitus without complication (HCC) (Chronic) ICD-10-CM: E11.9  ICD-9-CM: 250.00  3/27/2016        PAF (paroxysmal atrial fibrillation) (HCC) (Chronic) ICD-10-CM: I48.0  ICD-9-CM: 427.31  3/27/2016        Anemia (Chronic) ICD-10-CM: D64.9  ICD-9-CM: 285.9          Systolic CHF, chronic (HCC) (Chronic) ICD-10-CM: I50.22  ICD-9-CM: 428.22, 428.0  3/25/2016        Hyperlipidemia (Chronic) ICD-10-CM: E78.5  ICD-9-CM: 272.4  2014        S/P CABG x 2 (Chronic) ICD-10-CM: Z95.1  ICD-9-CM: V45.81  2011        Coronary atherosclerosis of native coronary artery (Chronic) ICD-10-CM: I25.10  ICD-9-CM: 414.01  2011    Overview Signed 2011  4:07 PM by Etienne Pinzon MD     90% MID LAD. Morbid obesity ([facility] (Chronic) ICD-10-CM: E66.01  ICD-9-CM: 278.01  2011                 Plan:     1. Chronic congestive systolic heart failure, compensated: Continue guideline directed medical therapy. Continue diuretics. Despite his LVEF less than 35% he is not a candidate for LifeVest due to noncompliance. We will continue to closely monitor him. We will recheck an echocardiogram today. 2. CAD/CABG: Continue aspirin, statins and beta blockers. 3. Ischemic cardiomyopathy: Continue Coreg, losartan and low-salt diet. 4. Hypertension: Pressure controlled. Continue beta blockers and losartan. 5. Possible sleep apnea: Continue home nocturnal oxygen. 6. Follow-up with me in 6 months. Subjective:       Mr. Loletta China is a 80y.o. year old male, he is here for followup. He has known history of CAD, CABG, obesity, sleep apnea, hypertension, dyslipidemia.   He appears to have lost about 20 pounds since last visit. He continues to have significant fatigue and shortness of breath. Exam:     Physical Exam:  Visit Vitals    /60 (BP 1 Location: Left arm, BP Patient Position: Sitting)    Pulse 72    Resp 16    Ht 5' 9\" (1.753 m)    Wt 327 lb (148.3 kg)    SpO2 99%    BMI 48.29 kg/m2     General appearance - alert, well appearing, and in no distress  Mental status - affect appropriate to mood  Eyes - sclera anicteric, moist mucous membranes  Neck - supple, no significant adenopathy  Chest - clear to auscultation, no wheezes, rales or rhonchi  Heart - normal rate, regular rhythm, normal S1, S2, no murmurs, rubs, clicks or gallops  Abdomen - soft, nontender, nondistended, no masses or organomegaly  Extremities - peripheral pulses normal, no pedal edema  Skin - normal coloration  no rashes    Medications:     Current Outpatient Prescriptions   Medication Sig    valsartan (DIOVAN) 80 mg tablet Take 1 Tab by mouth daily.  carvedilol (COREG) 6.25 mg tablet Take 1 Tab by mouth two (2) times daily (with meals).  bumetanide (BUMEX) 1 mg tablet Take 1 Tab by mouth two (2) times a day.  insulin detemir (LEVEMIR FLEXTOUCH) 100 unit/mL (3 mL) inpn 65 Units by SubCUTAneous route every twelve (12) hours. 1100 and 2300    aspirin (ASPIRIN) 325 mg tablet Take 325 mg by mouth daily.  omeprazole (PRILOSEC) 20 mg capsule Take 20 mg by mouth daily.  sertraline (ZOLOFT) 50 mg tablet Take 1 Tab by mouth daily.  rosuvastatin (CRESTOR) 10 mg tablet Take 1 Tab by mouth nightly.  potassium chloride (K-DUR, KLOR-CON) 20 mEq tablet Take one tablet daily     No current facility-administered medications for this visit. Diagnostic Data Review:       5/24/2017: CATH- Obstructive 1VD:LAD p80, m100, dist very small vessel; D1 patent. LCx p30 (co-dom). RCA patent (co-dom). 2/2 Grafts patent: LIMA-LAD patent.; SVG-D1 patent.     5/22/17: ECHO- Comparison was made 25-Nov-2014 and LV overall  function has decreased. LV wall hypokinesis, ventricle wall dilated, EF 35%;  SYSTEM MEASUREMENT TABLES  2D  LVOT Diam: 2.1 cm  Ao Diam: 3.2 cm  LA Diam: 4.6 cm  IVSd: 1.1 cm  LVIDd: 5.9 cm  LVIDs: 5.1 cm  LVPWd: 1 cm  SV(Teich): 53.9 ml    11/25/14: ECHO- TDS, - EF 45-50%. LVH, RV mild dilated - reduced RVEF   11/5/2012: ECHO- LVEF 50%, DD, Mild RV dysfuction. 11/10/11: CMR- . Normal left ventricular size by 3D volumetric assessment. Moderate left   ventricular systolic dysfunction. Severe hypokinesis of the inferior and   inferolateral wall. Mild hypokinesis of the anterior wall. LVEF 37%. 2. Normal right ventricular size and systolic function. 3. No significant valvular disease other than trace mitral and tricuspid   regurgitation. 4. Normal resting myocardial perfusion on first pass stress perfusion   imaging. 5. On LGE imaging, there is a very small focal area of small endocardial   infarct involving the basal inferolateral wall. The anterior, anteroseptal,   anterolateral, lateral, the entire inferior wall, the inferolateral wall,   inferoseptal wall and apex are completely viable. The LAD, LCx, and RCA   territories demonstrate significant viability for revascularization. 6. Large right-sided pleural effusion. Moderate left-sided pleural effusion.     11/8/2011: ECHO- LVEF 35-40%, LV- mod dilated, mod HK (basal-mid inferior/  basal-mid inferolateral walls) RV-dilated; mild LAE  2011: CATH- pLAD: 95%-> PCi-> 80% residual stenosis   2011: CABG- 2 V, LIMA -> LAD      Lab Review:     Lab Results   Component Value Date/Time    Cholesterol, total 192 08/25/2015 12:00 AM    HDL Cholesterol 62 08/25/2015 12:00 AM    LDL, calculated 118 08/25/2015 12:00 AM    Triglyceride 60 08/25/2015 12:00 AM    CHOL/HDL Ratio 2.4 11/26/2014 04:20 AM     Lab Results   Component Value Date/Time    Creatinine (POC) 1.4 03/25/2016 02:15 AM    Creatinine 1.16 06/01/2017 11:18 AM     Lab Results   Component Value Date/Time BUN 38 06/01/2017 11:18 AM    BUN (POC) 36 03/25/2016 02:15 AM     Lab Results   Component Value Date/Time    Potassium 4.4 06/01/2017 11:18 AM     Lab Results   Component Value Date/Time    Hemoglobin A1c 8.9 05/22/2017 11:26 AM     Lab Results   Component Value Date/Time    Hemoglobin (POC) 10.9 03/25/2016 02:15 AM    HGB 11.9 05/24/2017 01:26 AM     Lab Results   Component Value Date/Time    PLATELET 188 96/71/9773 01:26 AM     No results for input(s): CPK, CKMB, TROIQ in the last 72 hours. No lab exists for component: CKQMB, CPKMB             ___________________________________________________    Miriam Cesar.  Tesha Vaca MD, [facility]

## 2017-10-04 ENCOUNTER — CLINICAL SUPPORT (OUTPATIENT)
Dept: CARDIOLOGY CLINIC | Age: 72
End: 2017-10-04

## 2017-10-04 DIAGNOSIS — I25.119 ATHEROSCLEROSIS OF NATIVE CORONARY ARTERY OF NATIVE HEART WITH ANGINA PECTORIS (HCC): Chronic | ICD-10-CM

## 2017-10-04 DIAGNOSIS — E78.5 HYPERLIPIDEMIA, UNSPECIFIED HYPERLIPIDEMIA TYPE: Chronic | ICD-10-CM

## 2017-10-04 DIAGNOSIS — Z95.1 S/P CABG X 2: Chronic | ICD-10-CM

## 2017-10-04 DIAGNOSIS — I48.0 PAF (PAROXYSMAL ATRIAL FIBRILLATION) (HCC): Chronic | ICD-10-CM

## 2017-10-04 DIAGNOSIS — I50.22 SYSTOLIC CHF, CHRONIC (HCC): Primary | Chronic | ICD-10-CM

## 2017-10-04 DIAGNOSIS — I50.23 SYSTOLIC CHF, ACUTE ON CHRONIC (HCC): ICD-10-CM

## 2017-10-04 DIAGNOSIS — E66.01 MORBID OBESITY (HCC): Chronic | ICD-10-CM

## 2017-10-04 NOTE — PROGRESS NOTES
Per Dr. Jp Lacy, utilize [address] 96. ID verified per protocol. Test and risks explained to patient. Consent signed after all questions answered. Started saline lock #22 gauge in Left hand. 2 sticks. Good blood return and flushed without difficulty. At 2:15 pm, activated Definity (1.3 mL in 8.7 ml NS) injected  X 4. (Total 10 mLs given). No complaint of voiced. Echo images obtained. Removed saline lock and applied pressure until homeostasis achieved. Dressing applied. Patient instructed to leave dressing on times 1 hr. Verbalized understanding. Patient waited in echo room for 30 minutes. Patient left office without complaints of voiced from procedure.

## 2017-10-31 ENCOUNTER — HOSPITAL ENCOUNTER (EMERGENCY)
Age: 72
Discharge: HOME | End: 2017-10-31
Attending: STUDENT IN AN ORGANIZED HEALTH CARE EDUCATION/TRAINING PROGRAM
Payer: MEDICARE

## 2017-10-31 VITALS
HEART RATE: 81 BPM | WEIGHT: 315 LBS | OXYGEN SATURATION: 95 % | BODY MASS INDEX: 46.65 KG/M2 | SYSTOLIC BLOOD PRESSURE: 136 MMHG | DIASTOLIC BLOOD PRESSURE: 53 MMHG | HEIGHT: 69 IN | TEMPERATURE: 97.3 F | RESPIRATION RATE: 16 BRPM

## 2017-10-31 DIAGNOSIS — R73.9 HYPERGLYCEMIA: Primary | ICD-10-CM

## 2017-10-31 LAB
ALBUMIN SERPL-MCNC: 3 G/DL (ref 3.5–5)
ALBUMIN/GLOB SERPL: 0.6 {RATIO} (ref 1.1–2.2)
ALP SERPL-CCNC: 89 U/L (ref 45–117)
ALT SERPL-CCNC: 16 U/L (ref 12–78)
ANION GAP SERPL CALC-SCNC: 9 MMOL/L (ref 5–15)
APPEARANCE UR: CLEAR
AST SERPL-CCNC: 16 U/L (ref 15–37)
BACTERIA URNS QL MICRO: ABNORMAL /HPF
BASE DEFICIT BLDV-SCNC: 0.2 MMOL/L
BASOPHILS # BLD: 0 K/UL (ref 0–0.1)
BASOPHILS NFR BLD: 0 % (ref 0–1)
BDY SITE: ABNORMAL
BILIRUB SERPL-MCNC: 0.5 MG/DL (ref 0.2–1)
BILIRUB UR QL: NEGATIVE
BUN SERPL-MCNC: 47 MG/DL (ref 6–20)
BUN/CREAT SERPL: 21 (ref 12–20)
CALCIUM SERPL-MCNC: 8.8 MG/DL (ref 8.5–10.1)
CHLORIDE SERPL-SCNC: 94 MMOL/L (ref 97–108)
CO2 SERPL-SCNC: 25 MMOL/L (ref 21–32)
COLOR UR: ABNORMAL
CREAT SERPL-MCNC: 2.2 MG/DL (ref 0.7–1.3)
EOSINOPHIL # BLD: 0.1 K/UL (ref 0–0.4)
EOSINOPHIL NFR BLD: 2 % (ref 0–7)
EPITH CASTS URNS QL MICRO: ABNORMAL /LPF
ERYTHROCYTE [DISTWIDTH] IN BLOOD BY AUTOMATED COUNT: 12.8 % (ref 11.5–14.5)
FIO2 ON VENT: 21 %
GLOBULIN SER CALC-MCNC: 5.1 G/DL (ref 2–4)
GLUCOSE BLD STRIP.AUTO-MCNC: 470 MG/DL (ref 65–100)
GLUCOSE BLD STRIP.AUTO-MCNC: 483 MG/DL (ref 65–100)
GLUCOSE BLD STRIP.AUTO-MCNC: 565 MG/DL (ref 65–100)
GLUCOSE SERPL-MCNC: 554 MG/DL (ref 65–100)
GLUCOSE UR STRIP.AUTO-MCNC: >1000 MG/DL
HCO3 BLDV-SCNC: 25 MMOL/L (ref 23–28)
HCT VFR BLD AUTO: 33.3 % (ref 36.6–50.3)
HGB BLD-MCNC: 11.2 G/DL (ref 12.1–17)
HGB UR QL STRIP: NEGATIVE
KETONES UR QL STRIP.AUTO: NEGATIVE MG/DL
LACTATE SERPL-SCNC: 1.2 MMOL/L (ref 0.4–2)
LEUKOCYTE ESTERASE UR QL STRIP.AUTO: NEGATIVE
LIPASE SERPL-CCNC: 117 U/L (ref 73–393)
LYMPHOCYTES # BLD: 2.1 K/UL (ref 0.8–3.5)
LYMPHOCYTES NFR BLD: 35 % (ref 12–49)
MCH RBC QN AUTO: 32.9 PG (ref 26–34)
MCHC RBC AUTO-ENTMCNC: 33.6 G/DL (ref 30–36.5)
MCV RBC AUTO: 97.9 FL (ref 80–99)
MONOCYTES # BLD: 0.7 K/UL (ref 0–1)
MONOCYTES NFR BLD: 12 % (ref 5–13)
NEUTS SEG # BLD: 3.1 K/UL (ref 1.8–8)
NEUTS SEG NFR BLD: 51 % (ref 32–75)
NITRITE UR QL STRIP.AUTO: NEGATIVE
PCO2 BLDV: 40 MMHG (ref 41–51)
PH BLDV: 7.4 [PH] (ref 7.32–7.42)
PH UR STRIP: 6 [PH] (ref 5–8)
PLATELET # BLD AUTO: 216 K/UL (ref 150–400)
PO2 BLDV: 34 MMHG (ref 25–40)
POTASSIUM SERPL-SCNC: 5.1 MMOL/L (ref 3.5–5.1)
PROT SERPL-MCNC: 8.1 G/DL (ref 6.4–8.2)
PROT UR STRIP-MCNC: ABNORMAL MG/DL
RBC # BLD AUTO: 3.4 M/UL (ref 4.1–5.7)
RBC #/AREA URNS HPF: ABNORMAL /HPF (ref 0–5)
SAO2 % BLDV: 66 % (ref 65–88)
SAO2% DEVICE SAO2% SENSOR NAME: ABNORMAL
SERVICE CMNT-IMP: ABNORMAL
SODIUM SERPL-SCNC: 128 MMOL/L (ref 136–145)
SP GR UR REFRACTOMETRY: 1.03 (ref 1–1.03)
SPECIMEN SITE: ABNORMAL
UROBILINOGEN UR QL STRIP.AUTO: 0.2 EU/DL (ref 0.2–1)
WBC # BLD AUTO: 6 K/UL (ref 4.1–11.1)
WBC URNS QL MICRO: ABNORMAL /HPF (ref 0–4)

## 2017-10-31 PROCEDURE — 82962 GLUCOSE BLOOD TEST: CPT

## 2017-10-31 PROCEDURE — 80053 COMPREHEN METABOLIC PANEL: CPT | Performed by: STUDENT IN AN ORGANIZED HEALTH CARE EDUCATION/TRAINING PROGRAM

## 2017-10-31 PROCEDURE — 82803 BLOOD GASES ANY COMBINATION: CPT | Performed by: STUDENT IN AN ORGANIZED HEALTH CARE EDUCATION/TRAINING PROGRAM

## 2017-10-31 PROCEDURE — 96360 HYDRATION IV INFUSION INIT: CPT

## 2017-10-31 PROCEDURE — 74011250636 HC RX REV CODE- 250/636: Performed by: STUDENT IN AN ORGANIZED HEALTH CARE EDUCATION/TRAINING PROGRAM

## 2017-10-31 PROCEDURE — 99285 EMERGENCY DEPT VISIT HI MDM: CPT

## 2017-10-31 PROCEDURE — 85025 COMPLETE CBC W/AUTO DIFF WBC: CPT | Performed by: STUDENT IN AN ORGANIZED HEALTH CARE EDUCATION/TRAINING PROGRAM

## 2017-10-31 PROCEDURE — 83690 ASSAY OF LIPASE: CPT | Performed by: STUDENT IN AN ORGANIZED HEALTH CARE EDUCATION/TRAINING PROGRAM

## 2017-10-31 PROCEDURE — 83605 ASSAY OF LACTIC ACID: CPT | Performed by: STUDENT IN AN ORGANIZED HEALTH CARE EDUCATION/TRAINING PROGRAM

## 2017-10-31 PROCEDURE — 96361 HYDRATE IV INFUSION ADD-ON: CPT

## 2017-10-31 PROCEDURE — 81001 URINALYSIS AUTO W/SCOPE: CPT | Performed by: STUDENT IN AN ORGANIZED HEALTH CARE EDUCATION/TRAINING PROGRAM

## 2017-10-31 PROCEDURE — 36415 COLL VENOUS BLD VENIPUNCTURE: CPT | Performed by: STUDENT IN AN ORGANIZED HEALTH CARE EDUCATION/TRAINING PROGRAM

## 2017-10-31 PROCEDURE — 74011636637 HC RX REV CODE- 636/637: Performed by: STUDENT IN AN ORGANIZED HEALTH CARE EDUCATION/TRAINING PROGRAM

## 2017-10-31 RX ADMIN — SODIUM CHLORIDE 1000 ML: 9 INJECTION, SOLUTION INTRAVENOUS at 20:05

## 2017-10-31 RX ADMIN — HUMAN INSULIN 20 UNITS: 100 INJECTION, SOLUTION SUBCUTANEOUS at 21:55

## 2017-10-31 RX ADMIN — SODIUM CHLORIDE 1000 ML: 900 INJECTION, SOLUTION INTRAVENOUS at 18:33

## 2017-10-31 NOTE — ED PROVIDER NOTES
HPI Comments: 80 y.o. male with past medical history significant for prostate cancer, DM, morbid obesity, CAD, CHF, and GERD who presents from PCP with chief complaint of hyperglycemia. Pt arrives from his PCP office and was sent to the ED for further evaluation of dehydration, hypotension, and hyperglycemia. Pt states that his Jr Cross is high, in the 600s\" and his doctor told him to come here. He states that he \"took a chance\" and drove himself here from his PCP office. He states that he has been experiencing SOB and has been weak when he stands up to walk and losing his balance for the past 2 weeks. Pt takes insulin daily and reports taking it this morning. Pt denies any CP. There are no other acute medical concerns at this time. Social hx: former tobacco smoker (quit 26 years ago); (+) EtOH use (very rare); (-) illicit drug use    PCP: Eden Hernandez MD    Note written by Abhishek Polanco, as dictated by Ronny Mahajan MD 5:22 PM    The history is provided by the patient. No  was used. Past Medical History:   Diagnosis Date    CAD (coronary artery disease)     Cancer ([address]     PROSTATE    CHF (congestive heart failure) ([address] 2013    Diabetes ([address]     5yrs    GERD (gastroesophageal reflux disease)     Morbid obesity (HCC)     Prostate cancer ([address]        Past Surgical History:   Procedure Laterality Date    HX CORONARY ARTERY BYPASS GRAFT      HX HEART CATHETERIZATION      HX ORTHOPAEDIC      right wrist carpal tunnel repair         Family History:   Problem Relation Age of Onset    Heart Disease Father     Diabetes Father     Cancer Sister      BREAST       Social History     Social History    Marital status:      Spouse name: N/A    Number of children: N/A    Years of education: N/A     Occupational History    Not on file.      Social History Main Topics    Smoking status: Former Smoker     Packs/day: 1.00     Years: 7.00     Quit date: 11/8/1991    Smokeless tobacco: Never Used    Alcohol use Yes      Comment: VERY RARE    Drug use: No    Sexual activity: Not on file     Other Topics Concern    Not on file     Social History Narrative         ALLERGIES: Review of patient's allergies indicates no known allergies. Review of Systems   Constitutional: Positive for fatigue. Negative for chills, diaphoresis and fever. HENT: Negative for congestion, rhinorrhea, sinus pressure, sore throat, trouble swallowing and voice change. Eyes: Negative for photophobia and visual disturbance. Respiratory: Positive for shortness of breath. Negative for cough and chest tightness. Cardiovascular: Negative for chest pain, palpitations and leg swelling. Gastrointestinal: Negative for abdominal pain, blood in stool, constipation, diarrhea, nausea and vomiting. Musculoskeletal: Negative for arthralgias, myalgias and neck pain. Neurological: Positive for weakness. Negative for dizziness, light-headedness, numbness and headaches. All other systems reviewed and are negative. Vitals:    10/31/17 1611   BP: 128/60   Pulse: 72   Resp: 16   Temp: 97.3 °F (36.3 °C)   SpO2: 98%   Weight: 145.6 kg (321 lb)   Height: 5' 9\" (1.753 m)            Physical Exam   Constitutional: He is oriented to person, place, and time. He appears lethargic. No distress. Morbidly obese. HENT:   Head: Normocephalic and atraumatic. Nose: Nose normal.   Mouth/Throat: Oropharynx is clear and moist. No oropharyngeal exudate. Eyes: Conjunctivae and EOM are normal. Right eye exhibits no discharge. Left eye exhibits no discharge. No scleral icterus. Neck: Normal range of motion. Neck supple. No JVD present. No tracheal deviation present. No thyromegaly present. Cardiovascular: Normal rate, regular rhythm, normal heart sounds and intact distal pulses. Exam reveals no gallop and no friction rub. No murmur heard.   Pulmonary/Chest: Effort normal and breath sounds normal. No stridor. No respiratory distress. He has no wheezes. He has no rales. He exhibits no tenderness. Abdominal: Bowel sounds are normal. He exhibits no distension and no mass. There is no tenderness. There is no rebound. Musculoskeletal: Normal range of motion. He exhibits no edema or tenderness. Lymphadenopathy:     He has no cervical adenopathy. Neurological: He is oriented to person, place, and time. He appears lethargic. No cranial nerve deficit. Coordination normal.   A&O x 3. Skin: Skin is warm and dry. No rash noted. He is not diaphoretic. No erythema. No pallor. Psychiatric: He has a normal mood and affect. His behavior is normal. Judgment and thought content normal.      Note written by Abhishek Arrieta, as dictated by Demond Andres MD 5:22 PM    MDM  Number of Diagnoses or Management Options  Hyperglycemia:   Diagnosis management comments: Hyperglycemia, DKA, HHS. 79 y/o male presenting to ED for hyperglycemia. PT is IDDM and did not take his insulin this am.  PCP sent him to ED for further management. Plan:  Cbc, cmp, vbg, iv fluids, insulin, reassess.        Amount and/or Complexity of Data Reviewed  Clinical lab tests: ordered and reviewed  Review and summarize past medical records: yes  Discuss the patient with other providers: yes    Risk of Complications, Morbidity, and/or Mortality  Presenting problems: moderate  Diagnostic procedures: moderate  Management options: moderate    Critical Care  Total time providing critical care: 30-74 minutes (Total critical care time spent exclusive of procedures:  40 min)    Patient Progress  Patient progress: stable    ED Course       Procedures

## 2017-11-01 NOTE — ED NOTES
Patient and family updated on plan of care. IVF infusing without difficulty. VSS. Patient admits to not taking any home medications today, including his daily insulin.

## 2017-11-01 NOTE — DISCHARGE INSTRUCTIONS
Learning About High Blood Sugar  What is high blood sugar? Your body turns the food you eat into glucose (sugar), which it uses for energy. But if your body isn't able to use the sugar right away, it can build up in your blood and lead to high blood sugar. When the amount of sugar in your blood stays too high for too much of the time, you may have diabetes. Diabetes is a disease that can cause serious health problems. The good news is that lifestyle changes may help you get your blood sugar back to normal and avoid or delay diabetes. What causes high blood sugar? Sugar (glucose) can build up in your blood if you:  · Are overweight. · Have a family history of diabetes. · Take certain medicines, such as steroids. What are the symptoms? Having high blood sugar may not cause any symptoms at all. Or it may make you feel very thirsty or very hungry. You may also urinate more often than usual, have blurry vision, or lose weight without trying. How is high blood sugar treated? You can take steps to lower your blood sugar level if you understand what makes it get higher. Your doctor may want you to learn how to test your blood sugar level at home. Then you can see how illness, stress, or different kinds of food or medicine raise or lower your blood sugar level. Other tests may be needed to see if you have diabetes. How can you prevent high blood sugar? · Watch your weight. If you're overweight, losing just a small amount of weight may help. Reducing fat around your waist is most important. · Limit the amount of calories, sweets, and unhealthy fat you eat. Ask your doctor if a dietitian can help you. A registered dietitian can help you create meal plans that fit your lifestyle. · Get at least 30 minutes of exercise on most days of the week. Exercise helps control your blood sugar. It also helps you maintain a healthy weight. Walking is a good choice.  You also may want to do other activities, such as running, swimming, cycling, or playing tennis or team sports. · If your doctor prescribed medicines, take them exactly as prescribed. Call your doctor if you think you are having a problem with your medicine. You will get more details on the specific medicines your doctor prescribes. Follow-up care is a key part of your treatment and safety. Be sure to make and go to all appointments, and call your doctor if you are having problems. It's also a good idea to know your test results and keep a list of the medicines you take. Where can you learn more? Go to http://anuja-anderson.info/. Enter O108 in the search box to learn more about \"Learning About High Blood Sugar. \"  Current as of: March 13, 2017  Content Version: 11.4  © 1081-5101 Healthwise, Incorporated. Care instructions adapted under license by [employer] (which disclaims liability or warranty for this information). If you have questions about a medical condition or this instruction, always ask your healthcare professional. [address] any warranty or liability for your use of this information.

## 2017-11-01 NOTE — PROGRESS NOTES
10:50 PM  Discussed results w/ Pt/ Family/ they were offered/ refused admission/ 'will see his Dr Sondra Patrick'; noted can of vincent bedside;

## 2018-02-05 ENCOUNTER — OFFICE VISIT (OUTPATIENT)
Dept: CARDIOLOGY CLINIC | Age: 73
End: 2018-02-05

## 2018-02-05 VITALS — BODY MASS INDEX: 46.65 KG/M2 | WEIGHT: 315 LBS | HEIGHT: 69 IN

## 2018-02-05 DIAGNOSIS — I25.119 ATHEROSCLEROSIS OF NATIVE CORONARY ARTERY OF NATIVE HEART WITH ANGINA PECTORIS (HCC): Chronic | ICD-10-CM

## 2018-02-05 DIAGNOSIS — I50.22 SYSTOLIC CHF, CHRONIC (HCC): Primary | Chronic | ICD-10-CM

## 2018-02-05 DIAGNOSIS — I48.0 PAF (PAROXYSMAL ATRIAL FIBRILLATION) (HCC): Chronic | ICD-10-CM

## 2018-02-05 NOTE — MR AVS SNAPSHOT
[address] [facility] [address] [facility] 
[phone] Patient: Alex Burrell. MRN: [id number] :1945 Visit Information Date & Time Provider Department Dept. Phone Encounter #  
 2018  2:40 PM Eneida Damian MD [facility] Donis [phone] [id number] Your Appointments 2018  2:20 PM  
ESTABLISHED PATIENT with Eneida Damian MD  
[facility] ([address]) Appt Note: 6 mo fu  
 [address] [facility] 600 1007 [facility]  
[address] [facility] [place] [address] [address] Upcoming Health Maintenance Date Due Hepatitis C Screening 1945 FOOT EXAM Q1 1956 MICROALBUMIN Q1 1956 COLONOSCOPY 1964 ZOSTER VACCINE AGE 60> 10/4/2006 GLAUCOMA SCREENING Q2Y 2011 MEDICARE YEARLY EXAM 2011 EYE EXAM RETINAL OR DILATED Q1 2015 LIPID PANEL Q1 2016 Pneumococcal 65+ Low/Medium Risk (2 of 2 - PPSV23) 2016 Influenza Age 5 to Adult 2017 HEMOGLOBIN A1C Q6M 2017 DTaP/Tdap/Td series (2 - Td) 2024 Allergies as of 2018  Review Complete On: 2018 By: Lucy Conroy LPN No Known Allergies Current Immunizations  Reviewed on 2011 Name Date Influenza Vaccine Whole 10/8/2011 Tdap 2014 10:07 AM  
 ZZZ-RETIRED (DO NOT USE) Pneumococcal Vaccine (Unspecified Type) 2011  3:57 PM  
  
 Not reviewed this visit You Were Diagnosed With   
  
 Codes Comments Atherosclerosis of native coronary artery of native heart with angina pectoris ([facility]     ICD-10-CM: I25.119 ICD-9-CM: 414.01, 413.9 Systolic CHF, chronic (HCC)     ICD-10-CM: I50.22 ICD-9-CM: 428.22, 428.0 PAF (paroxysmal atrial fibrillation) (HCC)     ICD-10-CM: I48.0 ICD-9-CM: 427.31 Vitals Height(growth percentile) Weight(growth percentile) BMI Smoking Status 5' 9\" (1.753 m) 322 lb (146.1 kg) 47.55 kg/m2 Former Smoker Vitals History BMI and BSA Data Body Mass Index Body Surface Area  
 47.55 kg/m 2 2.67 m 2 Preferred Pharmacy Pharmacy Name Phone [facility] [address] [place] [phone] Your Updated Medication List  
  
   
This list is accurate as of: 2/5/18  4:23 PM.  Always use your most recent med list.  
  
  
  
  
 aspirin 325 mg tablet Commonly known as:  ASPIRIN Take 325 mg by mouth daily. bumetanide 1 mg tablet Commonly known as:  Alleen Manish Take 1 Tab by mouth two (2) times a day. carvedilol 6.25 mg tablet Commonly known as:  [place] Dunker Take 1 Tab by mouth two (2) times daily (with meals). insulin detemir 100 unit/mL (3 mL) Inpn Commonly known as:  LEVEMIR FLEXTOUCH  
65 Units by SubCUTAneous route every twelve (12) hours. 1100 and 2300  
  
 omeprazole 20 mg capsule Commonly known as:  PRILOSEC Take 20 mg by mouth daily. potassium chloride 20 mEq tablet Commonly known as:  K-DUR, KLOR-CON Take one tablet daily  
  
 rosuvastatin 10 mg tablet Commonly known as:  CRESTOR Take 1 Tab by mouth nightly. sertraline 50 mg tablet Commonly known as:  ZOLOFT Take 1 Tab by mouth daily. valsartan 80 mg tablet Commonly known as:  DIOVAN Take 1 Tab by mouth daily. We Performed the Following AMB POC EKG ROUTINE W/ 12 LEADS, INTER & REP [03891 CPT(R)] Patient Instructions See Dr. Patricia Burr in 1 year. Introducing [facility]! Valeriy Okeefe introduces [employer] patient portal. Now you can access parts of your medical record, email your doctor's office, and request medication refills online. 1. In your internet browser, go to [email or web address] 2. Click on the First Time User? Click Here link in the Sign In box. You will see the New Member Sign Up page. 3. Enter your Rev[employer] Access Code exactly as it appears below. You will not need to use this code after youve completed the sign-up process. If you do not sign up before the expiration date, you must request a new code. · [employer] Access Code: [email or web address] Expires: 5/6/2018  4:22 PM 
 
4. Enter the last four digits of your Social Security Number (xxxx) and Date of Birth (mm/dd/yyyy) as indicated and click Submit. You will be taken to the next sign-up page. 5. Create a Revenew ID. This will be your Rev[employer]ew login ID and cannot be changed, so think of one that is secure and easy to remember. 6. Create a Rev[employer] password. You can change your password at any time. 7. Enter your Password Reset Question and Answer. This can be used at a later time if you forget your password. 8. Enter your e-mail address. You will receive e-mail notification when new information is available in [address] 9. Click Sign Up. You can now view and download portions of your medical record. 10. Click the Download Summary menu link to download a portable copy of your medical information. If you have questions, please visit the Frequently Asked Questions section of the [employer] website. Remember, [employer] is NOT to be used for urgent needs. For medical emergencies, dial 911. Now available from your iPhone and Android! Please provide this summary of care documentation to your next provider. Your primary care clinician is listed as Marta Parrar. If you have any questions after today's visit, please call [phone].

## 2018-02-05 NOTE — PROGRESS NOTES
Dave Cifuentes. is a 71 y.o. male  Chief Complaint   Patient presents with    Irregular Heart Beat    Coronary Artery Disease    CHF    Cholesterol Problem
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
## 2018-09-02 ENCOUNTER — APPOINTMENT (OUTPATIENT)
Dept: GENERAL RADIOLOGY | Age: 73
End: 2018-09-02
Attending: PHYSICIAN ASSISTANT
Payer: MEDICARE

## 2018-09-02 ENCOUNTER — HOSPITAL ENCOUNTER (OUTPATIENT)
Age: 73
Setting detail: OBSERVATION
Discharge: HOME HEALTH | End: 2018-09-04
Attending: EMERGENCY MEDICINE | Admitting: INTERNAL MEDICINE
Payer: MEDICARE

## 2018-09-02 DIAGNOSIS — E87.5 ACUTE HYPERKALEMIA: ICD-10-CM

## 2018-09-02 DIAGNOSIS — R73.9 HYPERGLYCEMIA: Primary | ICD-10-CM

## 2018-09-02 DIAGNOSIS — N17.9 ACUTE RENAL FAILURE, UNSPECIFIED ACUTE RENAL FAILURE TYPE (HCC): ICD-10-CM

## 2018-09-02 PROBLEM — I50.23 SYSTOLIC CHF, ACUTE ON CHRONIC (HCC): Status: RESOLVED | Noted: 2017-05-24 | Resolved: 2018-09-02

## 2018-09-02 LAB
ALBUMIN SERPL-MCNC: 2.7 G/DL (ref 3.5–5)
ALBUMIN/GLOB SERPL: 0.6 {RATIO} (ref 1.1–2.2)
ALP SERPL-CCNC: 83 U/L (ref 45–117)
ALT SERPL-CCNC: 13 U/L (ref 12–78)
ANION GAP SERPL CALC-SCNC: 7 MMOL/L (ref 5–15)
AST SERPL-CCNC: 12 U/L (ref 15–37)
BASOPHILS # BLD: 0 K/UL (ref 0–0.1)
BASOPHILS NFR BLD: 0 % (ref 0–1)
BILIRUB SERPL-MCNC: 0.5 MG/DL (ref 0.2–1)
BUN SERPL-MCNC: 43 MG/DL (ref 6–20)
BUN/CREAT SERPL: 22 (ref 12–20)
CALCIUM SERPL-MCNC: 8.5 MG/DL (ref 8.5–10.1)
CHLORIDE SERPL-SCNC: 98 MMOL/L (ref 97–108)
CO2 SERPL-SCNC: 26 MMOL/L (ref 21–32)
COMMENT, HOLDF: NORMAL
CREAT SERPL-MCNC: 1.94 MG/DL (ref 0.7–1.3)
DIFFERENTIAL METHOD BLD: ABNORMAL
EOSINOPHIL # BLD: 0.1 K/UL (ref 0–0.4)
EOSINOPHIL NFR BLD: 1 % (ref 0–7)
ERYTHROCYTE [DISTWIDTH] IN BLOOD BY AUTOMATED COUNT: 12.2 % (ref 11.5–14.5)
GLOBULIN SER CALC-MCNC: 4.9 G/DL (ref 2–4)
GLUCOSE BLD STRIP.AUTO-MCNC: 494 MG/DL (ref 65–100)
GLUCOSE BLD STRIP.AUTO-MCNC: 534 MG/DL (ref 65–100)
GLUCOSE BLD STRIP.AUTO-MCNC: >600 MG/DL (ref 65–100)
GLUCOSE SERPL-MCNC: 568 MG/DL (ref 65–100)
HCT VFR BLD AUTO: 33.6 % (ref 36.6–50.3)
HGB BLD-MCNC: 11.2 G/DL (ref 12.1–17)
IMM GRANULOCYTES # BLD: 0 K/UL (ref 0–0.04)
IMM GRANULOCYTES NFR BLD AUTO: 1 % (ref 0–0.5)
KETONES SERPL QL: NEGATIVE
LYMPHOCYTES # BLD: 1.4 K/UL (ref 0.8–3.5)
LYMPHOCYTES NFR BLD: 25 % (ref 12–49)
MAGNESIUM SERPL-MCNC: 2.1 MG/DL (ref 1.6–2.4)
MCH RBC QN AUTO: 32.7 PG (ref 26–34)
MCHC RBC AUTO-ENTMCNC: 33.3 G/DL (ref 30–36.5)
MCV RBC AUTO: 98 FL (ref 80–99)
MONOCYTES # BLD: 0.6 K/UL (ref 0–1)
MONOCYTES NFR BLD: 10 % (ref 5–13)
NEUTS SEG # BLD: 3.5 K/UL (ref 1.8–8)
NEUTS SEG NFR BLD: 63 % (ref 32–75)
NRBC # BLD: 0 K/UL (ref 0–0.01)
NRBC BLD-RTO: 0 PER 100 WBC
PLATELET # BLD AUTO: 228 K/UL (ref 150–400)
PMV BLD AUTO: 9.4 FL (ref 8.9–12.9)
POTASSIUM SERPL-SCNC: 5.4 MMOL/L (ref 3.5–5.1)
PROT SERPL-MCNC: 7.6 G/DL (ref 6.4–8.2)
RBC # BLD AUTO: 3.43 M/UL (ref 4.1–5.7)
SAMPLES BEING HELD,HOLD: NORMAL
SERVICE CMNT-IMP: ABNORMAL
SODIUM SERPL-SCNC: 131 MMOL/L (ref 136–145)
TROPONIN I SERPL-MCNC: <0.05 NG/ML
UR CULT HOLD, URHOLD: NORMAL
WBC # BLD AUTO: 5.5 K/UL (ref 4.1–11.1)

## 2018-09-02 PROCEDURE — 36600 WITHDRAWAL OF ARTERIAL BLOOD: CPT

## 2018-09-02 PROCEDURE — 99285 EMERGENCY DEPT VISIT HI MDM: CPT

## 2018-09-02 PROCEDURE — 74011250636 HC RX REV CODE- 250/636: Performed by: INTERNAL MEDICINE

## 2018-09-02 PROCEDURE — 96374 THER/PROPH/DIAG INJ IV PUSH: CPT

## 2018-09-02 PROCEDURE — 74011250636 HC RX REV CODE- 250/636: Performed by: PHYSICIAN ASSISTANT

## 2018-09-02 PROCEDURE — 83036 HEMOGLOBIN GLYCOSYLATED A1C: CPT | Performed by: INTERNAL MEDICINE

## 2018-09-02 PROCEDURE — 96361 HYDRATE IV INFUSION ADD-ON: CPT

## 2018-09-02 PROCEDURE — 87077 CULTURE AEROBIC IDENTIFY: CPT | Performed by: INTERNAL MEDICINE

## 2018-09-02 PROCEDURE — 82570 ASSAY OF URINE CREATININE: CPT | Performed by: INTERNAL MEDICINE

## 2018-09-02 PROCEDURE — 93005 ELECTROCARDIOGRAM TRACING: CPT

## 2018-09-02 PROCEDURE — 80053 COMPREHEN METABOLIC PANEL: CPT | Performed by: PHYSICIAN ASSISTANT

## 2018-09-02 PROCEDURE — 74011636637 HC RX REV CODE- 636/637: Performed by: PHYSICIAN ASSISTANT

## 2018-09-02 PROCEDURE — 99218 HC RM OBSERVATION: CPT

## 2018-09-02 PROCEDURE — 82962 GLUCOSE BLOOD TEST: CPT

## 2018-09-02 PROCEDURE — 71045 X-RAY EXAM CHEST 1 VIEW: CPT

## 2018-09-02 PROCEDURE — 84484 ASSAY OF TROPONIN QUANT: CPT | Performed by: PHYSICIAN ASSISTANT

## 2018-09-02 PROCEDURE — 85025 COMPLETE CBC W/AUTO DIFF WBC: CPT | Performed by: PHYSICIAN ASSISTANT

## 2018-09-02 PROCEDURE — 96375 TX/PRO/DX INJ NEW DRUG ADDON: CPT

## 2018-09-02 PROCEDURE — 36415 COLL VENOUS BLD VENIPUNCTURE: CPT | Performed by: PHYSICIAN ASSISTANT

## 2018-09-02 PROCEDURE — 87186 SC STD MICRODIL/AGAR DIL: CPT | Performed by: INTERNAL MEDICINE

## 2018-09-02 PROCEDURE — 81001 URINALYSIS AUTO W/SCOPE: CPT | Performed by: PHYSICIAN ASSISTANT

## 2018-09-02 PROCEDURE — 82009 KETONE BODYS QUAL: CPT | Performed by: PHYSICIAN ASSISTANT

## 2018-09-02 PROCEDURE — 83735 ASSAY OF MAGNESIUM: CPT | Performed by: INTERNAL MEDICINE

## 2018-09-02 PROCEDURE — 84300 ASSAY OF URINE SODIUM: CPT | Performed by: INTERNAL MEDICINE

## 2018-09-02 PROCEDURE — 87086 URINE CULTURE/COLONY COUNT: CPT | Performed by: INTERNAL MEDICINE

## 2018-09-02 RX ORDER — INSULIN GLARGINE 100 [IU]/ML
55 INJECTION, SOLUTION SUBCUTANEOUS DAILY
Status: ON HOLD | COMMUNITY
End: 2018-09-04

## 2018-09-02 RX ORDER — PANTOPRAZOLE SODIUM 40 MG/1
40 TABLET, DELAYED RELEASE ORAL
Status: DISCONTINUED | OUTPATIENT
Start: 2018-09-03 | End: 2018-09-04 | Stop reason: HOSPADM

## 2018-09-02 RX ORDER — SULFAMETHOXAZOLE AND TRIMETHOPRIM 800; 160 MG/1; MG/1
1 TABLET ORAL 2 TIMES DAILY
COMMUNITY
End: 2018-09-04

## 2018-09-02 RX ORDER — ONDANSETRON 2 MG/ML
4 INJECTION INTRAMUSCULAR; INTRAVENOUS
Status: DISCONTINUED | OUTPATIENT
Start: 2018-09-02 | End: 2018-09-04 | Stop reason: HOSPADM

## 2018-09-02 RX ORDER — SODIUM CHLORIDE 9 MG/ML
75 INJECTION, SOLUTION INTRAVENOUS CONTINUOUS
Status: DISCONTINUED | OUTPATIENT
Start: 2018-09-02 | End: 2018-09-04

## 2018-09-02 RX ORDER — ASPIRIN 325 MG
325 TABLET ORAL DAILY
Status: DISCONTINUED | OUTPATIENT
Start: 2018-09-03 | End: 2018-09-03 | Stop reason: DRUGHIGH

## 2018-09-02 RX ORDER — MAGNESIUM SULFATE 100 %
4 CRYSTALS MISCELLANEOUS AS NEEDED
Status: DISCONTINUED | OUTPATIENT
Start: 2018-09-02 | End: 2018-09-04 | Stop reason: HOSPADM

## 2018-09-02 RX ORDER — METOPROLOL TARTRATE 25 MG/1
25 TABLET, FILM COATED ORAL 2 TIMES DAILY
COMMUNITY
End: 2018-09-04

## 2018-09-02 RX ORDER — INSULIN LISPRO 100 [IU]/ML
INJECTION, SOLUTION INTRAVENOUS; SUBCUTANEOUS
Status: DISCONTINUED | OUTPATIENT
Start: 2018-09-03 | End: 2018-09-04 | Stop reason: HOSPADM

## 2018-09-02 RX ORDER — NALOXONE HYDROCHLORIDE 0.4 MG/ML
0.4 INJECTION, SOLUTION INTRAMUSCULAR; INTRAVENOUS; SUBCUTANEOUS AS NEEDED
Status: DISCONTINUED | OUTPATIENT
Start: 2018-09-02 | End: 2018-09-04 | Stop reason: HOSPADM

## 2018-09-02 RX ORDER — METOPROLOL TARTRATE 25 MG/1
25 TABLET, FILM COATED ORAL 2 TIMES DAILY
Status: DISCONTINUED | OUTPATIENT
Start: 2018-09-03 | End: 2018-09-03

## 2018-09-02 RX ORDER — DEXTROSE 50 % IN WATER (D50W) INTRAVENOUS SYRINGE
12.5-25 AS NEEDED
Status: DISCONTINUED | OUTPATIENT
Start: 2018-09-02 | End: 2018-09-04 | Stop reason: HOSPADM

## 2018-09-02 RX ORDER — LISINOPRIL 2.5 MG/1
2.5 TABLET ORAL DAILY
COMMUNITY
End: 2018-09-03

## 2018-09-02 RX ORDER — INSULIN LISPRO 100 [IU]/ML
12 INJECTION, SOLUTION INTRAVENOUS; SUBCUTANEOUS ONCE
Status: COMPLETED | OUTPATIENT
Start: 2018-09-02 | End: 2018-09-03

## 2018-09-02 RX ORDER — SODIUM CHLORIDE 0.9 % (FLUSH) 0.9 %
5-10 SYRINGE (ML) INJECTION AS NEEDED
Status: DISCONTINUED | OUTPATIENT
Start: 2018-09-02 | End: 2018-09-04 | Stop reason: HOSPADM

## 2018-09-02 RX ORDER — SODIUM CHLORIDE 0.9 % (FLUSH) 0.9 %
5-10 SYRINGE (ML) INJECTION EVERY 8 HOURS
Status: DISCONTINUED | OUTPATIENT
Start: 2018-09-02 | End: 2018-09-04 | Stop reason: HOSPADM

## 2018-09-02 RX ORDER — ROSUVASTATIN CALCIUM 10 MG/1
10 TABLET, COATED ORAL
Status: DISCONTINUED | OUTPATIENT
Start: 2018-09-02 | End: 2018-09-04 | Stop reason: HOSPADM

## 2018-09-02 RX ORDER — INSULIN GLARGINE 100 [IU]/ML
55 INJECTION, SOLUTION SUBCUTANEOUS DAILY
Status: DISCONTINUED | OUTPATIENT
Start: 2018-09-03 | End: 2018-09-04 | Stop reason: HOSPADM

## 2018-09-02 RX ORDER — FUROSEMIDE 40 MG/1
40 TABLET ORAL 2 TIMES DAILY
COMMUNITY
End: 2018-09-04

## 2018-09-02 RX ORDER — ACETAMINOPHEN 325 MG/1
650 TABLET ORAL
Status: DISCONTINUED | OUTPATIENT
Start: 2018-09-02 | End: 2018-09-04 | Stop reason: HOSPADM

## 2018-09-02 RX ADMIN — SODIUM CHLORIDE 500 ML: 900 INJECTION, SOLUTION INTRAVENOUS at 21:02

## 2018-09-02 RX ADMIN — Medication 10 ML: at 22:36

## 2018-09-02 RX ADMIN — SODIUM CHLORIDE 75 ML/HR: 900 INJECTION, SOLUTION INTRAVENOUS at 22:33

## 2018-09-02 RX ADMIN — HUMAN INSULIN 10 UNITS: 100 INJECTION, SOLUTION SUBCUTANEOUS at 22:31

## 2018-09-02 NOTE — IP AVS SNAPSHOT
[address] 
[phone] Patient: Gamaliel Vann MRN: [id number] :1945 About your hospitalization You were admitted on:  2018 You last received care in the:  [facility] 4M POST SURG ORT 2 You were discharged on:  2018 Why you were hospitalized Your primary diagnosis was:  Hyperglycemia Your diagnoses also included:  Type 2 Diabetes Mellitus Without Complication (Hcc), Systolic Chf, Chronic (Hcc), Paf (Paroxysmal Atrial Fibrillation) (Hcc), Morbid Obesity (Hcc), Hyperlipidemia, Coronary Atherosclerosis Of Native Coronary Artery, Anemia, S/P Cabg X 2, Aly (Acute Kidney Injury) (Hcc), Uti (Urinary Tract Infection), Prostate Cancer (Hcc), Gerd (Gastroesophageal Reflux Disease), Chf (Congestive Heart Failure) (Hcc), Cad (Coronary Artery Disease) Follow-up Information Follow up With Details Comments Contact Info AT [place] [address] 
[phone] Hansel Pimentel MD Go on 2018 For hospital follow up appointment at 10:45AM  [address] [place] [address] 
[phone] Discharge Orders None A check trang indicates which time of day the medication should be taken. My Medications START taking these medications Instructions Each Dose to Equal  
 Morning Noon Evening Bedtime  
 cefdinir 300 mg capsule Commonly known as:  OMNICEF Take 1 Cap by mouth two (2) times a day for 4 days. 300 mg CHANGE how you take these medications Instructions Each Dose to Equal  
 Morning Noon Evening Bedtime  
 insulin glargine 100 unit/mL injection Commonly known as:  LANTUS U-100 INSULIN What changed:  how much to take Your next dose is:  18 with breakfast  
   
 57 Units by SubCUTAneous route daily. 57 Units CONTINUE taking these medications Instructions Each Dose to Equal  
 Morning Noon Evening Bedtime  
 aspirin 81 mg chewable tablet Your next dose is:  18 with breakfast   
   
 Take 81 mg by mouth daily. 81 mg  
    
   
   
   
  
 omeprazole 20 mg capsule Commonly known as:  PRILOSEC Take 20 mg by mouth daily. 20 mg  
    
   
   
   
  
 potassium chloride 20 mEq tablet Commonly known as:  K-DUR, KLOR-CON Take one tablet daily  
     
   
   
   
  
 rosuvastatin 10 mg tablet Commonly known as:  CRESTOR Your next dose is:  18 at 9 pm  
   
 Take 1 Tab by mouth nightly. 10 mg  
    
   
   
   
  
  
STOP taking these medications BACTRIM -800 mg per tablet Generic drug:  trimethoprim-sulfamethoxazole  
   
  
 furosemide 40 mg tablet Commonly known as:  LASIX  
   
  
 metoprolol tartrate 25 mg tablet Commonly known as:  LOPRESSOR Where to Get Your Medications Information on where to get these meds will be given to you by the nurse or doctor. ! Ask your nurse or doctor about these medications  
  cefdinir 300 mg capsule  
 insulin glargine 100 unit/mL injection Discharge Instructions HOSPITALIST DISCHARGE INSTRUCTIONS 
NAME: Diana Mark. :  1945 MRN:  [id number] Date/Time:  2018 12:05 PM 
 
ADMIT DATE: 2018 DISCHARGE DATE: 2018 ADMITTING DIAGNOSIS: 
Hyperglycemia (high blood sugars) Acute kidney injury DISCHARGE DIAGNOSIS: 
 
 
MEDICATIONS: 
MARIO VAZQUEZ NOTE THAT YOUR METOPROLOL AND LASIX WERE HELD. PLEASE RESTART THESE MEDICINES ON . PLEASE RESTART YOUR DOSE OF METOPROLOL AT 12.5 MG TWICE DAILY AND YOUR LASIX AT 40MG DAILY FOR NOW UNTIL YOU SEE YOUR PRIMARY CARE DOCTOR WHO CAN FURTHER ADJUST IT  
· It is important that you take the medication exactly as they are prescribed. · Keep your medication in the bottles provided by the pharmacist and keep a list of the medication names, dosages, and times to be taken in your wallet. · Do not take other medications without consulting your doctor. Pain Management: per above medications What to do at [facility] Recommended diet:  Cardiac Diet Recommended activity: Activity as tolerated If you experience any of the following symptoms then please call your primary care physician or return to the emergency room if you cannot get hold of your doctor: 
Fever, chills, nausea, vomiting, diarrhea, change in mentation, falling, bleeding, shortness of breath, chest pain Follow Up: 
Dr. Dyllan Holloway MD in 1-2 days Follow-up Information Follow up With Details Comments Contact Info AT [place] [address] 
[phone] Dyllan Holloway MD   [facility] [address] [facility] [address] 
[phone] Information obtained by : 
I understand that if any problems occur once I am at home I am to contact my physician. I understand and acknowledge receipt of the instructions indicated above. Physician's or R.N.'s Signature                                                                  Date/Time Patient or Representative Signature                                                          Date/Time QuarterSpothart Announcement We are excited to announce that we are making your provider's discharge notes available to you in QuarterSpothart.   You will see these notes when they are completed and signed by the physician that discharged you from your recent hospital stay. If you have any questions or concerns about any information you see in Dsg.nr, please call the Health Information Department where you were seen or reach out to your Primary Care Provider for more information about your plan of care. Introducing [facility]! Karime Machado introduces Dsg.nr patient portal. Now you can access parts of your medical record, email your doctor's office, and request medication refills online. 1. In your internet browser, go to [email or web address] 2. Click on the First Time User? Click Here link in the Sign In box. You will see the New Member Sign Up page. 3. Enter your Dsg.nr Access Code exactly as it appears below. You will not need to use this code after youve completed the sign-up process. If you do not sign up before the expiration date, you must request a new code. · Dsg.nr Access Code: [email or web address] Expires: 12/1/2018  8:46 PM 
 
4. Enter the last four digits of your Social Security Number (xxxx) and Date of Birth (mm/dd/yyyy) as indicated and click Submit. You will be taken to the next sign-up page. 5. Create a Dsg.nr ID. This will be your Dsg.nr login ID and cannot be changed, so think of one that is secure and easy to remember. 6. Create a Dsg.nr password. You can change your password at any time. 7. Enter your Password Reset Question and Answer. This can be used at a later time if you forget your password. 8. Enter your e-mail address. You will receive e-mail notification when new information is available in [address] 9. Click Sign Up. You can now view and download portions of your medical record. 10. Click the Download Summary menu link to download a portable copy of your medical information. If you have questions, please visit the Frequently Asked Questions section of the Dsg.nr website. Remember, Dsg.nr is NOT to be used for urgent needs. For medical emergencies, dial 911. Now available from your iPhone and Android! Introducing Ishmael Lopez As a Tej Gamaerly patient, I wanted to make you aware of our electronic visit tool called Ishmael Lopez. Tej Townsend [employer] allows you to connect within minutes with a medical provider 24 hours a day, seven days a week via a mobile device or tablet or logging into a secure website from your computer. You can access Ishmael Lopez from anywhere in the [place]. A virtual visit might be right for you when you have a simple condition and feel like you just dont want to get out of bed, or cant get away from work for an appointment, when your regular [facility] provider is not available (evenings, weekends or holidays), or when youre out of town and need minor care. Electronic visits cost only $49 and if the [employer] provider determines a prescription is needed to treat your condition, one can be electronically transmitted to a nearby pharmacy*. Please take a moment to enroll today if you have not already done so. The enrollment process is free and takes just a few minutes. To enroll, please download the [employer]ado marcia to your tablet or phone, or visit [email or web address] to enroll on your computer. And, as an [address] [place] [address] patient with a [employer] account, the results of your visits will be scanned into your electronic medical record and your primary care provider will be able to view the scanned results. We urge you to continue to see your regular [facility] provider for your ongoing medical care. And while your primary care provider may not be the one available when you seek a Ishmael Lopez virtual visit, the peace of mind you get from getting a real diagnosis real time can be priceless. For more information on Ishmael Lopez, view our Frequently Asked Questions (FAQs) at [email or web address]. Sincerely, 
 
Mulugeta Gorman MD 
Chief Medical Officer Beti Shaw *:  certain medications cannot be prescribed via Ishmael Lopez Unresulted Labs-Please follow up with your PCP about these lab tests Order Current Status CULTURE, BLOOD Preliminary result CULTURE, BLOOD Preliminary result CULTURE, URINE Preliminary result Providers Seen During Your Hospitalization Provider Specialty Primary office phone Marcio Starks, [address] [facility] Emergency Medicine [phone] Johanna Sanchez MD Internal Medicine [phone] Patria Jay MD Internal Medicine [phone] Your Primary Care Physician (PCP) Primary Care Physician Office Phone Office Fax Alejandra Malone SABIHA [phone] You are allergic to the following No active allergies Recent Documentation Height Weight BMI Smoking Status 1.753 m 137.9 kg 44.89 kg/m2 Former Smoker Emergency Contacts Name Discharge Info Relation Home Work Mobile Edna Castro DISCHARGE CAREGIVER [3] Spouse [3] [phone] [place] DISCHARGE CAREGIVER [3] Daughter [21] Patient Belongings The following personal items are in your possession at time of discharge: 
  Dental Appliances: None  Visual Aid: None      Home Medications: Sent home   Jewelry: None  Clothing: Pants, Shirt, Undergarments    Other Valuables: None Please provide this summary of care documentation to your next provider. Signatures-by signing, you are acknowledging that this After Visit Summary has been reviewed with you and you have received a copy. Patient Signature:  ____________________________________________________________ Date:  ____________________________________________________________  
  
Mary Jane Simons Provider Signature:  ____________________________________________________________ Date:  ____________________________________________________________

## 2018-09-03 PROBLEM — N39.0 UTI (URINARY TRACT INFECTION): Status: ACTIVE | Noted: 2018-09-03

## 2018-09-03 LAB
ANION GAP SERPL CALC-SCNC: 11 MMOL/L (ref 5–15)
APPEARANCE UR: ABNORMAL
BACTERIA URNS QL MICRO: ABNORMAL /HPF
BASOPHILS # BLD: 0 K/UL (ref 0–0.1)
BASOPHILS NFR BLD: 0 % (ref 0–1)
BILIRUB UR QL: NEGATIVE
BUN SERPL-MCNC: 42 MG/DL (ref 6–20)
BUN/CREAT SERPL: 23 (ref 12–20)
CALCIUM SERPL-MCNC: 8.8 MG/DL (ref 8.5–10.1)
CHLORIDE SERPL-SCNC: 101 MMOL/L (ref 97–108)
CO2 SERPL-SCNC: 23 MMOL/L (ref 21–32)
COLOR UR: ABNORMAL
CREAT SERPL-MCNC: 1.83 MG/DL (ref 0.7–1.3)
CREAT UR-MCNC: 82.66 MG/DL
DIFFERENTIAL METHOD BLD: ABNORMAL
EOSINOPHIL # BLD: 0.1 K/UL (ref 0–0.4)
EOSINOPHIL NFR BLD: 2 % (ref 0–7)
EPITH CASTS URNS QL MICRO: ABNORMAL /LPF
ERYTHROCYTE [DISTWIDTH] IN BLOOD BY AUTOMATED COUNT: 12.1 % (ref 11.5–14.5)
EST. AVERAGE GLUCOSE BLD GHB EST-MCNC: 303 MG/DL
GLUCOSE BLD STRIP.AUTO-MCNC: 154 MG/DL (ref 65–100)
GLUCOSE BLD STRIP.AUTO-MCNC: 186 MG/DL (ref 65–100)
GLUCOSE BLD STRIP.AUTO-MCNC: 202 MG/DL (ref 65–100)
GLUCOSE BLD STRIP.AUTO-MCNC: 231 MG/DL (ref 65–100)
GLUCOSE BLD STRIP.AUTO-MCNC: 235 MG/DL (ref 65–100)
GLUCOSE BLD STRIP.AUTO-MCNC: 355 MG/DL (ref 65–100)
GLUCOSE BLD STRIP.AUTO-MCNC: 479 MG/DL (ref 65–100)
GLUCOSE SERPL-MCNC: 319 MG/DL (ref 65–100)
GLUCOSE UR STRIP.AUTO-MCNC: >1000 MG/DL
HBA1C MFR BLD: 12.2 % (ref 4.2–6.3)
HCT VFR BLD AUTO: 37.5 % (ref 36.6–50.3)
HGB BLD-MCNC: 12.2 G/DL (ref 12.1–17)
HGB UR QL STRIP: NEGATIVE
HYALINE CASTS URNS QL MICRO: ABNORMAL /LPF (ref 0–5)
IMM GRANULOCYTES # BLD: 0 K/UL (ref 0–0.04)
IMM GRANULOCYTES NFR BLD AUTO: 0 % (ref 0–0.5)
KETONES UR QL STRIP.AUTO: NEGATIVE MG/DL
LEUKOCYTE ESTERASE UR QL STRIP.AUTO: ABNORMAL
LYMPHOCYTES # BLD: 2.5 K/UL (ref 0.8–3.5)
LYMPHOCYTES NFR BLD: 37 % (ref 12–49)
MAGNESIUM SERPL-MCNC: 2.3 MG/DL (ref 1.6–2.4)
MCH RBC QN AUTO: 32.4 PG (ref 26–34)
MCHC RBC AUTO-ENTMCNC: 32.5 G/DL (ref 30–36.5)
MCV RBC AUTO: 99.7 FL (ref 80–99)
MONOCYTES # BLD: 0.6 K/UL (ref 0–1)
MONOCYTES NFR BLD: 9 % (ref 5–13)
NEUTS SEG # BLD: 3.6 K/UL (ref 1.8–8)
NEUTS SEG NFR BLD: 52 % (ref 32–75)
NITRITE UR QL STRIP.AUTO: NEGATIVE
NRBC # BLD: 0 K/UL (ref 0–0.01)
NRBC BLD-RTO: 0 PER 100 WBC
PH UR STRIP: 6.5 [PH] (ref 5–8)
PLATELET # BLD AUTO: 240 K/UL (ref 150–400)
PMV BLD AUTO: 9.3 FL (ref 8.9–12.9)
POTASSIUM SERPL-SCNC: 4.6 MMOL/L (ref 3.5–5.1)
PROT UR STRIP-MCNC: 30 MG/DL
RBC # BLD AUTO: 3.76 M/UL (ref 4.1–5.7)
RBC #/AREA URNS HPF: ABNORMAL /HPF (ref 0–5)
SERVICE CMNT-IMP: ABNORMAL
SODIUM SERPL-SCNC: 135 MMOL/L (ref 136–145)
SODIUM UR-SCNC: 11 MMOL/L
SP GR UR REFRACTOMETRY: 1.02 (ref 1–1.03)
UROBILINOGEN UR QL STRIP.AUTO: 1 EU/DL (ref 0.2–1)
WBC # BLD AUTO: 6.9 K/UL (ref 4.1–11.1)
WBC URNS QL MICRO: ABNORMAL /HPF (ref 0–4)

## 2018-09-03 PROCEDURE — 74011636637 HC RX REV CODE- 636/637: Performed by: INTERNAL MEDICINE

## 2018-09-03 PROCEDURE — 99218 HC RM OBSERVATION: CPT

## 2018-09-03 PROCEDURE — 65270000029 HC RM PRIVATE

## 2018-09-03 PROCEDURE — 74011000250 HC RX REV CODE- 250: Performed by: INTERNAL MEDICINE

## 2018-09-03 PROCEDURE — 96365 THER/PROPH/DIAG IV INF INIT: CPT

## 2018-09-03 PROCEDURE — 87040 BLOOD CULTURE FOR BACTERIA: CPT | Performed by: INTERNAL MEDICINE

## 2018-09-03 PROCEDURE — G8988 SELF CARE GOAL STATUS: HCPCS | Performed by: OCCUPATIONAL THERAPIST

## 2018-09-03 PROCEDURE — 82962 GLUCOSE BLOOD TEST: CPT

## 2018-09-03 PROCEDURE — 83735 ASSAY OF MAGNESIUM: CPT | Performed by: INTERNAL MEDICINE

## 2018-09-03 PROCEDURE — 97116 GAIT TRAINING THERAPY: CPT

## 2018-09-03 PROCEDURE — 74011250636 HC RX REV CODE- 250/636: Performed by: INTERNAL MEDICINE

## 2018-09-03 PROCEDURE — 85025 COMPLETE CBC W/AUTO DIFF WBC: CPT | Performed by: INTERNAL MEDICINE

## 2018-09-03 PROCEDURE — 97535 SELF CARE MNGMENT TRAINING: CPT | Performed by: OCCUPATIONAL THERAPIST

## 2018-09-03 PROCEDURE — G8987 SELF CARE CURRENT STATUS: HCPCS | Performed by: OCCUPATIONAL THERAPIST

## 2018-09-03 PROCEDURE — 36415 COLL VENOUS BLD VENIPUNCTURE: CPT | Performed by: INTERNAL MEDICINE

## 2018-09-03 PROCEDURE — 80048 BASIC METABOLIC PNL TOTAL CA: CPT | Performed by: INTERNAL MEDICINE

## 2018-09-03 PROCEDURE — 97161 PT EVAL LOW COMPLEX 20 MIN: CPT

## 2018-09-03 PROCEDURE — 74011250637 HC RX REV CODE- 250/637: Performed by: INTERNAL MEDICINE

## 2018-09-03 PROCEDURE — 97165 OT EVAL LOW COMPLEX 30 MIN: CPT | Performed by: OCCUPATIONAL THERAPIST

## 2018-09-03 PROCEDURE — 96361 HYDRATE IV INFUSION ADD-ON: CPT

## 2018-09-03 PROCEDURE — 74011000258 HC RX REV CODE- 258: Performed by: INTERNAL MEDICINE

## 2018-09-03 PROCEDURE — 96375 TX/PRO/DX INJ NEW DRUG ADDON: CPT

## 2018-09-03 PROCEDURE — 96376 TX/PRO/DX INJ SAME DRUG ADON: CPT

## 2018-09-03 RX ORDER — GUAIFENESIN 100 MG/5ML
81 LIQUID (ML) ORAL DAILY
Status: DISCONTINUED | OUTPATIENT
Start: 2018-09-03 | End: 2018-09-04 | Stop reason: HOSPADM

## 2018-09-03 RX ORDER — GUAIFENESIN 100 MG/5ML
81 LIQUID (ML) ORAL DAILY
COMMUNITY
End: 2018-11-12

## 2018-09-03 RX ORDER — INSULIN LISPRO 100 [IU]/ML
12 INJECTION, SOLUTION INTRAVENOUS; SUBCUTANEOUS ONCE
Status: DISCONTINUED | OUTPATIENT
Start: 2018-09-03 | End: 2018-09-03

## 2018-09-03 RX ADMIN — INSULIN LISPRO 3 UNITS: 100 INJECTION, SOLUTION INTRAVENOUS; SUBCUTANEOUS at 09:15

## 2018-09-03 RX ADMIN — INSULIN LISPRO 2 UNITS: 100 INJECTION, SOLUTION INTRAVENOUS; SUBCUTANEOUS at 22:17

## 2018-09-03 RX ADMIN — INSULIN LISPRO 4 UNITS: 100 INJECTION, SOLUTION INTRAVENOUS; SUBCUTANEOUS at 10:49

## 2018-09-03 RX ADMIN — CEFTRIAXONE SODIUM 1 G: 1 INJECTION, POWDER, FOR SOLUTION INTRAMUSCULAR; INTRAVENOUS at 23:41

## 2018-09-03 RX ADMIN — INSULIN GLARGINE 55 UNITS: 100 INJECTION, SOLUTION SUBCUTANEOUS at 09:15

## 2018-09-03 RX ADMIN — Medication 10 ML: at 02:25

## 2018-09-03 RX ADMIN — ROSUVASTATIN CALCIUM 10 MG: 10 TABLET, FILM COATED ORAL at 22:17

## 2018-09-03 RX ADMIN — ASPIRIN 81 MG 81 MG: 81 TABLET ORAL at 09:15

## 2018-09-03 RX ADMIN — Medication 10 ML: at 14:00

## 2018-09-03 RX ADMIN — PANTOPRAZOLE SODIUM 40 MG: 40 TABLET, DELAYED RELEASE ORAL at 06:26

## 2018-09-03 RX ADMIN — INSULIN LISPRO 12 UNITS: 100 INJECTION, SOLUTION INTRAVENOUS; SUBCUTANEOUS at 00:51

## 2018-09-03 RX ADMIN — ROSUVASTATIN CALCIUM 10 MG: 10 TABLET, FILM COATED ORAL at 02:24

## 2018-09-03 RX ADMIN — INSULIN LISPRO 3 UNITS: 100 INJECTION, SOLUTION INTRAVENOUS; SUBCUTANEOUS at 17:25

## 2018-09-03 RX ADMIN — WATER 1 G: 1 INJECTION INTRAMUSCULAR; INTRAVENOUS; SUBCUTANEOUS at 00:51

## 2018-09-03 RX ADMIN — SODIUM CHLORIDE 75 ML/HR: 900 INJECTION, SOLUTION INTRAVENOUS at 13:07

## 2018-09-03 NOTE — PROGRESS NOTES
0011: Last blood glucose check was 534, concerned about patient needing an insulin gtt and SDU, I p/c to ED and spoke with primary nurse in reference to patient's blood glucose. Kerry Dave spoke with Dr. Jean Marie Paz and she does not believe patient needs insulin gtt at this time d/t urine being negative for ketones and anion gap WNL. 0030: Recheck of patient's blood glocuse was 494, [address] primary nurse to take report. 0212: Patient arrived on unit, blood glucose 479, notified Dr. Jean Marie Paz. No new orders. Primary nurse informed.

## 2018-09-03 NOTE — PROGRESS NOTES
BSHSI: MED RECONCILIATION Daughter to bring in medication bottles for review. Will complete at that time. In the meantime, list updated based on surescripts data available now. Will update provider with any changes to PTA list. 
 
Thank you, 
Dhiraj Acosta, Pharm. D.

## 2018-09-03 NOTE — PROGRESS NOTES
[facility] Medical Progress Note NAME: Luis Tapia. :  1945 MRM:  [id number] Date/Time: 9/3/2018  10:51 AM 
 Admission by my partner was prior to midnight. Assessment and Plan:  
 
Type 2 diabetes mellitus without complication, uncontrolled - POA, Not DKA, but symptomatic. Diabetic diet and counseling. SSI per protocol. Continue home Lantus. Check A1c. UTI (urinary tract infection) / Prostate cancer - Possible UTI based on UA, but this was contaminated and concentrated. Re-check. Ceftriaxone for now. PAULINA (acute kidney injury) / Dehydration - POA, likey due to poor PO intake and osmotic diuresis. Coronary atherosclerosis of native coronary artery / S/P CABG x 2 - POA, appears stable. Monitor and check AM troponin. continue ASA but hold BB Systolic CHF, chronic - POA, appears stable. Will hydrate gently as above and monitor for fluid overload. Hold lasix due to ARF. Hold metoprolol due to bradycardia. PAF (paroxysmal atrial fibrillation) - NSR here. Currently rate low, so hold metoprolol. Only on ASA for anticoagulation Morbid obesity - Advise weight loss. Would benefit from outpatient [place] testing. Hyperlipidemia - Continue crestor Anemia - POA, mild and no active bleeding. May worse with hydration. Likely due to chronic disease. GERD (gastroesophageal reflux disease) - PPI Subjective: Chief Complaint:  Feels week, barely better. ROS: 
(bold if positive, if negative) Tolerating some PT  Tolerating some Diet Objective:  
 
Last 24hrs VS reviewed since prior progress note. Most recent are: 
 
Visit Vitals  /63 (BP 1 Location: Left arm, BP Patient Position: At rest)  Pulse (!) 58  Temp 98.5 °F (36.9 °C)  Resp 16  
 Ht 5' 9\" (1.753 m)  Wt 137.9 kg (304 lb)  SpO2 98%  BMI 44.89 kg/m2 SpO2 Readings from Last 6 Encounters:  
18 98% 10/31/17 95% 17 99% 06/01/17 97% 05/25/17 96% 11/18/16 96% Intake/Output Summary (Last 24 hours) at 09/03/18 1051 Last data filed at 09/03/18 1027 Gross per 24 hour Intake              480 ml Output              200 ml Net              280 ml Physical Exam: 
 
Gen:  Morbid obese, in no acute distress HEENT:  Pink conjunctivae, PERRL, hearing intact to voice, moist mucous membranes Neck:  Supple, without masses, thyroid non-tender Resp:  No accessory muscle use, clear breath sounds without wheezes rales or rhonchi 
Card:  No murmurs, normal S1, S2 without thrills, bruits or peripheral edema Abd:  Soft, non-tender, non-distended, normoactive bowel sounds are present, no mass Lymph:  No cervical or inguinal adenopathy Musc:  No cyanosis or clubbing Skin:  No rashes or ulcers, skin turgor is good Neuro:  Cranial nerves are grossly intact, general motor weakness, follows commands vaguely Psych:  Poor insight, oriented to person, place and time, sleepy Telemetry reviewed:   normal sinus rhythm 
__________________________________________________________________ Medications Reviewed: (see below) Medications:  
 
Current Facility-Administered Medications Medication Dose Route Frequency  [START ON 9/4/2018] cefTRIAXone (ROCEPHIN) 1 g in 0.9% sodium chloride (MBP/ADV) 50 mL  1 g IntraVENous Q24H  
 aspirin chewable tablet 81 mg  81 mg Oral DAILY  0.9% sodium chloride infusion  75 mL/hr IntraVENous CONTINUOUS  
 insulin lispro (HUMALOG) injection   SubCUTAneous AC&HS  
 glucose chewable tablet 16 g  4 Tab Oral PRN  
 dextrose (D50W) injection syrg 12.5-25 g  12.5-25 g IntraVENous PRN  
 glucagon (GLUCAGEN) injection 1 mg  1 mg IntraMUSCular PRN  
 sodium chloride (NS) flush 5-10 mL  5-10 mL IntraVENous Q8H  
 sodium chloride (NS) flush 5-10 mL  5-10 mL IntraVENous PRN  
 acetaminophen (TYLENOL) tablet 650 mg  650 mg Oral Q4H PRN  
  naloxone (NARCAN) injection 0.4 mg  0.4 mg IntraVENous PRN  
 ondansetron (ZOFRAN) injection 4 mg  4 mg IntraVENous Q4H PRN  
 insulin glargine (LANTUS) injection 55 Units  55 Units SubCUTAneous DAILY  pantoprazole (PROTONIX) tablet 40 mg  40 mg Oral ACB  rosuvastatin (CRESTOR) tablet 10 mg  10 mg Oral QHS Lab Data Reviewed: (see below) Lab Review:  
 
Recent Labs  
   09/03/18 0252 09/02/18 2105 WBC  6.9  5.5 HGB  12.2  11.2* HCT  37.5  33.6* PLT  240  228 Recent Labs  
   09/03/18 0252 09/02/18 
 2105 NA  135*  131* K  4.6  5.4*  
CL  101  98 CO2  23  26 GLU  319*  568* BUN  42*  43* CREA  1.83*  1.94* CA  8.8  8.5 MG  2.3  2.1 ALB   --   2.7* TBILI   --   0.5 SGOT   --   12* ALT   --   13 Lab Results Component Value Date/Time Glucose (POC) 235 (H) 09/03/2018 10:25 AM  
 Glucose (POC) 154 (H) 09/03/2018 06:49 AM  
 Glucose (POC) 355 (H) 09/03/2018 03:49 AM  
 Glucose (POC) 479 (H) 09/03/2018 01:52 AM  
 Glucose (POC) 494 (H) 09/02/2018 11:36 PM  
 
No results for input(s): PH, PCO2, PO2, HCO3, FIO2 in the last 72 hours. No results for input(s): INR in the last 72 hours. No lab exists for component: INREXT All Micro Results Procedure Component Value Units Date/Time CULTURE, URINE [[id number]] Order Status:  Sent Specimen:  [facility] [[id number]] Collected:  09/02/18 2342 Order Status:  Completed Specimen:  [place] Specimen Updated:  09/03/18 1000 CULTURE, BLOOD [[id number]] Collected:  09/03/18 6485 Order Status:  Completed Specimen:  Whole Blood from Blood Updated:  09/03/18 9290 Special Requests: NO SPECIAL REQUESTS Culture result: NO GROWTH AFTER 3 HOURS     
 CULTURE, BLOOD [[id number]] Collected:  09/03/18 0252 Order Status:  Completed Specimen:  Blood Updated:  09/03/18 0746 Special Requests: NO SPECIAL REQUESTS   Culture result: NO GROWTH AFTER 2 HOURS     
 CULTURE, URINE [[id number]] Order Status:  Canceled Specimen:  [place] Specimen URINE CULTURE HOLD SAMPLE [[id number]] Collected:  09/02/18 2342 Order Status:  Completed Specimen:  Urine from Serum Updated:  09/02/18 2349 Urine culture hold URINE ON HOLD IN MICROBIOLOGY DEPT FOR 3 DAYS. IF UNPRESERVED URINE IS SUBMITTED, IT CANNOT BE USED FOR ADDITIONAL TESTING AFTER 24 HRS, RECOLLECTION WILL BE REQUIRED. I have reviewed notes of prior 24hr. Other pertinent lab: none Total time spent with patient: 45 Minutes Care Plan discussed with: Patient, Nursing Staff, Consultant/Specialist and >50% of time spent in counseling and coordination of care Discussed:  Care Plan Prophylaxis:  H2B/PPI Disposition:  Home w/Family 
        
___________________________________________________ Attending Physician: Cheo Soriano MD

## 2018-09-03 NOTE — PROGRESS NOTES
1:  TRANSFER - IN REPORT: 
 
Verbal report received from Ei Toshia RN(name) on Mayco Baer.  being received from ED(unit) for routine progression of care Report consisted of patients Situation, Background, Assessment and  
Recommendations(SBAR). Information from the following report(s) SBAR, Kardex, ED Summary, Intake/Output, MAR, Accordion, Recent Results and Cardiac Rhythm NSR/Sinus Colby Presley was reviewed with the receiving nurse. Opportunity for questions and clarification was provided. Assessment completed upon patients arrival to unit and care assumed. 0200:  Patient arrived on unit and vitals taken. Patient assessed, and blood sugar checked. . Dual skin assessment completed with Dee Giang RN.   
 
4710:  AM labs drawn and sent to lab. Blood cultures sent. 0349:  . 
 
0412:  Vitals taken and patient reassessed. 0654:  BG taken. . 
 
0700:  Blood sugar taken late. Spoke to Dr. Orville Read regarding order for 12 units Humalog. Dr. Orville Read stated she had based the order off the previous BG of 355 and not to give. MD cancelled order. Stated it was ok to use the sliding scale though for before breakfast  insulin. 0720: Bedside and Verbal shift change report given to Magda Mcleod RN (oncoming nurse) by [address] [place] [address] Avenue, RN (offgoing nurse). Report included the following information SBAR, Kardex, ED Summary, MAR, Accordion, Recent Results and Cardiac Rhythm Sinus Colby Presley.

## 2018-09-03 NOTE — PROGRESS NOTES
[facility]: MED RECONCILIATION Medications added:  
· Lasix · Metoprolol · Lantus · Bactrim Medications removed: · Bumex 1mg BID · Levermir 65units BID · Sertalrin 50mg daily · Valsartan 80mg daily · Lisinopril 2.5mg daily · Coreg 6.25mg BID Medications adjusted: · ASA- previously 325mg daily Information obtained from: Medication bottles, RxQuery Allergies: Review of patient's allergies indicates no known allergies. Prior to Admission Medications:  
 
Medication Documentation Review Audit Reviewed by Jason Patel (Pharmacist) on 09/03/18 at 8528 Medication Sig Documenting Provider Last Dose Status Taking?  
 
 aspirin 81 mg chewable tablet Take 81 mg by mouth daily. Historical Provider  Active Yes  
 furosemide (LASIX) 40 mg tablet Take 40 mg by mouth two (2) times a day. Historical Provider  Active Yes  
 insulin glargine (LANTUS U-100 INSULIN) 100 unit/mL injection 55 Units by SubCUTAneous route daily. Historical Provider  Active Yes Med Note (Christofer Dhillon Sep 2, 2018 10:34 PM): Previously on Levemir 65units BID 
  
 metoprolol tartrate (LOPRESSOR) 25 mg tablet Take 25 mg by mouth two (2) times a day. Historical Provider  Active Yes Med Note (Christofer Dhillon Sep 2, 2018 10:34 PM): Appears to have replaced Coreg 6.25mg BID 
  
 omeprazole (PRILOSEC) 20 mg capsule Take 20 mg by mouth daily. Historical Provider  Active No  
 potassium chloride (K-DUR, KLOR-CON) 20 mEq tablet Take one tablet daily Kishor Sue MD  Active Yes  
 rosuvastatin (CRESTOR) 10 mg tablet Take 1 Tab by mouth nightly. Kishor Sue MD  Active Yes  
 trimethoprim-sulfamethoxazole (BACTRIM DS) 160-800 mg per tablet Take 1 Tab by mouth two (2) times a day. Historical Provider  Active Yes Med Note (Christofer Dhillon Sep 2, 2018 10:33 PM): Possibly started 8/28/18 Thank you, 
Rhonda Reid, Pharm. D.

## 2018-09-03 NOTE — DIABETES MGMT
DTC Progress Note Recommendations/ Comments: Attempted to see patient for elevated A1c > 9% but still very sleepy and just now getting lunch. Will continue to assess when appropriate to discuss home mgt. Review for hyperglycemia; Noted  55 units of home Lantus given  along with resistant insulin correction scale. Pt has received 10 units of regular insulin and 15 units of lispro since admission last night. He responded well to the insulin with POC > 600 mg/dl on admission at 2043 last night to 154 mg/dl this morning at 0649. Current hospital DM medication: Lantus 55 units am; lispro insulin correction scale-resistant scale Chart reviewed on [place]. Tha Alcala Patient is a 71 y.o. male with known  Type 2 Diabetes on insulin injections: Lantus : 55 units daily at home. A1c:  
Lab Results Component Value Date/Time Hemoglobin A1c 12.2 (H) 09/02/2018 09:05 PM  
 Hemoglobin A1c 8.9 (H) 05/22/2017 11:26 AM  
 
 
Recent Glucose Results: Lab Results Component Value Date/Time  (H) 09/03/2018 02:52 AM  
  (H) 09/02/2018 09:05 PM  
 GLUCPOC 235 (H) 09/03/2018 10:25 AM  
 GLUCPOC 154 (H) 09/03/2018 06:49 AM  
 GLUCPOC 355 (H) 09/03/2018 03:49 AM  
  
 
Lab Results Component Value Date/Time Creatinine 1.83 (H) 09/03/2018 02:52 AM  
 
Estimated Creatinine Clearance: 51.1 mL/min (based on Cr of 1.83). Active Orders Diet DIET CARDIAC Regular; 2 GM NA (House Low NA); Consistent Carb 2000kcal  
  
 
PO intake: Patient Vitals for the past 72 hrs: 
 % Diet Eaten 09/03/18 0918 90 % Will continue to follow as needed. Thank you

## 2018-09-03 NOTE — PROGRESS NOTES
Problem: Self Care Deficits Care Plan (Adult) Goal: *Acute Goals and Plan of Care (Insert Text) Occupational Therapy Goals Initiated 9/3/2018 1. Patient will perform grooming standing at sink with supervision/set-up within 7 day(s). 2.  Patient will perform lower body dressing with supervision/set-up within 7 day(s). 3.  Patient will perform toilet transfers with supervision/set-up using RW within 7 day(s). 4.  Patient will perform all aspects of toileting with supervision/set-up within 7 day(s). 5.  Patient will participate in upper extremity therapeutic exercise/activities with independence for 10 minutes within 7 day(s). 6.  Patient will utilize energy conservation techniques during functional activities with verbal and visual cues within 7 day(s). Occupational Therapy EVALUATION Patient: Pradeep Mora (76 y.o. male) Date: 9/3/2018 Primary Diagnosis: Hyperglycemia Precautions:  Fall (Pt is >300lbs) ASSESSMENT : 
Based on the objective data described below, the patient presents with drowsiness, decreased strength, endurance, mobility and safety following admission for hyperglycemia. He is morbidly obese and currently requires up to [facility] for LE ADLs, toileting and functional mobility using bariatric RW to amb. Per pt he moves slow at home, amb with RW, performs ADLs with mod I and drives. Recommend HH therapy vs none for OT at discharge depending on progress. Patient will benefit from skilled intervention to address the above impairments. Patients rehabilitation potential is considered to be Fair Factors which may influence rehabilitation potential include:  
[]             None noted []             Mental ability/status [x]             Medical condition []             Home/family situation and support systems []             Safety awareness []             Pain tolerance/management 
[]             Other: PLAN : 
Recommendations and Planned Interventions: [x]               Self Care Training                  [x]        Therapeutic Activities [x]               Functional Mobility Training    []        Cognitive Retraining 
[x]               Therapeutic Exercises           [x]        Endurance Activities [x]               Balance Training                   []        Neuromuscular Re-Education []               Visual/Perceptual Training     [x]   Home Safety Training 
[x]               Patient Education                 [x]        Family Training/Education []               Other (comment): Frequency/Duration: Patient will be followed by occupational therapy 5 times a week to address goals. Discharge Recommendations: Home Health vs None Further Equipment Recommendations for Discharge: TBD SUBJECTIVE:  
Patient stated I feel much better.  OBJECTIVE DATA SUMMARY:  
HISTORY:  
Past Medical History:  
Diagnosis Date  CAD (coronary artery disease)  CHF (congestive heart failure) ([facility] 2013  Diabetes ([facility] 5yrs  GERD (gastroesophageal reflux disease)  Morbid obesity ([facility]  [facility] Past Surgical History:  
Procedure Laterality Date  HX CORONARY ARTERY BYPASS GRAFT    
 HX HEART CATHETERIZATION    
 HX ORTHOPAEDIC    
 right wrist carpal tunnel repair Prior Level of Function/Environment/Context:  Per pt he moves slow at home, amb with RW, performs ADLs with mod I and drives. Home Situation Home Environment: Private residence # Steps to Enter: 0 Wheelchair Ramp: Yes One/Two Story Residence: One story Living Alone: No 
Support Systems: Spouse/Significant Other/Partner, Family member(s) Patient Expects to be Discharged to[de-identified] Private residence Current DME Used/Available at Home: Cane, straight, Walker, rolling Tub or Shower Type: Tub/Shower combination Hand dominance: Right EXAMINATION OF PERFORMANCE DEFICITS: 
Cognitive/Behavioral Status: 
Neurologic State: Drowsy Orientation Level: Oriented X4 Cognition: Decreased attention/concentration; Follows commands Perception: Appears intact Perseveration: No perseveration noted Safety/Judgement: Awareness of environment; Fall prevention; Insight into deficits; Decreased awareness of need for safety Hearing: Auditory Auditory Impairment: Hard of hearing, bilateral 
 
Vision/Perceptual:   
Acuity: Able to read clock/calendar on wall without difficulty Range of Motion: 
AROM: Generally decreased, functional 
PROM: Generally decreased, functional 
  
  
  
  
  
  
 
Strength: 
Strength: Generally decreased, functional 
  
  
  
  
 
Coordination: 
Coordination: Generally decreased, functional 
Fine Motor Skills-Upper: Left Intact; Right Intact Gross Motor Skills-Upper: Left Intact; Right Intact Tone & Sensation: 
Tone: Normal 
  
  
  
  
  
  
  
 
Balance: 
Sitting: Intact Standing: Impaired Standing - Static: Fair Standing - Dynamic : Fair Functional Mobility and Transfers for ADLs: 
Bed Mobility: 
Rolling: Supervision Supine to Sit: Supervision Sit to Supine: Supervision Scooting: Supervision Transfers: 
Sit to Stand: Contact guard assistance Stand to Sit: Contact guard assistance Toilet Transfer : Contact guard assistance; Additional time (to heavy duty BSC beside bed) ADL Assessment and Intervention: 
Feeding: Independent Oral Facial Hygiene/Grooming: Contact guard assistance; Additional time (in standing) Bathing: Contact guard assistance;Assist x1;Additional time (A for safety with standing) Upper Body Dressing: Setup Lower Body Dressing: Contact guard assistance; Additional time (bending forward to reach feet- CGA for standing) Toileting: Minimum assistance;Assist x1;Additional time (A for cleanliness after BM) Cognitive Retraining Safety/Judgement: Awareness of environment; Fall prevention; Insight into deficits; Decreased awareness of need for safety Functional Measure: Barthel Index: 
 
Bathin Bladder: 10 Bowels: 10 
Groomin Dressin Feeding: 10 Mobility: 0 Stairs: 0 Toilet Use: 5 Transfer (Bed to Chair and Back): 10 Total: 55 Barthel and G-code impairment scale: 
Percentage of impairment CH 
0% CI 
1-19% CJ 
20-39% CK 
40-59% CL 
60-79% CM 
80-99% CN 
100% Barthel Score 0-100 100 99-80 79-60 59-40 20-39 1-19 
 0 Barthel Score 0-20 20 17-19 13-16 9-12 5-8 1-4 0 The Barthel ADL Index: Guidelines 1. The index should be used as a record of what a patient does, not as a record of what a patient could do. 2. The main aim is to establish degree of independence from any help, physical or verbal, however minor and for whatever reason. 3. The need for supervision renders the patient not independent. 4. A patient's performance should be established using the best available evidence. Asking the patient, friends/relatives and nurses are the usual sources, but direct observation and common sense are also important. However direct testing is not needed. 5. Usually the patient's performance over the preceding 24-48 hours is important, but occasionally longer periods will be relevant. 6. Middle categories imply that the patient supplies over 50 per cent of the effort. 7. Use of aids to be independent is allowed. Harlee Cowden., Barthel DFedeW. ([id number]). Functional evaluation: the Barthel Index. [facility] (14)2. Edison York paul CRISTINO Fonseca, Abelino Perry, Masood Ennis., [place], [address] [place] [address] (). Measuring the change indisability after inpatient rehabilitation; comparison of the responsiveness of the Barthel Index and Functional [place] Measure. Journal of Neurology, Neurosurgery, and Psychiatry, 66(4), 508-135. Isaura Monae, N.J.A, MIKI Ramirez.NOLA, & Braulio Sheehan M.A. (2004.) Assessment of post-stroke quality of life in cost-effectiveness studies: The usefulness of the Barthel Index and the EuroQoL-5D. [facility], 13, 459-78 G codes: In compliance with CMSs Claims Based Outcome Reporting, the following G-code set was chosen for this patient based on their primary functional limitation being treated: The outcome measure chosen to determine the severity of the functional limitation was the Barthel Index with a score of 55/100 which was correlated with the impairment scale. ? Self Care:  
  - CURRENT STATUS: CK - 40%-59% impaired, limited or restricted  - GOAL STATUS: CJ - 20%-39% impaired, limited or restricted  - D/C STATUS:  ---------------To be determined--------------- Occupational Therapy Evaluation Charge Determination History Examination Decision-Making LOW Complexity : Brief history review  MEDIUM Complexity : 3-5 performance deficits relating to physical, cognitive , or psychosocial skils that result in activity limitations and / or participation restrictions MEDIUM Complexity : Patient may present with comorbidities that affect occupational performnce. Miniml to moderate modification of tasks or assistance (eg, physical or verbal ) with assesment(s) is necessary to enable patient to complete evaluation Based on the above components, the patient evaluation is determined to be of the following complexity level: LOW Pain: 
Pain Scale 1: Numeric (0 - 10) Pain Intensity 1: 0 Activity Tolerance:  
Fair Please refer to the flowsheet for vital signs taken during this treatment. After treatment:  
[] Patient left in no apparent distress sitting up in chair 
[x] Patient left in no apparent distress in bed 
[x] Call bell left within reach [x] Nursing notified 
[] Caregiver present 
[] Bed alarm activated COMMUNICATION/EDUCATION:  
The patients plan of care was discussed with: Physical Therapist and Registered Nurse. [x] Home safety education was provided and the patient/caregiver indicated understanding. [x] Patient/family have participated as able in goal setting and plan of care. [x] Patient/family agree to work toward stated goals and plan of care. [] Patient understands intent and goals of therapy, but is neutral about his/her participation. [] Patient is unable to participate in goal setting and plan of care. This patients plan of care is appropriate for delegation to [facility]. Thank you for this referral. 
Nathan Pinedo OT Time Calculation: 25 mins

## 2018-09-03 NOTE — H&P
[facility] [address] [facility] [address] 
[phone] Admission History and Physical 
 
 
NAME:  Evelia Crews. :   1945 MRN:  [id number] PCP:  Lisa Tellez MD  
 
Date/Time:  9/3/2018 Subjective: CHIEF COMPLAINT: \"I don't know\" HISTORY OF PRESENT ILLNESS:    
Mr. Quincy Aguillon is a 71 y.o.  male with PMH of DM, CAD, CHF admitted for hyperglycemia. Per pt's family, they were at [facility] and pt wasn't eating much which was unusual. He was also somewhat somnolent requiring frequent prompting from the family to continue eating. They had someone check his BG and it was > 500. He was taken to the ED for further eval for which his BG was note to be >500. Family uncertain whether he took his morning Lantus. Past Medical History:  
Diagnosis Date  CAD (coronary artery disease)  Cancer ([address] PROSTATE  CHF (congestive heart failure) ([address]   Diabetes ([address] 5yrs  GERD (gastroesophageal reflux disease)  Morbid obesity ([address]  Prostate cancer ([address] Past Surgical History:  
Procedure Laterality Date  HX CORONARY ARTERY BYPASS GRAFT    
 HX HEART CATHETERIZATION    
 HX ORTHOPAEDIC    
 right wrist carpal tunnel repair Social History Substance Use Topics  Smoking status: Former Smoker Packs/day: 1.00 Years: 7.00 Quit date: 1991  Smokeless tobacco: Former User  Alcohol use Yes Comment: VERY RARE Family History Problem Relation Age of Onset  Heart Disease Father  Diabetes Father  Cancer Sister BREAST No Known Allergies Prior to Admission medications Medication Sig Start Date End Date Taking? Authorizing Provider  
aspirin 81 mg chewable tablet Take 81 mg by mouth daily.    Yes Historical Provider  
insulin glargine (LANTUS U-100 INSULIN) 100 unit/mL injection 55 Units by SubCUTAneous route daily. Yes Historical Provider  
furosemide (LASIX) 40 mg tablet Take 40 mg by mouth two (2) times a day. Yes Historical Provider  
metoprolol tartrate (LOPRESSOR) 25 mg tablet Take 25 mg by mouth two (2) times a day. Yes Historical Provider  
trimethoprim-sulfamethoxazole (BACTRIM DS) 160-800 mg per tablet Take 1 Tab by mouth two (2) times a day. Yes Historical Provider  
rosuvastatin (CRESTOR) 10 mg tablet Take 1 Tab by mouth nightly. 8/25/15  Yes Susie Silverman MD  
potassium chloride (K-DUR, KLOR-CON) 20 mEq tablet Take one tablet daily 8/25/15  Yes Susie Silverman MD  
omeprazole (PRILOSEC) 20 mg capsule Take 20 mg by mouth daily. Historical Provider Review of Systems: 
(bold if positive, if negative) Gen:  Eyes:  ENT:  CVS:  Pulm:  GI:   
:   
MS:  Skin:  Psych:  Endo:   
Hem:  Renal:   
Neuro:    
Lethargic Objective: VITALS:   
Vital signs reviewed; most recent are: 
 
Visit Vitals  BP (!) 116/102 (BP 1 Location: Left arm, BP Patient Position: At rest)  Pulse (!) 55  Temp 98.1 °F (36.7 °C)  Resp 16  
 Ht 5' 9\" (1.753 m)  Wt 139.3 kg (307 lb)  SpO2 100%  BMI 45.34 kg/m2 SpO2 Readings from Last 6 Encounters:  
09/03/18 100% 10/31/17 95% 09/22/17 99% 06/01/17 97% 05/25/17 96% 11/18/16 96% No intake or output data in the 24 hours ending 09/03/18 0057 Exam:  
 
Physical Exam: 
 
Gen:  Well-developed, well-nourished, in no acute distress HEENT:  Pink conjunctivae, PERRL, hearing intact to voice, moist mucous membranes Neck:  Supple, without masses, thyroid non-tender Resp:  No accessory muscle use, clear breath sounds without wheezes rales or rhonchi 
Card: Bradycardic. No murmurs, normal S1, S2 without thrills, bruits or peripheral edema Abd:  Soft, non-tender, non-distended, normoactive bowel sounds are present, no palpable organomegaly Lymph:  No cervical adenopathy Musc:  No cyanosis or clubbing Skin:  No rashes or ulcers, skin turgor is good Neuro:  Cranial nerves 3-12 are grossly intact,  strength is 5/5 bilaterally, dorsi / plantarflexion strength is 5/5 bilaterally, follows commands appropriately Psych: Somnolent but readily arouses Labs: 
 
Recent Labs  
   09/02/18 
 2105 WBC  5.5 HGB  11.2* HCT  33.6* PLT  228 Recent Labs  
   09/02/18 
 2105 NA  131*  
K  5.4* CL  98  
CO2  26 GLU  568* BUN  43* CREA  1.94* CA  8.5 MG  2.1 ALB  2.7* SGOT  12* ALT  13 No components found for: [place] No results for input(s): PH, PCO2, PO2, HCO3, FIO2 in the last 72 hours. No results for input(s): INR in the last 72 hours. No lab exists for component: INREXT 
 
CXR => no acute process Assessment/Plan: Hyperglycemia (9/2/2018) - ?2/2 underlying infection v. Non-compliance 
-admit  
-no indication for insulin gtt at this time as no AG or ketones -s/p IV insulin in the ED  
-ISS  
-resume home Lantus  
-gently hydrate Coronary atherosclerosis of native coronary artery (11/21/2011) Overview: 90% MID LAD. S/P CABG x 2 (12/14/2011) 
-currently asymptomatic; continue ASA, statin  
-no beta blocker for now due to reduced HR Hyperlipidemia (11/25/2014) -continue statin PAULINA (acute kidney injury) ([address] (11/25/2014) - 2/2 IVVD from lasix, hyperglycemia  
-hold lasix 
-gentle hydration -optimize BG levels Anemia (3/25/2016) - at baseline  
-monitor Systolic CHF, chronic ([address] (3/25/2016) - NOT decompensated  
-holding lasix and ACE I due to PAULINA  
-holding beta blocker due to bradycardia Type 2 diabetes mellitus without complication ([address] (4/38/0026) 
-check A1c  
-ISS  
-BG checks AC TID and qHS  
-resume Lantus PAF (paroxysmal atrial fibrillation) ([address] (3/27/2016) 
-not on NOAC; defer to cardiology as CHADS2 score is elevated  
-off metoprolol for now due to bradycardia UTI - POA -start Ceftriaxone  
-f/u cultures Surrogate decision maker: Wife Total time spent with patient: 70 Minutes Care Plan discussed with: Patient, Family, Nursing Staff, Consultant/Specialist and >50% of time spent in counseling and coordination of care Discussed:  Code Status, Care Plan and D/C Planning Prophylaxis:  Hep SQ Probable Disposition:  TBD 
        
___________________________________________________ Attending Physician: Romana Areola, MD

## 2018-09-03 NOTE — ED PROVIDER NOTES
HPI Comments: Vanessa Carmen is a 71 y.o. male  who presents by EMS to ER with c/o Patient presents with: 
High Blood Sugar Altered mental status He specifically denies any fevers, chills, nausea, vomiting, chest pain, shortness of breath, headache, rash, diarrhea, abdominal pain, urinary/bowel changes, sweating or weight loss. PCP: Cece Mcgarry MD  
PMHx significant for: Past Medical History: 
No date: CAD (coronary artery disease) No date: Cancer ([facility] Comment: PROSTATE 
2013: CHF (congestive heart failure) ([facility] No date: Diabetes ([facility] Comment: 5yrs No date: GERD (gastroesophageal reflux disease) No date: Morbid obesity ([facility] No date: Prostate cancer ([facility] PSHx significant for: Past Surgical History: 
No date: HX CORONARY ARTERY BYPASS GRAFT No date: HX HEART CATHETERIZATION No date: HX ORTHOPAEDIC Comment: right wrist carpal tunnel repair Social Hx: Tobacco use: Smoking status: Former Smoker Packs/day: 1.00      Years: 7.00 Quit date: 11/8/1991 Smokeless status: Former User                    
; EtOH use: The patient states he drinks 0 per week.; Illicit Drug use: Allergies: 
No Known Allergies There are no other complaints, changes or physical findings at this time. Patient is a 71 y.o. male presenting with hyperglycemia and altered mental status. The history is provided by the patient and the spouse. High Blood Sugar This is a new problem. The current episode started 1 to 2 hours ago. The problem occurs rarely. The problem has not changed since onset. The pain is associated with an unknown factor. The patient is experiencing no pain. Associated symptoms include myalgias. Nothing worsens the pain. The pain is relieved by nothing. The patient's surgical history non-contributory. Altered mental status Past Medical History:  
Diagnosis Date  CAD (coronary artery disease)  Cancer ([facility] PROSTATE  CHF (congestive heart failure) ([facility] 2013  Diabetes ([facility] 5yrs  GERD (gastroesophageal reflux disease)  [facility]  Prostate cancer ([facility] Past Surgical History:  
Procedure Laterality Date  HX CORONARY ARTERY BYPASS GRAFT    
 HX HEART CATHETERIZATION    
 HX ORTHOPAEDIC    
 right wrist carpal tunnel repair Family History:  
Problem Relation Age of Onset  Heart Disease Father  Diabetes Father  Cancer Sister BREAST Social History Social History  Marital status:  Spouse name: N/A  
 Number of children: N/A  
 Years of education: N/A Occupational History  Not on file. Social History Main Topics  Smoking status: Former Smoker Packs/day: 1.00 Years: 7.00 Quit date: 11/8/1991  Smokeless tobacco: Former User  Alcohol use Yes Comment: VERY RARE  Drug use: No  
 Sexual activity: Not on file Other Topics Concern  Not on file Social History Narrative ALLERGIES: Review of patient's allergies indicates no known allergies. Review of Systems Musculoskeletal: Positive for myalgias. Vitals:  
 09/02/18 2047 BP: 132/56 Pulse: 63 Resp: 20 Temp: 98.4 °F (36.9 °C) SpO2: 100% Weight: 139.3 kg (307 lb) Height: 5' 9\" (1.753 m) Physical Exam  
Constitutional: He is oriented to person, place, and time. He appears well-developed and well-nourished. He does not have a sickly appearance. He appears ill. Patient is morbidly HENT:  
Head: Normocephalic and atraumatic. Right Ear: External ear normal.  
Left Ear: External ear normal.  
Mouth/Throat: Oropharynx is clear and moist. No oropharyngeal exudate. Eyes: Conjunctivae and EOM are normal. Pupils are equal, round, and reactive to light. Right eye exhibits no discharge. Left eye exhibits no discharge. No scleral icterus. Neck: Normal range of motion. Neck supple. No tracheal deviation present. No thyromegaly present. Cardiovascular: Normal rate, regular rhythm, normal heart sounds and intact distal pulses. No murmur heard. Pulmonary/Chest: Effort normal and breath sounds normal. No respiratory distress. He has no wheezes. He has no rales. Abdominal: Soft. Bowel sounds are normal. He exhibits no distension. There is no tenderness. There is no rebound and no guarding. Musculoskeletal: Normal range of motion. He exhibits no edema or tenderness. Lymphadenopathy:  
  He has no cervical adenopathy. Neurological: He is alert and oriented to person, place, and time. No cranial nerve deficit. Coordination normal.  
Skin: Skin is warm. No rash noted. No erythema. Psychiatric: He has a normal mood and affect. His behavior is normal. Judgment and thought content normal.  
Nursing note and vitals reviewed. MDM Number of Diagnoses or Management Options Acute hyperkalemia:  
Acute renal failure, unspecified acute renal failure type [facility]): Hyperglycemia:  
Diagnosis management comments: 12:47 AM 
Patient is being admitted to the hospital.  The results of their tests and reasons for their admission have been discussed with them and/or available family. They convey agreement and understanding for the need to be admitted and for their admission diagnosis. Consultation has been made with the inpatient physician specialist for hospitalization. LABORATORY TESTS: 
Recent Results (from the past 12 hour(s)) -GLUCOSE, POC Collection Time: 09/02/18  8:43 PM 
     Result                                            Value                         Ref Range Glucose (POC)                                     >600 (HH)                     65 - 100 mg/dL      Performed by                                      [facility]                                              
-GLUCOSE, POC 
 Collection Time: 09/02/18  8:46 PM 
     Result                                            Value                         Ref Range Glucose (POC)                                     534 (H)                       65 - 100 mg/dL Performed by                                      Evan Pope Ei                                              
-EKG, 12 LEAD, INITIAL Collection Time: 09/02/18  8:53 PM 
     Result                                            Value                         Ref Range Ventricular Rate                                  52                            BPM                       
     Atrial Rate                                       52                            BPM                       
     P-R Interval                                      164                           ms                        
     QRS Duration                                      108                           ms Q-T Interval                                      434                           ms                        
     QTC Calculation (Bezet)                           403                           ms                        
     Calculated P Axis                                 72                            degrees Calculated R Axis                                 -47                           degrees Calculated T Axis                                 114                           degrees Diagnosis Sinus bradycardia Left anterior fascicular block Minimal voltage criteria for LVH, may be normal variant T wave abnormality, consider lateral ischemia Abnormal ECG When compared with ECG of 22-MAY-2017 11:00, premature ventricular complexes are no longer present Vent.  rate has decreased BY  28 BPM Nonspecific T wave abnormality now evident in Inferior leads -METABOLIC PANEL, COMPREHENSIVE Collection Time: 09/02/18  9:05 PM 
     Result                                            Value                         Ref Range Sodium                                            131 (L)                       136 - 145 mmol/L Potassium                                         5.4 (H)                       3.5 - 5.1 mmol/L Chloride                                          98                            97 - 108 mmol/L           
     CO2                                               26                            21 - 32 mmol/L Anion gap                                         7                             5 - 15 mmol/L Glucose                                           568 (H)                       65 - 100 mg/dL BUN                                               43 (H)                        6 - 20 MG/DL Creatinine                                        1. 94 (H)                      0.70 - 1.30 MG/DL         
     BUN/Creatinine ratio                              22 (H)                        12 - 20 GFR est AA                                        42 (L)                        >60 ml/min/1.73m2 GFR est non-AA                                    34 (L)                        >60 ml/min/1.73m2 Calcium                                           8.5                           8.5 - 10.1 MG/DL      Bilirubin, total                                  0.5                           0.2 - 1.0 MG/DL           
     ALT (SGPT)                                        13                            12 - 78 U/L               
     AST (SGOT)                                        12 (L)                        15 - 37 U/L               
 Alk. phosphatase                                  83                            45 - 117 U/L Protein, total                                    7.6                           6.4 - 8.2 g/dL Albumin                                           2.7 (L)                       3.5 - 5.0 g/dL Globulin                                          4.9 (H)                       2.0 - 4.0 g/dL A-G Ratio                                         0.6 (L)                       1.1 - 2.2                 
-CBC WITH AUTOMATED DIFF Collection Time: 09/02/18  9:05 PM 
     Result                                            Value                         Ref Range WBC                                               5.5                           4.1 - 11.1 K/uL           
     RBC                                               3.43 (L)                      4.10 - 5.70 M/uL HGB                                               11.2 (L)                      12.1 - 17.0 g/dL HCT                                               33.6 (L)                      36.6 - 50.3 % MCV                                               98.0                          80.0 - 99.0 FL            
     MCH                                               32.7                          26.0 - 34.0 PG            
     MCHC                                              33.3                          30.0 - 36.5 g/dL RDW                                               12.2                          11.5 - 14.5 % PLATELET                                          228                           150 - 400 K/uL      MPV                                               9.4                           8.9 - 12.9 FL             
     NRBC                                              0.0 0  WBC ABSOLUTE NRBC                                     0.00                          0.00 - 0.01 K/uL NEUTROPHILS                                       63                            32 - 75 % LYMPHOCYTES                                       25                            12 - 49 % MONOCYTES                                         10                            5 - 13 % EOSINOPHILS                                       1                             0 - 7 % BASOPHILS                                         0                             0 - 1 % IMMATURE GRANULOCYTES                             1 (H)                         0.0 - 0.5 % ABS. NEUTROPHILS                                  3.5                           1.8 - 8.0 K/UL            
     ABS. LYMPHOCYTES                                  1.4                           0.8 - 3.5 K/UL            
     ABS. MONOCYTES                                    0.6                           0.0 - 1.0 K/UL            
     ABS. EOSINOPHILS                                  0.1                           0.0 - 0.4 K/UL            
     ABS. BASOPHILS                                    0.0                           0.0 - 0.1 K/UL            
     ABS. IMM. GRANS.                                  0.0                           0.00 - 0.04 K/UL          
     DF                                                AUTOMATED                                               
-ACETONE/KETONE, QL Collection Time: 09/02/18  9:05 PM 
     Result                                            Value                         Ref Range Acetone/Ketone serum, QL.                         NEGATIVE                      NEG                       
-SAMPLES BEING HELD Collection Time: 09/02/18  9:05 PM 
 Result                                            Value                         Ref Range SAMPLES BEING HELD                                RED.GRN. NICKOLAS                                             
     COMMENT Add-on orders for these samples will be processed based on acceptable specimen integrity and analyte stability, which may vary by analyte. -TROPONIN I Collection Time: 09/02/18  9:05 PM 
     Result                                            Value                         Ref Range Troponin-I, Qt.                                   <0.05                         <0.05 ng/mL               
-MAGNESIUM Collection Time: 09/02/18  9:05 PM 
     Result                                            Value                         Ref Range Magnesium                                         2.1                           1.6 - 2.4 mg/dL           
-GLUCOSE, POC Collection Time: 09/02/18 11:36 PM 
     Result                                            Value                         Ref Range Glucose (POC)                                     494 (H)                       65 - 100 mg/dL Performed by                                      [address] [place] [address] W/MICROSCOPIC Collection Time: 09/02/18 11:42 PM 
     Result                                            Value                         Ref Range Color                                             YELLOW/STRAW Appearance                                        CLOUDY (A)                    CLEAR      Specific gravity                                  1.025                         1.003 - 1.030             
 pH (UA)                                           6.5                           5.0 - 8.0 Protein                                           30 (A)                        NEG mg/dL Glucose                                           >1000 (A)                     NEG mg/dL Ketone                                            NEGATIVE                      NEG mg/dL Bilirubin                                         NEGATIVE                      NEG Blood                                             NEGATIVE                      NEG Urobilinogen                                      1.0                           0.2 - 1.0 EU/dL Nitrites                                          NEGATIVE                      NEG Leukocyte Esterase                                SMALL (A)                     NEG                       
     WBC                                                                       0 - 4 /hpf                
     RBC                                               0-5                           0 - 5 /hpf Epithelial cells                                  FEW                           FEW /lpf Bacteria                                          4+ (A)                        NEG /hpf Hyaline cast                                      2-5                           0 - 5 /lpf                
-URINE CULTURE HOLD SAMPLE Collection Time: 09/02/18 11:42 PM 
     Result                                            Value                         Ref Range Urine culture hold URINE ON HOLD IN MICROBIOLOGY DEPT FOR 3 DAYS.  IF UNPRESERVED URINE IS SUBMITTED, IT CANNOT BE USED FOR ADDITIONAL TESTING AFTER 24 HRS, RECOLLECTION WILL BE REQUIRED. -CREATININE, UR, RANDOM Collection Time: 09/02/18 11:43 PM 
     Result                                            Value                         Ref Range Creatinine, urine                                 82.66                         mg/dL IMAGING RESULTS: 
See chart MEDICATIONS GIVEN: 
Medications 
0.9% sodium chloride infusion (75 mL/hr IntraVENous New Bag 9/2/18 2233) insulin lispro (HUMALOG) injection (not administered) glucose chewable tablet 16 g (not administered) dextrose (D50W) injection syrg 12.5-25 g (not administered) glucagon (GLUCAGEN) injection 1 mg (not administered) 
sodium chloride (NS) flush 5-10 mL (10 mL IntraVENous Given 9/2/18 2236) 
sodium chloride (NS) flush 5-10 mL (not administered) 
acetaminophen (TYLENOL) tablet 650 mg (not administered) 
naloxone (NARCAN) injection 0.4 mg (not administered) 
ondansetron (ZOFRAN) injection 4 mg (not administered) 
insulin glargine (LANTUS) injection 55 Units (not administered) 
metoprolol tartrate (LOPRESSOR) tablet 25 mg (not administered) 
pantoprazole (PROTONIX) tablet 40 mg (not administered) 
rosuvastatin (CRESTOR) tablet 10 mg (not administered) 
insulin lispro (HUMALOG) injection 12 Units (not administered) 
cefTRIAXone (ROCEPHIN) 1 g in 0.9% sodium chloride (MBP/ADV) 50 mL (not administered) 
cefTRIAXone (ROCEPHIN) 1 g in sterile water (preservative free) 10 mL IV syringe (not administered) aspirin chewable tablet 81 mg (not administered) 
sodium chloride 0.9 % bolus infusion 500 mL (500 mL IntraVENous New Bag 9/2/18 2102) insulin regular (NOVOLIN R, HUMULIN R) injection 10 Units (10 Units IntraVENous Given 9/2/18 2231) IMPRESSION: 
Hyperglycemia  (primary encounter diagnosis) Acute hyperkalemia Acute renal failure, unspecified acute renal failure type ([facility] PLAN: 
1.  Admit to hospital 
 ED Course Procedures CONSULT NOTE:  
10:26 PM 
Jennifer Raymond PA-C spoke with Dr. Debbie Elena, Specialty: Hospitalist 
Discussed pt's hx, disposition, and available diagnostic and imaging results. Reviewed care plans. Consultant agrees with plans as outlined. Will see for admission.

## 2018-09-03 NOTE — PROGRESS NOTES
TRANSFER - OUT REPORT: 
 
Verbal report given to Orestes Zeng RN on Xochitl Tejeda.  being transferred to 4th floor for routine progression of care Report consisted of patients Situation, Background, Assessment and  
Recommendations(SBAR). Information from the following report(s) SBAR, Kardex, ED Summary, Intake/Output, MAR and Recent Results was reviewed with the receiving nurse. Lines:  
Peripheral IV 09/02/18 Left Forearm (Active) Site Assessment Clean, dry, & intact 9/3/2018  4:00 PM  
Phlebitis Assessment 0 9/3/2018  4:00 PM  
Infiltration Assessment 0 9/3/2018  4:00 PM  
Dressing Status Clean, dry, & intact 9/3/2018  4:00 PM  
Dressing Type Tape;Transparent 9/3/2018  4:00 PM  
Hub Color/Line Status Pink; Infusing 9/3/2018  4:00 PM  
Action Taken Open ports on tubing capped 9/3/2018  4:00 PM  
Alcohol Cap Used Yes 9/3/2018  4:00 PM  
  
 
Opportunity for questions and clarification was provided. Patient transported with: 
 [employer]

## 2018-09-03 NOTE — ED NOTES
Noticed O2 sat decreased to 76% with good O2 pleth. Entered room and patient asleep. Patient awoken and sat increased to 98%. Patient relays that he has sleep apnea but \" I sent that machine back\". O2 at 2l per nc applied at this time, this relayed to [facility] PA.

## 2018-09-03 NOTE — PROGRESS NOTES
Bedside and Verbal shift change report given to Magda Mcleod (oncoming nurse) by Sarita (offgoing nurse). Report included the following information SBAR, Kardex, Procedure Summary, Intake/Output and MAR.

## 2018-09-03 NOTE — PROGRESS NOTES
Bedside shift change report given to Marcelo Miramontes RN by Rebel Howell RN. Report included the following information SBAR, Kardex, ED Summary, Intake/Output, MAR, Recent Results and Cardiac Rhythm NSR.  
 
1840 Patient finished with dinner and ready for transport to room 425. Patient transported via wheelchair by PCT with personal belongings.

## 2018-09-03 NOTE — PROGRESS NOTES
Problem: Falls - Risk of 
Goal: *Absence of Falls Document Madison Duron Fall Risk and appropriate interventions in the flowsheet. Outcome: Progressing Towards Goal 
Fall Risk Interventions: 
Mobility Interventions: Patient to call before getting OOB Medication Interventions: Patient to call before getting OOB Elimination Interventions: Call light in reach, Patient to call for help with toileting needs

## 2018-09-03 NOTE — ED TRIAGE NOTES
Patient here after with c/o weakness while eating at [facility]. EMS had blood sugar at 507, patient states blood sugar usually runs about 230.

## 2018-09-03 NOTE — PROGRESS NOTES
Physical Therapy orders acknowledged, chart reviewed and discussed with nurse patient eating his lunch. Offered to sit up on the chair for his lunch however patient declined and just to eat his lunch on bed. We will try to continue to follow up with the patient for therapy. Thank you.

## 2018-09-03 NOTE — PROGRESS NOTES
9/3/2018 
2:32 PM 
Reason for Admission:   Hyperglycemia RRAT Score:     18 Do you (patient/family) have any concerns for transition/discharge? No concerns reported. Plan for utilizing home health:     No HH needs indicated at this time. Likelihood of readmission? Yellow Transition of Care Plan:       
 
CM met with pt for assessment. Demographics and PCP were confirmed. Pt is a 71year old,  male who lives in a private residence with his wife, Jamie Brice (0 exterior or interior steps). PTA, pt was able to complete ADLs with the use of a rolling walker and cane. Pt has prescription drug coverage, and prefers [facility] on [address] OBS letters provided and explained. [facility] information provided. No additional discharge service needs indicated. Pt's family will provide transport upon discharge. Steph Valentine MA Care Management Interventions PCP Verified by CM: Yes (Jay Macdonald NN to notify. ) Palliative Care Criteria Met (RRAT>21 & CHF Dx)?: No 
Mode of Transport at Discharge: Other (see comment) (Family) [place] Signup: No 
Discharge Durable Medical Equipment: No 
Physical Therapy Consult: Yes Occupational Therapy Consult: Yes Speech Therapy Consult: No 
Current Support Network: Lives with Spouse Confirm Follow Up Transport: Family Plan discussed with Pt/Family/Caregiver: Yes Discharge Location Discharge Placement: Home with family assistance

## 2018-09-03 NOTE — ED NOTES
TRANSFER - OUT REPORT: 
 
Verbal report given to Mata Gong RN on Lori Ragsdale.  being transferred to [facility] for routine progression of care Report consisted of patients Situation, Background, Assessment and  
Recommendations(SBAR). Information from the following report(s) SBAR, Kardex, ED Summary, MAR, Accordion and Recent Results was reviewed with the receiving nurse. Lines:  
Peripheral IV 09/02/18 Left Forearm (Active) Site Assessment Clean, dry, & intact 9/2/2018  8:50 PM  
Phlebitis Assessment 0 9/2/2018  8:50 PM  
Infiltration Assessment 0 9/2/2018  8:50 PM  
Dressing Status Clean, dry, & intact 9/2/2018  8:50 PM  
Dressing Type Transparent 9/2/2018  8:50 PM  
Hub Color/Line Status Pink 9/2/2018  8:50 PM  
  
 
Opportunity for questions and clarification was provided. Patient transported with: 
 Monitor Registered Nurse

## 2018-09-03 NOTE — PROGRESS NOTES
Problem: Mobility Impaired (Adult and Pediatric) Goal: *Acute Goals and Plan of Care (Insert Text) Physical Therapy Goals Initiated 9/3/2018 1. Patient will move from supine to sit and sit to supine , scoot up and down and roll side to side in bed with independence within 7 day(s). 2.  Patient will transfer from bed to chair and chair to bed with independence using the least restrictive device within 7 day(s). 3.  Patient will perform sit to stand with independence within 7 day(s). 4.  Patient will ambulate with modified independence for 150 feet with the least restrictive device within 7 day(s). physical Therapy EVALUATION Patient: Bo Trimble (76 y.o. male) Date: 9/3/2018 Primary Diagnosis: Hyperglycemia Precautions:   Fall (Pt is >300lbs) ASSESSMENT : 
Based on the objective data described below, the patient presents with generalized weakness and debility. Sit on the edge of bed with supervision, sit to stand CGA, ambulate with rolling walker SBA around the bed, no loss of balance, slow pace gait. OOB to chair as tolerated performed some active range of motion exercise on both LE all planes. Educate and  Instructed patient to continue active range of motion exercise on both legs while up on chair or on bed. Notified nurse who agreed to monitor and assist back to bed when ready to go back. Patient will benefit from skilled intervention to address the above impairments. Patients rehabilitation potential is considered to be Excellent Factors which may influence rehabilitation potential include:  
[]         None noted 
[]         Mental ability/status [x]         Medical condition 
[]         Home/family situation and support systems 
[]         Safety awareness 
[]         Pain tolerance/management 
[]         Other: PLAN : 
Recommendations and Planned Interventions: 
[x]           Bed Mobility Training             []    Neuromuscular Re-Education [x]           Transfer Training                   []    Orthotic/Prosthetic Training 
[x]           Gait Training                         []    Modalities [x]           Therapeutic Exercises           []    Edema Management/Control 
[x]           Therapeutic Activities            []    Patient and Family Training/Education 
[]           Other (comment): Frequency/Duration: Patient will be followed by physical therapy  5 times a week to address goals. Discharge Recommendations: Home Health Further Equipment Recommendations for Discharge: TBD SUBJECTIVE:  
Patient stated ok.  OBJECTIVE DATA SUMMARY:  
HISTORY:   
Past Medical History:  
Diagnosis Date  CAD (coronary artery disease)  CHF (congestive heart failure) ([facility] 2013  Diabetes ([facility] 5yrs  GERD (gastroesophageal reflux disease)  Morbid obesity ([facility]  Prostate cancer ([facility] Past Surgical History:  
Procedure Laterality Date  HX CORONARY ARTERY BYPASS GRAFT    
 HX HEART CATHETERIZATION    
 HX ORTHOPAEDIC    
 right wrist carpal tunnel repair Prior Level of Function/Home Situation: modified independent with rolling walker Personal factors and/or comorbidities impacting plan of care:  
 
Home Situation Home Environment: Private residence # Steps to Enter: 0 Wheelchair Ramp: Yes One/Two Story Residence: One story Living Alone: No 
Support Systems: Spouse/Significant Other/Partner, Family member(s) Patient Expects to be Discharged to[de-identified] Private residence Current DME Used/Available at Home: Cane, straight, Walker, rolling Tub or Shower Type: Tub/Shower combination EXAMINATION/PRESENTATION/DECISION MAKING:  
Critical Behavior: 
Neurologic State: Drowsy Orientation Level: Oriented X4 Cognition: Decreased attention/concentration, Follows commands Safety/Judgement: Awareness of environment, Fall prevention, Insight into deficits, Decreased awareness of need for safety Hearing: Auditory Auditory Impairment: Hard of hearing, bilateral 
 
Range Of Motion: 
AROM: Generally decreased, functional 
  
  
  
PROM: Generally decreased, functional 
  
  
  
Strength:   
Strength: Generally decreased, functional 
  
  
  
  
  
  
Tone & Sensation:  
Tone: Normal 
  
  
  
  
  
  
  
  
   
Coordination: 
Coordination: Generally decreased, functional 
Vision:  
Acuity: Able to read clock/calendar on wall without difficulty Functional Mobility: 
Bed Mobility: 
Rolling: Supervision Supine to Sit: Supervision Sit to Supine: Supervision Scooting: Supervision Transfers: 
Sit to Stand: Contact guard assistance Stand to Sit: Contact guard assistance Stand Pivot Transfers: Contact guard assistance Bed to Chair: Contact guard assistance Balance:  
Sitting: Intact Standing: Impaired; With support Standing - Static: Good Standing - Dynamic : Fair Ambulation/Gait Training: 
Distance (ft): 25 Feet (ft) Assistive Device: Walker, rolling;Gait belt Ambulation - Level of Assistance: Stand-by assistance Gait Description (WDL): Exceptions to [facility] Gait Abnormalities: Path deviations Base of Support: Widened Speed/Akanksha: Slow Therapeutic Exercises:  
 Instructed patient to continue active range of motion exercise on both legs while up on chair or on bed. Functional Measure: 
Barthel Index: 
 
Bathin Bladder: 10 Bowels: 10 
Groomin Dressin Feeding: 10 Mobility: 0 Stairs: 0 Toilet Use: 5 Transfer (Bed to Chair and Back): 10 Total: 55 Barthel and G-code impairment scale: 
Percentage of impairment CH 
0% CI 
1-19% CJ 
20-39% CK 
40-59% CL 
60-79% CM 
80-99% CN 
100% Barthel Score 0-100 100 99-80 79-60 59-40 20-39 1-19 
 0 Barthel Score 0-20 20 17-19 13-16 9-12 5-8 1-4 0 The Barthel ADL Index: Guidelines 1. The index should be used as a record of what a patient does, not as a record of what a patient could do. 2. The main aim is to establish degree of independence from any help, physical or verbal, however minor and for whatever reason. 3. The need for supervision renders the patient not independent. 4. A patient's performance should be established using the best available evidence. Asking the patient, friends/relatives and nurses are the usual sources, but direct observation and common sense are also important. However direct testing is not needed. 5. Usually the patient's performance over the preceding 24-48 hours is important, but occasionally longer periods will be relevant. 6. Middle categories imply that the patient supplies over 50 per cent of the effort. 7. Use of aids to be independent is allowed. Vickie Guardado., Barthel, D.W. (1444). Functional evaluation: the Barthel Index. [facility] (14)2. CRISTINO Vega, Mango Arias., Alethea De La Torre., Yaz, [address] [place] [address] (1999). Measuring the change indisability after inpatient rehabilitation; comparison of the responsiveness of the Barthel Index and Functional [place] Measure. Journal of Neurology, Neurosurgery, and Psychiatry, 66(4), 391-965. Rhea Ray, N.J.A, SURAJ Ramirez, & Nicole Allison, M.A. (2004.) Assessment of post-stroke quality of life in cost-effectiveness studies: The usefulness of the Barthel Index and the EuroQoL-5D. [facility], 13, 896-19 G codes: In compliance with CMSs Claims Based Outcome Reporting, the following G-code set was chosen for this patient based on their primary functional limitation being treated: The outcome measure chosen to determine the severity of the functional limitation was the barthel with a score of 55/100 which was correlated with the impairment scale. ? Mobility - Walking and Moving Around:  
  - CURRENT STATUS: CK - 40%-59% impaired, limited or restricted  - GOAL STATUS: CJ - 20%-39% impaired, limited or restricted  - D/C STATUS:  ---------------To be determined--------------- Physical Therapy Evaluation Charge Determination History Examination Presentation Decision-Making LOW Complexity : Zero comorbidities / personal factors that will impact the outcome / POC LOW Complexity : 1-2 Standardized tests and measures addressing body structure, function, activity limitation and / or participation in recreation  LOW Complexity : Stable, uncomplicated  Other outcome measures barthel  MEDIUM Based on the above components, the patient evaluation is determined to be of the following complexity level: LOW Pain: 
Pain Scale 1: Numeric (0 - 10) Pain Intensity 1: 0 Activity Tolerance:  
good Please refer to the flowsheet for vital signs taken during this treatment. After treatment:  
[x]         Patient left in no apparent distress sitting up in chair 
[]         Patient left in no apparent distress in bed 
[x]         Call bell left within reach [x]         Nursing notified 
[]         Caregiver present 
[]         Bed alarm activated COMMUNICATION/EDUCATION:  
The patients plan of care was discussed with: Occupational Therapist, Registered Nurse and patient. [x]         Fall prevention education was provided and the patient/caregiver indicated understanding. [x]         Patient/family have participated as able in goal setting and plan of care. [x]         Patient/family agree to work toward stated goals and plan of care. []         Patient understands intent and goals of therapy, but is neutral about his/her participation. []         Patient is unable to participate in goal setting and plan of care. Thank you for this referral. 
Camron Davidson, PT,WCC. Time Calculation: 26 mins

## 2018-09-04 VITALS
WEIGHT: 304 LBS | RESPIRATION RATE: 16 BRPM | DIASTOLIC BLOOD PRESSURE: 85 MMHG | HEIGHT: 69 IN | HEART RATE: 62 BPM | BODY MASS INDEX: 45.03 KG/M2 | TEMPERATURE: 98.3 F | SYSTOLIC BLOOD PRESSURE: 163 MMHG | OXYGEN SATURATION: 97 %

## 2018-09-04 LAB
ANION GAP SERPL CALC-SCNC: 7 MMOL/L (ref 5–15)
ATRIAL RATE: 52 BPM
BNP SERPL-MCNC: 219 PG/ML (ref 0–125)
BUN SERPL-MCNC: 37 MG/DL (ref 6–20)
BUN/CREAT SERPL: 29 (ref 12–20)
CALCIUM SERPL-MCNC: 8.2 MG/DL (ref 8.5–10.1)
CALCULATED P AXIS, ECG09: 72 DEGREES
CALCULATED R AXIS, ECG10: -47 DEGREES
CALCULATED T AXIS, ECG11: 114 DEGREES
CHLORIDE SERPL-SCNC: 103 MMOL/L (ref 97–108)
CHOLEST SERPL-MCNC: 155 MG/DL
CO2 SERPL-SCNC: 25 MMOL/L (ref 21–32)
CREAT SERPL-MCNC: 1.28 MG/DL (ref 0.7–1.3)
DIAGNOSIS, 93000: NORMAL
ERYTHROCYTE [DISTWIDTH] IN BLOOD BY AUTOMATED COUNT: 12.3 % (ref 11.5–14.5)
GLUCOSE BLD STRIP.AUTO-MCNC: 153 MG/DL (ref 65–100)
GLUCOSE BLD STRIP.AUTO-MCNC: 202 MG/DL (ref 65–100)
GLUCOSE BLD STRIP.AUTO-MCNC: 234 MG/DL (ref 65–100)
GLUCOSE SERPL-MCNC: 168 MG/DL (ref 65–100)
HCT VFR BLD AUTO: 32.3 % (ref 36.6–50.3)
HDLC SERPL-MCNC: 46 MG/DL
HDLC SERPL: 3.4 {RATIO} (ref 0–5)
HGB BLD-MCNC: 10.6 G/DL (ref 12.1–17)
LDLC SERPL CALC-MCNC: 84.8 MG/DL (ref 0–100)
LIPID PROFILE,FLP: NORMAL
MAGNESIUM SERPL-MCNC: 2.2 MG/DL (ref 1.6–2.4)
MCH RBC QN AUTO: 32.5 PG (ref 26–34)
MCHC RBC AUTO-ENTMCNC: 32.8 G/DL (ref 30–36.5)
MCV RBC AUTO: 99.1 FL (ref 80–99)
NRBC # BLD: 0 K/UL (ref 0–0.01)
NRBC BLD-RTO: 0 PER 100 WBC
P-R INTERVAL, ECG05: 164 MS
PLATELET # BLD AUTO: 197 K/UL (ref 150–400)
PMV BLD AUTO: 9 FL (ref 8.9–12.9)
POTASSIUM SERPL-SCNC: 4 MMOL/L (ref 3.5–5.1)
Q-T INTERVAL, ECG07: 434 MS
QRS DURATION, ECG06: 108 MS
QTC CALCULATION (BEZET), ECG08: 403 MS
RBC # BLD AUTO: 3.26 M/UL (ref 4.1–5.7)
SERVICE CMNT-IMP: ABNORMAL
SODIUM SERPL-SCNC: 135 MMOL/L (ref 136–145)
T4 FREE SERPL-MCNC: 1.1 NG/DL (ref 0.8–1.5)
TRIGL SERPL-MCNC: 121 MG/DL (ref ?–150)
TROPONIN I SERPL-MCNC: <0.05 NG/ML
TSH SERPL DL<=0.05 MIU/L-ACNC: 0.86 UIU/ML (ref 0.36–3.74)
VENTRICULAR RATE, ECG03: 52 BPM
VLDLC SERPL CALC-MCNC: 24.2 MG/DL
WBC # BLD AUTO: 5.8 K/UL (ref 4.1–11.1)

## 2018-09-04 PROCEDURE — 83735 ASSAY OF MAGNESIUM: CPT | Performed by: INTERNAL MEDICINE

## 2018-09-04 PROCEDURE — 74011250636 HC RX REV CODE- 250/636: Performed by: INTERNAL MEDICINE

## 2018-09-04 PROCEDURE — 84443 ASSAY THYROID STIM HORMONE: CPT | Performed by: INTERNAL MEDICINE

## 2018-09-04 PROCEDURE — 97110 THERAPEUTIC EXERCISES: CPT

## 2018-09-04 PROCEDURE — 84484 ASSAY OF TROPONIN QUANT: CPT | Performed by: INTERNAL MEDICINE

## 2018-09-04 PROCEDURE — 83880 ASSAY OF NATRIURETIC PEPTIDE: CPT | Performed by: INTERNAL MEDICINE

## 2018-09-04 PROCEDURE — 80048 BASIC METABOLIC PNL TOTAL CA: CPT | Performed by: INTERNAL MEDICINE

## 2018-09-04 PROCEDURE — 74011636637 HC RX REV CODE- 636/637: Performed by: INTERNAL MEDICINE

## 2018-09-04 PROCEDURE — 85027 COMPLETE CBC AUTOMATED: CPT | Performed by: INTERNAL MEDICINE

## 2018-09-04 PROCEDURE — 84439 ASSAY OF FREE THYROXINE: CPT | Performed by: INTERNAL MEDICINE

## 2018-09-04 PROCEDURE — 80061 LIPID PANEL: CPT | Performed by: INTERNAL MEDICINE

## 2018-09-04 PROCEDURE — 36415 COLL VENOUS BLD VENIPUNCTURE: CPT | Performed by: INTERNAL MEDICINE

## 2018-09-04 PROCEDURE — 82962 GLUCOSE BLOOD TEST: CPT

## 2018-09-04 PROCEDURE — 97116 GAIT TRAINING THERAPY: CPT

## 2018-09-04 PROCEDURE — 74011250637 HC RX REV CODE- 250/637: Performed by: INTERNAL MEDICINE

## 2018-09-04 PROCEDURE — 99218 HC RM OBSERVATION: CPT

## 2018-09-04 RX ORDER — CEFDINIR 300 MG/1
300 CAPSULE ORAL 2 TIMES DAILY
Qty: 8 CAP | Refills: 0 | Status: SHIPPED | OUTPATIENT
Start: 2018-09-04 | End: 2018-09-08

## 2018-09-04 RX ORDER — INSULIN GLARGINE 100 [IU]/ML
57 INJECTION, SOLUTION SUBCUTANEOUS DAILY
Qty: 1 VIAL | Refills: 0 | Status: SHIPPED
Start: 2018-09-04 | End: 2021-07-08

## 2018-09-04 RX ORDER — SODIUM CHLORIDE 9 MG/ML
75 INJECTION, SOLUTION INTRAVENOUS CONTINUOUS
Status: DISCONTINUED | OUTPATIENT
Start: 2018-09-04 | End: 2018-09-04 | Stop reason: HOSPADM

## 2018-09-04 RX ADMIN — PANTOPRAZOLE SODIUM 40 MG: 40 TABLET, DELAYED RELEASE ORAL at 06:04

## 2018-09-04 RX ADMIN — INSULIN GLARGINE 55 UNITS: 100 INJECTION, SOLUTION SUBCUTANEOUS at 09:30

## 2018-09-04 RX ADMIN — ASPIRIN 81 MG 81 MG: 81 TABLET ORAL at 09:35

## 2018-09-04 RX ADMIN — INSULIN LISPRO 4 UNITS: 100 INJECTION, SOLUTION INTRAVENOUS; SUBCUTANEOUS at 09:29

## 2018-09-04 RX ADMIN — INSULIN LISPRO 4 UNITS: 100 INJECTION, SOLUTION INTRAVENOUS; SUBCUTANEOUS at 12:01

## 2018-09-04 RX ADMIN — Medication 10 ML: at 06:04

## 2018-09-04 RX ADMIN — ONDANSETRON 4 MG: 2 INJECTION INTRAMUSCULAR; INTRAVENOUS at 12:14

## 2018-09-04 RX ADMIN — SODIUM CHLORIDE 75 ML/HR: 9 INJECTION, SOLUTION INTRAVENOUS at 14:45

## 2018-09-04 NOTE — PROGRESS NOTES
Problem: Mobility Impaired (Adult and Pediatric) Goal: *Acute Goals and Plan of Care (Insert Text) Physical Therapy Goals Initiated 9/3/2018 1. Patient will move from supine to sit and sit to supine , scoot up and down and roll side to side in bed with independence within 7 day(s). 2.  Patient will transfer from bed to chair and chair to bed with independence using the least restrictive device within 7 day(s). 3.  Patient will perform sit to stand with independence within 7 day(s). 4.  Patient will ambulate with modified independence for 150 feet with the least restrictive device within 7 day(s). physical Therapy TREATMENT Patient: Lori Barrett (76 y.o. male) Date: 9/4/2018 Diagnosis: Hyperglycemia Hyperglycemia Precautions: Fall Chart, physical therapy assessment, plan of care and goals were reviewed. ASSESSMENT: 
Upon arrival , patient received supine and NAD, resting. He reports he is disappointed and depressed about his fluctuating blood sugars. Offered active listening and positive encouragement. Patient agreed to amb hallways and requested second lap after brief rest break. Total of 200' and patient educated on benefits of regular exercise and home walking program as adjunct to diabetes management. Patient needed few verbal prompts for pacing during activity as tended to amb too fast at times. HR and SPO2 stable 98% 82-92 bpm and patient reported fatigue and leg cramping toward end of amb as well as mild dyspnea. He denied chest tightness or pain. Recommend HHPT to encourage home exercise program to help DM management Progression toward goals: 
[]    Improving appropriately and progressing toward goals [x]    Improving slowly and progressing toward goals 
[]    Not making progress toward goals and plan of care will be adjusted PLAN: 
Patient continues to benefit from skilled intervention to address the above impairments. Continue treatment per established plan of care. Discharge Recommendations:  Home Health Further Equipment Recommendations for Discharge:  none SUBJECTIVE:  
Patient stated I am depressed.  Patient with flat affect and difficult to engage- terri OBJECTIVE DATA SUMMARY:  
Critical Behavior: 
Neurologic State: Alert Orientation Level: Oriented X4 Cognition: Follows commands, Appropriate for age attention/concentration Safety/Judgement: Awareness of environment, Fall prevention, Insight into deficits, Decreased awareness of need for safety Functional Mobility Training: 
Bed Mobility: 
  
Supine to Sit: Supervision; Additional time Sit to Supine: Supervision Transfers: 
Sit to Stand: Stand-by assistance Balance: 
Sitting: Intact Standing: Impaired Standing - Static: Good Ambulation/Gait Trainin' with RW with rest break in sitting after 100' Pain: 
Denies any pain Activity Tolerance:  
Good - Vitals stable Please refer to the flowsheet for vital signs taken during this treatment. After treatment:  
[x]    Patient left in no apparent distress sitting up in chair 
[]    Patient left in no apparent distress in bed 
[x]    Call bell left within reach [x]    Nursing notified 
[]    Caregiver present 
[]    Bed alarm activated COMMUNICATION/COLLABORATION:  
The patients plan of care was discussed with: Registered Nurse , Mahin Mendoza, GARRETT Time Calculation: 25 mins

## 2018-09-04 NOTE — PROGRESS NOTES
Problem: Self Care Deficits Care Plan (Adult) Goal: *Acute Goals and Plan of Care (Insert Text) Occupational Therapy Goals Initiated 9/3/2018 1. Patient will perform grooming standing at sink with supervision/set-up within 7 day(s). 2.  Patient will perform lower body dressing with supervision/set-up within 7 day(s). 3.  Patient will perform toilet transfers with supervision/set-up using RW within 7 day(s). 4.  Patient will perform all aspects of toileting with supervision/set-up within 7 day(s). 5.  Patient will participate in upper extremity therapeutic exercise/activities with independence for 10 minutes within 7 day(s). 6.  Patient will utilize energy conservation techniques during functional activities with verbal and visual cues within 7 day(s). Occupational Therapy TREATMENT Patient: Jim Nolan (76 y.o. male) Date: 9/4/2018 Diagnosis: Hyperglycemia Hyperglycemia Precautions: Fall (Pt is >300lbs) Chart, occupational therapy assessment, plan of care, and goals were reviewed. ASSESSMENT: 
Agreeable to UB exercises seated EOB, bed mobility with S, sit to stand to scoot up in bed SBA, fair endurance, limited by acid reflux, per patient nursing just administered medication, requesting supine after 2 exercises, verbally reviewed HEP for cardio-pulmonary theraband exercises. continue recommend [facility] OT. Progression toward goals: 
[x]       Improving appropriately and progressing toward goals 
[]       Improving slowly and progressing toward goals 
[]       Not making progress toward goals and plan of care will be adjusted PLAN: 
Patient continues to benefit from skilled intervention to address the above impairments. Continue treatment per established plan of care. Discharge Recommendations:  Home Health Further Equipment Recommendations for Discharge:  none SUBJECTIVE:  
Patient stated I just take something for this at home but not since I have been here.  Re: acid reflux OBJECTIVE DATA SUMMARY:  
Cognitive/Behavioral Status: 
Neurologic State: Alert Orientation Level: Oriented X4 Cognition: Follows commands; Appropriate for age attention/concentration Functional Mobility and Transfers for ADLs: 
Bed Mobility: 
Supine to Sit: Supervision; Additional time Sit to Supine: Supervision Transfers: 
Sit to Stand: Stand-by assistance Balance: 
Sitting: Intact Standing: Impaired Standing - Static: Good ADL Intervention: 
  
Patient instructed and indicated understanding the benefits of maintaining activity tolerance, functional mobility, and independence with self care tasks during acute stay  to ensure safe return home and to baseline. Encouraged patient to increase frequency and duration OOB, be out of bed for all meals, perform daily ADLs (as approved by RN/MD regarding bathing etc), and performing functional mobility to/from bathroom. Neuro Re-Education: 
  
  
  
Therapeutic Exercises:  
therband yellow HEP EOB B horizontal abduction 10x 
B bicep curls-band under foot Patient familiar with exercsies, fair endurance due to acid refulx limited, educated on building tolerance, strength and endurance with this, fair understanding noted, 
Pain: 
  
  
  
  
  
  
Activity Tolerance:  
fair Please refer to the flowsheet for vital signs taken during this treatment. After treatment:  
[] Patient left in no apparent distress sitting up in chair 
[x] Patient left in no apparent distress in bed 
[x] Call bell left within reach [x] Nursing notified 
[] Caregiver present 
[] Bed alarm activated COMMUNICATION/COLLABORATION:  
The patients plan of care was discussed with: Registered Nurse Chuy Valencia OT Time Calculation: 11 mins

## 2018-09-04 NOTE — DISCHARGE SUMMARY
Physician Discharge Summary     Patient ID:  Edward Connor  [id number]  71 y.o.  1945    Admit date: 9/2/2018    Discharge date and time: 9/4/2018    Admission Diagnoses: Hyperglycemia    Discharge Diagnoses/Hospital Course   Mr. Rowdy Rangel is a 72 yo male with PMH of DM, PAF,CAD, systolic CHF admitted for hyperglycemia and acute kidney injury. Hospital course as follows:      Type 2 diabetes mellitus without complication, uncontrolled - POA, Not DKA. Lantus dose was adjusted. A1c was greater than 12. ?non-compliance.      UTI (urinary tract infection) / Prostate cancer - urine cultures grew > 100k Enterobacter cloacae. Was on Ceftriaxone in house. Discharged on PO Omnicef     PAULINA (acute kidney injury) / Dehydration - POA, likey due to poor PO intake and osmotic diuresis. Improved with gentle hydration.      Coronary atherosclerosis of native coronary artery / S/P CABG x 2 - POA. On ASA. Unable to resume metoprolol at discharge due to marginal BP's       Systolic CHF, chronic - POA. Lasix held due to PAULINA. Metoprolol held due to bradycardia and hypotension.      PAF (paroxysmal atrial fibrillation) - HR was low during this hospitalization even with holding metoprolol. BP's also were too low to resume beta blockade.      PCP: Dyllan Holloway MD     Consults: None    Discharge Exam:  Visit Vitals    /72    Pulse 64    Temp 97.6    Resp 16    Ht 5' 9\" (1.753 m)    Wt 137.9 kg (304 lb)    SpO2 97%    BMI 44.89 kg/m2     Gen:  Morbid obese, in no acute distress  HEENT:  Pink conjunctivae, PERRL, hearing intact to voice, moist mucous membranes  Neck:  Supple, without masses, thyroid non-tender  Resp:  No accessory muscle use, clear breath sounds without wheezes rales or rhonchi  Card:  No murmurs, normal S1, S2 without thrills, bruits or peripheral edema  Abd:  Soft, non-tender, non-distended, normoactive bowel sounds are present, no mass  Lymph:  No cervical or inguinal adenopathy  Musc:  No cyanosis or clubbing  Skin:  No rashes or ulcers, skin turgor is good  Neuro:  Cranial nerves are grossly intact, general motor weakness, follows commands vaguely  Psych:  Poor insight, oriented to person, place and time    Disposition: home    Patient Instructions:   Current Discharge Medication List      START taking these medications    Details   cefdinir (OMNICEF) 300 mg capsule Take 1 Cap by mouth two (2) times a day for 4 days. Qty: 8 Cap, Refills: 0         CONTINUE these medications which have CHANGED    Details   insulin glargine (LANTUS U-100 INSULIN) 100 unit/mL injection 57 Units by SubCUTAneous route daily. Qty: 1 Vial, Refills: 0         CONTINUE these medications which have NOT CHANGED    Details   aspirin 81 mg chewable tablet Take 81 mg by mouth daily. rosuvastatin (CRESTOR) 10 mg tablet Take 1 Tab by mouth nightly. Qty: 30 Tab, Refills: 1      potassium chloride (K-DUR, KLOR-CON) 20 mEq tablet Take one tablet daily  Qty: 30 Tab, Refills: 1      omeprazole (PRILOSEC) 20 mg capsule Take 20 mg by mouth daily.          STOP taking these medications       furosemide (LASIX) 40 mg tablet Comments:   Reason for Stopping:         metoprolol tartrate (LOPRESSOR) 25 mg tablet Comments:   Reason for Stopping:         trimethoprim-sulfamethoxazole (BACTRIM DS) 160-800 mg per tablet Comments:   Reason for Stopping:             Activity: as tolerated   Diet: diabetic     Follow-up Information     Follow up With Details Comments Contact Info    AT [address]    Matias Nguyen MD Go on 9/13/2018 For hospital follow up appointment at 10:45AM  [id number] [place] [address]  [phone]            Approximate time spent in patient care on day of discharge: 35 minutes     Signed:  Juju High MD  9/4/2018  1:16 PM

## 2018-09-04 NOTE — PROGRESS NOTES
9/4/2018 12:35 PM [facility] was sent discharge clinicals. 9/4/2018 11:44 AM Discussed with treating PT, recommendations for home health noted. Confirmed with pt's attending, home health order placed. Spoke with pt who reported he had home health in the past through [facility] and would like to use this agency again. Referral sent to [facility] via [employer]. 9/4/2018  10:31 AM EMR reviewed, CM will follow for final discharge needs. DANA Armendariz

## 2018-09-04 NOTE — DISCHARGE INSTRUCTIONS
HOSPITALIST DISCHARGE INSTRUCTIONS  NAME: Meghan Montero. :  1945   MRN:  [id number]     Date/Time:  2018 12:05 PM    ADMIT DATE: 2018     DISCHARGE DATE: 2018     ADMITTING DIAGNOSIS:  Hyperglycemia (high blood sugars)   Acute kidney injury     DISCHARGE DIAGNOSIS:      MEDICATIONS:  MARIO VAZQUEZ NOTE THAT YOUR METOPROLOL AND LASIX WERE HELD. PLEASE RESTART THESE MEDICINES ON . PLEASE RESTART YOUR DOSE OF METOPROLOL AT 12.5 MG TWICE DAILY AND YOUR LASIX AT 40MG DAILY FOR NOW UNTIL YOU SEE YOUR PRIMARY CARE DOCTOR WHO CAN FURTHER ADJUST IT   · It is important that you take the medication exactly as they are prescribed. · Keep your medication in the bottles provided by the pharmacist and keep a list of the medication names, dosages, and times to be taken in your wallet. · Do not take other medications without consulting your doctor. Pain Management: per above medications    What to do at Home    Recommended diet:  Cardiac Diet    Recommended activity: Activity as tolerated    If you experience any of the following symptoms then please call your primary care physician or return to the emergency room if you cannot get hold of your doctor:  Fever, chills, nausea, vomiting, diarrhea, change in mentation, falling, bleeding, shortness of breath, chest pain     Follow Up:  Dr. Mitzy Daniels MD in 1-2 days   Follow-up Information     Follow up With Details Comments [address] [place] [address], [address] [place] [address] [phone]            Information obtained by :  I understand that if any problems occur once I am at home I am to contact my physician. I understand and acknowledge receipt of the instructions indicated above. Physician's or R.N.'s Signature                                                                  Date/Time                                                                                                                                              Patient or Representative Signature                                                          Date/Time

## 2018-09-04 NOTE — PROGRESS NOTES
Orthopedic & Surgical Nursing Communication Tool 12:18 AM 
9/4/2018 Bedside and Verbal shift change report given to Carmela Cowden, RN (incoming nurse) by Margaret Davalos RN (outgoing nurse) on Yohana Prim. a 71 y.o. male who was admitted on 9/2/2018  8:35 PM. Report included the following information SBAR, Kardex, Intake/Output, MAR and Med Rec Status. No acute distress noted Opportunity for questions and clarifications were given to the incoming nurse. Patient's bed is in low position, side rails x2, door open PRN, call bell within reach and patient not in distress.  
 
Margaret Davalos RN

## 2018-09-04 NOTE — DIABETES MGMT
DTC Progress Note Recommendations/ Comments: Attempted to see patient x2 for elevated A1c > 9% but pt reported he is still very sleepy and unable to complete sentences without falling back asleep. Education materials left on pt bedside table with DTC contact info. Current hospital DM medication: Lantus 55 units am; lispro insulin correction scale-resistant scale Chart reviewed on Vanessa Araujo. Jaleel Avina Patient is a 71 y.o. male with known  Type 2 Diabetes on insulin injections: Lantus : 55 units daily at home. A1c:  
Lab Results Component Value Date/Time Hemoglobin A1c 12.2 (H) 09/02/2018 09:05 PM  
 Hemoglobin A1c 8.9 (H) 05/22/2017 11:26 AM  
 
 
Recent Glucose Results:  
Lab Results Component Value Date/Time  (H) 09/04/2018 02:03 AM  
 GLUCPOC 234 (H) 09/04/2018 11:03 AM  
 GLUCPOC 202 (H) 09/04/2018 06:55 AM  
 GLUCPOC 231 (H) 09/03/2018 09:08 PM  
  
 
Lab Results Component Value Date/Time Creatinine 1.28 09/04/2018 02:03 AM  
 
Estimated Creatinine Clearance: 73.1 mL/min (based on Cr of 1.28). Active Orders Diet DIET CARDIAC Regular; 2 GM NA (House Low NA); Consistent Carb 2000kcal  
  
 
PO intake:  
Patient Vitals for the past 72 hrs: 
 % Diet Eaten 09/04/18 1205 75 % 09/03/18 0918 90 % Will continue to follow as needed. Thank you Link CASSIE Cisse, CDE [facility] Office: [phone] Pager: [phone]

## 2018-09-05 LAB
BACTERIA SPEC CULT: ABNORMAL
CC UR VC: ABNORMAL
SERVICE CMNT-IMP: ABNORMAL

## 2018-09-09 LAB
BACTERIA SPEC CULT: NORMAL
BACTERIA SPEC CULT: NORMAL
SERVICE CMNT-IMP: NORMAL
SERVICE CMNT-IMP: NORMAL

## 2018-11-12 ENCOUNTER — OFFICE VISIT (OUTPATIENT)
Dept: CARDIOLOGY CLINIC | Age: 73
End: 2018-11-12

## 2018-11-12 VITALS
HEIGHT: 69 IN | DIASTOLIC BLOOD PRESSURE: 68 MMHG | WEIGHT: 305 LBS | RESPIRATION RATE: 20 BRPM | SYSTOLIC BLOOD PRESSURE: 116 MMHG | BODY MASS INDEX: 45.18 KG/M2 | HEART RATE: 66 BPM | OXYGEN SATURATION: 99 %

## 2018-11-12 DIAGNOSIS — I48.0 PAF (PAROXYSMAL ATRIAL FIBRILLATION) (HCC): Primary | Chronic | ICD-10-CM

## 2018-11-12 RX ORDER — LOSARTAN POTASSIUM 25 MG/1
25 TABLET ORAL DAILY
Qty: 90 TAB | Refills: 0 | Status: SHIPPED | OUTPATIENT
Start: 2018-11-12 | End: 2019-03-18 | Stop reason: SDUPTHER

## 2018-11-12 RX ORDER — CARVEDILOL 3.12 MG/1
TABLET ORAL 2 TIMES DAILY WITH MEALS
COMMUNITY
End: 2019-03-21 | Stop reason: SDUPTHER

## 2018-11-12 RX ORDER — CIPROFLOXACIN 500 MG/1
TABLET ORAL 2 TIMES DAILY
COMMUNITY
End: 2019-01-31

## 2018-11-12 RX ORDER — FUROSEMIDE 40 MG/1
40 TABLET ORAL
COMMUNITY
End: 2019-01-31

## 2018-11-12 RX ORDER — METFORMIN HYDROCHLORIDE 500 MG/1
2000 TABLET ORAL
COMMUNITY
End: 2020-09-03

## 2018-11-12 RX ORDER — GLUCOSAMINE SULFATE 1500 MG
POWDER IN PACKET (EA) ORAL DAILY
COMMUNITY
End: 2020-09-03

## 2018-11-12 NOTE — PROGRESS NOTES
Office Follow-up    NAME: Josiane Celestin :  1945  MRM:  [id number]    Date:  2018            Assessment:     Problem List  Date Reviewed: 2018          Codes Class Noted    UTI (urinary tract infection) ICD-10-CM: N39.0  ICD-9-CM: 599.0  9/3/2018        Prostate cancer ([facility] ICD-10-CM: C61  ICD-9-CM: 185  Unknown        GERD (gastroesophageal reflux disease) ICD-10-CM: K21.9  ICD-9-CM: 530.81  Unknown        CAD (coronary artery disease) ICD-10-CM: I25.10  ICD-9-CM: 414.00  Unknown        Hyperglycemia ICD-10-CM: R73.9  ICD-9-CM: 790.29  2018        Type 2 diabetes mellitus without complication (HCC) (Chronic) ICD-10-CM: E11.9  ICD-9-CM: 250.00  3/27/2016        PAF (paroxysmal atrial fibrillation) (HCC) (Chronic) ICD-10-CM: I48.0  ICD-9-CM: 427.31  3/27/2016        Anemia (Chronic) ICD-10-CM: D64.9  ICD-9-CM: 285.9  2788        Systolic CHF, chronic (HCC) (Chronic) ICD-10-CM: I50.22  ICD-9-CM: 428.22, 428.0  3/25/2016        Hyperlipidemia (Chronic) ICD-10-CM: E78.5  ICD-9-CM: 272.4  2014        PAULINA (acute kidney injury) ([facility] ICD-10-CM: N17.9  ICD-9-CM: 584.9  2014        CHF (congestive heart failure) ([facility] ICD-10-CM: I50.9  ICD-9-CM: 428.0  2013        S/P CABG x 2 (Chronic) ICD-10-CM: Z95.1  ICD-9-CM: V45.81  2011        Coronary atherosclerosis of native coronary artery (Chronic) ICD-10-CM: I25.10  ICD-9-CM: 414.01  2011    Overview Signed 2011  4:07 PM by Jose Green MD     90% MID LAD. Morbid obesity ([facility] (Chronic) ICD-10-CM: E66.01  ICD-9-CM: 278.01  2011                 Plan:     1. Paroxysmal atrial fibrillation: This is new. He is in sinus rhythm today. Continue Coreg and Xarelto. 2. CAD: He has known history of CABG. Continue medical management. His aspirin was discontinued because now he is on Xarelto. 3. Ischemic cardiomyopathy: Continue Coreg.   His losartan HCTZ was discontinued because of the renal failure. Now since his creatinine is probably at baseline we should resume low-dose Cozaar. Continue Lasix. 4. Dyslipidemia: Continue Crestor. 5. See Dr. Ekaterina Adam in 6 months. Subjective:     Che Mead, a 71y.o. year-old who presents for followup. He has known history of hypertension, diabetes, CAD/CABG, CRI. He was admitted to [facility] on October 24, 2018 and was released on November 2, 2018. He was admitted for hyperglycemia, UTI, and had a new diagnosis of paroxysmal atrial fibrillation. He was started on anticoagulation. He was treated for hyper glycemia as well as for UTI and on discharge was asked to follow-up with me. During the hospitalization and on discharge his metoprolol was discontinued along with his losartan and HCTZ. His aspirin was also discontinued because he was started on Xarelto. He was started on Coreg. Records from [facility] were personally reviewed. EKG today demonstrated normal sinus rhythm with poor R wave progression with normal ST segment. Normal axis. Exam:     Physical Exam:  Visit Vitals  /68 (BP 1 Location: Right arm, BP Patient Position: Sitting)   Pulse 66   Resp 20   Ht 5' 9\" (1.753 m)   Wt 305 lb (138.3 kg)   SpO2 99%   BMI 45.04 kg/m²     General appearance - alert, well appearing, and in no distress  Mental status - affect appropriate to mood  Eyes - sclera anicteric, moist mucous membranes  Neck - supple, no significant adenopathy  Chest - clear to auscultation, no wheezes, rales or rhonchi  Heart - normal rate, regular rhythm, normal S1, S2, no murmurs, rubs, clicks or gallops  Abdomen - soft, nontender, nondistended, no masses or organomegaly  Extremities - peripheral pulses normal, no pedal edema  Skin - normal coloration  no rashes    Medications:     Current Outpatient Medications   Medication Sig    semaglutide (OZEMPIC) 0.25 mg/0.2 mL (2 mg/1.5 mL) sub-q pen by SubCUTAneous route every seven (7) days.  furosemide (LASIX) 40 mg tablet Take 40 mg by mouth two (2) times daily as needed.  metFORMIN (GLUCOPHAGE) 500 mg tablet Take 2,000 mg by mouth daily (with dinner).  rivaroxaban (XARELTO) 20 mg tab tablet Take  by mouth daily.  carvedilol (COREG) 3.125 mg tablet Take  by mouth two (2) times daily (with meals).  cholecalciferol (VITAMIN D3) 1,000 unit cap Take  by mouth daily.  ciprofloxacin HCl (CIPRO) 500 mg tablet Take  by mouth two (2) times a day.  insulin glargine (LANTUS U-100 INSULIN) 100 unit/mL injection 57 Units by SubCUTAneous route daily.  omeprazole (PRILOSEC) 20 mg capsule Take 20 mg by mouth daily.  rosuvastatin (CRESTOR) 10 mg tablet Take 1 Tab by mouth nightly.  potassium chloride (K-DUR, KLOR-CON) 20 mEq tablet Take one tablet daily     No current facility-administered medications for this visit. Diagnostic Data Review:       1/9/18 Echo: Hypo/akinesis of the septal segments. Mild LVH. EF 35-40%. Right ventricle mildly dilated. Systolic function reduced. Aortic valve mildly thickened leaflets. 10/4/17: ECHO- LVEF 45 %, mild diffuse HK, thickness was mildly increased; Mod concentric hypertrophy. 5/24/2017: CATH- Obstructive 1VD:LAD p80, m100, dist very small vessel; D1 patent. LCx p30 (co-dom). RCA patent (co-dom). 2/2 Grafts patent: LIMA-LAD patent.; SVG-D1 patent. 5/22/17: ECHO- Comparison was made 25-Nov-2014 and LV overall  function has decreased. LV wall hypokinesis, ventricle wall dilated, EF 35%;  SYSTEM MEASUREMENT TABLES  2D  LVOT Diam: 2.1 cm  Ao Diam: 3.2 cm  LA Diam: 4.6 cm  IVSd: 1.1 cm  LVIDd: 5.9 cm  LVIDs: 5.1 cm  LVPWd: 1 cm  SV(Teich): 53.9 ml    11/25/14: ECHO- TDS, - EF 45-50%. LVH, RV mild dilated - reduced RVEF   11/5/2012: ECHO- LVEF 50%, DD, Mild RV dysfuction. 11/10/11: CMR- . Normal left ventricular size by 3D volumetric assessment. Moderate left   ventricular systolic dysfunction.  Severe hypokinesis of the inferior and inferolateral wall. Mild hypokinesis of the anterior wall. LVEF 37%. 2. Normal right ventricular size and systolic function. 3. No significant valvular disease other than trace mitral and tricuspid   regurgitation. 4. Normal resting myocardial perfusion on first pass stress perfusion   imaging. 5. On LGE imaging, there is a very small focal area of small endocardial   infarct involving the basal inferolateral wall. The anterior, anteroseptal,   anterolateral, lateral, the entire inferior wall, the inferolateral wall,   inferoseptal wall and apex are completely viable. The LAD, LCx, and RCA   territories demonstrate significant viability for revascularization. 6. Large right-sided pleural effusion. Moderate left-sided pleural effusion. 11/8/2011: ECHO- LVEF 35-40%, LV- mod dilated, mod HK (basal-mid inferior/  basal-mid inferolateral walls) RV-dilated; mild LAE  2011: CATH- pLAD: 95%-> PCi-> 80% residual stenosis   2011: CABG- 2 V, LIMA -> LAD      Lab Review:     Lab Results   Component Value Date/Time    Cholesterol, total 155 09/04/2018 02:03 AM    HDL Cholesterol 46 09/04/2018 02:03 AM    LDL, calculated 84.8 09/04/2018 02:03 AM    Triglyceride 121 09/04/2018 02:03 AM    CHOL/HDL Ratio 3.4 09/04/2018 02:03 AM     Lab Results   Component Value Date/Time    Creatinine (POC) 1.4 (H) 03/25/2016 02:15 AM    Creatinine 1.28 09/04/2018 02:03 AM     Lab Results   Component Value Date/Time    BUN 37 (H) 09/04/2018 02:03 AM    BUN (POC) 36 (H) 03/25/2016 02:15 AM     Lab Results   Component Value Date/Time    Potassium 4.0 09/04/2018 02:03 AM     Lab Results   Component Value Date/Time    Hemoglobin A1c 12.2 (H) 09/02/2018 09:05 PM     Lab Results   Component Value Date/Time    Hemoglobin (POC) 10.9 (L) 03/25/2016 02:15 AM    HGB 10.6 (L) 09/04/2018 02:03 AM     Lab Results   Component Value Date/Time    PLATELET 957 94/82/7929 02:03 AM     No results for input(s): CPK, CKMB, TROIQ in the last 72 hours.     No lab exists for component: CKQMB, CPKMB             ___________________________________________________    Violet Arias.  Clemencia Chong MD, [facility]

## 2019-01-28 ENCOUNTER — HOSPITAL ENCOUNTER (INPATIENT)
Age: 74
LOS: 3 days | Discharge: HOME | DRG: 292 | End: 2019-01-31
Attending: EMERGENCY MEDICINE | Admitting: FAMILY MEDICINE
Payer: MEDICARE

## 2019-01-28 ENCOUNTER — APPOINTMENT (OUTPATIENT)
Dept: NON INVASIVE DIAGNOSTICS | Age: 74
DRG: 292 | End: 2019-01-28
Attending: NURSE PRACTITIONER
Payer: MEDICARE

## 2019-01-28 ENCOUNTER — APPOINTMENT (OUTPATIENT)
Dept: GENERAL RADIOLOGY | Age: 74
DRG: 292 | End: 2019-01-28
Attending: EMERGENCY MEDICINE
Payer: MEDICARE

## 2019-01-28 ENCOUNTER — APPOINTMENT (OUTPATIENT)
Dept: VASCULAR SURGERY | Age: 74
DRG: 292 | End: 2019-01-28
Attending: FAMILY MEDICINE
Payer: MEDICARE

## 2019-01-28 DIAGNOSIS — I20.9 ANGINA PECTORIS (HCC): Primary | ICD-10-CM

## 2019-01-28 DIAGNOSIS — I50.9 ACUTE ON CHRONIC CONGESTIVE HEART FAILURE, UNSPECIFIED HEART FAILURE TYPE (HCC): ICD-10-CM

## 2019-01-28 PROBLEM — R06.02 SOB (SHORTNESS OF BREATH): Status: ACTIVE | Noted: 2019-01-28

## 2019-01-28 LAB
ALBUMIN SERPL-MCNC: 2.4 G/DL (ref 3.5–5)
ALBUMIN/GLOB SERPL: 0.5 {RATIO} (ref 1.1–2.2)
ALP SERPL-CCNC: 74 U/L (ref 45–117)
ALT SERPL-CCNC: 27 U/L (ref 12–78)
ANION GAP SERPL CALC-SCNC: 7 MMOL/L (ref 5–15)
AST SERPL-CCNC: 33 U/L (ref 15–37)
ATRIAL RATE: 122 BPM
BASOPHILS # BLD: 0 K/UL (ref 0–0.1)
BASOPHILS NFR BLD: 0 % (ref 0–1)
BILIRUB SERPL-MCNC: 0.7 MG/DL (ref 0.2–1)
BNP SERPL-MCNC: 4777 PG/ML (ref 0–125)
BUN SERPL-MCNC: 22 MG/DL (ref 6–20)
BUN/CREAT SERPL: 18 (ref 12–20)
CALCIUM SERPL-MCNC: 8.3 MG/DL (ref 8.5–10.1)
CALCULATED R AXIS, ECG10: -49 DEGREES
CALCULATED T AXIS, ECG11: 112 DEGREES
CHLORIDE SERPL-SCNC: 107 MMOL/L (ref 97–108)
CO2 SERPL-SCNC: 26 MMOL/L (ref 21–32)
COMMENT, HOLDF: NORMAL
CREAT SERPL-MCNC: 1.2 MG/DL (ref 0.7–1.3)
DIAGNOSIS, 93000: NORMAL
DIFFERENTIAL METHOD BLD: ABNORMAL
ECHO AO ROOT DIAM: 3.98 CM
ECHO AV AREA PEAK VELOCITY: 3.5 CM2
ECHO AV PEAK GRADIENT: 2.6 MMHG
ECHO AV PEAK VELOCITY: 80.36 CM/S
ECHO LA MAJOR AXIS: 5.18 CM
ECHO LA TO AORTIC ROOT RATIO: 1.3
ECHO LA VOL BP: 123.64 ML (ref 18–58)
ECHO LA VOL/BSA BIPLANE: 50.03 ML/M2 (ref 16–28)
ECHO LV E' LATERAL VELOCITY: 8.68 CM/S
ECHO LV E' SEPTAL VELOCITY: 6.37 CM/S
ECHO LV EJECTION FRACTION BIPLANE: 30 % (ref 55–100)
ECHO LV INTERNAL DIMENSION DIASTOLIC: 5.17 CM (ref 4.2–5.9)
ECHO LV INTERNAL DIMENSION SYSTOLIC: 4.79 CM
ECHO LV IVSD: 1.22 CM (ref 0.6–1)
ECHO LV MASS 2D: 289.5 G (ref 88–224)
ECHO LV MASS INDEX 2D: 117.1 G/M2 (ref 49–115)
ECHO LV POSTERIOR WALL DIASTOLIC: 1.16 CM (ref 0.6–1)
ECHO LVOT DIAM: 2.29 CM
ECHO LVOT PEAK GRADIENT: 1.9 MMHG
ECHO LVOT PEAK VELOCITY: 69.11 CM/S
ECHO MV A VELOCITY: 30.62 CM/S
ECHO MV AREA PHT: 4.1 CM2
ECHO MV E DECELERATION TIME (DT): 185.9 MS
ECHO MV E VELOCITY: 1.14 CM/S
ECHO MV E/A RATIO: 0.04
ECHO MV E/E' LATERAL: 0.13
ECHO MV E/E' RATIO (AVERAGED): 0.16
ECHO MV E/E' SEPTAL: 0.18
ECHO MV PRESSURE HALF TIME (PHT): 53.9 MS
ECHO PV MAX VELOCITY: 59.41 CM/S
ECHO PV PEAK GRADIENT: 1.4 MMHG
ECHO RV TAPSE: 1.99 CM (ref 1.5–2)
ECHO TV REGURGITANT MAX VELOCITY: 295.49 CM/S
ECHO TV REGURGITANT PEAK GRADIENT: 34.9 MMHG
EOSINOPHIL # BLD: 0.1 K/UL (ref 0–0.4)
EOSINOPHIL NFR BLD: 3 % (ref 0–7)
ERYTHROCYTE [DISTWIDTH] IN BLOOD BY AUTOMATED COUNT: 12.7 % (ref 11.5–14.5)
EST. AVERAGE GLUCOSE BLD GHB EST-MCNC: 206 MG/DL
GLOBULIN SER CALC-MCNC: 5 G/DL (ref 2–4)
GLUCOSE BLD STRIP.AUTO-MCNC: 139 MG/DL (ref 65–100)
GLUCOSE BLD STRIP.AUTO-MCNC: 258 MG/DL (ref 65–100)
GLUCOSE SERPL-MCNC: 261 MG/DL (ref 65–100)
HBA1C MFR BLD: 8.8 % (ref 4.2–6.3)
HCT VFR BLD AUTO: 33.2 % (ref 36.6–50.3)
HGB BLD-MCNC: 10.3 G/DL (ref 12.1–17)
IMM GRANULOCYTES # BLD AUTO: 0 K/UL (ref 0–0.04)
IMM GRANULOCYTES NFR BLD AUTO: 0 % (ref 0–0.5)
LYMPHOCYTES # BLD: 1.9 K/UL (ref 0.8–3.5)
LYMPHOCYTES NFR BLD: 34 % (ref 12–49)
MAGNESIUM SERPL-MCNC: 2 MG/DL (ref 1.6–2.4)
MCH RBC QN AUTO: 32 PG (ref 26–34)
MCHC RBC AUTO-ENTMCNC: 31 G/DL (ref 30–36.5)
MCV RBC AUTO: 103.1 FL (ref 80–99)
MONOCYTES # BLD: 0.7 K/UL (ref 0–1)
MONOCYTES NFR BLD: 13 % (ref 5–13)
NEUTS SEG # BLD: 2.8 K/UL (ref 1.8–8)
NEUTS SEG NFR BLD: 50 % (ref 32–75)
NRBC # BLD: 0 K/UL (ref 0–0.01)
NRBC BLD-RTO: 0 PER 100 WBC
PLATELET # BLD AUTO: 215 K/UL (ref 150–400)
PMV BLD AUTO: 9 FL (ref 8.9–12.9)
POTASSIUM SERPL-SCNC: 4.4 MMOL/L (ref 3.5–5.1)
PROT SERPL-MCNC: 7.4 G/DL (ref 6.4–8.2)
Q-T INTERVAL, ECG07: 436 MS
QRS DURATION, ECG06: 118 MS
QTC CALCULATION (BEZET), ECG08: 446 MS
RBC # BLD AUTO: 3.22 M/UL (ref 4.1–5.7)
SAMPLES BEING HELD,HOLD: NORMAL
SERVICE CMNT-IMP: ABNORMAL
SERVICE CMNT-IMP: ABNORMAL
SODIUM SERPL-SCNC: 140 MMOL/L (ref 136–145)
TROPONIN I SERPL-MCNC: <0.05 NG/ML
TROPONIN I SERPL-MCNC: <0.05 NG/ML
TSH SERPL DL<=0.05 MIU/L-ACNC: 1.57 UIU/ML (ref 0.36–3.74)
VENTRICULAR RATE, ECG03: 63 BPM
WBC # BLD AUTO: 5.5 K/UL (ref 4.1–11.1)

## 2019-01-28 PROCEDURE — 84443 ASSAY THYROID STIM HORMONE: CPT

## 2019-01-28 PROCEDURE — 71046 X-RAY EXAM CHEST 2 VIEWS: CPT

## 2019-01-28 PROCEDURE — 83735 ASSAY OF MAGNESIUM: CPT

## 2019-01-28 PROCEDURE — 84484 ASSAY OF TROPONIN QUANT: CPT

## 2019-01-28 PROCEDURE — 36415 COLL VENOUS BLD VENIPUNCTURE: CPT

## 2019-01-28 PROCEDURE — 93005 ELECTROCARDIOGRAM TRACING: CPT

## 2019-01-28 PROCEDURE — 74011250637 HC RX REV CODE- 250/637: Performed by: EMERGENCY MEDICINE

## 2019-01-28 PROCEDURE — 83880 ASSAY OF NATRIURETIC PEPTIDE: CPT

## 2019-01-28 PROCEDURE — 99284 EMERGENCY DEPT VISIT MOD MDM: CPT

## 2019-01-28 PROCEDURE — 74011250636 HC RX REV CODE- 250/636: Performed by: EMERGENCY MEDICINE

## 2019-01-28 PROCEDURE — 74011636637 HC RX REV CODE- 636/637: Performed by: FAMILY MEDICINE

## 2019-01-28 PROCEDURE — 94762 N-INVAS EAR/PLS OXIMTRY CONT: CPT

## 2019-01-28 PROCEDURE — 74011250636 HC RX REV CODE- 250/636: Performed by: NURSE PRACTITIONER

## 2019-01-28 PROCEDURE — 85025 COMPLETE CBC W/AUTO DIFF WBC: CPT

## 2019-01-28 PROCEDURE — 74011000250 HC RX REV CODE- 250: Performed by: EMERGENCY MEDICINE

## 2019-01-28 PROCEDURE — 80053 COMPREHEN METABOLIC PANEL: CPT

## 2019-01-28 PROCEDURE — 82962 GLUCOSE BLOOD TEST: CPT

## 2019-01-28 PROCEDURE — 83036 HEMOGLOBIN GLYCOSYLATED A1C: CPT

## 2019-01-28 PROCEDURE — 94760 N-INVAS EAR/PLS OXIMETRY 1: CPT

## 2019-01-28 PROCEDURE — 74011250637 HC RX REV CODE- 250/637: Performed by: NURSE PRACTITIONER

## 2019-01-28 PROCEDURE — C8929 TTE W OR WO FOL WCON,DOPPLER: HCPCS

## 2019-01-28 PROCEDURE — 65660000000 HC RM CCU STEPDOWN

## 2019-01-28 PROCEDURE — 93970 EXTREMITY STUDY: CPT

## 2019-01-28 RX ORDER — FUROSEMIDE 10 MG/ML
80 INJECTION INTRAMUSCULAR; INTRAVENOUS 2 TIMES DAILY
Status: DISCONTINUED | OUTPATIENT
Start: 2019-01-28 | End: 2019-01-31

## 2019-01-28 RX ORDER — GUAIFENESIN 100 MG/5ML
81 LIQUID (ML) ORAL DAILY
Status: DISCONTINUED | OUTPATIENT
Start: 2019-01-29 | End: 2019-01-31 | Stop reason: HOSPADM

## 2019-01-28 RX ORDER — PANTOPRAZOLE SODIUM 40 MG/1
40 TABLET, DELAYED RELEASE ORAL DAILY
Status: DISCONTINUED | OUTPATIENT
Start: 2019-01-29 | End: 2019-01-31 | Stop reason: HOSPADM

## 2019-01-28 RX ORDER — LOSARTAN POTASSIUM 25 MG/1
25 TABLET ORAL DAILY
Status: DISCONTINUED | OUTPATIENT
Start: 2019-01-29 | End: 2019-01-31 | Stop reason: HOSPADM

## 2019-01-28 RX ORDER — ACETAMINOPHEN 325 MG/1
650 TABLET ORAL
Status: DISCONTINUED | OUTPATIENT
Start: 2019-01-28 | End: 2019-01-31 | Stop reason: HOSPADM

## 2019-01-28 RX ORDER — MAGNESIUM SULFATE 100 %
4 CRYSTALS MISCELLANEOUS AS NEEDED
Status: DISCONTINUED | OUTPATIENT
Start: 2019-01-28 | End: 2019-01-31 | Stop reason: HOSPADM

## 2019-01-28 RX ORDER — INSULIN LISPRO 100 [IU]/ML
INJECTION, SOLUTION INTRAVENOUS; SUBCUTANEOUS
Status: DISCONTINUED | OUTPATIENT
Start: 2019-01-28 | End: 2019-01-31 | Stop reason: HOSPADM

## 2019-01-28 RX ORDER — GUAIFENESIN 100 MG/5ML
81 LIQUID (ML) ORAL DAILY
Status: DISCONTINUED | OUTPATIENT
Start: 2019-01-29 | End: 2019-01-28 | Stop reason: SDUPTHER

## 2019-01-28 RX ORDER — POTASSIUM CHLORIDE 750 MG/1
20 TABLET, FILM COATED, EXTENDED RELEASE ORAL DAILY
Status: DISCONTINUED | OUTPATIENT
Start: 2019-01-29 | End: 2019-01-31 | Stop reason: HOSPADM

## 2019-01-28 RX ORDER — NITROGLYCERIN 0.4 MG/1
0.4 TABLET SUBLINGUAL
Status: DISCONTINUED | OUTPATIENT
Start: 2019-01-28 | End: 2019-01-31 | Stop reason: HOSPADM

## 2019-01-28 RX ORDER — NITROGLYCERIN 0.4 MG/1
0.4 TABLET SUBLINGUAL
Status: DISCONTINUED | OUTPATIENT
Start: 2019-01-28 | End: 2019-01-28

## 2019-01-28 RX ORDER — SODIUM CHLORIDE 0.9 % (FLUSH) 0.9 %
5-40 SYRINGE (ML) INJECTION AS NEEDED
Status: DISCONTINUED | OUTPATIENT
Start: 2019-01-28 | End: 2019-01-31 | Stop reason: HOSPADM

## 2019-01-28 RX ORDER — SODIUM CHLORIDE 0.9 % (FLUSH) 0.9 %
5-40 SYRINGE (ML) INJECTION EVERY 8 HOURS
Status: DISCONTINUED | OUTPATIENT
Start: 2019-01-28 | End: 2019-01-31 | Stop reason: HOSPADM

## 2019-01-28 RX ORDER — CARVEDILOL 3.12 MG/1
3.12 TABLET ORAL 2 TIMES DAILY WITH MEALS
Status: DISCONTINUED | OUTPATIENT
Start: 2019-01-28 | End: 2019-01-31 | Stop reason: HOSPADM

## 2019-01-28 RX ORDER — INSULIN GLARGINE 100 [IU]/ML
57 INJECTION, SOLUTION SUBCUTANEOUS DAILY
Status: DISCONTINUED | OUTPATIENT
Start: 2019-01-29 | End: 2019-01-31 | Stop reason: HOSPADM

## 2019-01-28 RX ORDER — POTASSIUM CHLORIDE 750 MG/1
20 TABLET, FILM COATED, EXTENDED RELEASE ORAL
Status: COMPLETED | OUTPATIENT
Start: 2019-01-28 | End: 2019-01-28

## 2019-01-28 RX ORDER — ROSUVASTATIN CALCIUM 10 MG/1
10 TABLET, COATED ORAL
Status: DISCONTINUED | OUTPATIENT
Start: 2019-01-28 | End: 2019-01-31 | Stop reason: HOSPADM

## 2019-01-28 RX ORDER — DEXTROSE 50 % IN WATER (D50W) INTRAVENOUS SYRINGE
25-50 AS NEEDED
Status: DISCONTINUED | OUTPATIENT
Start: 2019-01-28 | End: 2019-01-31 | Stop reason: HOSPADM

## 2019-01-28 RX ORDER — FUROSEMIDE 10 MG/ML
40 INJECTION INTRAMUSCULAR; INTRAVENOUS 2 TIMES DAILY
Status: CANCELLED | OUTPATIENT
Start: 2019-01-28

## 2019-01-28 RX ORDER — FUROSEMIDE 10 MG/ML
40 INJECTION INTRAMUSCULAR; INTRAVENOUS ONCE
Status: DISCONTINUED | OUTPATIENT
Start: 2019-01-28 | End: 2019-01-28 | Stop reason: SDUPTHER

## 2019-01-28 RX ORDER — GUAIFENESIN 100 MG/5ML
324 LIQUID (ML) ORAL
Status: COMPLETED | OUTPATIENT
Start: 2019-01-28 | End: 2019-01-28

## 2019-01-28 RX ADMIN — INSULIN LISPRO 3 UNITS: 100 INJECTION, SOLUTION INTRAVENOUS; SUBCUTANEOUS at 23:15

## 2019-01-28 RX ADMIN — CARVEDILOL 3.12 MG: 3.12 TABLET, FILM COATED ORAL at 18:57

## 2019-01-28 RX ADMIN — FUROSEMIDE 80 MG: 10 INJECTION, SOLUTION INTRAMUSCULAR; INTRAVENOUS at 16:47

## 2019-01-28 RX ADMIN — SODIUM CHLORIDE 1 ML: 9 INJECTION INTRAMUSCULAR; INTRAVENOUS; SUBCUTANEOUS at 17:07

## 2019-01-28 RX ADMIN — Medication 10 ML: at 21:32

## 2019-01-28 RX ADMIN — ROSUVASTATIN CALCIUM 10 MG: 10 TABLET, FILM COATED ORAL at 21:32

## 2019-01-28 RX ADMIN — POTASSIUM CHLORIDE 20 MEQ: 750 TABLET, EXTENDED RELEASE ORAL at 16:47

## 2019-01-28 RX ADMIN — ASPIRIN 81 MG CHEWABLE TABLET 324 MG: 81 TABLET CHEWABLE at 16:47

## 2019-01-28 NOTE — CONSULTS
Cardiology Consult Note      Patient Name: Hiren Arroyo : 1945 MRN: [id number]  Date: 2019  Time: 3:16 PM    Admit Diagnosis: SOB (shortness of breath)    Primary Cardiologist: Dr. Pamela Munoz MD  Consulting Cardiologist: Royer Silva. Nicanor Ahuja M.D.     Reason for Consult: CHF    Requesting MD: Dr. Maxine Celis MD    HPI:  Hiren Villa. is a 68 y.o. male admitted on 2019  for SOB (shortness of breath). Has PMH of CAD s/p CABG 4055, Systolic CHF, PAF on xarelto,  ischemic cardiomyopathy, [place], morbid obesity, DM. Presented with chief c/o of SOB. States she has had progressvie worsening of SOB with activity, orthopnea and worsened BLE edema which started 1 week ago. Reports weight gain of at least 4 lbs in 2 days. Additionally reports new onset midsternal nonradiating chest pain which has been intermittent over past couple weeks and occurs with activity and with laying down. States pain is better with laying still and with deep breathing. Pain lasts a few minutes with each occurrence- last episode last night. Reports dry cough. Reports he had palpitations last week but none since. Reports hx of GERD but this pain feels different than his GERD symptomsStates when he had CABG in  his presenting symptom was SOB. . Reports hx of [place] but never told he needed CPAP. Says he never had outpatient sleep study. Reports compliance with his medications. SH: Former smoker, no ETOH use  FH: Father: CHF, MI at age 80, DM,HTN. Mother: no early CAD. Son passed away from CKD and CHF. Sister has breast CA. Subjective:  Denies chest pain. States SOB better. Denies palpitations, dizziness. Assessment and Plan     1. Acute on chronic systolic CHF  -NYHA class III-IV on admission. -EF  45% 10/2017 (EF 35% in 2017).  Will repeat TTE  -Pro BNP 4777  -CXR with vascular congestion.  -Daily weights, intake/output  -Continue IV diuresis: Lasix 80 mg IV BID  -Continue Coreg 3.125 mg BID  -Continue Losartan 25 mg daily    2. Chest pain: typical and atypical features  -Troponin <0.05 x 1, trend  -12 lead EKG: afib, no ischemic changes  -Had LHC in 5/2017 with patent grafts. -Will need stress test once able to lay flat. Will order lexiscan stress test for tomorrow- likely will be 2 day test.    3. Hx of CAD: s/p CERNA-LAD  -Start ASA 81 mg daily  -Continue BB, statin, ARB    4. Hx of PAF:    -12 lead EKG:  Afib, rate controlled. Currently RRR  -[id number]= 5 (age, DM, HTN, Vascular dz). Will hold Xarelto for now in case heart catheterization is needed. 5. Anemia: hgb 10.3     6. Hx of DM type II: on insulin. 7. Hx of dyslipidemia: continue crestor. Pt with hx of ICM/chf and CAD now presents with SOB/ acute on chronic systolic CHF and chest pain. Will continue IV diuresis. Pt has had C 5/2017 with patent grafts but will proceed with Lexiscan stress test tomorrow. Saw and evaluated pt and agree with above assessment and plan. IV diuresis for CHF exacerbation. Chest pain with typical and atypical features, no acute ischemia; will plan for stress test.  Micky Finn MD      1/9/18 Echo: Hypo/akinesis of the septal segments. Mild LVH. EF 35-40%. Right ventricle mildly dilated. Systolic function reduced. Aortic valve mildly thickened leaflets. 10/4/17: ECHO- LVEF 45 %, mild diffuse HK, thickness was mildly increased; Mod concentric hypertrophy. 5/24/2017: CATH- Obstructive 1VD:LAD p80, m100, dist very small vessel; D1 patent. LCx p30 (co-dom). RCA patent (co-dom). 2/2 Grafts patent: LIMA-LAD patent.; SVG-D1 patent. 5/22/17: ECHO- Comparison was made 25-Nov-2014 and LV overall  function has decreased.  LV wall hypokinesis, ventricle wall dilated, EF 35%;  SYSTEM MEASUREMENT TABLES  2D  LVOT Diam: 2.1 cm  Ao Diam: 3.2 cm  LA Diam: 4.6 cm  IVSd: 1.1 cm  LVIDd: 5.9 cm  LVIDs: 5.1 cm  LVPWd: 1 cm  SV(Teich): 53.9 ml    11/25/14: ECHO- TDS, - EF 45-50%. LVH, RV mild dilated - reduced RVEF   11/5/2012: ECHO- LVEF 50%, DD, Mild RV dysfuction. 11/10/11: CMR- . Normal left ventricular size by 3D volumetric assessment. Moderate left   ventricular systolic dysfunction. Severe hypokinesis of the inferior and   inferolateral wall. Mild hypokinesis of the anterior wall. LVEF 37%. 2. Normal right ventricular size and systolic function. 3. No significant valvular disease other than trace mitral and tricuspid   regurgitation. 4. Normal resting myocardial perfusion on first pass stress perfusion   imaging. 5. On LGE imaging, there is a very small focal area of small endocardial   infarct involving the basal inferolateral wall. The anterior, anteroseptal,   anterolateral, lateral, the entire inferior wall, the inferolateral wall,   inferoseptal wall and apex are completely viable. The LAD, LCx, and RCA   territories demonstrate significant viability for revascularization. 6. Large right-sided pleural effusion. Moderate left-sided pleural effusion. 11/8/2011: ECHO- LVEF 35-40%, LV- mod dilated, mod HK (basal-mid inferior/  basal-mid inferolateral walls) RV-dilated; mild LAE  2011: CATH- pLAD: 95%-> PCi-> 80% residual stenosis   2011: CABG- 2 V, LIMA -> LAD    Review of Symptoms:  Constitutional: Negative for fever,  +weight gain,   HEENT: + dysphagia  Respiratory: Positve for cough  Cardiovascular: Positive for chest pain, palpitations, orthopnea, leg swelling, negative syncope,   Gastrointestinal: Negative for nausea, vomiting, blood in stool and melena, abdominal pain   Genitourinary: Negative for hematuria. Musculoskeletal: Negative for myalgias  Skin: Negative for rash. Heme: No bleeding. Neurological: Negative for speech changes and focal weakness      Previous treatment/evaluation includes Coronary Artery Bypass Graft, Percutaneous Coronary Intervention, echocardiogram and cardiac catheterization .   Cardiac risk factors: smoking/ tobacco exposure, family history, dyslipidemia, diabetes mellitus, obesity, sedentary life style, male gender, hypertension. Past Medical History:   Diagnosis Date    CAD (coronary artery disease)     [facility] 2013    Diabetes ([facility]     5yrs    GERD (gastroesophageal reflux disease)     Morbid obesity (HCC)     Prostate cancer ([facility]      Past Surgical History:   Procedure Laterality Date    HX CORONARY ARTERY BYPASS GRAFT      HX HEART CATHETERIZATION      HX ORTHOPAEDIC      right wrist carpal tunnel repair     Current Facility-Administered Medications   Medication Dose Route Frequency    furosemide (LASIX) injection 40 mg  40 mg IntraVENous ONCE    aspirin chewable tablet 324 mg  324 mg Oral NOW     Current Outpatient Medications   Medication Sig    semaglutide (OZEMPIC) 0.25 mg/0.2 mL (2 mg/1.5 mL) sub-q pen by SubCUTAneous route every seven (7) days.  furosemide (LASIX) 40 mg tablet Take 40 mg by mouth two (2) times daily as needed.  metFORMIN (GLUCOPHAGE) 500 mg tablet Take 2,000 mg by mouth daily (with dinner).  rivaroxaban (XARELTO) 20 mg tab tablet Take  by mouth daily.  carvedilol (COREG) 3.125 mg tablet Take  by mouth two (2) times daily (with meals).  cholecalciferol (VITAMIN D3) 1,000 unit cap Take  by mouth daily.  ciprofloxacin HCl (CIPRO) 500 mg tablet Take  by mouth two (2) times a day.  losartan (COZAAR) 25 mg tablet Take 1 Tab by mouth daily.  insulin glargine (LANTUS U-100 INSULIN) 100 unit/mL injection 57 Units by SubCUTAneous route daily.  omeprazole (PRILOSEC) 20 mg capsule Take 20 mg by mouth daily.  rosuvastatin (CRESTOR) 10 mg tablet Take 1 Tab by mouth nightly.     potassium chloride (K-DUR, KLOR-CON) 20 mEq tablet Take one tablet daily       No Known Allergies   Family History   Problem Relation Age of Onset    Heart Disease Father     Diabetes Father     Cancer Sister         BREAST      Social History     Socioeconomic History    Marital status:      Spouse name: Not on file    Number of children: Not on file    Years of education: Not on file    Highest education level: Not on file   Tobacco Use    Smoking status: Former Smoker     Packs/day: 1.00     Years: 7.00     Pack years: 7.00     Last attempt to quit: 1991     Years since quittin.2    Smokeless tobacco: Former User   Substance and Sexual Activity    Alcohol use: Yes     Comment: VERY RARE    Drug use: No       Objective:    Physical Exam    Vitals:   Vitals:    19 1118 19 1221   BP:  139/74   Pulse: 87 67   Resp:  20   Temp:  97.7 °F (36.5 °C)   SpO2: 98% 99%   Height:  5' 9\" (1.753 m)       General:    Alert, cooperative, no distress, appears stated age. Neck:   Supple,    Back:     Symmetric,   Lungs:     Crackles bilateral bases. Heart[de-identified]    Regular rate and rhythm, S1, S2 normal, no murmur, click, rub or gallop. Abdomen:     Soft, obese, non-tender. Extremities:   1+ BLE edema   Vascular:   Pulses - 2+ DP   Skin:   Skin color normal. No rashes or lesions   Neurologic:   CORRAL       Telemetry:     ECG: Afib with nonspecific T wave abnormality. Data Review:     Radiology: CXR with increased vascular congestion. Recent Labs     19  1217   TROIQ <0.05     Recent Labs     19  1217      K 4.4      CO2 26   BUN 22*   CREA 1.20   *   CA 8.3*     Recent Labs     19  1217   WBC 5.5   HGB 10.3*   HCT 33.2*        Recent Labs     19  1217   SGOT 33   AP 74     No results for input(s): CHOL, LDLC in the last 72 hours. No lab exists for component: TGL, HDLC,  HBA1C  No results for input(s): CRP, TSH, TSHEXT in the last 72 hours. No lab exists for component: ESR    Thank you very much for this referral. I appreciate the opportunity to participate in this patient's care. I will follow along with above stated plan. Cyril Wong.  KAYLEEN Perez         [facility], [address] [place] [address],[address]     Enmanuel Chaudhry     [phone]    Salma Abraham MD

## 2019-01-28 NOTE — ED PROVIDER NOTES
68 y.o. male with past medical history significant for prostate CA, diabetes, CAD, GERD, CHF, PAF, s/p CABG, no longer on ASA but on Xarelto who presents from home via private vehicle with chief complaint of chest pain. Patient is a difficult historian. Patient reports intermittent substernal chest pain for the last week with accompanying TALLEY. Patient reports 2 weeks of bilateral leg swelling. Patient currently described chest pain as a tightness and mild in severity. Patient notes his TALLEY seemed worse this morning, prompting his visit. Patient reports a h/o CABG and CHF, on Xarelto, follows cardiology. Patient denies any nausea, vomiting, or diarrhea. There are no other acute medical concerns at this time. Social hx: Former smoker (3 [facility]); Rare EtOH use PCP: Colette Aguirre MD 
Cardiologist: Ryan Ty MD 
 
Note written by Abhishek Barron, as dictated by Juliet Tiwari DO 1:28 PM 
 
 
The history is provided by medical records and the patient. No  was used. Past Medical History:  
Diagnosis Date  CAD (coronary artery disease)  CHF (congestive heart failure) ([address] 2013  Diabetes ([address] 5yrs  GERD (gastroesophageal reflux disease)  Morbid obesity ([facility]  Prostate cancer ([facility] Past Surgical History:  
Procedure Laterality Date  HX CORONARY ARTERY BYPASS GRAFT    
 HX HEART CATHETERIZATION    
 HX ORTHOPAEDIC    
 right wrist carpal tunnel repair Family History:  
Problem Relation Age of Onset  Heart Disease Father  Diabetes Father  Cancer Sister BREAST Social History Socioeconomic History  Marital status:  Spouse name: Not on file  Number of children: Not on file  Years of education: Not on file  Highest education level: Not on file Social Needs  Financial resource strain: Not on file  Food insecurity - worry: Not on file  Food insecurity - inability: Not on file  Transportation needs - medical: Not on file  Transportation needs - non-medical: Not on file Occupational History  Not on file Tobacco Use  Smoking status: Former Smoker Packs/day: 1.00 Years: 7.00 Pack years: 7.00 Last attempt to quit: 1991 Years since quittin.2  Smokeless tobacco: Former User Substance and Sexual Activity  Alcohol use: Yes Comment: VERY RARE  Drug use: No  
 Sexual activity: Not on file Other Topics Concern  Not on file Social History Narrative  Not on file ALLERGIES: Patient has no known allergies. Review of Systems Constitutional: Negative for activity change, appetite change, chills and fever. HENT: Negative for congestion, rhinorrhea, sinus pain, sneezing and sore throat. Eyes: Negative for photophobia and visual disturbance. Respiratory: Positive for shortness of breath. Negative for cough. Cardiovascular: Positive for chest pain and leg swelling. Gastrointestinal: Negative for abdominal pain, blood in stool, constipation, diarrhea, nausea and vomiting. Genitourinary: Negative for difficulty urinating, dysuria, flank pain, hematuria, penile pain and testicular pain. Musculoskeletal: Negative for arthralgias, back pain, myalgias and neck pain. Skin: Negative for rash and wound. Neurological: Negative for syncope, weakness, light-headedness, numbness and headaches. Psychiatric/Behavioral: Negative for self-injury and suicidal ideas. All other systems reviewed and are negative. Vitals:  
 19 1118 19 1221 BP:  139/74 Pulse: 87 67 Resp:  20 Temp:  97.7 °F (36.5 °C) SpO2: 98% 99% Height:  5' 9\" (1.753 m) Physical Exam  
Constitutional: He is oriented to person, place, and time. He appears well-developed. He appears ill. No distress. Chronically ill appearing. NAD. Obese.   
HENT:  
 Head: Normocephalic and atraumatic. Eyes: Conjunctivae and EOM are normal. Pupils are equal, round, and reactive to light. Neck: Neck supple. Cardiovascular: Normal rate and normal heart sounds. An irregularly irregular rhythm present. Irregularly irregular. Pulmonary/Chest: Effort normal. He has rales. Bibasilar rales. Well healed surgical scar noted without signs of infection or dehiscence. Abdominal: Soft. He exhibits no distension. There is no tenderness. Musculoskeletal: He exhibits edema. 1+ pitting edema to bilateral LE. Neurological: He is alert and oriented to person, place, and time. Skin: Skin is warm and dry. He is not diaphoretic. Nursing note and vitals reviewed. Note written by Abhishek Back, as dictated by Emily Gonzalez DO 1:28 PM 
 
MDM 
  68 y.o. male presents with chest pains and SOB concerning for anginal type sx. CXR viewed by myself and read by radiology showing some interstitial edema and cardiomegaly. Labs significant for elevated BNP at 4777, trop neg, gluc 261, BUN 22, cr 1.2, hg 10.3, normal WBC. Given 40mg of IV lasix and asa. Cardiology consulted, Dr. Wellington Issa who agreed to see the patient. Discussed with Dr. Magali Colindres, hospitalist who agreed to admit the patient. Procedures ED EKG interpretation: 
Rhythm: atrial fib; and irregular. Rate (approx.): 63 bpm; LVH; T wave flattening laterally without acute ST elevations or depressions.   
 
Note written by Abhishek Back, as dictated by Emily Gonzalez DO 1:31 PM

## 2019-01-28 NOTE — PROGRESS NOTES
TRANSFER - IN REPORT: 
 
Verbal report received from BullRN(name) on Mark Ordaz.  being received from ED(unit) for routine progression of care Report consisted of patients Situation, Background, Assessment and  
Recommendations(SBAR). Information from the following report(s) SBAR, MAR and Recent Results was reviewed with the receiving nurse. Opportunity for questions and clarification was provided. Assessment completed upon patients arrival to unit and care assumed. 1842: Patient received from vascular via stretcher. Assisted patient to bed and attached to telemetry box. Vitals taken Patient weighed via standing scale: 143.3 kg. Patient stated the last time he weighed himself was the beginning of the month. 1900: Patient in bed consuming chinese food. Educated patient on the triggers of heart failure and consuming a heart healthy diet.

## 2019-01-28 NOTE — ED TRIAGE NOTES
Triage Note: Patient is coming in for intermittent chest pain and shortness of breath for the past week but worse this morning when getting dressed . Patient denies any pain at this time.

## 2019-01-29 ENCOUNTER — APPOINTMENT (OUTPATIENT)
Dept: NON INVASIVE DIAGNOSTICS | Age: 74
DRG: 292 | End: 2019-01-29
Attending: FAMILY MEDICINE
Payer: MEDICARE

## 2019-01-29 ENCOUNTER — APPOINTMENT (OUTPATIENT)
Dept: NUCLEAR MEDICINE | Age: 74
DRG: 292 | End: 2019-01-29
Attending: NURSE PRACTITIONER
Payer: MEDICARE

## 2019-01-29 ENCOUNTER — APPOINTMENT (OUTPATIENT)
Dept: NON INVASIVE DIAGNOSTICS | Age: 74
DRG: 292 | End: 2019-01-29
Attending: NURSE PRACTITIONER
Payer: MEDICARE

## 2019-01-29 LAB
ANION GAP SERPL CALC-SCNC: 8 MMOL/L (ref 5–15)
ATRIAL RATE: 312 BPM
BASOPHILS # BLD: 0 K/UL (ref 0–0.1)
BASOPHILS NFR BLD: 0 % (ref 0–1)
BUN SERPL-MCNC: 21 MG/DL (ref 6–20)
BUN/CREAT SERPL: 16 (ref 12–20)
CALCIUM SERPL-MCNC: 8.1 MG/DL (ref 8.5–10.1)
CALCULATED R AXIS, ECG10: -43 DEGREES
CALCULATED T AXIS, ECG11: 110 DEGREES
CHLORIDE SERPL-SCNC: 107 MMOL/L (ref 97–108)
CHOLEST SERPL-MCNC: 125 MG/DL
CO2 SERPL-SCNC: 27 MMOL/L (ref 21–32)
CREAT SERPL-MCNC: 1.28 MG/DL (ref 0.7–1.3)
DIAGNOSIS, 93000: NORMAL
DIFFERENTIAL METHOD BLD: ABNORMAL
EOSINOPHIL # BLD: 0.1 K/UL (ref 0–0.4)
EOSINOPHIL NFR BLD: 3 % (ref 0–7)
ERYTHROCYTE [DISTWIDTH] IN BLOOD BY AUTOMATED COUNT: 12.6 % (ref 11.5–14.5)
GLUCOSE BLD STRIP.AUTO-MCNC: 124 MG/DL (ref 65–100)
GLUCOSE BLD STRIP.AUTO-MCNC: 250 MG/DL (ref 65–100)
GLUCOSE BLD STRIP.AUTO-MCNC: 75 MG/DL (ref 65–100)
GLUCOSE SERPL-MCNC: 298 MG/DL (ref 65–100)
HCT VFR BLD AUTO: 31.2 % (ref 36.6–50.3)
HDLC SERPL-MCNC: 42 MG/DL
HDLC SERPL: 3 {RATIO} (ref 0–5)
HGB BLD-MCNC: 10.2 G/DL (ref 12.1–17)
IMM GRANULOCYTES # BLD AUTO: 0 K/UL (ref 0–0.04)
IMM GRANULOCYTES NFR BLD AUTO: 0 % (ref 0–0.5)
LDLC SERPL CALC-MCNC: 68.6 MG/DL (ref 0–100)
LIPID PROFILE,FLP: NORMAL
LYMPHOCYTES # BLD: 1.7 K/UL (ref 0.8–3.5)
LYMPHOCYTES NFR BLD: 37 % (ref 12–49)
MCH RBC QN AUTO: 33 PG (ref 26–34)
MCHC RBC AUTO-ENTMCNC: 32.7 G/DL (ref 30–36.5)
MCV RBC AUTO: 101 FL (ref 80–99)
MONOCYTES # BLD: 0.6 K/UL (ref 0–1)
MONOCYTES NFR BLD: 14 % (ref 5–13)
NEUTS SEG # BLD: 2.2 K/UL (ref 1.8–8)
NEUTS SEG NFR BLD: 46 % (ref 32–75)
NRBC # BLD: 0 K/UL (ref 0–0.01)
NRBC BLD-RTO: 0 PER 100 WBC
PLATELET # BLD AUTO: 203 K/UL (ref 150–400)
PMV BLD AUTO: 9 FL (ref 8.9–12.9)
POTASSIUM SERPL-SCNC: 4 MMOL/L (ref 3.5–5.1)
Q-T INTERVAL, ECG07: 464 MS
QRS DURATION, ECG06: 120 MS
QTC CALCULATION (BEZET), ECG08: 478 MS
RBC # BLD AUTO: 3.09 M/UL (ref 4.1–5.7)
SERVICE CMNT-IMP: ABNORMAL
SERVICE CMNT-IMP: ABNORMAL
SERVICE CMNT-IMP: NORMAL
SODIUM SERPL-SCNC: 142 MMOL/L (ref 136–145)
TRIGL SERPL-MCNC: 72 MG/DL (ref ?–150)
TROPONIN I SERPL-MCNC: <0.05 NG/ML
VENTRICULAR RATE, ECG03: 64 BPM
VLDLC SERPL CALC-MCNC: 14.4 MG/DL
WBC # BLD AUTO: 4.6 K/UL (ref 4.1–11.1)

## 2019-01-29 PROCEDURE — 65660000000 HC RM CCU STEPDOWN

## 2019-01-29 PROCEDURE — 85025 COMPLETE CBC W/AUTO DIFF WBC: CPT

## 2019-01-29 PROCEDURE — 80048 BASIC METABOLIC PNL TOTAL CA: CPT

## 2019-01-29 PROCEDURE — 80061 LIPID PANEL: CPT

## 2019-01-29 PROCEDURE — 74011250636 HC RX REV CODE- 250/636: Performed by: NURSE PRACTITIONER

## 2019-01-29 PROCEDURE — 93005 ELECTROCARDIOGRAM TRACING: CPT

## 2019-01-29 PROCEDURE — 36415 COLL VENOUS BLD VENIPUNCTURE: CPT

## 2019-01-29 PROCEDURE — 82962 GLUCOSE BLOOD TEST: CPT

## 2019-01-29 PROCEDURE — 74011636637 HC RX REV CODE- 636/637: Performed by: FAMILY MEDICINE

## 2019-01-29 PROCEDURE — 74011250637 HC RX REV CODE- 250/637: Performed by: INTERNAL MEDICINE

## 2019-01-29 PROCEDURE — 84484 ASSAY OF TROPONIN QUANT: CPT

## 2019-01-29 PROCEDURE — 74011250637 HC RX REV CODE- 250/637: Performed by: FAMILY MEDICINE

## 2019-01-29 PROCEDURE — 74011250636 HC RX REV CODE- 250/636: Performed by: INTERNAL MEDICINE

## 2019-01-29 PROCEDURE — 74011250637 HC RX REV CODE- 250/637: Performed by: NURSE PRACTITIONER

## 2019-01-29 PROCEDURE — A9500 TC99M SESTAMIBI: HCPCS

## 2019-01-29 PROCEDURE — 78452 HT MUSCLE IMAGE SPECT MULT: CPT

## 2019-01-29 PROCEDURE — 94760 N-INVAS EAR/PLS OXIMETRY 1: CPT

## 2019-01-29 RX ORDER — SPIRONOLACTONE 25 MG/1
25 TABLET ORAL DAILY
Status: DISCONTINUED | OUTPATIENT
Start: 2019-01-29 | End: 2019-01-31 | Stop reason: HOSPADM

## 2019-01-29 RX ORDER — SODIUM CHLORIDE 0.9 % (FLUSH) 0.9 %
20 SYRINGE (ML) INJECTION
Status: COMPLETED | OUTPATIENT
Start: 2019-01-29 | End: 2019-01-29

## 2019-01-29 RX ADMIN — PANTOPRAZOLE SODIUM 40 MG: 40 TABLET, DELAYED RELEASE ORAL at 08:59

## 2019-01-29 RX ADMIN — INSULIN LISPRO 5 UNITS: 100 INJECTION, SOLUTION INTRAVENOUS; SUBCUTANEOUS at 06:58

## 2019-01-29 RX ADMIN — REGADENOSON 0.4 MG: 0.08 INJECTION, SOLUTION INTRAVENOUS at 11:50

## 2019-01-29 RX ADMIN — SPIRONOLACTONE 25 MG: 25 TABLET ORAL at 08:59

## 2019-01-29 RX ADMIN — INSULIN GLARGINE 57 UNITS: 100 INJECTION, SOLUTION SUBCUTANEOUS at 08:59

## 2019-01-29 RX ADMIN — ASPIRIN 81 MG CHEWABLE TABLET 81 MG: 81 TABLET CHEWABLE at 08:59

## 2019-01-29 RX ADMIN — Medication 10 ML: at 05:12

## 2019-01-29 RX ADMIN — Medication 10 ML: at 21:11

## 2019-01-29 RX ADMIN — POTASSIUM CHLORIDE 20 MEQ: 750 TABLET, EXTENDED RELEASE ORAL at 08:59

## 2019-01-29 RX ADMIN — FUROSEMIDE 80 MG: 10 INJECTION, SOLUTION INTRAMUSCULAR; INTRAVENOUS at 21:11

## 2019-01-29 RX ADMIN — Medication 20 ML: at 11:50

## 2019-01-29 RX ADMIN — CARVEDILOL 3.12 MG: 3.12 TABLET, FILM COATED ORAL at 21:11

## 2019-01-29 RX ADMIN — LOSARTAN POTASSIUM 25 MG: 25 TABLET, FILM COATED ORAL at 08:59

## 2019-01-29 RX ADMIN — FUROSEMIDE 80 MG: 10 INJECTION, SOLUTION INTRAMUSCULAR; INTRAVENOUS at 05:12

## 2019-01-29 RX ADMIN — ROSUVASTATIN CALCIUM 10 MG: 10 TABLET, FILM COATED ORAL at 21:13

## 2019-01-29 NOTE — CARDIO/PULMONARY
Cardiac Rehab: Living with Heart Failure Booklet given to Johnathan Cruz. Priyanka Mitzi He is [place]. Educated using teach back method. Discussed diagnosis definition and assessed patient understanding. He is aware that his heart is \"weak\" and that he has had \"heart failure\" in the past. Reviewed importance of daily weight monitoring and Low Sodium diet (5555-4061 mg. Daily). He stopped weighing himself because he was \"gaining weight\", he was \"tired\", and \"had too much swelling it was hard to stand\". Reinforced the need to call Dr. Santo Alcantara at the first sign of fluid retention and reviewed the signs and symptoms for fluid retention. Confirmed he does have a working scale. Johnathan Cruz. was encouraged to keep all appointments with doctor. Will follow for 2 day stress test results.   
Mata Dozier RN

## 2019-01-29 NOTE — PROGRESS NOTES
Cardiology Progress Note 1/29/2019 9:05 AM 
 
Admit Date: 1/28/2019 Admit Diagnosis: SOB (shortness of breath) Subjective:  
 
Sheralyn Green. Breathing improved. No chest pain. Negative balance Visit Vitals /64 (BP 1 Location: Right arm, BP Patient Position: At rest) Pulse 73 Temp 98.5 °F (36.9 °C) Resp 16 Ht 5' 9\" (1.753 m) Wt 330 lb 6.4 oz (149.9 kg) SpO2 98% BMI 48.79 kg/m² Current Facility-Administered Medications Medication Dose Route Frequency  regadenoson (LEXISCAN) injection 0.4 mg  0.4 mg IntraVENous RAD ONCE  
 saline peripheral flush soln 20 mL  20 mL InterCATHeter RAD ONCE  
 spironolactone (ALDACTONE) tablet 25 mg  25 mg Oral DAILY  carvedilol (COREG) tablet 3.125 mg  3.125 mg Oral BID WITH MEALS  
 losartan (COZAAR) tablet 25 mg  25 mg Oral DAILY  rosuvastatin (CRESTOR) tablet 10 mg  10 mg Oral QHS  furosemide (LASIX) injection 80 mg  80 mg IntraVENous BID  aspirin chewable tablet 81 mg  81 mg Oral DAILY  insulin glargine (LANTUS) injection 57 Units  57 Units SubCUTAneous DAILY  pantoprazole (PROTONIX) tablet 40 mg  40 mg Oral DAILY  potassium chloride SR (KLOR-CON 10) tablet 20 mEq  20 mEq Oral DAILY  sodium chloride (NS) flush 5-40 mL  5-40 mL IntraVENous Q8H  
 sodium chloride (NS) flush 5-40 mL  5-40 mL IntraVENous PRN  
 nitroglycerin (NITROSTAT) tablet 0.4 mg  0.4 mg SubLINGual Q5MIN PRN  
 acetaminophen (TYLENOL) tablet 650 mg  650 mg Oral Q4H PRN  
 glucose chewable tablet 16 g  4 Tab Oral PRN  
 dextrose (D50W) injection syrg 12.5-25 g  25-50 mL IntraVENous PRN  
 glucagon (GLUCAGEN) injection 1 mg  1 mg IntraMUSCular PRN  
 insulin lispro (HUMALOG) injection   SubCUTAneous AC&HS Objective:  
  
Physical Exam: 
Visit Vitals /64 (BP 1 Location: Right arm, BP Patient Position: At rest) Pulse 73 Temp 98.5 °F (36.9 °C) Resp 16 Ht 5' 9\" (1.753 m) Wt 330 lb 6.4 oz (149.9 kg) SpO2 98% BMI 48.79 kg/m² General Appearance:  Well developed, well nourished,alert and oriented x 3, and individual in no acute distress. Ears/Nose/Mouth/Throat:   Hearing grossly normal. 
  
    Neck: Supple. Chest:   Scattered rales, decreased Cardiovascular:  Regular rate and rhythm, S1, S2 normal, no murmur. Abdomen:   Soft, non-tender, bowel sounds are active. Extremities: 1-2+ LE edema bilaterally. Skin: Warm and dry. Data Review:  
Labs:   
Recent Results (from the past 24 hour(s)) EKG, 12 LEAD, INITIAL Collection Time: 01/28/19 11:38 AM  
Result Value Ref Range Ventricular Rate 63 BPM  
 Atrial Rate 122 BPM  
 QRS Duration 118 ms Q-T Interval 436 ms  
 QTC Calculation (Bezet) 446 ms Calculated R Axis -49 degrees Calculated T Axis 112 degrees Diagnosis Atrial fibrillation Left anterior fascicular block Left ventricular hypertrophy with QRS widening T wave abnormality, consider lateral ischemia When compared with ECG of 02-SEP-2018 20:53, Atrial fibrillation has replaced Sinus rhythm Nonspecific T wave abnormality no longer evident in Inferior leads Confirmed by Maria Luisa Jimenez MD. ([id number]) on 1/28/2019 3:10:58 PM 
  
CBC WITH AUTOMATED DIFF Collection Time: 01/28/19 12:17 PM  
Result Value Ref Range WBC 5.5 4.1 - 11.1 K/uL  
 RBC 3.22 (L) 4.10 - 5.70 M/uL  
 HGB 10.3 (L) 12.1 - 17.0 g/dL HCT 33.2 (L) 36.6 - 50.3 % .1 (H) 80.0 - 99.0 FL  
 MCH 32.0 26.0 - 34.0 PG  
 MCHC 31.0 30.0 - 36.5 g/dL  
 RDW 12.7 11.5 - 14.5 % PLATELET 810 669 - 890 K/uL MPV 9.0 8.9 - 12.9 FL  
 NRBC 0.0 0  WBC ABSOLUTE NRBC 0.00 0.00 - 0.01 K/uL NEUTROPHILS 50 32 - 75 % LYMPHOCYTES 34 12 - 49 % MONOCYTES 13 5 - 13 % EOSINOPHILS 3 0 - 7 % BASOPHILS 0 0 - 1 % IMMATURE GRANULOCYTES 0 0.0 - 0.5 % ABS. NEUTROPHILS 2.8 1.8 - 8.0 K/UL  
 ABS. LYMPHOCYTES 1.9 0.8 - 3.5 K/UL  
 ABS. MONOCYTES 0.7 0.0 - 1.0 K/UL ABS. EOSINOPHILS 0.1 0.0 - 0.4 K/UL  
 ABS. BASOPHILS 0.0 0.0 - 0.1 K/UL  
 ABS. IMM. GRANS. 0.0 0.00 - 0.04 K/UL  
 DF AUTOMATED METABOLIC PANEL, COMPREHENSIVE Collection Time: 01/28/19 12:17 PM  
Result Value Ref Range Sodium 140 136 - 145 mmol/L Potassium 4.4 3.5 - 5.1 mmol/L Chloride 107 97 - 108 mmol/L  
 CO2 26 21 - 32 mmol/L Anion gap 7 5 - 15 mmol/L Glucose 261 (H) 65 - 100 mg/dL BUN 22 (H) 6 - 20 MG/DL Creatinine 1.20 0.70 - 1.30 MG/DL  
 BUN/Creatinine ratio 18 12 - 20 GFR est AA >60 >60 ml/min/1.73m2 GFR est non-AA 60 (L) >60 ml/min/1.73m2 Calcium 8.3 (L) 8.5 - 10.1 MG/DL Bilirubin, total 0.7 0.2 - 1.0 MG/DL  
 ALT (SGPT) 27 12 - 78 U/L  
 AST (SGOT) 33 15 - 37 U/L Alk. phosphatase 74 45 - 117 U/L Protein, total 7.4 6.4 - 8.2 g/dL Albumin 2.4 (L) 3.5 - 5.0 g/dL Globulin 5.0 (H) 2.0 - 4.0 g/dL A-G Ratio 0.5 (L) 1.1 - 2.2    
TROPONIN I Collection Time: 01/28/19 12:17 PM  
Result Value Ref Range Troponin-I, Qt. <0.05 <0.05 ng/mL SAMPLES BEING HELD Collection Time: 01/28/19 12:17 PM  
Result Value Ref Range SAMPLES BEING HELD 1RED 1BLUE   
 COMMENT Add-on orders for these samples will be processed based on acceptable specimen integrity and analyte stability, which may vary by analyte. NT-PRO BNP Collection Time: 01/28/19 12:17 PM  
Result Value Ref Range NT pro-BNP 4,777 (H) 0 - 125 PG/ML  
MAGNESIUM Collection Time: 01/28/19 12:17 PM  
Result Value Ref Range Magnesium 2.0 1.6 - 2.4 mg/dL HEMOGLOBIN A1C WITH EAG Collection Time: 01/28/19 12:17 PM  
Result Value Ref Range Hemoglobin A1c 8.8 (H) 4.2 - 6.3 % Est. average glucose 206 mg/dL TSH 3RD GENERATION Collection Time: 01/28/19 12:17 PM  
Result Value Ref Range TSH 1.57 0.36 - 3.74 uIU/mL ECHO ADULT COMPLETE Collection Time: 01/28/19  5:15 PM  
Result Value Ref Range LA Volume 123.64 (A) 18 - 58 mL Aortic Valve Systolic Peak Velocity 64.12 cm/s Aortic Valve Area by Continuity of Peak Velocity 3.5 cm2 AoV PG 2.6 mmHg LVIDd 5.17 4.2 - 5.9 cm  
 LVPWd 1.16 (A) 0.6 - 1.0 cm LVIDs 4.79 cm IVSd 1.22 (A) 0.6 - 1.0 cm  
 LVOT d 2.29 cm  
 LVOT Peak Velocity 69.11 cm/s LVOT Peak Gradient 1.9 mmHg MVA (PHT) 4.1 cm2  
 MV A Wade 30.62 cm/s  
 MV E Wade 1.14 cm/s  
 MV E/A 0.04 Left Atrium to Aortic Root Ratio 1.30 BP EF 30.0 55 - 100 % LV Mass .5 (A) 88 - 224 g LV Mass AL Index 117.1 49 - 115 g/m2 E/E' lateral 0.13   
 E/E' septal 0.18   
 E/E' ratio (averaged) 0.16 Mitral Valve E Wave Deceleration Time 185.9 ms  
 Mitral Valve Pressure Half-time 53.9 ms Left Atrium Major Axis 5.18 cm Triscuspid Valve Regurgitation Peak Gradient 34.9 mmHg Pulmonic Valve Max Velocity 59.41 cm/s  
 TR Max Velocity 295.49 cm/s  
 LA Vol Index 50.03 16 - 28 ml/m2 PV peak gradient 1.4 mmHg LV E' Lateral Velocity 8.68 cm/s LV E' Septal Velocity 6.37 cm/s Tapse 1.99 1.5 - 2.0 cm Ao Root D 3.98 cm GLUCOSE, POC Collection Time: 01/28/19  6:48 PM  
Result Value Ref Range Glucose (POC) 139 (H) 65 - 100 mg/dL Performed by Ulices Castrejon TROPONIN I Collection Time: 01/28/19  7:13 PM  
Result Value Ref Range Troponin-I, Qt. <0.05 <0.05 ng/mL GLUCOSE, POC Collection Time: 01/28/19  9:42 PM  
Result Value Ref Range Glucose (POC) 258 (H) 65 - 100 mg/dL Performed by Ara Wills   
TROPONIN I Collection Time: 01/29/19  4:00 AM  
Result Value Ref Range Troponin-I, Qt. <0.05 <0.05 ng/mL METABOLIC PANEL, BASIC Collection Time: 01/29/19  4:00 AM  
Result Value Ref Range Sodium 142 136 - 145 mmol/L Potassium 4.0 3.5 - 5.1 mmol/L Chloride 107 97 - 108 mmol/L  
 CO2 27 21 - 32 mmol/L Anion gap 8 5 - 15 mmol/L Glucose 298 (H) 65 - 100 mg/dL BUN 21 (H) 6 - 20 MG/DL  Creatinine 1.28 0.70 - 1.30 MG/DL  
 BUN/Creatinine ratio 16 12 - 20 GFR est AA >60 >60 ml/min/1.73m2 GFR est non-AA 55 (L) >60 ml/min/1.73m2 Calcium 8.1 (L) 8.5 - 10.1 MG/DL  
CBC WITH AUTOMATED DIFF Collection Time: 01/29/19  4:00 AM  
Result Value Ref Range WBC 4.6 4.1 - 11.1 K/uL  
 RBC 3.09 (L) 4.10 - 5.70 M/uL  
 HGB 10.2 (L) 12.1 - 17.0 g/dL HCT 31.2 (L) 36.6 - 50.3 % .0 (H) 80.0 - 99.0 FL  
 MCH 33.0 26.0 - 34.0 PG  
 MCHC 32.7 30.0 - 36.5 g/dL  
 RDW 12.6 11.5 - 14.5 % PLATELET 106 980 - 875 K/uL MPV 9.0 8.9 - 12.9 FL  
 NRBC 0.0 0  WBC ABSOLUTE NRBC 0.00 0.00 - 0.01 K/uL NEUTROPHILS 46 32 - 75 % LYMPHOCYTES 37 12 - 49 % MONOCYTES 14 (H) 5 - 13 % EOSINOPHILS 3 0 - 7 % BASOPHILS 0 0 - 1 % IMMATURE GRANULOCYTES 0 0.0 - 0.5 % ABS. NEUTROPHILS 2.2 1.8 - 8.0 K/UL  
 ABS. LYMPHOCYTES 1.7 0.8 - 3.5 K/UL  
 ABS. MONOCYTES 0.6 0.0 - 1.0 K/UL  
 ABS. EOSINOPHILS 0.1 0.0 - 0.4 K/UL  
 ABS. BASOPHILS 0.0 0.0 - 0.1 K/UL  
 ABS. IMM. GRANS. 0.0 0.00 - 0.04 K/UL  
 DF AUTOMATED    
LIPID PANEL Collection Time: 01/29/19  4:00 AM  
Result Value Ref Range LIPID PROFILE Cholesterol, total 125 <200 MG/DL Triglyceride 72 <150 MG/DL  
 HDL Cholesterol 42 MG/DL  
 LDL, calculated 68.6 0 - 100 MG/DL VLDL, calculated 14.4 MG/DL  
 CHOL/HDL Ratio 3.0 0 - 5.0 EKG, 12 LEAD, INITIAL Collection Time: 01/29/19  5:15 AM  
Result Value Ref Range Ventricular Rate 64 BPM  
 Atrial Rate 312 BPM  
 QRS Duration 120 ms  
 Q-T Interval 464 ms QTC Calculation (Bezet) 478 ms Calculated R Axis -43 degrees Calculated T Axis 110 degrees Diagnosis Atrial fibrillation Voltage criteria for left ventricular hypertrophy Nonspecific ST abnormality When compared with ECG of 28-JAN-2019 11:38, No significant change Confirmed by Raj Russell M.D., Dominik Cherry ([id number]) on 1/29/2019 8:51:08 AM 
  
GLUCOSE, POC Collection Time: 01/29/19  6:48 AM  
Result Value Ref Range Glucose (POC) 250 (H) 65 - 100 mg/dL Performed by Judith Field Telemetry: normal sinus rhythm Assessment:  
 
Principal Problem: 
  SOB (shortness of breath) (1/28/2019) Plan: 1. Acute on chronic systolic CHF 
-NYHA class III-IV on admission. -EF 25% (EF 35% in 5/2017). Continue BB,ARB. Will add spironolactone. Outpt consider entresto. Continue IV diuresis. 
  
2. Chest pain: typical and atypical features 
-no acute ischemia by serial enzymes. No recurrent chest pain. Stress test 2 day given body habitus starting today.   
-Had LHC in 5/2017 with patent grafts.  
  
3. Hx of CAD: s/p CERNA-LAD 
-Start ASA 81 mg daily 
-Continue BB, statin, ARB 
  
4. Hx of PAF:   
-12 lead EKG:  Afib, rate controlled. Currently RRR 
-AIF4VPnoiy= 5 (age, DM, HTN, Vascular dz). Will hold Xarelto for now in case heart catheterization is needed. 
  
5. Anemia 
  
6. Hx of DM type II: on insulin. 
  
7.  Hx of dyslipidemia: continue crestor.

## 2019-01-29 NOTE — PROGRESS NOTES
1/29/19; 15:20 -  
CM attempted to meet with patient at bedside, but patient was asleep and did not respond to door knock or name call. CM performed chart review, and of note:  
- Patient is followed by Dr. Lv Sylvester, cardio - Patient is a known HF patient, with EF at 25% - Patient's plan includes: CXR, Echo (completed), troponin trends, ongoing IV Lasix, and a two-day nuclear stress test, which is to be initiated today, 1/29.   
- Patient will be eligible for [facility] visit for CHF. CM to attempt meeting with patient tomorrow, 1/30. CRM: Gi Shaw, MPH, [address] [place] [address]; Z: [phone]

## 2019-01-29 NOTE — H&P
[address]  HISTORY AND PHYSICAL Judah KATHIA Barboza 
MR#: [id number] : 1945 ACCOUNT #: [de-identified] ADMIT DATE: 2019 CHIEF COMPLAINT:  Shortness of breath. HISTORY OF PRESENT ILLNESS:  The patient is a 78-year-old gentleman with past medical history of coronary artery disease status post CABG, diastolic CHF, paroxysmal atrial fibrillation on Xarelto, ischemic cardiomyopathy, obstructive sleep apnea, morbid obesity, DM2 presents to the hospital with the above-mentioned symptoms. Patient reports that he has had progressive shortness of breath associated with chest pain that started about a week back. Patient reports it got worse in the last 2 days. The patient reports he takes Xarelto for his paroxysmal atrial fibrillation. He reports that he also had some lower extremity swelling that started about 2 weeks back. Patient reports that he has gained about 4 pounds in 2 weeks. He also has some orthopnea associated with his symptoms. Patient reports that he is getting increasingly short of breath today when he would walk, got concerned and decided to come to the hospital.  Patient also reports that he has some dry cough associated with the symptoms, but no production. The patient reports that he had a CABG done in . Patient denies any other complaints or problems. Patient was seen in the ER and was requested to be admitted under the hospitalist service for shortness of breath and CHF exacerbation. The patient denies any headache, blurry vision, sore throat, trouble swallowing, trouble with speech, any abdominal pain, constipation, diarrhea, urinary symptoms, focal or generalized neurological weakness, recent travel, sick contacts, falls, injuries, hematemesis, melena, hemoptysis, hematuria, or any new concerns or problems. PAST MEDICAL HISTORY:  See above.  
 
HOME MEDICATIONS:  Xarelto 20 mg every day, torsemide 40 mg b.i.d., Glucophage 2000 mg every day, Coreg 3.125 mg b.i.d., ciprofloxacin, cholecalciferol, losartan 25 mg every day, Lantus 57 units subcutaneous daily, omeprazole 20 mg every day, Crestor 10 mg daily, potassium chloride 20 mEq every day. SOCIAL HISTORY:  Former smoker, used to smoke 1 pack per day for 7 years, quit about 25 years back. Occasional alcohol. No IV drug abuse. ALLERGIES:  NO KNOWN DRUG ALLERGIES. FAMILY HISTORY:  Was discussed. Father had history of heart disease, diabetes and sister has a history of breast cancer. REVIEW OF SYSTEMS:  All systems were reviewed and found to be essentially negative except for the symptoms mentioned above. PHYSICAL EXAMINATION: 
VITAL SIGNS:  Temperature 97.7, pulse 58, respiratory rate 18, blood pressure 129/106, pulse ox 94% on room air. GENERAL:  Alert x3, awake, mildly distressed, pleasant male, appears to be stated age. HEENT:  Pupils equal, reactive to light. Dry mucous membranes. Mucous membranes clear. NECK:  Supple. CHEST:  Decreased basal breath sounds with crackles. HEART:  S1, S2 are heard. ABDOMEN:  Soft, nontender, nondistended. Bowel sounds are physiologic. EXTREMITIES:  1+ pitting edema bilateral lower extremities. NEUROPSYCHIATRIC:  Pleasant mood and affect. Cranial nerves II-XII grossly intact. Sensory grossly within normal limits, DTRs 2+ and equal.  Strength 5/5. SKIN:  Warm. LABORATORY DATA:  White count 5.5, hemoglobin 10.3, hematocrit 33.2, platelets 165. Sodium 140, potassium 4.4, chloride 107, bicarbonate 26, anion gap 7, glucose 261, BUN 22, creatinine 1.20, calcium 8.3, bilirubin total 0.7, ALT 27, AST 33, alkaline phosphatase 74. Troponin less than 0.05. BNP 4777. X-ray of the chest shows status post median sternotomy with cardiomegaly and increased vascular congestion/interstitial edema. ASSESSMENT AND PLAN: 
1.   Acute on chronic systolic congestive heart failure, NYHA class IV on admission, NYHA class 2 at baseline. The patient will be admitted on a telemetry bed. Appreciate cardiology. Lasix 80 mg IV b.i.d. Strict I's and O's, daily weights, continue the patient on Coreg and losartan. Patient is mildly bradycardic. We will put holding parameters on Coreg. Further intervention will be per hospital course. Await cardiology input. Echocardiogram has been ordered. May consider further intervention if symptoms persist.  Oxygen support and reassess as needed. 2.  Chest pain, rule out acute coronary syndrome. The patient will be on nitroglycerin postop. I spoke with Cardiology. Start patient on aspirin 81 mg every day. We will provide serial enzymes, telemetry monitoring, echocardiogram.  If persists, may consider further intervention and diagnostics. We will hold Xarelto as recommended by Cardiology for possible catheterization in the morning. Continue to monitor. 3.  Paroxysmal atrial fibrillation. The patient on Xarelto as possible catheterization in the morning recommended by Cardiology. Currently, rate controlled. Continue home medications. Continue telemetry monitoring. 4.  Diabetes, poorly controlled. The patient will be on sliding scale Novolog insulin, Accu-Cheks, diet control, close monitoring. Further intervention will be per hospital course. 5.  Hyperlipidemia. Continue home medications. 6.  Gastrointestinal and deep venous thrombosis prophylaxis. The patient is on Xarelto. Celestino Khanna MD MM/MN 
D: 01/28/2019 16:57    
T: 01/28/2019 22:42 JOB #: [id number]

## 2019-01-29 NOTE — NURSE NAVIGATOR
Chart reviewed by Heart Failure Nurse Navigator. Heart Failure database completed. EF:  25% ACEi/ARB/ARNi: losartan 25 mg daily BB: coreg 3.1215 mg twice daily Aldosterone Antagonist: spironolactone 25 mg daily Obstructive Sleep Apnea Screening: STOP-BANG score: 
 Referred to Sleep Medicine: CRT:   
 
NYHA Functional Class IV on admission. Heart Failure Teach Back in Patient Education. Heart Failure Avoiding Triggers on Discharge Instructions. Cardiologist: Dr. Paul Connelly ([facility]) Post discharge follow up phone call to be made within 48-72 hours of discharge.

## 2019-01-29 NOTE — PROGRESS NOTES
Hospitalist Progress Note Nilsa Jordan MD 
Answering service: [phone] from in house phone Cell: [phone] Date of Service:  2019 NAME:  Naty Cleaning. :  1945 MRN:  [id number] Admission Summary:  
The patient is a 71-year-old gentleman with past medical history of coronary artery disease status post CABG, diastolic CHF, paroxysmal atrial fibrillation on Xarelto, ischemic cardiomyopathy, obstructive sleep apnea, morbid obesity, DM2 presents to the hospital with SOB. Patient reports that he has had progressive shortness of breath associated with chest pain that started about a week back. Patient reports it got worse in the last 2 days. The patient reports he takes Xarelto for his paroxysmal atrial fibrillation. He reports that he also had some lower extremity swelling that started about 2 weeks back. Patient reports that he has gained about 4 pounds in 2 weeks. He also has some orthopnea associated with his symptoms. Patient reports that he is getting increasingly short of breath today when he would walk, got concerned and decided to come to the hospital.  Patient also reports that he has some dry cough associated with the symptoms, but no production. The patient reports that he had a CABG done in . Patient denies any other complaints or problems. Patient was seen in the ER and was requested to be admitted under the hospitalist service for shortness of breath and CHF exacerbation. The patient denies any headache, blurry vision, sore throat, trouble swallowing, trouble with speech, any abdominal pain, constipation, diarrhea, urinary symptoms, focal or generalized neurological weakness, recent travel, sick contacts, falls, injuries, hematemesis, melena, hemoptysis, hematuria, or any new concerns or problems. Interval history / Subjective:  
  F/u SOB Feels much better regarding SOB Assessment & Plan:  
 
Acute on chronic systolic congestive heart failure, NYHA class IV on admission, NYHA class 2 at baseline 
-Echo EF 25% with left ventricular global hypokinesis 
-Continue IV diuresis 
-Appreciate Cardiology 
-On ASA/COreg/Losartan/Crestor Chest pain, rule out acute coronary syndrome in a patient with CAD   
-Troponins unremarkable 
-Oral anticoagulant on hold for now 
-Appreciate Cardiology 
-Awaiting stress test, second part tomorrow Paroxysmal atrial fibrillation.  
-Continue home meds except oral anticoagulant 
-rate controlled DM type 2 
-on SSI 
-Monitor [place] Marinelli Hyperlipidemia. -stable [place] Diabetic diet Code status: FULL CODE 
DVT prophylaxis: scd PTA: home Plan: Stress test 
 
Care Plan discussed with: Patient/Family Disposition: TBD Hospital Problems  Date Reviewed: 1/28/2019 Codes Class Noted POA * (Principal) SOB (shortness of breath) ICD-10-CM: R06.02 
ICD-9-CM: 786.05  1/28/2019 Unknown Review of Systems: A comprehensive review of systems was negative except for that written in the HPI. Vital Signs:  
 Last 24hrs VS reviewed since prior progress note. Most recent are: 
Visit Vitals /73 (BP 1 Location: Right arm, BP Patient Position: At rest) Pulse 73 Temp 98.3 °F (36.8 °C) Resp 16 Ht 5' 9\" (1.753 m) Wt 149.9 kg (330 lb 6.4 oz) SpO2 98% BMI 48.79 kg/m² Intake/Output Summary (Last 24 hours) at 1/29/2019 1622 Last data filed at 1/29/2019 1158 Gross per 24 hour Intake 120 ml Output 2625 ml Net -2505 ml Physical Examination:  
 
 
     
Constitutional:  No acute distress, cooperative, pleasant   
ENT:  Oral mucous moist, oropharynx benign. Neck supple, Resp:  CTA bilaterally. No wheezing/rhonchi/rales. No accessory muscle use CV:  Regular rhythm, normal rate, no murmurs, gallops, rubs GI:  Soft, non distended, non tender. normoactive bowel sounds, no hepatosplenomegaly Musculoskeletal:  No edema, warm, 2+ pulses throughout Neurologic:  Moves all extremities. AAOx3, CN II-XII reviewed Skin:  Good turgor, no rashes or ulcers Data Review:  
 Review and/or order of clinical lab test 
 
 
Labs:  
 
Recent Labs  
  01/29/19 
0400 01/28/19 
1217 WBC 4.6 5.5 HGB 10.2* 10.3* HCT 31.2* 33.2*  
 215 Recent Labs  
  01/29/19 
0400 01/28/19 
1217  140  
K 4.0 4.4  107 CO2 27 26 BUN 21* 22* CREA 1.28 1.20 * 261* CA 8.1* 8.3*  
MG  --  2.0 Recent Labs  
  01/28/19 
1217 SGOT 33 ALT 27 AP 74 TBILI 0.7 TP 7.4 ALB 2.4*  
GLOB 5.0* No results for input(s): INR, PTP, APTT in the last 72 hours. No lab exists for component: INREXT No results for input(s): FE, TIBC, PSAT, FERR in the last 72 hours. Lab Results Component Value Date/Time Folate 8.9 08/25/2015 12:00 AM  
  
No results for input(s): PH, PCO2, PO2 in the last 72 hours. Recent Labs  
  01/29/19 
0400 01/28/19 
1913 01/28/19 1217 TROIQ <0.05 <0.05 <0.05 Lab Results Component Value Date/Time Cholesterol, total 125 01/29/2019 04:00 AM  
 HDL Cholesterol 42 01/29/2019 04:00 AM  
 LDL, calculated 68.6 01/29/2019 04:00 AM  
 Triglyceride 72 01/29/2019 04:00 AM  
 CHOL/HDL Ratio 3.0 01/29/2019 04:00 AM  
 
Lab Results Component Value Date/Time Glucose (POC) 75 01/29/2019 11:31 AM  
 Glucose (POC) 250 (H) 01/29/2019 06:48 AM  
 Glucose (POC) 258 (H) 01/28/2019 09:42 PM  
 Glucose (POC) 139 (H) 01/28/2019 06:48 PM  
 Glucose (POC) 153 (H) 09/04/2018 04:55 PM  
 
Lab Results Component Value Date/Time  Color YELLOW/STRAW 09/02/2018 11:42 PM  
 Appearance CLOUDY (A) 09/02/2018 11:42 PM  
 Specific gravity 1.025 09/02/2018 11:42 PM  
 pH (UA) 6.5 09/02/2018 11:42 PM  
 Protein 30 (A) 09/02/2018 11:42 PM  
 Glucose >1,000 (A) 09/02/2018 11:42 PM  
 Ketone NEGATIVE  09/02/2018 11:42 PM  
 Bilirubin NEGATIVE  09/02/2018 11:42 PM  
 Urobilinogen 1.0 09/02/2018 11:42 PM  
 Nitrites NEGATIVE  09/02/2018 11:42 PM  
 Leukocyte Esterase SMALL (A) 09/02/2018 11:42 PM  
 Epithelial cells FEW 09/02/2018 11:42 PM  
 Bacteria 4+ (A) 09/02/2018 11:42 PM  
 WBC  09/02/2018 11:42 PM  
 RBC 0-5 09/02/2018 11:42 PM  
 
 
 
Medications Reviewed:  
 
Current Facility-Administered Medications Medication Dose Route Frequency  spironolactone (ALDACTONE) tablet 25 mg  25 mg Oral DAILY  carvedilol (COREG) tablet 3.125 mg  3.125 mg Oral BID WITH MEALS  
 losartan (COZAAR) tablet 25 mg  25 mg Oral DAILY  rosuvastatin (CRESTOR) tablet 10 mg  10 mg Oral QHS  furosemide (LASIX) injection 80 mg  80 mg IntraVENous BID  aspirin chewable tablet 81 mg  81 mg Oral DAILY  insulin glargine (LANTUS) injection 57 Units  57 Units SubCUTAneous DAILY  pantoprazole (PROTONIX) tablet 40 mg  40 mg Oral DAILY  potassium chloride SR (KLOR-CON 10) tablet 20 mEq  20 mEq Oral DAILY  sodium chloride (NS) flush 5-40 mL  5-40 mL IntraVENous Q8H  
 sodium chloride (NS) flush 5-40 mL  5-40 mL IntraVENous PRN  
 nitroglycerin (NITROSTAT) tablet 0.4 mg  0.4 mg SubLINGual Q5MIN PRN  
 acetaminophen (TYLENOL) tablet 650 mg  650 mg Oral Q4H PRN  
 glucose chewable tablet 16 g  4 Tab Oral PRN  
 dextrose (D50W) injection syrg 12.5-25 g  25-50 mL IntraVENous PRN  
 glucagon (GLUCAGEN) injection 1 mg  1 mg IntraMUSCular PRN  
 insulin lispro (HUMALOG) injection   SubCUTAneous AC&HS  
 
______________________________________________________________________ EXPECTED LENGTH OF STAY: 2d 9h 
ACTUAL LENGTH OF STAY:          1 Kem Pappas MD

## 2019-01-29 NOTE — DIABETES MGMT
DTC Progress Note Recommendations/ Comments:Chart reviewed on William Crowley for fluctuating blood sugars. If appropriate, please consider: 
 - decreasing Lantus dose to 48 units Current hospital DM medication: Lantus 57 units, Lispro correction, normal sensitivity Patient is a 68 y.o. male with known  Type 2 Diabetes on insulin injections: Lantus : 57 units daily, Ozempic weekly and Metformin  at home. A1c:  
Lab Results Component Value Date/Time Hemoglobin A1c 8.8 (H) 01/28/2019 12:17 PM  
 Hemoglobin A1c 12.2 (H) 09/02/2018 09:05 PM  
 
 
Recent Glucose Results:  
Lab Results Component Value Date/Time  (H) 01/29/2019 04:00 AM  
 GLUCPOC 75 01/29/2019 11:31 AM  
 GLUCPOC 250 (H) 01/29/2019 06:48 AM  
 GLUCPOC 258 (H) 01/28/2019 09:42 PM  
  
 
Lab Results Component Value Date/Time Creatinine 1.28 01/29/2019 04:00 AM  
 
Estimated Creatinine Clearance: 75.6 mL/min (based on SCr of 1.28 mg/dL). Active Orders Diet DIET DIABETIC CONSISTENT CARB Regular; 2 GM NA (House Low NA) PO intake: No data found. Will continue to follow as needed. Thank you Lc Nye MS, RN, CDE Time spent: 6 minutes

## 2019-01-29 NOTE — PROGRESS NOTES
0800: Bedside shift change report given to Harpreet Kimble (oncoming nurse) by Duaine Merlin (offgoing nurse). Report included the following information SBAR, Kardex, Intake/Output, MAR and Recent Results.

## 2019-01-29 NOTE — PROGRESS NOTES
Spiritual Care Assessment/Progress Note [address] 
 
 
NAME: Abhilash Kaplan MRN: [id number] AGE: 68 y.o. SEX: male Anabaptism Affiliation: Orthodox  
Language: English  
 
1/29/2019     Total Time (in minutes): 8 Spiritual Assessment begun in [address] [place] [address] TELEMETRY through conversation with: 
  
    [x]Patient        [] Family    [] Friend(s) Reason for Consult: Initial/Spiritual assessment, patient floor Spiritual beliefs: (Please include comment if needed) [x] Identifies with a elke tradition:     
   [] Supported by a elke community:        
   [] Claims no spiritual orientation:       
   [] Seeking spiritual identity:            
   [] Adheres to an individual form of spirituality:       
   [] Not able to assess:                   
 
    
Identified resources for coping:  
   [] Prayer                           
   [] Music                  [] Guided Imagery 
   [] Family/friends                 [] Pet visits [] Devotional reading                         [x] Unknown 
   [] Other:                                          
 
 
Interventions offered during this visit: (See comments for more details) Patient Interventions: Affirmation of emotions/emotional suffering, Affirmation of elke, Initial/Spiritual assessment, patient floor, Normalization of emotional/spiritual concerns Plan of Care: 
 
 [] Support spiritual and/or cultural needs  
 [] Support AMD and/or advance care planning process    
 [] Support grieving process 
 [] Coordinate Rites and/or Rituals  
 [] Coordination with community clergy [] No spiritual needs identified at this time 
 [] Detailed Plan of Care below (See Comments)  [] Make referral to Music Therapy 
[] Make referral to Pet Therapy    
[] Make referral to Addiction services 
[] Make referral to [facility] 
[] Make referral to Spiritual Care Partner 
[] No future visits requested       
[x] Follow up visits as needed Comments:  for initial visit. Pt was in bed and welcomed visit.  let him know of  availability.  follow up as needed. Carolina Louis, [facility] 
 [phone])

## 2019-01-30 ENCOUNTER — APPOINTMENT (OUTPATIENT)
Dept: NUCLEAR MEDICINE | Age: 74
DRG: 292 | End: 2019-01-30
Attending: NURSE PRACTITIONER
Payer: MEDICARE

## 2019-01-30 ENCOUNTER — HOME HEALTH ADMISSION (OUTPATIENT)
Dept: HOME HEALTH SERVICES | Facility: HOME HEALTH | Age: 74
End: 2019-01-30

## 2019-01-30 LAB
ANION GAP SERPL CALC-SCNC: 9 MMOL/L (ref 5–15)
BASOPHILS # BLD: 0 K/UL (ref 0–0.1)
BASOPHILS NFR BLD: 0 % (ref 0–1)
BUN SERPL-MCNC: 21 MG/DL (ref 6–20)
BUN/CREAT SERPL: 18 (ref 12–20)
CALCIUM SERPL-MCNC: 8.6 MG/DL (ref 8.5–10.1)
CHLORIDE SERPL-SCNC: 105 MMOL/L (ref 97–108)
CO2 SERPL-SCNC: 28 MMOL/L (ref 21–32)
CREAT SERPL-MCNC: 1.2 MG/DL (ref 0.7–1.3)
DIFFERENTIAL METHOD BLD: ABNORMAL
EOSINOPHIL # BLD: 0.2 K/UL (ref 0–0.4)
EOSINOPHIL NFR BLD: 3 % (ref 0–7)
ERYTHROCYTE [DISTWIDTH] IN BLOOD BY AUTOMATED COUNT: 12.8 % (ref 11.5–14.5)
GLUCOSE BLD STRIP.AUTO-MCNC: 142 MG/DL (ref 65–100)
GLUCOSE BLD STRIP.AUTO-MCNC: 202 MG/DL (ref 65–100)
GLUCOSE BLD STRIP.AUTO-MCNC: 81 MG/DL (ref 65–100)
GLUCOSE BLD STRIP.AUTO-MCNC: 93 MG/DL (ref 65–100)
GLUCOSE SERPL-MCNC: 80 MG/DL (ref 65–100)
HCT VFR BLD AUTO: 32.6 % (ref 36.6–50.3)
HGB BLD-MCNC: 10.3 G/DL (ref 12.1–17)
IMM GRANULOCYTES # BLD AUTO: 0 K/UL (ref 0–0.04)
IMM GRANULOCYTES NFR BLD AUTO: 0 % (ref 0–0.5)
LYMPHOCYTES # BLD: 1.7 K/UL (ref 0.8–3.5)
LYMPHOCYTES NFR BLD: 35 % (ref 12–49)
MCH RBC QN AUTO: 32.2 PG (ref 26–34)
MCHC RBC AUTO-ENTMCNC: 31.6 G/DL (ref 30–36.5)
MCV RBC AUTO: 101.9 FL (ref 80–99)
MONOCYTES # BLD: 0.5 K/UL (ref 0–1)
MONOCYTES NFR BLD: 10 % (ref 5–13)
NEUTS SEG # BLD: 2.4 K/UL (ref 1.8–8)
NEUTS SEG NFR BLD: 51 % (ref 32–75)
NRBC # BLD: 0 K/UL (ref 0–0.01)
NRBC BLD-RTO: 0 PER 100 WBC
PLATELET # BLD AUTO: 228 K/UL (ref 150–400)
PMV BLD AUTO: 9.1 FL (ref 8.9–12.9)
POTASSIUM SERPL-SCNC: 3.4 MMOL/L (ref 3.5–5.1)
RBC # BLD AUTO: 3.2 M/UL (ref 4.1–5.7)
SERVICE CMNT-IMP: ABNORMAL
SERVICE CMNT-IMP: ABNORMAL
SERVICE CMNT-IMP: NORMAL
SERVICE CMNT-IMP: NORMAL
SODIUM SERPL-SCNC: 142 MMOL/L (ref 136–145)
WBC # BLD AUTO: 4.8 K/UL (ref 4.1–11.1)

## 2019-01-30 PROCEDURE — 74011250637 HC RX REV CODE- 250/637: Performed by: INTERNAL MEDICINE

## 2019-01-30 PROCEDURE — 82962 GLUCOSE BLOOD TEST: CPT

## 2019-01-30 PROCEDURE — 80048 BASIC METABOLIC PNL TOTAL CA: CPT

## 2019-01-30 PROCEDURE — 74011250636 HC RX REV CODE- 250/636: Performed by: NURSE PRACTITIONER

## 2019-01-30 PROCEDURE — 74011250637 HC RX REV CODE- 250/637: Performed by: FAMILY MEDICINE

## 2019-01-30 PROCEDURE — 74011636637 HC RX REV CODE- 636/637: Performed by: FAMILY MEDICINE

## 2019-01-30 PROCEDURE — 74011250637 HC RX REV CODE- 250/637: Performed by: NURSE PRACTITIONER

## 2019-01-30 PROCEDURE — 85025 COMPLETE CBC W/AUTO DIFF WBC: CPT

## 2019-01-30 PROCEDURE — 36415 COLL VENOUS BLD VENIPUNCTURE: CPT

## 2019-01-30 PROCEDURE — 65660000000 HC RM CCU STEPDOWN

## 2019-01-30 RX ADMIN — ROSUVASTATIN CALCIUM 10 MG: 10 TABLET, FILM COATED ORAL at 22:12

## 2019-01-30 RX ADMIN — ASPIRIN 81 MG CHEWABLE TABLET 81 MG: 81 TABLET CHEWABLE at 11:04

## 2019-01-30 RX ADMIN — SPIRONOLACTONE 25 MG: 25 TABLET ORAL at 11:03

## 2019-01-30 RX ADMIN — INSULIN LISPRO 3 UNITS: 100 INJECTION, SOLUTION INTRAVENOUS; SUBCUTANEOUS at 13:19

## 2019-01-30 RX ADMIN — Medication 10 ML: at 13:20

## 2019-01-30 RX ADMIN — PANTOPRAZOLE SODIUM 40 MG: 40 TABLET, DELAYED RELEASE ORAL at 11:03

## 2019-01-30 RX ADMIN — POTASSIUM CHLORIDE 20 MEQ: 750 TABLET, EXTENDED RELEASE ORAL at 11:03

## 2019-01-30 RX ADMIN — FUROSEMIDE 80 MG: 10 INJECTION, SOLUTION INTRAMUSCULAR; INTRAVENOUS at 06:56

## 2019-01-30 RX ADMIN — CARVEDILOL 3.12 MG: 3.12 TABLET, FILM COATED ORAL at 11:06

## 2019-01-30 RX ADMIN — Medication 10 ML: at 06:56

## 2019-01-30 RX ADMIN — FUROSEMIDE 80 MG: 10 INJECTION, SOLUTION INTRAMUSCULAR; INTRAVENOUS at 17:30

## 2019-01-30 RX ADMIN — LOSARTAN POTASSIUM 25 MG: 25 TABLET, FILM COATED ORAL at 11:03

## 2019-01-30 RX ADMIN — Medication 10 ML: at 22:12

## 2019-01-30 RX ADMIN — INSULIN GLARGINE 57 UNITS: 100 INJECTION, SOLUTION SUBCUTANEOUS at 11:03

## 2019-01-30 NOTE — PROGRESS NOTES
Hospitalist Progress Note Kanika Segal MD 
Answering service: [phone] from in house phone Cell: [phone] Date of Service:  2019 NAME:  Rhea Haley. :  1945 MRN:  [id number] Admission Summary:  
The patient is a 46-year-old gentleman with past medical history of coronary artery disease status post CABG, diastolic CHF, paroxysmal atrial fibrillation on Xarelto, ischemic cardiomyopathy, obstructive sleep apnea, morbid obesity, DM2 presents to the hospital with SOB. Patient reports that he has had progressive shortness of breath associated with chest pain that started about a week back. Patient reports it got worse in the last 2 days. The patient reports he takes Xarelto for his paroxysmal atrial fibrillation. He reports that he also had some lower extremity swelling that started about 2 weeks back. Patient reports that he has gained about 4 pounds in 2 weeks. He also has some orthopnea associated with his symptoms. Patient reports that he is getting increasingly short of breath today when he would walk, got concerned and decided to come to the hospital.  Patient also reports that he has some dry cough associated with the symptoms, but no production. The patient reports that he had a CABG done in . Patient denies any other complaints or problems. Patient was seen in the ER and was requested to be admitted under the hospitalist service for shortness of breath and CHF exacerbation. The patient denies any headache, blurry vision, sore throat, trouble swallowing, trouble with speech, any abdominal pain, constipation, diarrhea, urinary symptoms, focal or generalized neurological weakness, recent travel, sick contacts, falls, injuries, hematemesis, melena, hemoptysis, hematuria, or any new concerns or problems. Interval history / Subjective:  
  F/u SOB Feels much better regarding SOB Assessment & Plan:  
 
Acute on chronic systolic congestive heart failure, NYHA class IV on admission, NYHA class 2 at baseline 
-Echo EF 25% with left ventricular global hypokinesis 
-Continue IV diuresis -Negative 5 litres 
-Claims his dry weight is 305-306 lbs 
-Appreciate Cardiology 
-On ASA/COreg/Losartan/Crestor Chest pain, rule out acute coronary syndrome in a patient with CAD   
-Troponins unremarkable 
-Oral anticoagulant on hold for now 
-Appreciate Cardiology 
-Awaiting stress test, second part today 1/30 Paroxysmal atrial fibrillation.  
-Continue home meds except oral anticoagulant 
-rate controlled DM type 2 
-on SSI 
-Monitor [place] Leivage Hyperlipidemia. -stable [place] Diabetic diet PT/OT awaited Code status: FULL CODE 
DVT prophylaxis: scd PTA: home Plan: Stress test, IV diuresis, Likely discharge tomorrow Care Plan discussed with: Patient/Family Disposition: TBD Hospital Problems  Date Reviewed: 1/28/2019 Codes Class Noted POA * (Principal) SOB (shortness of breath) ICD-10-CM: R06.02 
ICD-9-CM: 786.05  1/28/2019 Unknown Review of Systems: A comprehensive review of systems was negative except for that written in the HPI. Vital Signs:  
 Last 24hrs VS reviewed since prior progress note. Most recent are: 
Visit Vitals /71 (BP 1 Location: Left arm, BP Patient Position: At rest) Pulse 61 Temp 97.8 °F (36.6 °C) Resp 16 Ht 5' 9\" (1.753 m) Wt 144.2 kg (318 lb) SpO2 95% BMI 46.96 kg/m² Intake/Output Summary (Last 24 hours) at 1/30/2019 0084 Last data filed at 1/30/2019 8637 Gross per 24 hour Intake 240 ml Output 3875 ml Net -3635 ml Physical Examination:  
 
 
     
Constitutional:  No acute distress, cooperative, pleasant   
ENT:  Oral mucous moist, oropharynx benign. Neck supple, Resp:  CTA bilaterally. No wheezing/rhonchi/rales. No accessory muscle use CV:  Regular rhythm, normal rate, no murmurs, gallops, rubs GI:  Soft, non distended, non tender. normoactive bowel sounds, no hepatosplenomegaly Musculoskeletal:  No edema, warm, 2+ pulses throughout Neurologic:  Moves all extremities. AAOx3, CN II-XII reviewed Skin:  Good turgor, no rashes or ulcers Data Review:  
 Review and/or order of clinical lab test 
 
 
Labs:  
 
Recent Labs  
  01/30/19 
0543 01/29/19 
0400 WBC 4.8 4.6 HGB 10.3* 10.2* HCT 32.6* 31.2*  
 203 Recent Labs  
  01/30/19 
0543 01/29/19 
0400 01/28/19 
1217  142 140  
K 3.4* 4.0 4.4  107 107 CO2 28 27 26 BUN 21* 21* 22* CREA 1.20 1.28 1.20 GLU 80 298* 261* CA 8.6 8.1* 8.3*  
MG  --   --  2.0 Recent Labs  
  01/28/19 
1217 SGOT 33 ALT 27 AP 74 TBILI 0.7 TP 7.4 ALB 2.4*  
GLOB 5.0* No results for input(s): INR, PTP, APTT in the last 72 hours. No lab exists for component: INREXT, INREXT No results for input(s): FE, TIBC, PSAT, FERR in the last 72 hours. Lab Results Component Value Date/Time Folate 8.9 08/25/2015 12:00 AM  
  
No results for input(s): PH, PCO2, PO2 in the last 72 hours. Recent Labs  
  01/29/19 
0400 01/28/19 
1913 01/28/19 
1217 TROIQ <0.05 <0.05 <0.05 Lab Results Component Value Date/Time Cholesterol, total 125 01/29/2019 04:00 AM  
 HDL Cholesterol 42 01/29/2019 04:00 AM  
 LDL, calculated 68.6 01/29/2019 04:00 AM  
 Triglyceride 72 01/29/2019 04:00 AM  
 CHOL/HDL Ratio 3.0 01/29/2019 04:00 AM  
 
Lab Results Component Value Date/Time Glucose (POC) 81 01/30/2019 06:56 AM  
 Glucose (POC) 124 (H) 01/29/2019 04:27 PM  
 Glucose (POC) 75 01/29/2019 11:31 AM  
 Glucose (POC) 250 (H) 01/29/2019 06:48 AM  
 Glucose (POC) 258 (H) 01/28/2019 09:42 PM  
 
Lab Results Component Value Date/Time  Color YELLOW/STRAW 09/02/2018 11:42 PM  
 Appearance CLOUDY (A) 09/02/2018 11:42 PM  
 Specific gravity 1.025 09/02/2018 11:42 PM  
 pH (UA) 6.5 09/02/2018 11:42 PM  
 Protein 30 (A) 09/02/2018 11:42 PM  
 Glucose >1,000 (A) 09/02/2018 11:42 PM  
 Ketone NEGATIVE  09/02/2018 11:42 PM  
 Bilirubin NEGATIVE  09/02/2018 11:42 PM  
 Urobilinogen 1.0 09/02/2018 11:42 PM  
 Nitrites NEGATIVE  09/02/2018 11:42 PM  
 Leukocyte Esterase SMALL (A) 09/02/2018 11:42 PM  
 Epithelial cells FEW 09/02/2018 11:42 PM  
 Bacteria 4+ (A) 09/02/2018 11:42 PM  
 WBC  09/02/2018 11:42 PM  
 RBC 0-5 09/02/2018 11:42 PM  
 
 
 
Medications Reviewed:  
 
Current Facility-Administered Medications Medication Dose Route Frequency  spironolactone (ALDACTONE) tablet 25 mg  25 mg Oral DAILY  carvedilol (COREG) tablet 3.125 mg  3.125 mg Oral BID WITH MEALS  
 losartan (COZAAR) tablet 25 mg  25 mg Oral DAILY  rosuvastatin (CRESTOR) tablet 10 mg  10 mg Oral QHS  furosemide (LASIX) injection 80 mg  80 mg IntraVENous BID  aspirin chewable tablet 81 mg  81 mg Oral DAILY  insulin glargine (LANTUS) injection 57 Units  57 Units SubCUTAneous DAILY  pantoprazole (PROTONIX) tablet 40 mg  40 mg Oral DAILY  potassium chloride SR (KLOR-CON 10) tablet 20 mEq  20 mEq Oral DAILY  sodium chloride (NS) flush 5-40 mL  5-40 mL IntraVENous Q8H  
 sodium chloride (NS) flush 5-40 mL  5-40 mL IntraVENous PRN  
 nitroglycerin (NITROSTAT) tablet 0.4 mg  0.4 mg SubLINGual Q5MIN PRN  
 acetaminophen (TYLENOL) tablet 650 mg  650 mg Oral Q4H PRN  
 glucose chewable tablet 16 g  4 Tab Oral PRN  
 dextrose (D50W) injection syrg 12.5-25 g  25-50 mL IntraVENous PRN  
 glucagon (GLUCAGEN) injection 1 mg  1 mg IntraMUSCular PRN  
 insulin lispro (HUMALOG) injection   SubCUTAneous AC&HS  
 
______________________________________________________________________ EXPECTED LENGTH OF STAY: 2d 9h 
ACTUAL LENGTH OF STAY:          2 Emma Reid MD

## 2019-01-30 NOTE — PROGRESS NOTES
0745 - Bedside and Verbal shift change report given to Alfonso Anthony RN (oncoming nurse) by Katie Krishnamurthy (offgoing nurse). Report included the following information SBAR, Kardex, Intake/Output, MAR, Accordion, Recent Results and Cardiac Rhythm NSR.  
 
1000 - Pt going to nuclear medicine via wheelchair with transport. 1615 - Bedside and Verbal shift change report given to [facility] (oncoming nurse) by Alfonso Anthony RN (offgoing nurse). Report included the following information SBAR, Kardex, Intake/Output, MAR, Accordion, Recent Results and Cardiac Rhythm NSR.  
 
1700 - Pt's wife had called and asked for a call back to update her on pt's status. Attempted to return call, left HIPPA complaint voicemail.

## 2019-01-30 NOTE — PROGRESS NOTES
Problem: Heart Failure: Day 1 Goal: *Oxygen saturation within defined limits Outcome: Progressing Towards Goal 
Pt oxygen saturation within normal limits on room air. Continue to monitor.

## 2019-01-30 NOTE — PROGRESS NOTES
Bedside shift change report given to Larissa Chaves (oncoming nurse) by Rama Waggoner (offgoing nurse). Report included the following information SBAR.

## 2019-01-30 NOTE — PROGRESS NOTES
Reason for Admission:   Shortness of breath RRAT Score:    20-Yellow-Moderate Do you (patient/family) have any concerns for transition/discharge? None reported Plan for utilizing home health:   Referral sent to [address] for [facility]; CM notified RN of the need for order. Likelihood of readmission? Moderate Transition of Care Plan:    CM attempted to meet with patient to complete initial assessment but he was groggy. Patient agreed for CM to communicate with his wife. CM contacted patient's wife who verified demographic and insurance info. Patient lives at home with his wife and daughter. Patient's PCP was verified but wife was not sure of last visit. Patient reportedly has a walker that he uses for assistance with ambulation as needed. Patient is independent with ADLs and self-care. Preferred pharmacy is [facility]. Patient's wife states that she or her daughter will assist with any transportation needs at discharge. CM to monitor.   
 
Tenzin uW MS

## 2019-01-30 NOTE — PROGRESS NOTES
Cardiology Progress Note 1/30/2019 9:05 AM 
 
Admit Date: 1/28/2019 Admit Diagnosis: SOB (shortness of breath) Subjective:  
 
Voncile Beam. Breathing improved. No chest pain. Negative balance Assessment:  
 
Principal Problem: 
  SOB (shortness of breath) (1/28/2019) Plan: 1. Acute on chronic systolic CHF 
-NYHA class III-IV on admission. -EF 25% (EF 35% in 5/2017). Continue BB,ARB. Added spironolactone. Outpt consider entresto. Continue IV diuresis today; probably to po tomorrow. If no additional ischemic work up needed, probably d/c tomorrow. Will need close post CHF f/u with primary cardiologist Dr. Rossana Randolph and scheduled with Dr. Rossana Randolph for Monday, 2/4/19 at 3:40, [phone]. 
  
2. Chest pain: typical and atypical features 
-no acute ischemia by serial enzymes. No recurrent chest pain. Stress test 2 day given body habitus, 2nd day today. 
-Had LHC in 5/2017 with patent grafts.  
  
3. Hx of CAD: s/p LIMA-LAD 
-Start ASA 81 mg daily 
-Continue BB, statin, ARB 
  
4. Hx of PAF:   
-12 lead EKG:  Afib, rate controlled. Currently RRR 
-[id number]= 5 (age, DM, HTN, Vascular dz). Will hold Xarelto for now in case heart catheterization is needed. Restart if stress test is low risk 
  
5. Anemia 
  
6. Hx of DM type II: on insulin. 
  
7. Hx of dyslipidemia: continue crestor. 
  
Visit Vitals /71 (BP 1 Location: Left arm, BP Patient Position: At rest) Pulse 61 Temp 97.8 °F (36.6 °C) Resp 16 Ht 5' 9\" (1.753 m) Wt 318 lb (144.2 kg) SpO2 95% BMI 46.96 kg/m² Current Facility-Administered Medications Medication Dose Route Frequency  spironolactone (ALDACTONE) tablet 25 mg  25 mg Oral DAILY  carvedilol (COREG) tablet 3.125 mg  3.125 mg Oral BID WITH MEALS  
 losartan (COZAAR) tablet 25 mg  25 mg Oral DAILY  rosuvastatin (CRESTOR) tablet 10 mg  10 mg Oral QHS  furosemide (LASIX) injection 80 mg  80 mg IntraVENous BID  
  aspirin chewable tablet 81 mg  81 mg Oral DAILY  insulin glargine (LANTUS) injection 57 Units  57 Units SubCUTAneous DAILY  pantoprazole (PROTONIX) tablet 40 mg  40 mg Oral DAILY  potassium chloride SR (KLOR-CON 10) tablet 20 mEq  20 mEq Oral DAILY  sodium chloride (NS) flush 5-40 mL  5-40 mL IntraVENous Q8H  
 sodium chloride (NS) flush 5-40 mL  5-40 mL IntraVENous PRN  
 nitroglycerin (NITROSTAT) tablet 0.4 mg  0.4 mg SubLINGual Q5MIN PRN  
 acetaminophen (TYLENOL) tablet 650 mg  650 mg Oral Q4H PRN  
 glucose chewable tablet 16 g  4 Tab Oral PRN  
 dextrose (D50W) injection syrg 12.5-25 g  25-50 mL IntraVENous PRN  
 glucagon (GLUCAGEN) injection 1 mg  1 mg IntraMUSCular PRN  
 insulin lispro (HUMALOG) injection   SubCUTAneous AC&HS Objective:  
  
Physical Exam: 
Visit Vitals /71 (BP 1 Location: Left arm, BP Patient Position: At rest) Pulse 61 Temp 97.8 °F (36.6 °C) Resp 16 Ht 5' 9\" (1.753 m) Wt 318 lb (144.2 kg) SpO2 95% BMI 46.96 kg/m² General Appearance:  Well developed, well nourished,alert and oriented x 3, and individual in no acute distress. Ears/Nose/Mouth/Throat:   Hearing grossly normal. 
  
    Neck: Supple. Chest:   Scattered rales, decreased Cardiovascular:  Regular rate and rhythm, S1, S2 normal, no murmur. Abdomen:   Soft, non-tender, bowel sounds are active. Extremities: 1-2+ LE edema bilaterally. Skin: Warm and dry. Data Review:  
Labs:   
Recent Results (from the past 24 hour(s)) GLUCOSE, POC Collection Time: 01/29/19 11:31 AM  
Result Value Ref Range Glucose (POC) 75 65 - 100 mg/dL Performed by Sánchez Page NUCLEAR CARDIAC STRESS TEST Collection Time: 01/29/19 11:52 AM  
Result Value Ref Range Target  bpm  
 Exercise duration time 00:03:00 Stress Base Systolic  mmHg Stress Base Diastolic BP 56 mmHg  Post peak HR 76 BPM  
 Baseline HR 76 BPM  
 Estimated workload 1.0 METS Percent HR 60 % Stress Rate Pressure Product 9,348 BPM*mmHg GLUCOSE, POC Collection Time: 01/29/19  4:27 PM  
Result Value Ref Range Glucose (POC) 124 (H) 65 - 100 mg/dL Performed by Ari Holden CBC WITH AUTOMATED DIFF Collection Time: 01/30/19  5:43 AM  
Result Value Ref Range WBC 4.8 4.1 - 11.1 K/uL  
 RBC 3.20 (L) 4.10 - 5.70 M/uL  
 HGB 10.3 (L) 12.1 - 17.0 g/dL HCT 32.6 (L) 36.6 - 50.3 % .9 (H) 80.0 - 99.0 FL  
 MCH 32.2 26.0 - 34.0 PG  
 MCHC 31.6 30.0 - 36.5 g/dL  
 RDW 12.8 11.5 - 14.5 % PLATELET 369 102 - 644 K/uL MPV 9.1 8.9 - 12.9 FL  
 NRBC 0.0 0  WBC ABSOLUTE NRBC 0.00 0.00 - 0.01 K/uL NEUTROPHILS 51 32 - 75 % LYMPHOCYTES 35 12 - 49 % MONOCYTES 10 5 - 13 % EOSINOPHILS 3 0 - 7 % BASOPHILS 0 0 - 1 % IMMATURE GRANULOCYTES 0 0.0 - 0.5 % ABS. NEUTROPHILS 2.4 1.8 - 8.0 K/UL  
 ABS. LYMPHOCYTES 1.7 0.8 - 3.5 K/UL  
 ABS. MONOCYTES 0.5 0.0 - 1.0 K/UL  
 ABS. EOSINOPHILS 0.2 0.0 - 0.4 K/UL  
 ABS. BASOPHILS 0.0 0.0 - 0.1 K/UL  
 ABS. IMM. GRANS. 0.0 0.00 - 0.04 K/UL  
 DF AUTOMATED METABOLIC PANEL, BASIC Collection Time: 01/30/19  5:43 AM  
Result Value Ref Range Sodium 142 136 - 145 mmol/L Potassium 3.4 (L) 3.5 - 5.1 mmol/L Chloride 105 97 - 108 mmol/L  
 CO2 28 21 - 32 mmol/L Anion gap 9 5 - 15 mmol/L Glucose 80 65 - 100 mg/dL BUN 21 (H) 6 - 20 MG/DL Creatinine 1.20 0.70 - 1.30 MG/DL  
 BUN/Creatinine ratio 18 12 - 20 GFR est AA >60 >60 ml/min/1.73m2 GFR est non-AA 60 (L) >60 ml/min/1.73m2 Calcium 8.6 8.5 - 10.1 MG/DL  
GLUCOSE, POC Collection Time: 01/30/19  6:56 AM  
Result Value Ref Range Glucose (POC) 81 65 - 100 mg/dL Performed by Satish Gore Telemetry: normal sinus rhythm

## 2019-01-31 VITALS
RESPIRATION RATE: 14 BRPM | HEART RATE: 60 BPM | TEMPERATURE: 97.8 F | HEIGHT: 69 IN | BODY MASS INDEX: 44.28 KG/M2 | SYSTOLIC BLOOD PRESSURE: 105 MMHG | WEIGHT: 299 LBS | OXYGEN SATURATION: 96 % | DIASTOLIC BLOOD PRESSURE: 65 MMHG

## 2019-01-31 LAB
GLUCOSE BLD STRIP.AUTO-MCNC: 142 MG/DL (ref 65–100)
GLUCOSE BLD STRIP.AUTO-MCNC: 173 MG/DL (ref 65–100)
GLUCOSE BLD STRIP.AUTO-MCNC: 206 MG/DL (ref 65–100)
GLUCOSE BLD STRIP.AUTO-MCNC: 70 MG/DL (ref 65–100)
GLUCOSE BLD STRIP.AUTO-MCNC: 89 MG/DL (ref 65–100)
SERVICE CMNT-IMP: ABNORMAL
SERVICE CMNT-IMP: NORMAL
SERVICE CMNT-IMP: NORMAL
STRESS BASELINE HR: 76 BPM
STRESS ESTIMATED WORKLOAD: 1 METS
STRESS EXERCISE DUR MIN: NORMAL
STRESS PEAK DIAS BP: 56 MMHG
STRESS PEAK SYS BP: 123 MMHG
STRESS PERCENT HR ACHIEVED: 60 %
STRESS POST PEAK HR: 76 BPM
STRESS RATE PRESSURE PRODUCT: 9348 BPM*MMHG
STRESS ST DEPRESSION: 0 MM
STRESS ST ELEVATION: 0 MM
STRESS TARGET HR: 126 BPM
TROPONIN I SERPL-MCNC: <0.05 NG/ML

## 2019-01-31 PROCEDURE — 74011250637 HC RX REV CODE- 250/637: Performed by: INTERNAL MEDICINE

## 2019-01-31 PROCEDURE — 97535 SELF CARE MNGMENT TRAINING: CPT

## 2019-01-31 PROCEDURE — 84484 ASSAY OF TROPONIN QUANT: CPT

## 2019-01-31 PROCEDURE — 97116 GAIT TRAINING THERAPY: CPT

## 2019-01-31 PROCEDURE — 74011000250 HC RX REV CODE- 250: Performed by: FAMILY MEDICINE

## 2019-01-31 PROCEDURE — 36415 COLL VENOUS BLD VENIPUNCTURE: CPT

## 2019-01-31 PROCEDURE — 82962 GLUCOSE BLOOD TEST: CPT

## 2019-01-31 PROCEDURE — 97161 PT EVAL LOW COMPLEX 20 MIN: CPT

## 2019-01-31 PROCEDURE — 74011250637 HC RX REV CODE- 250/637: Performed by: FAMILY MEDICINE

## 2019-01-31 PROCEDURE — 74011250637 HC RX REV CODE- 250/637: Performed by: NURSE PRACTITIONER

## 2019-01-31 PROCEDURE — 74011636637 HC RX REV CODE- 636/637: Performed by: FAMILY MEDICINE

## 2019-01-31 PROCEDURE — 97165 OT EVAL LOW COMPLEX 30 MIN: CPT

## 2019-01-31 RX ORDER — PANTOPRAZOLE SODIUM 40 MG/1
40 TABLET, DELAYED RELEASE ORAL DAILY
Qty: 30 TAB | Refills: 0 | Status: SHIPPED | OUTPATIENT
Start: 2019-01-31 | End: 2019-03-02

## 2019-01-31 RX ORDER — SPIRONOLACTONE 25 MG/1
25 TABLET ORAL DAILY
Qty: 30 TAB | Refills: 0 | Status: SHIPPED | OUTPATIENT
Start: 2019-02-01 | End: 2019-03-21 | Stop reason: SDUPTHER

## 2019-01-31 RX ORDER — GUAIFENESIN 100 MG/5ML
81 LIQUID (ML) ORAL DAILY
Qty: 30 TAB | Refills: 0 | Status: SHIPPED | OUTPATIENT
Start: 2019-02-01 | End: 2019-03-03

## 2019-01-31 RX ORDER — FUROSEMIDE 40 MG/1
80 TABLET ORAL
Status: DISCONTINUED | OUTPATIENT
Start: 2019-01-31 | End: 2019-01-31 | Stop reason: HOSPADM

## 2019-01-31 RX ORDER — FUROSEMIDE 80 MG/1
80 TABLET ORAL 2 TIMES DAILY
Qty: 60 TAB | Refills: 0 | Status: SHIPPED | OUTPATIENT
Start: 2019-01-31 | End: 2019-03-21 | Stop reason: SDUPTHER

## 2019-01-31 RX ADMIN — Medication 10 ML: at 09:40

## 2019-01-31 RX ADMIN — Medication 10 ML: at 07:22

## 2019-01-31 RX ADMIN — FUROSEMIDE 80 MG: 40 TABLET ORAL at 07:24

## 2019-01-31 RX ADMIN — SPIRONOLACTONE 25 MG: 25 TABLET ORAL at 09:29

## 2019-01-31 RX ADMIN — FUROSEMIDE 80 MG: 40 TABLET ORAL at 17:33

## 2019-01-31 RX ADMIN — LOSARTAN POTASSIUM 25 MG: 25 TABLET, FILM COATED ORAL at 09:29

## 2019-01-31 RX ADMIN — DEXTROSE MONOHYDRATE 12.5 G: 25 INJECTION, SOLUTION INTRAVENOUS at 09:45

## 2019-01-31 RX ADMIN — PANTOPRAZOLE SODIUM 40 MG: 40 TABLET, DELAYED RELEASE ORAL at 09:30

## 2019-01-31 RX ADMIN — INSULIN LISPRO 3 UNITS: 100 INJECTION, SOLUTION INTRAVENOUS; SUBCUTANEOUS at 17:34

## 2019-01-31 RX ADMIN — POTASSIUM CHLORIDE 20 MEQ: 750 TABLET, EXTENDED RELEASE ORAL at 09:29

## 2019-01-31 RX ADMIN — CARVEDILOL 3.12 MG: 3.12 TABLET, FILM COATED ORAL at 09:29

## 2019-01-31 RX ADMIN — INSULIN GLARGINE 57 UNITS: 100 INJECTION, SOLUTION SUBCUTANEOUS at 09:33

## 2019-01-31 RX ADMIN — ASPIRIN 81 MG CHEWABLE TABLET 81 MG: 81 TABLET CHEWABLE at 09:29

## 2019-01-31 NOTE — PROGRESS NOTES
Hospitalist Progress Note Kerry De Jesus MD 
Answering service: [phone] OR [phone] from in house phone Date of Service:  2019 NAME:  Karma Brown :  1945 MRN:  [id number] Admission Summary:  
The patient is a 50-year-old gentleman with past medical history of coronary artery disease status post CABG, diastolic CHF, paroxysmal atrial fibrillation on Xarelto, ischemic cardiomyopathy, obstructive sleep apnea, morbid obesity, DM2 presents to the hospital with SOB. Patient reports that he has had progressive shortness of breath associated with chest pain that started about a week back. Patient reports it got worse in the last 2 days. The patient reports he takes Xarelto for his paroxysmal atrial fibrillation. He reports that he also had some lower extremity swelling that started about 2 weeks back. Patient reports that he has gained about 4 pounds in 2 weeks. He also has some orthopnea associated with his symptoms. Patient reports that he is getting increasingly short of breath today when he would walk, got concerned and decided to come to the hospital.  Patient also reports that he has some dry cough associated with the symptoms, but no production. The patient reports that he had a CABG done in . Patient denies any other complaints or problems. Patient was seen in the ER and was requested to be admitted under the hospitalist service for shortness of breath and CHF exacerbation. The patient denies any headache, blurry vision, sore throat, trouble swallowing, trouble with speech, any abdominal pain, constipation, diarrhea, urinary symptoms, focal or generalized neurological weakness, recent travel, sick contacts, falls, injuries, hematemesis, melena, hemoptysis, hematuria, or any new concerns or problems. Interval history / Subjective:  
  F/u SOB Feels much better no SOB working with OT 
 NM stress test report pending d/w pt Assessment & Plan:  
 
Acute on chronic systolic congestive heart failure, NYHA class IV on admission, NYHA class 2 at baseline 
-Echo EF 25% with left ventricular global hypokinesis 
-Continue IV diuresis , switch to oral 
-Negative 1.3 L last 24 hrs  
-Claims his dry weight is 305-306 lbs 
-Appreciate Cardiology 
-On ASA/COreg/Losartan/Crestor/ aldactone Chest pain, rule out acute coronary syndrome in a patient with CAD   
-Troponins unremarkable 
-Oral anticoagulant on hold for now 
-Appreciate Cardiology 
-Awaiting stress test, second part report Paroxysmal atrial fibrillation.  
-Continue home meds except oral anticoagulant 
-rate controlled 
- resume [address] on d/c 
 
DM type 2 
-on SSI 
-Monitor Frutoso Golder Hyperlipidemia. -stable [place] Diabetic diet PT/OT awaited Code status: FULL CODE 
DVT prophylaxis: scd PTA: home Plan: Stress test, IV diuresis, Likely discharge today Care Plan discussed with: Patient/Family Disposition: TBD Hospital Problems  Date Reviewed: 1/28/2019 Codes Class Noted POA * (Principal) SOB (shortness of breath) ICD-10-CM: R06.02 
ICD-9-CM: 786.05  1/28/2019 Unknown Review of Systems: A comprehensive review of systems was negative except for that written in the HPI. Vital Signs:  
 Last 24hrs VS reviewed since prior progress note. Most recent are: 
Visit Vitals /61 (BP 1 Location: Left arm, BP Patient Position: At rest;Sitting) Pulse 67 Temp 97.8 °F (36.6 °C) Resp 16 Ht 5' 9\" (1.753 m) Wt 135.6 kg (299 lb) SpO2 98% BMI 44.15 kg/m² Intake/Output Summary (Last 24 hours) at 1/31/2019 7339 Last data filed at 1/31/2019 3368 Gross per 24 hour Intake  Output 1325 ml Net -1325 ml Physical Examination:  
 
 
     
Constitutional:  No acute distress, cooperative, pleasant   
ENT:  Oral mucous moist, oropharynx benign.  Neck supple,   
 Resp: CTA bilaterally. No wheezing/rhonchi/rales. No accessory muscle use CV:  Regular rhythm, normal rate, no murmurs, gallops, rubs GI:  Soft, non distended, non tender. normoactive bowel sounds, no hepatosplenomegaly Musculoskeletal:  No edema, warm, 2+ pulses throughout Neurologic:  Moves all extremities. AAOx3, CN II-XII reviewed Skin:  Good turgor, no rashes or ulcers Data Review:  
 Review and/or order of clinical lab test 
 
 
Labs:  
 
Recent Labs  
  01/30/19 
0543 01/29/19 
0400 WBC 4.8 4.6 HGB 10.3* 10.2* HCT 32.6* 31.2*  
 203 Recent Labs  
  01/30/19 
0543 01/29/19 
0400 01/28/19 
1217  142 140  
K 3.4* 4.0 4.4  107 107 CO2 28 27 26 BUN 21* 21* 22* CREA 1.20 1.28 1.20 GLU 80 298* 261* CA 8.6 8.1* 8.3*  
MG  --   --  2.0 Recent Labs  
  01/28/19 
1217 SGOT 33 ALT 27 AP 74 TBILI 0.7 TP 7.4 ALB 2.4*  
GLOB 5.0* No results for input(s): INR, PTP, APTT in the last 72 hours. No lab exists for component: INREXT, INREXT No results for input(s): FE, TIBC, PSAT, FERR in the last 72 hours. Lab Results Component Value Date/Time Folate 8.9 08/25/2015 12:00 AM  
  
No results for input(s): PH, PCO2, PO2 in the last 72 hours. Recent Labs  
  01/31/19 
0422 01/29/19 
0400 01/28/19 
1913 TROIQ <0.05 <0.05 <0.05 Lab Results Component Value Date/Time Cholesterol, total 125 01/29/2019 04:00 AM  
 HDL Cholesterol 42 01/29/2019 04:00 AM  
 LDL, calculated 68.6 01/29/2019 04:00 AM  
 Triglyceride 72 01/29/2019 04:00 AM  
 CHOL/HDL Ratio 3.0 01/29/2019 04:00 AM  
 
Lab Results Component Value Date/Time Glucose (POC) 89 01/31/2019 06:42 AM  
 Glucose (POC) 142 (H) 01/30/2019 09:57 PM  
 Glucose (POC) 93 01/30/2019 05:05 PM  
 Glucose (POC) 202 (H) 01/30/2019 12:16 PM  
 Glucose (POC) 81 01/30/2019 06:56 AM  
 
Lab Results Component Value Date/Time  Color YELLOW/STRAW 09/02/2018 11:42 PM  
 Appearance CLOUDY (A) 09/02/2018 11:42 PM  
 Specific gravity 1.025 09/02/2018 11:42 PM  
 pH (UA) 6.5 09/02/2018 11:42 PM  
 Protein 30 (A) 09/02/2018 11:42 PM  
 Glucose >1,000 (A) 09/02/2018 11:42 PM  
 Ketone NEGATIVE  09/02/2018 11:42 PM  
 Bilirubin NEGATIVE  09/02/2018 11:42 PM  
 Urobilinogen 1.0 09/02/2018 11:42 PM  
 Nitrites NEGATIVE  09/02/2018 11:42 PM  
 Leukocyte Esterase SMALL (A) 09/02/2018 11:42 PM  
 Epithelial cells FEW 09/02/2018 11:42 PM  
 Bacteria 4+ (A) 09/02/2018 11:42 PM  
 WBC  09/02/2018 11:42 PM  
 RBC 0-5 09/02/2018 11:42 PM  
 
 
 
Medications Reviewed:  
 
Current Facility-Administered Medications Medication Dose Route Frequency  furosemide (LASIX) tablet 80 mg  80 mg Oral ACB&D  
 spironolactone (ALDACTONE) tablet 25 mg  25 mg Oral DAILY  carvedilol (COREG) tablet 3.125 mg  3.125 mg Oral BID WITH MEALS  
 losartan (COZAAR) tablet 25 mg  25 mg Oral DAILY  rosuvastatin (CRESTOR) tablet 10 mg  10 mg Oral QHS  aspirin chewable tablet 81 mg  81 mg Oral DAILY  insulin glargine (LANTUS) injection 57 Units  57 Units SubCUTAneous DAILY  pantoprazole (PROTONIX) tablet 40 mg  40 mg Oral DAILY  potassium chloride SR (KLOR-CON 10) tablet 20 mEq  20 mEq Oral DAILY  sodium chloride (NS) flush 5-40 mL  5-40 mL IntraVENous Q8H  
 sodium chloride (NS) flush 5-40 mL  5-40 mL IntraVENous PRN  
 nitroglycerin (NITROSTAT) tablet 0.4 mg  0.4 mg SubLINGual Q5MIN PRN  
 acetaminophen (TYLENOL) tablet 650 mg  650 mg Oral Q4H PRN  
 glucose chewable tablet 16 g  4 Tab Oral PRN  
 dextrose (D50W) injection syrg 12.5-25 g  25-50 mL IntraVENous PRN  
 glucagon (GLUCAGEN) injection 1 mg  1 mg IntraMUSCular PRN  
 insulin lispro (HUMALOG) injection   SubCUTAneous AC&HS  
 
______________________________________________________________________ EXPECTED LENGTH OF STAY: 2d 9h 
ACTUAL LENGTH OF STAY:          3 Filiberto Presley MD

## 2019-01-31 NOTE — PROGRESS NOTES
2124  Pt states that he had a couple episodes of chest discomfort today lasting about two minutes length. Pt states that pain started while at rest and went away on its own. Pt denies any chest discomfort at present. Oxygen applied at 2L NC. Pt instructed to call for any further chest discomfort. Will continue to monitor.

## 2019-01-31 NOTE — PROGRESS NOTES
Problem: Mobility Impaired (Adult and Pediatric) Goal: *Acute Goals and Plan of Care (Insert Text) Physical Therapy Goals Initiated 1/31/2019 1. Patient will move from supine to sit and sit to supine , scoot up and down and roll side to side in bed with independence within 7 day(s). 2.  Patient will transfer from bed to chair and chair to bed with modified independence using the least restrictive device within 7 day(s). 3.  Patient will perform sit to stand with modified independence within 7 day(s). 4.  Patient will ambulate with modified independence for 300 feet with the least restrictive device within 7 day(s). physical Therapy EVALUATION Patient: Idalia Whiet (74 y.o. male) Date: 1/31/2019 Primary Diagnosis: SOB (shortness of breath) Precautions: fall ASSESSMENT : 
Based on the objective data described below, the patient presents with low BP at rest and symptomaitc after sit to stand (dizzy and lighheaded) unable to stand long enough to get BP reading to complete, then error reading in sitting and finally a reading completed after about 2 minutes of effort and then a drop in BP after second attempt and completion of reading sit to stand and pt again symptomatic, see below and discussed with his nurse. Had pt amb short distance and gait was stable with walker support. Given above, opted to defer the 6 minute walk test (admited with CHF acute on chronic), per chart has a Fidel level of 2. Post session just learned that pt had a blood sugar of 70. Pt is moving well and if plan if for discharge today, I can endorse that. Vitals:  
 01/31/19 7473 01/31/19 9325 01/31/19 0881 01/31/19 9271 BP: 97/52 122/66 104/70 128/73 BP 1 Location: Left arm Left arm  Left arm BP Patient Position: Sitting;Pre-activity Sitting Comment: post standing, pt too dizzy/lightheaded in standing Standing Sitting;Post activity Pulse: 62 62 80 74 Resp:      
Temp:      
 SpO2: on room air 97%   98% Patient will benefit from skilled intervention to address the above impairments. Patients rehabilitation potential is considered to be Good Factors which may influence rehabilitation potential include:  
[]         None noted 
[]         Mental ability/status [x]         Medical condition 
[]         Home/family situation and support systems 
[]         Safety awareness 
[]         Pain tolerance/management 
[]         Other: PLAN : 
Recommendations and Planned Interventions: 
[x]           Bed Mobility Training             []    Neuromuscular Re-Education 
[x]           Transfer Training                   []    Orthotic/Prosthetic Training 
[x]           Gait Training                         []    Modalities [x]           Therapeutic Exercises           []    Edema Management/Control 
[x]           Therapeutic Activities            [x]    Patient and Family Training/Education 
[]           Other (comment): Frequency/Duration: Patient will be followed by physical therapy  4 times a week to address goals. Discharge Recommendations: None Further Equipment Recommendations for Discharge: none SUBJECTIVE:  
Patient stated I feel dizzy and lightheaded. , in standing after sit to stand OBJECTIVE DATA SUMMARY:  
Consult received, chart reviewed, pt cleared by nursing HISTORY:   
Past Medical History:  
Diagnosis Date  CAD (coronary artery disease)  CHF [facility]congestive heart failure) [facility] 2013  Diabetes ([facility] 5yrs  GERD (gastroesophageal reflux disease)  Morbid obesity ([facility]  Prostate cancer ([facility] Past Surgical History:  
Procedure Laterality Date  HX CORONARY ARTERY BYPASS GRAFT    
 HX HEART CATHETERIZATION    
 HX ORTHOPAEDIC    
 right wrist carpal tunnel repair Prior Level of Function/Home Situation: independent/mod I using a cane or walker prn Personal factors and/or comorbidities impacting plan of care: heart failure, DM, MO Home Situation Home Environment: Private residence # Steps to Enter: 0 Wheelchair Ramp: Yes One/Two Story Residence: One story Living Alone: No 
Support Systems: Spouse/Significant Other/Partner Patient Expects to be Discharged to[de-identified] Private residence Current DME Used/Available at Home: Cane, straight, Walker, rolling, Grab bars, Shower chair(bars around toilet as well) Tub or Shower Type: Tub/Shower combination EXAMINATION/PRESENTATION/DECISION MAKING: Critical Behavior: 
Neurologic State: Alert Orientation Level: Oriented X4 Cognition: Appropriate for age attention/concentration, Follows commands Safety/Judgement: Awareness of environment, Fall prevention Hearing: Auditory Auditory Impairment: Hard of hearing, bilateralSkin:  Refer to MD and nursing notes Edema: refer to MD and nursing notes, edema LEs Range Of Motion: 
AROM: Generally decreased, functional 
  
  
  
  
  
  
  
Strength:   
Strength: Within functional limits Tone & Sensation:  
Tone: Normal 
  
  
  
  
Sensation: Impaired(reports numbness in 5th digit on B hands per OT) and pt reports numbness in feet at baseline Coordination: 
Coordination: Within functional limits Vision:  
Tracking: Able to track stimulus in all quadrants w/o difficulty Diplopia: No 
Acuity: Able to read clock/calendar on wall without difficulty Corrective Lenses: Glasses Functional Mobility: 
Bed Mobility: 
Rolling: Supervision; Additional time Supine to Sit: Supervision; Additional time Scooting: Supervision; Additional time Transfers: 
Sit to Stand: Contact guard assistance Stand to Sit: Contact guard assistance Balance:  
Sitting: Intact Standing: IntactAmbulation/Gait Training:Distance (ft): 50 Feet (ft) Assistive Device: Gait belt;Walker, rolling Ambulation - Level of Assistance: Contact guard assistance Gait Abnormalities: Decreased step clearance Base of Support: Widened Speed/Akanksha: Slow Step Length: Left shortened;Right shortened Stairs: Therapeutic Exercises:  
 
 
Functional Measure: 
Timed up and go: 
 
Timed Get Up And Go Test: 18(extrapolated), moves slowly < than 10 seconds=Normal  Greater then 13.5 seconds (in elderly)=Increased fall risk Amy HOLDER Predicting the probability for falls in community dwelling older adults using the Timed Up and Go Test. Phys Ther. 2000;80:896-903. Physical Therapy Evaluation Charge Determination History Examination Presentation Decision-Making HIGH Complexity :3+ comorbidities / personal factors will impact the outcome/ POC  LOW Complexity : 1-2 Standardized tests and measures addressing body structure, function, activity limitation and / or participation in recreation  MEDIUM Complexity : Evolving with changing characteristics  LOW Complexity : FOTO score of  Based on the above components, the patient evaluation is determined to be of the following complexity level: LOW Pain: 
  
  
  
  
  
  
Activity Tolerance:  
See assessment above Please refer to the flowsheet for vital signs taken during this treatment. After treatment:  
[x]         Patient left in no apparent distress sitting up in chair 
[]         Patient left in no apparent distress in bed 
[x]         Call bell left within reach [x]         Nursing present 
[]         Caregiver present 
[]         Bed alarm activated COMMUNICATION/EDUCATION:  
The patients plan of care was discussed with: Occupational Therapist and Registered Nurse. [x]         Fall prevention education was provided and the patient/caregiver indicated understanding. [x]         Patient/family have participated as able in goal setting and plan of care. [x]         Patient/family agree to work toward stated goals and plan of care. []         Patient understands intent and goals of therapy, but is neutral about his/her participation. []         Patient is unable to participate in goal setting and plan of care. Thank you for this referral. 
Consuelo Hollis Time Calculation: 23 mins

## 2019-01-31 NOTE — DISCHARGE INSTRUCTIONS
Discharge Instructions       PATIENT ID: Sanju Zuniga MRN: [id number]   YOB: 1945    DATE OF ADMISSION: 1/28/2019 12:50 PM    DATE OF DISCHARGE: 1/31/2019    PRIMARY CARE PROVIDER: Patti Berumdez MD     ATTENDING PHYSICIAN: Denise Collier MD  DISCHARGING PROVIDER: Bucky Lazaro MD    To contact this individual call [phone] and ask the  to page. If unavailable ask to be transferred the Adult Hospitalist Department. DISCHARGE DIAGNOSES SOB / CHF    CONSULTATIONS: IP CONSULT TO CARDIOLOGY  IP CONSULT TO HOSPITALIST    PROCEDURES/SURGERIES: * No surgery found *    PENDING TEST RESULTS:   At the time of discharge the following test results are still pending:     FOLLOW UP APPOINTMENTS:   Follow-up Information     Follow up With Specialties Details Why Contact Harrison Fontenot MD Internal Medicine In 1 week  [address] [facility] [address]      Genia Cruz MD Cardiology, Internal Medicine   Dr. Marissa Francois for Monday, 2/4/19 at 3:40, [phone]. [address]  [address] [facility]  [phone]             ADDITIONAL CARE RECOMMENDATIONS:     DIET: Cardiac Diet and Diabetic Diet      ACTIVITY: Activity as tolerated    WOUND CARE:     EQUIPMENT needed:       DISCHARGE MEDICATIONS:   See Medication Reconciliation Form    · It is important that you take the medication exactly as they are prescribed. · Keep your medication in the bottles provided by the pharmacist and keep a list of the medication names, dosages, and times to be taken in your wallet. · Do not take other medications without consulting your doctor. NOTIFY YOUR PHYSICIAN FOR ANY OF THE FOLLOWING:   Fever over 101 degrees for 24 hours. Chest pain, shortness of breath, fever, chills, nausea, vomiting, diarrhea, change in mentation, falling, weakness, bleeding. Severe pain or pain not relieved by medications.   Or, any other signs or symptoms that you may have questions about.       DISPOSITION:   x Home With:   OT  PT  HH  RN       SNF/Inpatient Rehab/LTAC    Independent/assisted living    Hospice    Other:     CDMP Checked:   Yes x     PROBLEM LIST Updated:  Yes x       Signed:   Mary Anne Levin MD  1/31/2019  3:20 PM

## 2019-01-31 NOTE — PROGRESS NOTES
Problem: Heart Failure: Day 3 Goal: *Hemodynamically stable Outcome: Progressing Towards Goal 
Continue to monitor vital signs and assess every four hours.

## 2019-01-31 NOTE — PROGRESS NOTES
8944: Pt complaining of lightheadedness and nausea. Pt noted to be diaphoretic, with a small amount of vomitus on breakfast tray. Dyspnic and tachypnic. Blood pressures normal systolic with map in the 45'W. BGM noted to be 70.  
 
0945: D50W 12.5g given via IV as pt is vomiting. 
 
0957: Repeat . Pt reporting no more nausea. Breathing normally. Pt given peanut butter and crackers. Eating, nad.

## 2019-01-31 NOTE — PROGRESS NOTES
1/31/19; 11:15 -  
CM participated in IDRs on patient. PT and OT are following patient. Patient was noted to have some dizziness with ambulation, but patient was found to be hypoglycemic. Patient's [facility] referral submitted yesterday, 1/30, to [facility] via [address] [place] [address]. CRM: Roscoe Razo, MPH, [address] [place] [address]; Z: [phone]
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
## 2019-01-31 NOTE — PROGRESS NOTES
Problem: Falls - Risk of 
Goal: *Absence of Falls Document Ayala Shows Fall Risk and appropriate interventions in the flowsheet. Outcome: Progressing Towards Goal 
Fall Risk Interventions: 
Mobility Interventions: PT Consult for mobility concerns Medication Interventions: Teach patient to arise slowly Elimination Interventions: Urinal in reach

## 2019-01-31 NOTE — PROGRESS NOTES
Problem: Self Care Deficits Care Plan (Adult) Goal: *Acute Goals and Plan of Care (Insert Text) Occupational Therapy Goals Initiated 1/31/2019 1. Patient will perform grooming standing at sink for 10 minutes with no SOB with independence within 7 day(s). 2.  Patient will perform upper body ADLs with independence within 7 day(s). 3.  Patient will perform lower body ADLs using AE PRN with supervision/set-up within 7 day(s). 4.  Patient will perform toilet transfers with independence within 7 day(s). 5.  Patient will perform all aspects of toileting with independence within 7 day(s). 6.  Patient will participate in upper extremity therapeutic exercise/activities with independence for 5 minutes within 7 day(s). 7.  Patient will utilize energy conservation techniques during functional activities with verbal cues within 7 day(s). Occupational Therapy EVALUATION Patient: Jeffrey Rivera (74 y.o. male) Date: 1/31/2019 Primary Diagnosis: SOB (shortness of breath) Precautions: falls ASSESSMENT : 
Based on the objective data described below, the patient presents with Independent - Setup upper body ADLs, Setup - Minimum assistance lower body ADLs, and Contact guard assistance functional mobility. Patient appears to be functioning close to his baseline, may benefit from education on AE for lower body ADLs, will continue to assess/educate. The following are barriers to independence with ADLs while in acute care:  
- Cognitive and/or behavioral: initiation - Medical condition: strength, functional reach, girth and medical history   
- Other:    
 
Patient will benefit from skilled acute intervention to address the above impairments. Patients rehabilitation potential is considered to be Fair Discharge recommendations: Home health (to increase independence and safety) Barriers to discharging home, in addition to above listed impairments: significant other is elderly and unable to assist in patient care. Equipment recommendations for successful discharge (if) home: BTD - likely none PLAN : 
Recommendations and Planned Interventions: self care training, functional mobility training, therapeutic exercise, balance training, therapeutic activities, endurance activities, patient education, home safety training and family training/education Frequency/Duration: Patient will be followed by occupational therapy 2 times a week to address goals. SUBJECTIVE:  
Patient stated I usually put my own socks on but today, I'm a little tired.  OBJECTIVE DATA SUMMARY:  
HISTORY:  
Past Medical History:  
Diagnosis Date  CAD (coronary artery disease)  CHF (congestive heart failure) ([facility] 2013  Diabetes ([facility] 5yrs  GERD (gastroesophageal reflux disease)  Morbid obesity ([facility]  Prostate cancer ([facility] Past Surgical History:  
Procedure Laterality Date  HX CORONARY ARTERY BYPASS GRAFT    
 HX HEART CATHETERIZATION    
 HX ORTHOPAEDIC    
 right wrist carpal tunnel repair Prior Level of Function/Environment/Context: Patient reports living in 1 level home with wife and daughter and being largely mod I for ADLs and functional mobility. Patient reports daughter completes IADLs and assists with meal prep. Patient has all needed DME. Patient reports wife is in a w/c and cannot assist with ADLs. Patient reports he is still driving. Home Situation Home Environment: Private residence # Steps to Enter: 0 Wheelchair Ramp: Yes One/Two Story Residence: One story Living Alone: No 
Support Systems: Spouse/Significant Other/Partner Patient Expects to be Discharged to[de-identified] Private residence Current DME Used/Available at Home: Cane, straight, Walker, rolling, Grab bars, Shower chair(bars around toilet as well) Tub or Shower Type: Tub/Shower combination Hand dominance: RightEXAMINATION OF PERFORMANCE DEFICITS: 
 Cognitive/Behavioral Status: 
Neurologic State: Alert Orientation Level: Oriented X4 Cognition: Appropriate for age attention/concentration; Follows commands Perception: Appears intact Perseveration: No perseveration noted Safety/Judgement: Awareness of environment; Fall prevention Skin: Appears grossly intact Edema: none noted in BUEs Hearing: Auditory Auditory Impairment: Hard of hearing, bilateral 
Vision/Perceptual:   
Tracking: Able to track stimulus in all quadrants w/o difficulty Diplopia: No   
Acuity: Able to read clock/calendar on wall without difficulty Corrective Lenses: Glasses Range of Motion: In [facility] AROM: Generally decreased, functional 
Strength: In [facility]  equal - 4+/5 Strength: Within functional limits Coordination: 
Coordination: Within functional limits Fine Motor Skills-Upper: Left Intact; Right Intact Gross Motor Skills-Upper: Left Intact; Right Intact Tone & Sensation: In [facility] Tone: Normal 
Sensation: Impaired(reports numbness in 5th digit on B hands) Balance: 
Sitting: Intact Standing: Intact Functional Mobility and Transfers for ADLs:Bed Mobility: 
Rolling: Supervision; Additional time Supine to Sit: Supervision; Additional time Scooting: Supervision; Additional time Transfers: 
Sit to Stand: Contact guard assistance; Adaptive equipment(RW) 
Stand to Sit: Contact guard assistance; Adaptive equipment Bed to Chair: Contact guard assistance; Adaptive equipment(RW) 
Toilet Transfer : Contact guard assistance; Adaptive equipment(RW and grab bars) ADL Assessment: 
Feeding: Independent* Oral Facial Hygiene/Grooming: Independent* Bathing: Setup* Upper Body Dressing: Independent* Lower Body Dressing: Minimum assistance(2* reports shortness of breath with bending/exertion) Toileting: Setup *Infer per obs of functional mobility, functional reach, BUE ROM, strength, balance and cognition/safety awareness ADL Intervention and task modifications: Lower Body Dressing Assistance Socks: Minimum assistance Leg Crossed Method Used: No 
Position Performed: Seated in chair Cues: Don;Doff;Physical assistance Cognitive Retraining Safety/Judgement: Awareness of environment; Fall prevention Functional Measure: 
Barthel Index: 
 
Bathin Bladder: 10 Bowels: 10 
Groomin Dressin Feeding: 10 Mobility: 5 Stairs: 0 Toilet Use: 10 Transfer (Bed to Chair and Back): 10 Total: 70 The Barthel ADL Index: Guidelines 1. The index should be used as a record of what a patient does, not as a record of what a patient could do. 2. The main aim is to establish degree of independence from any help, physical or verbal, however minor and for whatever reason. 3. The need for supervision renders the patient not independent. 4. A patient's performance should be established using the best available evidence. Asking the patient, friends/relatives and nurses are the usual sources, but direct observation and common sense are also important. However direct testing is not needed. 5. Usually the patient's performance over the preceding 24-48 hours is important, but occasionally longer periods will be relevant. 6. Middle categories imply that the patient supplies over 50 per cent of the effort. 7. Use of aids to be independent is allowed. Kiana Covarrubias., Barthel, D.W. (). Functional evaluation: the Barthel Index. [facility] (14)2. CRISTINO Jarvis, Sindhu Paula., [facility], [address] [place] [address] (). Measuring the change indisability after inpatient rehabilitation; comparison of the responsiveness of the Barthel Index and Functional [place] Measure. Journal of Neurology, Neurosurgery, and Psychiatry, 66(4), 164-892. Main Mistry, N.J.A, SURAJ Ramirez, & Rodriguez Garrett MFedeA. (2004.) Assessment of post-stroke quality of life in cost-effectiveness studies: The usefulness of the Barthel Index and the EuroQoL-5D.  Quality of Life Research, 13, 264-20 Occupational Therapy Evaluation Charge Determination History Examination Decision-Making LOW Complexity : Brief history review  LOW Complexity : 1-3 performance deficits relating to physical, cognitive , or psychosocial skils that result in activity limitations and / or participation restrictions  MEDIUM Complexity : Patient may present with comorbidities that affect occupational performnce. Miniml to moderate modification of tasks or assistance (eg, physical or verbal ) with assesment(s) is necessary to enable patient to complete evaluation Based on the above components, the patient evaluation is determined to be of the following complexity level: LOW Pain: 
Pre treatment: 0 /10 Post treatment:  0/10 Location: none Description:none Aggravating factors: Activity Tolerance:  
good Please refer to the flowsheet for vital signs taken during this treatment. After treatment patient left:  
Up in chair Call light within reach RN notified COMMUNICATION/EDUCATION:  
The patients plan of care was discussed with: Physical Therapist, Registered Nurse and Physician. Home safety education was provided and the patient/caregiver indicated understanding. and Patient/family have participated as able in goal setting and plan of care. This patients plan of care is appropriate for delegation to [facility]. Thank you for this referral. 
Leyla Mckeon, [place] Time Calculation: 28 mins

## 2019-01-31 NOTE — PROGRESS NOTES
Hospital follow-up PCP transitional care appointment has been scheduled with Dr. Rachana Kohli for Thursday, 2/7/19 at 1:45 p.m. Pending patient discharge.   Sea Moreno, Care Management Specialist.

## 2019-01-31 NOTE — PROGRESS NOTES
Cardiology Progress Note 1/31/2019 9:05 AM 
 
Admit Date: 1/28/2019 Admit Diagnosis: SOB (shortness of breath) Subjective:  
 
William Rump. Breathing back to baseline. No chest pain. Negative balance Assessment:  
 
Principal Problem: 
  SOB (shortness of breath) (1/28/2019) Plan: 1. Acute on chronic systolic CHF 
-NYHA class III-IV on admission. -EF 25% (EF 35% in 5/2017). Continue BB,ARB. Added spironolactone. Outpt consider entresto. Did well with IV diuresis; to po today. Stress test results pending but would only consider cath if high risk findings. Erica Flores for d/c today after stress test results. Will need close post CHF f/u with primary cardiologist Dr. Erika Rosales and appt has been scheduled with Dr. Erika Rosales for Monday, 2/4/19 at 3:40, [phone]. 
  
2. Chest pain: typical and atypical features 
-no acute ischemia by serial enzymes. No recurrent chest pain. Stress test 2 day given body habitus, 2nd day today. 
-Had LHC in 5/2017 with patent grafts.  
  
3. Hx of CAD: s/p LIMA-LAD 
-Start ASA 81 mg daily 
-Continue BB, statin, ARB 
  
4. Hx of PAF:   
-12 lead EKG:  Afib, rate controlled. Currently RRR 
-[id number]= 5 (age, DM, HTN, Vascular dz). Will hold Xarelto for now in case heart catheterization is needed. Restart if stress test is low risk 
  
5. Anemia 
  
6. Hx of DM type II: on insulin. 
  
7. Hx of dyslipidemia: continue crestor. 
  
Visit Vitals /67 (BP 1 Location: Right arm, BP Patient Position: At rest) Pulse 66 Temp 97.7 °F (36.5 °C) Resp 16 Ht 5' 9\" (1.753 m) Wt 299 lb (135.6 kg) SpO2 100% BMI 44.15 kg/m² Current Facility-Administered Medications Medication Dose Route Frequency  furosemide (LASIX) tablet 80 mg  80 mg Oral ACB&D  
 spironolactone (ALDACTONE) tablet 25 mg  25 mg Oral DAILY  carvedilol (COREG) tablet 3.125 mg  3.125 mg Oral BID WITH MEALS  
 losartan (COZAAR) tablet 25 mg  25 mg Oral DAILY  rosuvastatin (CRESTOR) tablet 10 mg  10 mg Oral QHS  aspirin chewable tablet 81 mg  81 mg Oral DAILY  insulin glargine (LANTUS) injection 57 Units  57 Units SubCUTAneous DAILY  pantoprazole (PROTONIX) tablet 40 mg  40 mg Oral DAILY  potassium chloride SR (KLOR-CON 10) tablet 20 mEq  20 mEq Oral DAILY  sodium chloride (NS) flush 5-40 mL  5-40 mL IntraVENous Q8H  
 sodium chloride (NS) flush 5-40 mL  5-40 mL IntraVENous PRN  
 nitroglycerin (NITROSTAT) tablet 0.4 mg  0.4 mg SubLINGual Q5MIN PRN  
 acetaminophen (TYLENOL) tablet 650 mg  650 mg Oral Q4H PRN  
 glucose chewable tablet 16 g  4 Tab Oral PRN  
 dextrose (D50W) injection syrg 12.5-25 g  25-50 mL IntraVENous PRN  
 glucagon (GLUCAGEN) injection 1 mg  1 mg IntraMUSCular PRN  
 insulin lispro (HUMALOG) injection   SubCUTAneous AC&HS Objective:  
  
Physical Exam: 
Visit Vitals /67 (BP 1 Location: Right arm, BP Patient Position: At rest) Pulse 66 Temp 97.7 °F (36.5 °C) Resp 16 Ht 5' 9\" (1.753 m) Wt 299 lb (135.6 kg) SpO2 100% BMI 44.15 kg/m² General Appearance:  Well developed, well nourished,alert and oriented x 3, and individual in no acute distress. Ears/Nose/Mouth/Throat:   Hearing grossly normal. 
  
    Neck: Supple. Chest:   CTAB Cardiovascular:  Regular rate and rhythm, S1, S2 normal, no murmur. Abdomen:   Soft, non-tender, bowel sounds are active. Extremities: 1+ LE edema bilaterally. Skin: Warm and dry. Data Review:  
Labs:   
Recent Results (from the past 24 hour(s)) GLUCOSE, POC Collection Time: 01/30/19 12:16 PM  
Result Value Ref Range Glucose (POC) 202 (H) 65 - 100 mg/dL Performed by Hillary Habermann GLUCOSE, POC Collection Time: 01/30/19  5:05 PM  
Result Value Ref Range Glucose (POC) 93 65 - 100 mg/dL Performed by Kranthi Lopez GLUCOSE, POC Collection Time: 01/30/19  9:57 PM  
Result Value Ref Range Glucose (POC) 142 (H) 65 - 100 mg/dL Performed by Yesenia Bah PCT   
TROPONIN I Collection Time: 01/31/19  4:22 AM  
Result Value Ref Range Troponin-I, Qt. <0.05 <0.05 ng/mL GLUCOSE, POC Collection Time: 01/31/19  6:42 AM  
Result Value Ref Range Glucose (POC) 89 65 - 100 mg/dL Performed by Yesenia Bah PCT Telemetry: normal sinus rhythm

## 2019-01-31 NOTE — PROGRESS NOTES
Pt discharged home with daughter. Prescriptions given to pt. Pt verbalized understanding of discharge instructions.

## 2019-02-01 ENCOUNTER — HOME CARE VISIT (OUTPATIENT)
Dept: HOME HEALTH SERVICES | Facility: HOME HEALTH | Age: 74
End: 2019-02-01

## 2019-02-01 ENCOUNTER — TELEPHONE (OUTPATIENT)
Dept: CASE MANAGEMENT | Age: 74
End: 2019-02-01

## 2019-02-01 NOTE — ADT AUTH CERT NOTES
Patient Demographics Patient Name Justina Bills 
[id number] Sex Male  
1945 Address [address] [place], [place] [address] Phone [phone] (Home) *Preferred* 
[phone] [facility] CSN:  
[id number] Admit Date: Admit Time Room Bed 2019 12:50  [07246] 01 [[id number]] Attending Providers Provider Pager From To  
Holly Parada DO  19 Martina Teague MD  19 Chema Hemphill MD  19 Romeo Steele MD  19 Emergency Contact(s) Name Relation Home Work Mobile Nataliya Burrows [phone] Maddy Medina [phone] Utilization Reviews Heart Failure - Care Day 4 (2019) by Racheal Matso RN Review Entered Review Status 2019 11:33 Completed Criteria Review Care Day: 4 Care Date: 2019 Level of Care: Telemetry Guideline Day 2 Clinical Status ( ) * Hemodynamic stability  
(X) * Mental status at baseline  
(X) * MI excluded  
(X) * Cardiac rate and rhythm acceptable  
(X) * Oxygenation at baseline or improved 2019 11:33 AM EST by Daishaangela Hernadez  
  96% RA  
  
(X) * Pulmonary edema absent or improved Routes ( ) Low-salt diet 2019 11:33 AM EST by Daisha Hernadez  
  Diabetic diet Interventions  
(X) * Pulmonary catheter absent ( ) Possible electrolytes[I] 2019 11:33 AM EST by Daishaangela Hernadez  
  glucose 89 / 70 / 142 / 173 / 206 Medications (X) Diuretics 2019 11:33 AM EST by Daisha Hernadez  
  lasix 80mg po 2x/day; aldactone 25mg po q day  
  
(X) Neprilysin inhibitor with ARB, or ACE inhibitor or ARB  
2019 11:33 AM EST by Daishaangela Hernadez  
  cozaar 25mg po q day  
  
(X) Beta-blocker 2019 11:33 AM EST by Daisha Hernadez  
  coreg 3.125mg po 2x/day  
  
(X) Possible nitrates, digoxin, hydralazine 2019 11:33 AM EST by Daisha Hernadez Subject: Additional Clinical Information NM stress test report pending d/w pt Assessment & Plan:  Acute on chronic systolic congestive heart failure, NYHA class IV on admission, NYHA class 2 at baseline-Echo EF 25% with left ventricular global hypokinesis-Continue IV diuresis , switch to oral-Negative 1.3 L last 24 hrs -Claims his dry weight is 305-306 lbs-Appreciate Cardiology-On ASA/COreg/Losartan/Crestor/ aldactone  Chest pain, rule out acute coronary syndrome in a patient with CAD   -Troponins unremarkable-Oral anticoagulant on hold for now-Appreciate Cardiology-Awaiting stress test, second part report  Paroxysmal atrial fibrillation. -Continue home meds except oral anticoagulant-rate controlled- resume [address] on d/c  VS 97.8-53-/65d/c to homeBreathing back to baseline.   No chest pain. * Milestone Heart Failure - Care Day 3 (1/30/2019) by Ladan Dye RN Review Entered Review Status 2/1/2019 11:27 Completed Criteria Review Care Day: 3 Care Date: 1/30/2019 Level of Care: Telemetry Guideline Day 2 Clinical Status ( ) * Hemodynamic stability  
(X) * Mental status at baseline  
(X) * MI excluded  
(X) * Cardiac rate and rhythm acceptable  
(X) * Oxygenation at baseline or improved 2/1/2019 11:27 AM EST by Arnetha Double  
  95% RA  
  
(X) * Pulmonary edema absent or improved 2/1/2019 11:27 AM EST by Arnetha Double  
  Breathing improved Routes ( ) Low-salt diet 2/1/2019 11:27 AM EST by Arnetha Double  
  Diabetic diet Interventions  
(X) * Pulmonary catheter absent  
(X) Possible electrolytes[I] 2/1/2019 11:27 AM EST by Arnetha Double  
  K 3.4 Medications (X) Diuretics 2/1/2019 11:27 AM EST by Arnetha Double  
  lasix 80mg IV 2x/day; aldactone 25mg po q day  
  
(X) Neprilysin inhibitor with ARB, or ACE inhibitor or ARB  
2/1/2019 11:27 AM EST by Arnetha Double  
  cozaar 25mg po q day  
  
(X) Beta-blocker 2/1/2019 11:27 AM EST by Bianca Thapa  
  coreg 3.125mg po 2x/day  
  
(X) Possible nitrates, digoxin, hydralazine 2/1/2019 11:27 AM EST by Bianca Thapa  
  Nitro SL q 5  minutes prn  
  
  
2/1/2019 11:27 AM EST by Bianca Thpaa Subject: Additional Clinical Information  
 Breathing improved. No chest pain. Negative balance   Plan:  1.  Acute on chronic systolic CHF-NYHA class III-IV on admission. -EF 25% (EF 35% in 5/2017). Continue BB,ARB. Added spironolactone.   Outpt consider entresto. Continue IV diuresis today; probably to po tomorrow.   If no additional ischemic work up needed, probably d/c tomorrow.   Will need close post CHF f/u with primary cardiologist Dr. Coty Kowalski and scheduled with Dr. Coty Kowalski for Monday, 2/4/19 at 3:40, ([phone].  2. Chest pain: typical and atypical features-no acute ischemia by serial enzymes. No recurrent chest pain.   Stress test 2 day given body habitus, 2nd day today.-Had LHC in 5/2017 with patent grafts.   3. Hx of CAD: s/p LIMA-LAD-Start ASA 81 mg daily-Continue BB, statin, ARB  4. Hx of PAF:   -12 lead EKG:   Afib, rate controlled. Currently [id number]= 5 (age, DM, HTN, Vascular dz).   Will hold Xarelto for now in case heart catheterization is needed. Restart if stress test is low risk * Milestone

## 2019-02-05 ENCOUNTER — HOME CARE VISIT (OUTPATIENT)
Dept: HOME HEALTH SERVICES | Facility: HOME HEALTH | Age: 74
End: 2019-02-05

## 2019-02-07 ENCOUNTER — HOME CARE VISIT (OUTPATIENT)
Dept: SCHEDULING | Facility: HOME HEALTH | Age: 74
End: 2019-02-07

## 2019-02-07 PROCEDURE — G0495 RN CARE TRAIN/EDU IN HH: HCPCS

## 2019-03-20 RX ORDER — LOSARTAN POTASSIUM 25 MG/1
TABLET ORAL
Qty: 90 TAB | Refills: 0 | Status: SHIPPED | OUTPATIENT
Start: 2019-03-20 | End: 2020-09-03

## 2019-03-25 RX ORDER — RIVAROXABAN 20 MG/1
TABLET, FILM COATED ORAL
Qty: 30 TAB | Refills: 1 | Status: SHIPPED | OUTPATIENT
Start: 2019-03-25 | End: 2020-09-03 | Stop reason: SDUPTHER

## 2019-03-25 RX ORDER — CARVEDILOL 3.12 MG/1
TABLET ORAL
Qty: 60 TAB | Refills: 1 | Status: SHIPPED | OUTPATIENT
Start: 2019-03-25 | End: 2020-09-03 | Stop reason: SDUPTHER

## 2019-03-25 RX ORDER — PANTOPRAZOLE SODIUM 40 MG/1
TABLET, DELAYED RELEASE ORAL
Qty: 30 TAB | Refills: 0 | Status: SHIPPED | OUTPATIENT
Start: 2019-03-25 | End: 2020-09-03

## 2019-03-25 RX ORDER — SPIRONOLACTONE 25 MG/1
TABLET ORAL
Qty: 30 TAB | Refills: 0 | Status: SHIPPED | OUTPATIENT
Start: 2019-03-25 | End: 2019-04-21 | Stop reason: SDUPTHER

## 2019-03-25 RX ORDER — FUROSEMIDE 80 MG/1
TABLET ORAL
Qty: 60 TAB | Refills: 0 | Status: SHIPPED | OUTPATIENT
Start: 2019-03-25 | End: 2019-04-21 | Stop reason: SDUPTHER

## 2019-04-23 RX ORDER — PANTOPRAZOLE SODIUM 40 MG/1
TABLET, DELAYED RELEASE ORAL
Qty: 30 TAB | Refills: 0 | OUTPATIENT
Start: 2019-04-23

## 2019-04-23 RX ORDER — SPIRONOLACTONE 25 MG/1
TABLET ORAL
Qty: 30 TAB | Refills: 0 | Status: SHIPPED | OUTPATIENT
Start: 2019-04-23 | End: 2020-09-03

## 2019-04-23 RX ORDER — FUROSEMIDE 80 MG/1
TABLET ORAL
Qty: 60 TAB | Refills: 0 | Status: SHIPPED | OUTPATIENT
Start: 2019-04-23 | End: 2020-09-03

## 2019-04-23 NOTE — TELEPHONE ENCOUNTER
Refill of Lasix 80mg BID and Spironolactone 25mg daily completed per VO of Dr. Forest Smith.     Last OV: 11/2018  Next OV: 5/2019

## 2020-09-03 ENCOUNTER — OFFICE VISIT (OUTPATIENT)
Dept: CARDIOLOGY CLINIC | Age: 75
End: 2020-09-03
Payer: MEDICARE

## 2020-09-03 VITALS
HEART RATE: 60 BPM | HEIGHT: 69 IN | SYSTOLIC BLOOD PRESSURE: 110 MMHG | OXYGEN SATURATION: 98 % | DIASTOLIC BLOOD PRESSURE: 64 MMHG | BODY MASS INDEX: 46.65 KG/M2 | WEIGHT: 315 LBS

## 2020-09-03 DIAGNOSIS — E78.5 HYPERLIPIDEMIA, UNSPECIFIED HYPERLIPIDEMIA TYPE: ICD-10-CM

## 2020-09-03 DIAGNOSIS — I50.22 SYSTOLIC CHF, CHRONIC (HCC): ICD-10-CM

## 2020-09-03 DIAGNOSIS — I48.0 PAF (PAROXYSMAL ATRIAL FIBRILLATION) (HCC): Primary | ICD-10-CM

## 2020-09-03 DIAGNOSIS — I25.10 CORONARY ARTERY DISEASE DUE TO LIPID RICH PLAQUE: ICD-10-CM

## 2020-09-03 DIAGNOSIS — I25.83 CORONARY ARTERY DISEASE DUE TO LIPID RICH PLAQUE: ICD-10-CM

## 2020-09-03 PROCEDURE — 1101F PT FALLS ASSESS-DOCD LE1/YR: CPT | Performed by: INTERNAL MEDICINE

## 2020-09-03 PROCEDURE — G8419 CALC BMI OUT NRM PARAM NOF/U: HCPCS | Performed by: INTERNAL MEDICINE

## 2020-09-03 PROCEDURE — 3017F COLORECTAL CA SCREEN DOC REV: CPT | Performed by: INTERNAL MEDICINE

## 2020-09-03 PROCEDURE — 93000 ELECTROCARDIOGRAM COMPLETE: CPT | Performed by: INTERNAL MEDICINE

## 2020-09-03 PROCEDURE — G8536 NO DOC ELDER MAL SCRN: HCPCS | Performed by: INTERNAL MEDICINE

## 2020-09-03 PROCEDURE — G8432 DEP SCR NOT DOC, RNG: HCPCS | Performed by: INTERNAL MEDICINE

## 2020-09-03 PROCEDURE — 99214 OFFICE O/P EST MOD 30 MIN: CPT | Performed by: INTERNAL MEDICINE

## 2020-09-03 PROCEDURE — G8427 DOCREV CUR MEDS BY ELIG CLIN: HCPCS | Performed by: INTERNAL MEDICINE

## 2020-09-03 RX ORDER — LOSARTAN POTASSIUM AND HYDROCHLOROTHIAZIDE 25; 100 MG/1; MG/1
TABLET ORAL
COMMUNITY
Start: 2020-08-13 | End: 2020-09-03 | Stop reason: SDUPTHER

## 2020-09-03 RX ORDER — SERTRALINE HYDROCHLORIDE 50 MG/1
50 TABLET, FILM COATED ORAL
COMMUNITY
Start: 2020-08-18

## 2020-09-03 RX ORDER — OMEPRAZOLE 20 MG/1
CAPSULE, DELAYED RELEASE ORAL
COMMUNITY
Start: 2020-06-01

## 2020-09-03 RX ORDER — LOSARTAN POTASSIUM AND HYDROCHLOROTHIAZIDE 25; 100 MG/1; MG/1
TABLET ORAL
Qty: 90 TAB | Refills: 1 | Status: SHIPPED | OUTPATIENT
Start: 2020-09-03 | End: 2021-07-17

## 2020-09-03 RX ORDER — CARVEDILOL 3.12 MG/1
TABLET ORAL
Qty: 180 TAB | Refills: 1 | Status: SHIPPED | OUTPATIENT
Start: 2020-09-03

## 2020-09-03 RX ORDER — ROSUVASTATIN CALCIUM 10 MG/1
10 TABLET, COATED ORAL
Qty: 90 TAB | Refills: 1 | Status: SHIPPED | OUTPATIENT
Start: 2020-09-03

## 2020-09-03 NOTE — PROGRESS NOTES
Per VO of Dr. Lam Asper: Visit date not found    Future Appointments   Date Time Provider Mart Tobin   3/5/2021  1:20 PM Little Maya MD [facility] BS AMB       Requested Prescriptions     Signed Prescriptions Disp Refills    carvediloL (COREG) 3.125 mg tablet 180 Tab 1     Sig: take 1 tablet by mouth twice a day AT 8 AM AND 6 PM    losartan-hydroCHLOROthiazide (HYZAAR) 100-25 mg per tablet 90 Tab 1     Sig: take 1 tablet by mouth once daily    rosuvastatin (Crestor) 10 mg tablet 90 Tab 1     Sig: Take 1 Tab by mouth nightly.     rivaroxaban (Xarelto) 20 mg tab tablet 90 Tab 1     Sig: take 1 tablet by mouth once daily

## 2020-09-03 NOTE — PROGRESS NOTES
Adeline Gonzalez. is a 69 y.o. male    Chief Complaint   Patient presents with    Follow-up     refills        Chest pain No    SOB patient states SOB     Dizziness  Patient states a little dizziness at times    Swelling patient  States swelling in his feet at times    Refills No    Visit Vitals  /64 (BP 1 Location: Left arm, BP Patient Position: Sitting)   Pulse 60   Ht 5' 9\" (1.753 m)   Wt 315 lb 6.4 oz (143.1 kg)   SpO2 98%   BMI 46.58 kg/m²       1. Have you been to the ER, urgent care clinic since your last visit? Hospitalized since your last visit? no    2. Have you seen or consulted any other health care providers outside of the [address] [place] [address] since your last visit? Include any pap smears or colon screening.   no

## 2020-09-03 NOTE — PROGRESS NOTES
Office Follow-up    NAME: Zeinab Delgadillo. :  1945  MRM:  [id number]    Date:  9/3/2020            Assessment:     Problem List  Date Reviewed: 2019          Codes Class Noted    SOB (shortness of breath) ICD-10-CM: R06.02  ICD-9-CM: 786.05  2019        UTI (urinary tract infection) ICD-10-CM: N39.0  ICD-9-CM: 599.0  9/3/2018        Prostate cancer ([facility] ICD-10-CM: C61  ICD-9-CM: 185  Unknown        GERD (gastroesophageal reflux disease) ICD-10-CM: K21.9  ICD-9-CM: 530.81  Unknown        CAD (coronary artery disease) ICD-10-CM: I25.10  ICD-9-CM: 414.00  Unknown        Hyperglycemia ICD-10-CM: R73.9  ICD-9-CM: 790.29  2018        Type 2 diabetes mellitus without complication (HCC) (Chronic) ICD-10-CM: E11.9  ICD-9-CM: 250.00  3/27/2016        PAF (paroxysmal atrial fibrillation) (HCC) (Chronic) ICD-10-CM: I48.0  ICD-9-CM: 427.31  3/27/2016        Anemia (Chronic) ICD-10-CM: D64.9  ICD-9-CM: 285.9          Systolic CHF, chronic (HCC) (Chronic) ICD-10-CM: I50.22  ICD-9-CM: 428.22, 428.0  3/25/2016        Hyperlipidemia (Chronic) ICD-10-CM: E78.5  ICD-9-CM: 272.4  2014        PAULINA (acute kidney injury) ([facility] ICD-10-CM: N17.9  ICD-9-CM: 584.9  2014        CHF (congestive heart failure) ([facility] ICD-10-CM: I50.9  ICD-9-CM: 428.0  2013        S/P CABG x 2 (Chronic) ICD-10-CM: Z95.1  ICD-9-CM: V45.81  2011        Coronary atherosclerosis of native coronary artery (Chronic) ICD-10-CM: I25.10  ICD-9-CM: 414.01  2011    Overview Signed 2011  4:07 PM by Michele Holcomb MD     90% MID LAD. Morbid obesity ([facility] (Chronic) ICD-10-CM: E66.01  ICD-9-CM: 278.01  2011                 Plan:     1. Paroxysmal atrial fibrillation: This is new. He is in sinus rhythm today. Continue Coreg and Xarelto. 2. CAD: He has known history of CABG. Continue medical management. His aspirin was discontinued because now he is on Xarelto.   3. Ischemic cardiomyopathy: Continue Coreg, losartan HCTZ. No diuretics as he is compensated. 4. Dyslipidemia: Continue Crestor. 5. See Dr. Yonas Dimas in 6 months. Subjective:     Anais Mcfarlane., a 69y.o. year-old who presents for followup. He has known history of hypertension, diabetes, CAD/CABG, CRI. He was last seen by me in 2018. After that he was admitted to the hospital in January 2019. Since then he has not been in the hospital.  He follows with his primary care physician on a regular basis. There is no lab results available for me to review from past 2 years. He conveys to me that his diabetes is under control. He otherwise does not have any cardiac symptoms including chest pain, shortness of breath, lightheadedness or dizziness. No peripheral edema. His wife passed away this year and he has been quite depressed since that. He has been started on antidepressant. EKG today demonstrated normal sinus rhythm with left axis deviation with poor R wave progression with IVCD.     Exam:     Physical Exam:  Visit Vitals  /64 (BP 1 Location: Left arm, BP Patient Position: Sitting)   Pulse 60   Ht 5' 9\" (1.753 m)   Wt 315 lb 6.4 oz (143.1 kg)   SpO2 98%   BMI 46.58 kg/m²     General appearance - alert, well appearing, and in no distress  Mental status - affect appropriate to mood  Eyes - sclera anicteric, moist mucous membranes  Neck - supple, no significant adenopathy  Chest - clear to auscultation, no wheezes, rales or rhonchi  Heart - normal rate, regular rhythm, normal S1, S2, no murmurs, rubs, clicks or gallops  Abdomen - soft, nontender, nondistended, no masses or organomegaly  Extremities - peripheral pulses normal, no pedal edema  Skin - normal coloration  no rashes    Medications:     Current Outpatient Medications   Medication Sig    losartan-hydroCHLOROthiazide (HYZAAR) 100-25 mg per tablet take 1 tablet by mouth once daily    omeprazole (PRILOSEC) 20 mg capsule take 1 capsule by mouth once daily  sertraline (ZOLOFT) 50 mg tablet take 1 tablet by mouth once daily    XARELTO 20 mg tab tablet take 1 tablet by mouth once daily    carvedilol (COREG) 3.125 mg tablet take 1 tablet by mouth twice a day AT 8 AM AND 6 PM    insulin glargine (LANTUS U-100 INSULIN) 100 unit/mL injection 57 Units by SubCUTAneous route daily. (Patient taking differently: 55 Units by SubCUTAneous route daily.)    rosuvastatin (CRESTOR) 10 mg tablet Take 1 Tab by mouth nightly.  potassium chloride (K-DUR, KLOR-CON) 20 mEq tablet Take one tablet daily     No current facility-administered medications for this visit. Diagnostic Data Review:       1/29/19: Major Lindsay- Apicoinferior ischemia. Left ventricular ejection fraction is 30%. 1/28/19: ECHO- LVEF 25%, LV cavity size mild dil, LV global HK; LA cavity mod dil; RA mild dil; MR mild-mod; TR mild-mod    1/9/18 Echo: Hypo/akinesis of the septal segments. Mild LVH. EF 35-40%. Right ventricle mildly dilated. Systolic function reduced. Aortic valve mildly thickened leaflets. 10/4/17: ECHO- LVEF 45 %, mild diffuse HK, thickness was mildly increased; Mod concentric hypertrophy. 5/24/2017: CATH- Obstructive 1VD:LAD p80, m100, dist very small vessel; D1 patent. LCx p30 (co-dom). RCA patent (co-dom). 2/2 Grafts patent: LIMA-LAD patent.; SVG-D1 patent. 5/22/17: ECHO- Comparison was made 25-Nov-2014 and LV overall  function has decreased. LV wall hypokinesis, ventricle wall dilated, EF 35%;  SYSTEM MEASUREMENT TABLES  2D  LVOT Diam: 2.1 cm  Ao Diam: 3.2 cm  LA Diam: 4.6 cm  IVSd: 1.1 cm  LVIDd: 5.9 cm  LVIDs: 5.1 cm  LVPWd: 1 cm  SV(Teich): 53.9 ml    11/25/14: ECHO- TDS, - EF 45-50%. LVH, RV mild dilated - reduced RVEF   11/5/2012: ECHO- LVEF 50%, DD, Mild RV dysfuction. 11/10/11: CMR- . Normal left ventricular size by 3D volumetric assessment. Moderate left   ventricular systolic dysfunction. Severe hypokinesis of the inferior and   inferolateral wall.  Mild hypokinesis of the anterior wall. LVEF 37%. 2. Normal right ventricular size and systolic function. 3. No significant valvular disease other than trace mitral and tricuspid   regurgitation. 4. Normal resting myocardial perfusion on first pass stress perfusion   imaging. 5. On LGE imaging, there is a very small focal area of small endocardial   infarct involving the basal inferolateral wall. The anterior, anteroseptal,   anterolateral, lateral, the entire inferior wall, the inferolateral wall,   inferoseptal wall and apex are completely viable. The LAD, LCx, and RCA   territories demonstrate significant viability for revascularization. 6. Large right-sided pleural effusion. Moderate left-sided pleural effusion. 11/8/2011: ECHO- LVEF 35-40%, LV- mod dilated, mod HK (basal-mid inferior/  basal-mid inferolateral walls) RV-dilated; mild LAE  2011: CATH- pLAD: 95%-> PCi-> 80% residual stenosis   2011: CABG- 2 V, LIMA -> LAD      Lab Review:     Lab Results   Component Value Date/Time    Cholesterol, total 125 01/29/2019 04:00 AM    HDL Cholesterol 42 01/29/2019 04:00 AM    LDL, calculated 68.6 01/29/2019 04:00 AM    Triglyceride 72 01/29/2019 04:00 AM    CHOL/HDL Ratio 3.0 01/29/2019 04:00 AM     Lab Results   Component Value Date/Time    Creatinine (POC) 1.4 (H) 03/25/2016 02:15 AM    Creatinine 1.20 01/30/2019 05:43 AM     Lab Results   Component Value Date/Time    BUN 21 (H) 01/30/2019 05:43 AM    BUN (POC) 36 (H) 03/25/2016 02:15 AM     Lab Results   Component Value Date/Time    Potassium 3.4 (L) 01/30/2019 05:43 AM     Lab Results   Component Value Date/Time    Hemoglobin A1c 8.8 (H) 01/28/2019 12:17 PM     Lab Results   Component Value Date/Time    Hemoglobin (POC) 10.9 (L) 03/25/2016 02:15 AM    HGB 10.3 (L) 01/30/2019 05:43 AM     Lab Results   Component Value Date/Time    PLATELET 995 11/22/5796 05:43 AM     No results for input(s): CPK, CKMB, TROIQ in the last 72 hours.     No lab exists for component: CKQMB, CPKMB             ___________________________________________________    Richard Wiggins MD, [facility]

## 2021-07-08 ENCOUNTER — HOSPITAL ENCOUNTER (INPATIENT)
Age: 76
LOS: 9 days | Discharge: NURSING, REHAB OR LONG-TERM CARE | DRG: 616 | End: 2021-07-17
Attending: EMERGENCY MEDICINE | Admitting: HOSPITALIST
Payer: MEDICARE

## 2021-07-08 ENCOUNTER — APPOINTMENT (OUTPATIENT)
Dept: GENERAL RADIOLOGY | Age: 76
DRG: 616 | End: 2021-07-08
Attending: EMERGENCY MEDICINE
Payer: MEDICARE

## 2021-07-08 DIAGNOSIS — M86.9 OSTEOMYELITIS OF SECOND TOE OF LEFT FOOT (HCC): ICD-10-CM

## 2021-07-08 DIAGNOSIS — E11.628 DIABETIC FOOT INFECTION (HCC): ICD-10-CM

## 2021-07-08 DIAGNOSIS — R60.9 PERIPHERAL EDEMA: ICD-10-CM

## 2021-07-08 DIAGNOSIS — R06.09 DYSPNEA ON EXERTION: Primary | ICD-10-CM

## 2021-07-08 DIAGNOSIS — L08.9 DIABETIC FOOT INFECTION (HCC): ICD-10-CM

## 2021-07-08 DIAGNOSIS — R07.9 CHEST PAIN ON EXERTION: ICD-10-CM

## 2021-07-08 DIAGNOSIS — M86.172 OTHER ACUTE OSTEOMYELITIS OF LEFT FOOT (HCC): ICD-10-CM

## 2021-07-08 PROBLEM — R79.89 ELEVATED BRAIN NATRIURETIC PEPTIDE (BNP) LEVEL: Status: ACTIVE | Noted: 2021-07-08

## 2021-07-08 LAB
ALBUMIN SERPL-MCNC: 2.5 G/DL (ref 3.5–5)
ALBUMIN/GLOB SERPL: 0.5 {RATIO} (ref 1.1–2.2)
ALP SERPL-CCNC: 70 U/L (ref 45–117)
ALT SERPL-CCNC: 14 U/L (ref 12–78)
ANION GAP SERPL CALC-SCNC: 4 MMOL/L (ref 5–15)
AST SERPL-CCNC: 20 U/L (ref 15–37)
BASOPHILS # BLD: 0 K/UL (ref 0–0.1)
BASOPHILS NFR BLD: 0 % (ref 0–1)
BILIRUB SERPL-MCNC: 0.7 MG/DL (ref 0.2–1)
BNP SERPL-MCNC: 2882 PG/ML
BUN SERPL-MCNC: 27 MG/DL (ref 6–20)
BUN/CREAT SERPL: 18 (ref 12–20)
CALCIUM SERPL-MCNC: 8.3 MG/DL (ref 8.5–10.1)
CHLORIDE SERPL-SCNC: 104 MMOL/L (ref 97–108)
CO2 SERPL-SCNC: 29 MMOL/L (ref 21–32)
CREAT SERPL-MCNC: 1.51 MG/DL (ref 0.7–1.3)
CRP SERPL-MCNC: 1.64 MG/DL (ref 0–0.6)
DIFFERENTIAL METHOD BLD: ABNORMAL
EOSINOPHIL # BLD: 0.2 K/UL (ref 0–0.4)
EOSINOPHIL NFR BLD: 2 % (ref 0–7)
ERYTHROCYTE [DISTWIDTH] IN BLOOD BY AUTOMATED COUNT: 13.1 % (ref 11.5–14.5)
ERYTHROCYTE [SEDIMENTATION RATE] IN BLOOD: 72 MM/HR (ref 0–20)
GLOBULIN SER CALC-MCNC: 5.2 G/DL (ref 2–4)
GLUCOSE SERPL-MCNC: 289 MG/DL (ref 65–100)
HCT VFR BLD AUTO: 34.2 % (ref 36.6–50.3)
HGB BLD-MCNC: 11.1 G/DL (ref 12.1–17)
IMM GRANULOCYTES # BLD AUTO: 0 K/UL (ref 0–0.04)
IMM GRANULOCYTES NFR BLD AUTO: 0 % (ref 0–0.5)
LYMPHOCYTES # BLD: 1.9 K/UL (ref 0.8–3.5)
LYMPHOCYTES NFR BLD: 27 % (ref 12–49)
MCH RBC QN AUTO: 32.8 PG (ref 26–34)
MCHC RBC AUTO-ENTMCNC: 32.5 G/DL (ref 30–36.5)
MCV RBC AUTO: 101.2 FL (ref 80–99)
MONOCYTES # BLD: 0.7 K/UL (ref 0–1)
MONOCYTES NFR BLD: 9 % (ref 5–13)
NEUTS SEG # BLD: 4.2 K/UL (ref 1.8–8)
NEUTS SEG NFR BLD: 62 % (ref 32–75)
NRBC # BLD: 0 K/UL (ref 0–0.01)
NRBC BLD-RTO: 0 PER 100 WBC
PLATELET # BLD AUTO: 229 K/UL (ref 150–400)
PMV BLD AUTO: 10 FL (ref 8.9–12.9)
POTASSIUM SERPL-SCNC: 4.9 MMOL/L (ref 3.5–5.1)
PROT SERPL-MCNC: 7.7 G/DL (ref 6.4–8.2)
RBC # BLD AUTO: 3.38 M/UL (ref 4.1–5.7)
SODIUM SERPL-SCNC: 137 MMOL/L (ref 136–145)
TROPONIN I SERPL-MCNC: <0.05 NG/ML
WBC # BLD AUTO: 6.9 K/UL (ref 4.1–11.1)

## 2021-07-08 PROCEDURE — 94762 N-INVAS EAR/PLS OXIMTRY CONT: CPT

## 2021-07-08 PROCEDURE — 96375 TX/PRO/DX INJ NEW DRUG ADDON: CPT

## 2021-07-08 PROCEDURE — 86140 C-REACTIVE PROTEIN: CPT

## 2021-07-08 PROCEDURE — 85025 COMPLETE CBC W/AUTO DIFF WBC: CPT

## 2021-07-08 PROCEDURE — 71046 X-RAY EXAM CHEST 2 VIEWS: CPT

## 2021-07-08 PROCEDURE — 85652 RBC SED RATE AUTOMATED: CPT

## 2021-07-08 PROCEDURE — 84484 ASSAY OF TROPONIN QUANT: CPT

## 2021-07-08 PROCEDURE — 96374 THER/PROPH/DIAG INJ IV PUSH: CPT

## 2021-07-08 PROCEDURE — 99285 EMERGENCY DEPT VISIT HI MDM: CPT

## 2021-07-08 PROCEDURE — 73660 X-RAY EXAM OF TOE(S): CPT

## 2021-07-08 PROCEDURE — 80053 COMPREHEN METABOLIC PANEL: CPT

## 2021-07-08 PROCEDURE — 93005 ELECTROCARDIOGRAM TRACING: CPT

## 2021-07-08 PROCEDURE — 83880 ASSAY OF NATRIURETIC PEPTIDE: CPT

## 2021-07-08 PROCEDURE — 36415 COLL VENOUS BLD VENIPUNCTURE: CPT

## 2021-07-08 PROCEDURE — 74011000250 HC RX REV CODE- 250: Performed by: EMERGENCY MEDICINE

## 2021-07-08 PROCEDURE — 74011250636 HC RX REV CODE- 250/636: Performed by: EMERGENCY MEDICINE

## 2021-07-08 PROCEDURE — 65660000000 HC RM CCU STEPDOWN

## 2021-07-08 RX ORDER — GUAIFENESIN 100 MG/5ML
81 LIQUID (ML) ORAL DAILY
Status: DISCONTINUED | OUTPATIENT
Start: 2021-07-09 | End: 2021-07-17 | Stop reason: HOSPADM

## 2021-07-08 RX ORDER — MAGNESIUM SULFATE 100 %
4 CRYSTALS MISCELLANEOUS AS NEEDED
Status: DISCONTINUED | OUTPATIENT
Start: 2021-07-08 | End: 2021-07-17 | Stop reason: HOSPADM

## 2021-07-08 RX ORDER — CARVEDILOL 3.12 MG/1
3.12 TABLET ORAL 2 TIMES DAILY WITH MEALS
Status: DISCONTINUED | OUTPATIENT
Start: 2021-07-09 | End: 2021-07-11

## 2021-07-08 RX ORDER — INSULIN GLARGINE 100 [IU]/ML
0.2 INJECTION, SOLUTION SUBCUTANEOUS
Status: DISCONTINUED | OUTPATIENT
Start: 2021-07-08 | End: 2021-07-08 | Stop reason: ALTCHOICE

## 2021-07-08 RX ORDER — ROSUVASTATIN CALCIUM 10 MG/1
20 TABLET, COATED ORAL
Status: DISCONTINUED | OUTPATIENT
Start: 2021-07-08 | End: 2021-07-12

## 2021-07-08 RX ORDER — METRONIDAZOLE 500 MG/100ML
500 INJECTION, SOLUTION INTRAVENOUS EVERY 12 HOURS
Status: DISCONTINUED | OUTPATIENT
Start: 2021-07-08 | End: 2021-07-14

## 2021-07-08 RX ORDER — SODIUM CHLORIDE 0.9 % (FLUSH) 0.9 %
5-40 SYRINGE (ML) INJECTION AS NEEDED
Status: DISCONTINUED | OUTPATIENT
Start: 2021-07-08 | End: 2021-07-17 | Stop reason: HOSPADM

## 2021-07-08 RX ORDER — ONDANSETRON 2 MG/ML
4 INJECTION INTRAMUSCULAR; INTRAVENOUS
Status: DISCONTINUED | OUTPATIENT
Start: 2021-07-08 | End: 2021-07-17 | Stop reason: HOSPADM

## 2021-07-08 RX ORDER — INSULIN GLARGINE 100 [IU]/ML
60 INJECTION, SOLUTION SUBCUTANEOUS DAILY
Status: ON HOLD | COMMUNITY
End: 2021-07-17 | Stop reason: SDUPTHER

## 2021-07-08 RX ORDER — SODIUM CHLORIDE 0.9 % (FLUSH) 0.9 %
5-40 SYRINGE (ML) INJECTION EVERY 8 HOURS
Status: DISCONTINUED | OUTPATIENT
Start: 2021-07-08 | End: 2021-07-17 | Stop reason: HOSPADM

## 2021-07-08 RX ORDER — POLYETHYLENE GLYCOL 3350 17 G/17G
17 POWDER, FOR SOLUTION ORAL DAILY PRN
Status: DISCONTINUED | OUTPATIENT
Start: 2021-07-08 | End: 2021-07-17 | Stop reason: HOSPADM

## 2021-07-08 RX ORDER — NITROGLYCERIN 0.4 MG/1
0.4 TABLET SUBLINGUAL
Status: DISCONTINUED | OUTPATIENT
Start: 2021-07-08 | End: 2021-07-17 | Stop reason: HOSPADM

## 2021-07-08 RX ORDER — SERTRALINE HYDROCHLORIDE 50 MG/1
50 TABLET, FILM COATED ORAL
Status: DISCONTINUED | OUTPATIENT
Start: 2021-07-08 | End: 2021-07-17 | Stop reason: HOSPADM

## 2021-07-08 RX ORDER — ACETAMINOPHEN 325 MG/1
650 TABLET ORAL
Status: DISCONTINUED | OUTPATIENT
Start: 2021-07-08 | End: 2021-07-17 | Stop reason: HOSPADM

## 2021-07-08 RX ORDER — VANCOMYCIN/0.9 % SOD CHLORIDE 1.5G/250ML
1500 PLASTIC BAG, INJECTION (ML) INTRAVENOUS EVERY 24 HOURS
Status: DISCONTINUED | OUTPATIENT
Start: 2021-07-09 | End: 2021-07-09

## 2021-07-08 RX ORDER — DEXTROSE 50 % IN WATER (D50W) INTRAVENOUS SYRINGE
25-50 AS NEEDED
Status: DISCONTINUED | OUTPATIENT
Start: 2021-07-08 | End: 2021-07-17 | Stop reason: HOSPADM

## 2021-07-08 RX ORDER — ACETAMINOPHEN 650 MG/1
650 SUPPOSITORY RECTAL
Status: DISCONTINUED | OUTPATIENT
Start: 2021-07-08 | End: 2021-07-09

## 2021-07-08 RX ORDER — FACIAL-BODY WIPES
10 EACH TOPICAL DAILY PRN
Status: DISCONTINUED | OUTPATIENT
Start: 2021-07-08 | End: 2021-07-17 | Stop reason: HOSPADM

## 2021-07-08 RX ORDER — FUROSEMIDE 10 MG/ML
40 INJECTION INTRAMUSCULAR; INTRAVENOUS ONCE
Status: COMPLETED | OUTPATIENT
Start: 2021-07-08 | End: 2021-07-09

## 2021-07-08 RX ORDER — INSULIN LISPRO 100 [IU]/ML
INJECTION, SOLUTION INTRAVENOUS; SUBCUTANEOUS EVERY 6 HOURS
Status: DISCONTINUED | OUTPATIENT
Start: 2021-07-09 | End: 2021-07-11

## 2021-07-08 RX ADMIN — VANCOMYCIN HYDROCHLORIDE 2500 MG: 10 INJECTION, POWDER, LYOPHILIZED, FOR SOLUTION INTRAVENOUS at 20:06

## 2021-07-08 RX ADMIN — CEFEPIME HYDROCHLORIDE 2 G: 2 INJECTION, POWDER, FOR SOLUTION INTRAVENOUS at 20:05

## 2021-07-08 NOTE — ED TRIAGE NOTES
Patient presents to the ED reporting he is here to have his heart checked but patient denies any chest painand have this B/L leg swelling that has been presents for the last week be evaluated.     Pt also reports generalized weakness    Patient also reports he attempted to clip his toe nail over 2 weeks ago and it appears as though he clipped the skin off of the tip of his toe- Pts second toe on his left foot is discolored

## 2021-07-08 NOTE — ED NOTES

## 2021-07-08 NOTE — ED NOTES
Bedside and Verbal shift change report given to Heather Upton RN (oncoming nurse) by Elda RN (offgoing nurse). Report included the following information SBAR, Kardex, ED Summary, Intake/Output and Recent Results.

## 2021-07-08 NOTE — PROGRESS NOTES
CARE MANAGEMENT INITIAL ASSESSEMENT      NAME:   Charlee Fournier. :     1945   MRN:     [id number]       Emergency Contact:  Extended Emergency Contact Information  Primary Emergency Contact: [address] [facility] Phone: [phone]  Relation: Daughter    Advance Directive:  Prior, does not have an advance directive. [facility] Decision Maker:   Candice Miles- daughter- [phone]    Reason for Admission:  Mr. Concepcion Bateman is a 77 y.o. male with history that includes DM, CAD, CHF and HLD  who was seen in ER for: SOB; discolored toe    Patient Active Problem List   Diagnosis Code    Morbid obesity ([facility] E66.01    Coronary atherosclerosis of native coronary artery I25.10    S/P CABG x 2 Z95.1    Hyperlipidemia E78.5    PAULINA (acute kidney injury) ([facility] N17.9    Anemia R09.3    Systolic CHF, chronic ([facility] I50.22    Type 2 diabetes mellitus without complication ([facility] M16.4    PAF (paroxysmal atrial fibrillation) ([facility]) I48.0    Hyperglycemia R73.9    UTI (urinary tract infection) N39.0    Prostate cancer ([facility] C61    GERD (gastroesophageal reflux disease) K21.9    CHF (congestive heart failure) ([facility]) I50.9    CAD (coronary artery disease) I25.10    SOB (shortness of breath) R06.02       Assessment: In person with patient and daughter Candice Miles. RUR:  Not available  Risk Level:  N/A  Value-based purchasing:   No  Bundle patient:  No    Residency:  Private residence  Exterior Steps:  [facility] Steps:  None    Lives With:  Adult children Arie Jamison    Prior functioning:  Independent.   Patient requires assistance with:  N/A    Prior DME required:  None    DME available:  Rolling walker and Shower chair    Rehab history:  SNF:  Provider - [facility]  Date - 3-4 years ago    Discharge Concerns:  None      Insurer:  Payor: [employer] / Plan: [address] [place] [address] HMO / Product Type: Managed Care Medicare /     PCP: Rodolfo Reed MD   Name of Practice:  [facility]   Current patient: Yes   Approximate date of last visit: 2020   Access to virtual PCP visits:   No    Pharmacy:  [facility] [place]. Pt denies any problems obtaining/taking medications. COVID-19 vaccination status:  Fully vaccinated on 07/08/21    DC Transport:  Family      Transition of care plan:  Home with outpatient follow-up     Comments:   CM met with Pt and daughter, Iesha Corona, to complete initial assessment. Pt lives at home with his daughter, Iesha Corona. Pt has no hx of HH or home O2. Pt has hx of SNF rehab at [facility] [address] [place] [address] about 3-4 years ago. Pt reports that he has a walker he does not use. Pt reports that he is independent with ADLs. Pt denies problems with ADLs. Pt reports SOB with ambulation. Discharge plan is for Pt to return home. Pt states family will transport him home.   _____________________________________  Joby Carlos, [address] [place] [address] Drive Management  7/8/2021   7:20 PM      Care Management Interventions  PCP Verified by CM: Yes Mojgan Carrillo MD)  Mode of Transport at Discharge:  Other (see comment) (family)  Transition of Care Consult (CM Consult): Discharge Planning  MyChart Signup: No  Discharge Durable Medical Equipment: No  Physical Therapy Consult: No  Occupational Therapy Consult: No  Speech Therapy Consult: No  Current Support Network: Relative's Home  Confirm Follow Up Transport: Family  Discharge Location  Discharge Placement: Home with outpatient services

## 2021-07-08 NOTE — ED PROVIDER NOTES
Is a 80-year-old gentleman with a history of coronary artery disease and hypertension who presents to the emergency department with bilateral lower extremity swelling, generalized weakness, dyspnea on exertion, and chest pain on exertion. He reports that his symptoms started approximately 2 to 3 weeks ago and have been gradually worsening. He had avoided coming into the emergency department for the symptoms because he was worried that we would admit him. The patient has had some intermittent reflux that is worse when he lays down on his left side and wakes him up from his sleep; he reports that when this happens he feels like he is choking. He has not had any fevers, chills, or cough. He also accidentally clipped skin off the tip of his toe 2 weeks ago and has developed a dark discoloration. The history is provided by the patient. Leg Swelling          Past Medical History:   Diagnosis Date    CAD (coronary artery disease)     [facility]     Diabetes ([facility]     5yrs    GERD (gastroesophageal reflux disease)     Morbid obesity ([facility]     Prostate cancer ([facility]        Past Surgical History:   Procedure Laterality Date    HX CORONARY ARTERY BYPASS GRAFT      HX HEART CATHETERIZATION      HX ORTHOPAEDIC      right wrist carpal tunnel repair         Family History:   Problem Relation Age of Onset    Heart Disease Father     Diabetes Father     Cancer Sister         BREAST       Social History     Socioeconomic History    Marital status:      Spouse name: Not on file    Number of children: Not on file    Years of education: Not on file    Highest education level: Not on file   Occupational History    Not on file   Tobacco Use    Smoking status: Former Smoker     Packs/day: 1.00     Years: 7.00     Pack years: 7.00     Quit date: 1991     Years since quittin.6    Smokeless tobacco: Former User   Substance and Sexual Activity    Alcohol use:  Yes Comment: VERY RARE    Drug use: No    Sexual activity: Not on file   Other Topics Concern    Not on file   Social History Narrative    Not on file     Social Determinants of Health     Financial Resource Strain:     Difficulty of Paying Living Expenses:    Food Insecurity:     Worried About Running Out of Food in the Last Year:     [address] in the Last Year:    Transportation Needs:     Lack of Transportation (Medical):  Lack of Transportation (Non-Medical):    Physical Activity:     Days of Exercise per Week:     Minutes of Exercise per Session:    Stress:     Feeling of Stress :    Social Connections:     Frequency of Communication with Friends and Family:     Frequency of Social Gatherings with Friends and Family:     Attends [employer] Services:     Active Member of Clubs or Organizations:     Attends Club or Organization Meetings:     Marital Status:    Intimate Partner Violence:     Fear of Current or Ex-Partner:     Emotionally Abused:     Physically Abused:     Sexually Abused: ALLERGIES: Patient has no known allergies. Review of Systems   Constitutional: Positive for fatigue. Negative for chills and fever. Respiratory: Positive for shortness of breath. Cardiovascular: Positive for chest pain and leg swelling. Gastrointestinal: Negative for abdominal pain, constipation, diarrhea and vomiting. Neurological: Negative for dizziness and light-headedness. All other systems reviewed and are negative. Vitals:    07/08/21 1743   BP: (!) 156/83   Pulse: 74   Resp: 16   Temp: (!) 96 °F (35.6 °C)   SpO2: 98%   Weight: 142.9 kg (315 lb)   Height: 5' 9\" (1.753 m)            Physical Exam  Vitals and nursing note reviewed. Constitutional:       General: He is not in acute distress. Appearance: He is well-developed. He is obese. HENT:      Head: Normocephalic and atraumatic.    Eyes:      Conjunctiva/sclera: Conjunctivae normal.      Pupils: Pupils are equal, round, and reactive to light. Cardiovascular:      Rate and Rhythm: Normal rate and regular rhythm. Pulmonary:      Effort: Pulmonary effort is normal.      Breath sounds: Examination of the left-lower field reveals rales. Rales present. Abdominal:      General: There is no distension. Palpations: Abdomen is soft. Tenderness: There is no abdominal tenderness. Musculoskeletal:         General: Normal range of motion. Cervical back: Normal range of motion. Right lower le+ Edema present. Left lower le+ Edema present. Feet:      Comments: Darkening of the left second toe with shallow ulceration to the tip of the toe  Skin:     General: Skin is dry. Capillary Refill: Capillary refill takes less than 2 seconds. Neurological:      Mental Status: He is alert. MDM         Procedures      MEDICAL DECISION MAKIN y.o. male presents with Leg Swelling    Differential diagnosis includes but not limited to: Osteomyelitis, pericarditis, STEMI, arrhythmia, CHF, pulmonary edema, fluid overload, peripheral edema, DVT, pneumonia, pneumothorax, sepsis    LABORATORY TESTS:  Labs Reviewed   CBC WITH AUTOMATED DIFF   METABOLIC PANEL, COMPREHENSIVE   NT-PRO BNP   TROPONIN I   SAMPLES BEING HELD   SED RATE (ESR)   C REACTIVE PROTEIN, QT       IMAGING RESULTS:  XR 2ND TOE LT MIN 2 V   Final Result      Second distal phalanx osteomyelitis. XR CHEST PA LAT   Final Result   1. No radiographic evidence of acute cardiopulmonary disease. MEDICATIONS GIVEN:  Medications - No data to display    PROGRESS NOTE:    The patient's ED course has been uncomplicated    EKG:  EKG performed at 6:21 PM  Normal sinus rhythm, 65 bpm, T wave inversions in I & aVL, no STEMI, no ectopy  EKG interpreted by Ned Acosta MD     CONSULTS:  7:59 PM Hospitalist Consult: ED Room Number: ER15/15, Patient Name and age:  Jimenez Juarez. 77 y.o.  male, Working Diagnosis:   1.  Dyspnea on exertion    2. Chest pain on exertion    3. Peripheral edema    4. Other acute osteomyelitis of left foot ([facility]    , COVID-19 Suspicion:  no, Sepsis present:  no  Reassessment needed: no, Code Status:  Full Code, Readmission: no, Isolation Requirements:  no, Recommended Level of Care:  telemetry, Department:[facility] ED - [phone], Other:  Hx of CAD    IMPRESSION:  1. Dyspnea on exertion    2. Chest pain on exertion    3. Peripheral edema    4.  Other acute osteomyelitis of left foot ([facility]        PLAN:  - Admit to hospitalist        Vincent Mock MD

## 2021-07-09 ENCOUNTER — APPOINTMENT (OUTPATIENT)
Dept: VASCULAR SURGERY | Age: 76
DRG: 616 | End: 2021-07-09
Attending: INTERNAL MEDICINE
Payer: MEDICARE

## 2021-07-09 ENCOUNTER — APPOINTMENT (OUTPATIENT)
Dept: NON INVASIVE DIAGNOSTICS | Age: 76
DRG: 616 | End: 2021-07-09
Attending: HOSPITALIST
Payer: MEDICARE

## 2021-07-09 PROBLEM — N39.0 UTI (URINARY TRACT INFECTION): Status: RESOLVED | Noted: 2018-09-03 | Resolved: 2021-07-09

## 2021-07-09 PROBLEM — R06.02 SOB (SHORTNESS OF BREATH): Status: RESOLVED | Noted: 2019-01-28 | Resolved: 2021-07-09

## 2021-07-09 LAB
ALBUMIN SERPL-MCNC: 2.3 G/DL (ref 3.5–5)
ALBUMIN/GLOB SERPL: 0.5 {RATIO} (ref 1.1–2.2)
ALP SERPL-CCNC: 66 U/L (ref 45–117)
ALT SERPL-CCNC: 13 U/L (ref 12–78)
ANION GAP SERPL CALC-SCNC: 4 MMOL/L (ref 5–15)
AST SERPL-CCNC: 16 U/L (ref 15–37)
ATRIAL RATE: 65 BPM
BASOPHILS # BLD: 0 K/UL (ref 0–0.1)
BASOPHILS NFR BLD: 0 % (ref 0–1)
BILIRUB SERPL-MCNC: 0.5 MG/DL (ref 0.2–1)
BUN SERPL-MCNC: 27 MG/DL (ref 6–20)
BUN/CREAT SERPL: 21 (ref 12–20)
CALCIUM SERPL-MCNC: 7.8 MG/DL (ref 8.5–10.1)
CALCULATED P AXIS, ECG09: 68 DEGREES
CALCULATED R AXIS, ECG10: -49 DEGREES
CALCULATED T AXIS, ECG11: 115 DEGREES
CHLORIDE SERPL-SCNC: 106 MMOL/L (ref 97–108)
CO2 SERPL-SCNC: 28 MMOL/L (ref 21–32)
CREAT SERPL-MCNC: 1.27 MG/DL (ref 0.7–1.3)
DIAGNOSIS, 93000: NORMAL
DIFFERENTIAL METHOD BLD: ABNORMAL
EOSINOPHIL # BLD: 0.2 K/UL (ref 0–0.4)
EOSINOPHIL NFR BLD: 3 % (ref 0–7)
ERYTHROCYTE [DISTWIDTH] IN BLOOD BY AUTOMATED COUNT: 12.6 % (ref 11.5–14.5)
EST. AVERAGE GLUCOSE BLD GHB EST-MCNC: 183 MG/DL
GLOBULIN SER CALC-MCNC: 4.7 G/DL (ref 2–4)
GLUCOSE BLD STRIP.AUTO-MCNC: 181 MG/DL (ref 65–117)
GLUCOSE BLD STRIP.AUTO-MCNC: 186 MG/DL (ref 65–117)
GLUCOSE BLD STRIP.AUTO-MCNC: 197 MG/DL (ref 65–117)
GLUCOSE BLD STRIP.AUTO-MCNC: 210 MG/DL (ref 65–117)
GLUCOSE BLD STRIP.AUTO-MCNC: 225 MG/DL (ref 65–117)
GLUCOSE BLD STRIP.AUTO-MCNC: 273 MG/DL (ref 65–117)
GLUCOSE SERPL-MCNC: 270 MG/DL (ref 65–100)
HBA1C MFR BLD: 8 % (ref 4–5.6)
HCT VFR BLD AUTO: 32.3 % (ref 36.6–50.3)
HGB BLD-MCNC: 10.4 G/DL (ref 12.1–17)
IMM GRANULOCYTES # BLD AUTO: 0 K/UL (ref 0–0.04)
IMM GRANULOCYTES NFR BLD AUTO: 0 % (ref 0–0.5)
LEFT ABI: 1.37
LEFT ANTERIOR TIBIAL: 240 MMHG
LEFT ARM BP: 175 MMHG
LEFT POSTERIOR TIBIAL: 0 MMHG
LEFT TBI: 0.96
LEFT TOE PRESSURE: 168 MMHG
LYMPHOCYTES # BLD: 2.6 K/UL (ref 0.8–3.5)
LYMPHOCYTES NFR BLD: 36 % (ref 12–49)
MCH RBC QN AUTO: 32.8 PG (ref 26–34)
MCHC RBC AUTO-ENTMCNC: 32.2 G/DL (ref 30–36.5)
MCV RBC AUTO: 101.9 FL (ref 80–99)
MONOCYTES # BLD: 0.7 K/UL (ref 0–1)
MONOCYTES NFR BLD: 9 % (ref 5–13)
NEUTS SEG # BLD: 3.8 K/UL (ref 1.8–8)
NEUTS SEG NFR BLD: 52 % (ref 32–75)
NRBC # BLD: 0 K/UL (ref 0–0.01)
NRBC BLD-RTO: 0 PER 100 WBC
P-R INTERVAL, ECG05: 186 MS
PLATELET # BLD AUTO: 216 K/UL (ref 150–400)
PMV BLD AUTO: 9.3 FL (ref 8.9–12.9)
POTASSIUM SERPL-SCNC: 4.2 MMOL/L (ref 3.5–5.1)
PROT SERPL-MCNC: 7 G/DL (ref 6.4–8.2)
Q-T INTERVAL, ECG07: 416 MS
QRS DURATION, ECG06: 124 MS
QTC CALCULATION (BEZET), ECG08: 432 MS
RBC # BLD AUTO: 3.17 M/UL (ref 4.1–5.7)
RBC MORPH BLD: ABNORMAL
RIGHT ABI: 1.06
RIGHT ANTERIOR TIBIAL: 186 MMHG
RIGHT ARM BP: 163 MMHG
RIGHT POSTERIOR TIBIAL: 0 MMHG
RIGHT TBI: 0.87
RIGHT TOE PRESSURE: 152 MMHG
SERVICE CMNT-IMP: ABNORMAL
SODIUM SERPL-SCNC: 138 MMOL/L (ref 136–145)
TROPONIN I SERPL-MCNC: <0.05 NG/ML
VENTRICULAR RATE, ECG03: 65 BPM
WBC # BLD AUTO: 7.3 K/UL (ref 4.1–11.1)

## 2021-07-09 PROCEDURE — 74011250636 HC RX REV CODE- 250/636: Performed by: EMERGENCY MEDICINE

## 2021-07-09 PROCEDURE — 74011250637 HC RX REV CODE- 250/637: Performed by: INTERNAL MEDICINE

## 2021-07-09 PROCEDURE — 74011000250 HC RX REV CODE- 250: Performed by: EMERGENCY MEDICINE

## 2021-07-09 PROCEDURE — 93922 UPR/L XTREMITY ART 2 LEVELS: CPT

## 2021-07-09 PROCEDURE — 80053 COMPREHEN METABOLIC PANEL: CPT

## 2021-07-09 PROCEDURE — 74011250636 HC RX REV CODE- 250/636: Performed by: INTERNAL MEDICINE

## 2021-07-09 PROCEDURE — 99223 1ST HOSP IP/OBS HIGH 75: CPT | Performed by: INTERNAL MEDICINE

## 2021-07-09 PROCEDURE — 84484 ASSAY OF TROPONIN QUANT: CPT

## 2021-07-09 PROCEDURE — 74011636637 HC RX REV CODE- 636/637: Performed by: HOSPITALIST

## 2021-07-09 PROCEDURE — 82962 GLUCOSE BLOOD TEST: CPT

## 2021-07-09 PROCEDURE — 65660000000 HC RM CCU STEPDOWN

## 2021-07-09 PROCEDURE — 74011250636 HC RX REV CODE- 250/636: Performed by: HOSPITALIST

## 2021-07-09 PROCEDURE — 85025 COMPLETE CBC W/AUTO DIFF WBC: CPT

## 2021-07-09 PROCEDURE — 36415 COLL VENOUS BLD VENIPUNCTURE: CPT

## 2021-07-09 PROCEDURE — 74011250637 HC RX REV CODE- 250/637: Performed by: HOSPITALIST

## 2021-07-09 PROCEDURE — 83036 HEMOGLOBIN GLYCOSYLATED A1C: CPT

## 2021-07-09 RX ORDER — VANCOMYCIN/0.9 % SOD CHLORIDE 1.5G/250ML
1500 PLASTIC BAG, INJECTION (ML) INTRAVENOUS EVERY 12 HOURS
Status: DISCONTINUED | OUTPATIENT
Start: 2021-07-09 | End: 2021-07-10

## 2021-07-09 RX ORDER — DIPHENHYDRAMINE HYDROCHLORIDE 50 MG/ML
25 INJECTION, SOLUTION INTRAMUSCULAR; INTRAVENOUS
Status: DISCONTINUED | OUTPATIENT
Start: 2021-07-09 | End: 2021-07-09

## 2021-07-09 RX ORDER — ALBUTEROL SULFATE 0.83 MG/ML
2.5 SOLUTION RESPIRATORY (INHALATION)
Status: DISCONTINUED | OUTPATIENT
Start: 2021-07-09 | End: 2021-07-17 | Stop reason: HOSPADM

## 2021-07-09 RX ORDER — LORAZEPAM 2 MG/ML
0.5 INJECTION INTRAMUSCULAR
Status: DISCONTINUED | OUTPATIENT
Start: 2021-07-09 | End: 2021-07-17 | Stop reason: HOSPADM

## 2021-07-09 RX ORDER — HYDRALAZINE HYDROCHLORIDE 20 MG/ML
20 INJECTION INTRAMUSCULAR; INTRAVENOUS
Status: DISCONTINUED | OUTPATIENT
Start: 2021-07-09 | End: 2021-07-17 | Stop reason: HOSPADM

## 2021-07-09 RX ORDER — CALCIUM CARB/MAGNESIUM CARB 311-232MG
5 TABLET ORAL
Status: DISCONTINUED | OUTPATIENT
Start: 2021-07-09 | End: 2021-07-17 | Stop reason: HOSPADM

## 2021-07-09 RX ORDER — OXYCODONE HYDROCHLORIDE 5 MG/1
5 TABLET ORAL
Status: DISCONTINUED | OUTPATIENT
Start: 2021-07-09 | End: 2021-07-17 | Stop reason: HOSPADM

## 2021-07-09 RX ORDER — FUROSEMIDE 10 MG/ML
40 INJECTION INTRAMUSCULAR; INTRAVENOUS ONCE
Status: COMPLETED | OUTPATIENT
Start: 2021-07-09 | End: 2021-07-09

## 2021-07-09 RX ORDER — FUROSEMIDE 20 MG/1
20 TABLET ORAL DAILY
Status: DISCONTINUED | OUTPATIENT
Start: 2021-07-10 | End: 2021-07-11

## 2021-07-09 RX ORDER — MORPHINE SULFATE 2 MG/ML
2 INJECTION, SOLUTION INTRAMUSCULAR; INTRAVENOUS
Status: DISCONTINUED | OUTPATIENT
Start: 2021-07-09 | End: 2021-07-17 | Stop reason: HOSPADM

## 2021-07-09 RX ORDER — MAG HYDROX/ALUMINUM HYD/SIMETH 200-200-20
30 SUSPENSION, ORAL (FINAL DOSE FORM) ORAL
Status: DISCONTINUED | OUTPATIENT
Start: 2021-07-09 | End: 2021-07-17 | Stop reason: HOSPADM

## 2021-07-09 RX ORDER — LOSARTAN POTASSIUM 50 MG/1
100 TABLET ORAL DAILY
Status: DISCONTINUED | OUTPATIENT
Start: 2021-07-09 | End: 2021-07-11

## 2021-07-09 RX ORDER — DIPHENHYDRAMINE HCL 25 MG
25 CAPSULE ORAL
Status: DISCONTINUED | OUTPATIENT
Start: 2021-07-09 | End: 2021-07-17 | Stop reason: HOSPADM

## 2021-07-09 RX ADMIN — ROSUVASTATIN CALCIUM 20 MG: 10 TABLET, COATED ORAL at 01:30

## 2021-07-09 RX ADMIN — INSULIN LISPRO 2 UNITS: 100 INJECTION, SOLUTION INTRAVENOUS; SUBCUTANEOUS at 06:13

## 2021-07-09 RX ADMIN — VANCOMYCIN HYDROCHLORIDE 1500 MG: 10 INJECTION, POWDER, LYOPHILIZED, FOR SOLUTION INTRAVENOUS at 22:05

## 2021-07-09 RX ADMIN — LOSARTAN POTASSIUM 100 MG: 50 TABLET, FILM COATED ORAL at 13:10

## 2021-07-09 RX ADMIN — VANCOMYCIN HYDROCHLORIDE 1500 MG: 10 INJECTION, POWDER, LYOPHILIZED, FOR SOLUTION INTRAVENOUS at 13:11

## 2021-07-09 RX ADMIN — INSULIN LISPRO 2 UNITS: 100 INJECTION, SOLUTION INTRAVENOUS; SUBCUTANEOUS at 01:44

## 2021-07-09 RX ADMIN — FUROSEMIDE 40 MG: 10 INJECTION, SOLUTION INTRAMUSCULAR; INTRAVENOUS at 01:30

## 2021-07-09 RX ADMIN — CEFEPIME HYDROCHLORIDE 2 G: 2 INJECTION, POWDER, FOR SOLUTION INTRAVENOUS at 20:37

## 2021-07-09 RX ADMIN — METRONIDAZOLE 500 MG: 500 INJECTION, SOLUTION INTRAVENOUS at 08:16

## 2021-07-09 RX ADMIN — INSULIN LISPRO 2 UNITS: 100 INJECTION, SOLUTION INTRAVENOUS; SUBCUTANEOUS at 18:27

## 2021-07-09 RX ADMIN — CARVEDILOL 3.12 MG: 3.12 TABLET, FILM COATED ORAL at 08:16

## 2021-07-09 RX ADMIN — ROSUVASTATIN CALCIUM 20 MG: 10 TABLET, COATED ORAL at 22:02

## 2021-07-09 RX ADMIN — Medication 10 ML: at 06:02

## 2021-07-09 RX ADMIN — SERTRALINE 50 MG: 50 TABLET, FILM COATED ORAL at 01:30

## 2021-07-09 RX ADMIN — METRONIDAZOLE 500 MG: 500 INJECTION, SOLUTION INTRAVENOUS at 01:31

## 2021-07-09 RX ADMIN — CARVEDILOL 3.12 MG: 3.12 TABLET, FILM COATED ORAL at 18:19

## 2021-07-09 RX ADMIN — HYDRALAZINE HYDROCHLORIDE 20 MG: 20 INJECTION INTRAMUSCULAR; INTRAVENOUS at 06:13

## 2021-07-09 RX ADMIN — Medication 10 ML: at 01:31

## 2021-07-09 RX ADMIN — FUROSEMIDE 40 MG: 10 INJECTION, SOLUTION INTRAMUSCULAR; INTRAVENOUS at 13:10

## 2021-07-09 RX ADMIN — SERTRALINE 50 MG: 50 TABLET, FILM COATED ORAL at 22:03

## 2021-07-09 RX ADMIN — CEFEPIME HYDROCHLORIDE 2 G: 2 INJECTION, POWDER, FOR SOLUTION INTRAVENOUS at 13:10

## 2021-07-09 RX ADMIN — METRONIDAZOLE 500 MG: 500 INJECTION, SOLUTION INTRAVENOUS at 20:38

## 2021-07-09 RX ADMIN — ASPIRIN 81 MG: 81 TABLET, CHEWABLE ORAL at 08:16

## 2021-07-09 RX ADMIN — Medication 10 ML: at 13:22

## 2021-07-09 RX ADMIN — CEFEPIME HYDROCHLORIDE 2 G: 2 INJECTION, POWDER, FOR SOLUTION INTRAVENOUS at 04:27

## 2021-07-09 RX ADMIN — Medication 10 ML: at 22:03

## 2021-07-09 NOTE — CONSULTS
Elgin Rodriguez MD., [facility]    [address] [facility] [place], [address] [facility]    Office [phone],[facility] [phone]           7/9/2021     Admit Date: 7/8/2021      Viki Aase is a 77 y.o. male admitted for Dyspnea on exertion [R06.00]  Chest pain on exertion [R07.9]  Peripheral edema [R60.9]  Acute osteomyelitis of left foot ([address] [M86.172]. Consult requested by Karina Walls MD       Assessment/Plan:    L toe osteomyelitis  Acute on chronic HFrEF  CAD-s/p CABG/chronic angina  PAF/chronic anticoagulation  Morbid obesity  DM  Hypertension  Hyperlipidemia    Plan:  Echocardiogram  Continue GDMT (guideline directed medical therapy) for cardiomyopathy. Continue aspirin, Coreg, Crestor,Losartan  IV Lasix 40 mg today / PO in am  Restart Xarelto post procedure  Class 3 risk  - 2 risk facotrs (10.1%)  from cardiac standpoint for surgery as per Bernardo's Revised cardiac risk  Index ( 30 day risk of death, MI, cardiac arrest). D/w pt. Patient understands the cardiac risk and wishes to proceed. Can proceed with surgery. Please do not hesitate to contact us with questions or concerns. See note below for details. Elgin Rodriguez MD      Cardiac Testing/Procedures: A. Cardiac Cath/PCI:    B.ECHO/STEW:    C.StressNuclear/Stress ECHO/Stress test:    D.Vascular:    E. EP:    F. Miscellaneous:  1/29/19: LEXISCAN- Apicoinferior ischemia. Left ventricular ejection fraction is 30%. 1/28/19: ECHO- LVEF 25%, LV cavity size mild dil, LV global HK; LA cavity mod dil; RA mild dil; MR mild-mod; TR mild-mod     1/9/18 Echo: Hypo/akinesis of the septal segments. Mild LVH. EF 35-40%. Right ventricle mildly dilated. Systolic function reduced. Aortic valve mildly thickened leaflets. 10/4/17: ECHO- LVEF 45 %, mild diffuse HK, thickness was mildly increased; Mod concentric hypertrophy. 5/24/2017: CATH- Obstructive 1VD:LAD p80, m100, dist very small vessel; D1 patent. LCx p30 (co-dom).  RCA patent (co-dom). 2/2 Grafts patent: LIMA-LAD patent.; SVG-D1 patent. 5/22/17: ECHO- Comparison was made 25-Nov-2014 and LV overall  function has decreased. LV wall hypokinesis, ventricle wall dilated, EF 35%;  SYSTEM MEASUREMENT TABLES  2D  LVOT Diam: 2.1 cm  Ao Diam: 3.2 cm  LA Diam: 4.6 cm  IVSd: 1.1 cm  LVIDd: 5.9 cm  LVIDs: 5.1 cm  LVPWd: 1 cm  SV(Teich): 53.9 ml     11/25/14: ECHO- TDS, - EF 45-50%. LVH, RV mild dilated - reduced RVEF   11/5/2012: ECHO- LVEF 50%, DD, Mild RV dysfuction. 11/10/11: CMR- . Normal left ventricular size by 3D volumetric assessment. Moderate left   ventricular systolic dysfunction. Severe hypokinesis of the inferior and   inferolateral wall. Mild hypokinesis of the anterior wall. LVEF 37%. 2. Normal right ventricular size and systolic function. 3. No significant valvular disease other than trace mitral and tricuspid   regurgitation. 4. Normal resting myocardial perfusion on first pass stress perfusion   imaging. 5. On LGE imaging, there is a very small focal area of small endocardial   infarct involving the basal inferolateral wall. The anterior, anteroseptal,   anterolateral, lateral, the entire inferior wall, the inferolateral wall,   inferoseptal wall and apex are completely viable. The LAD, LCx, and RCA   territories demonstrate significant viability for revascularization. 6. Large right-sided pleural effusion. Moderate left-sided pleural effusion. 11/8/2011: ECHO- LVEF 35-40%, LV- mod dilated, mod HK (basal-mid inferior/  basal-mid inferolateral walls) RV-dilated; mild LAE  2011: CATH- pLAD: 95%-> PCi-> 80% residual stenosis   2011: CABG- 2 V, LIMA -> LAD    History:     Patient  is a 77 y.o. male admitted for swelling LE/L second toe infection. Hx of chronic TALLEY/chronic intermitted precordial mild chest pain. Hx of CAD s/p CABG ( 12/14/2011).  Hx of CMP ( EF 25% - 2019)   Hx of DM/Morbid obesity/HTN/HLD/PAF/chronic anticoagulation - Xarelto  Trop <0.05  NTproBNP 5  Cxray - No radiographic evidence of acute cardiopulmonary disease.     PMH/PSH/FH/Soc Hx:     Past Medical History:   Diagnosis Date    CAD (coronary artery disease)     CHF (congestive heart failure) ([facility] 2013    Diabetes ([facility]     5yrs    GERD (gastroesophageal reflux disease)     Morbid obesity ([facility]     Prostate cancer ([facility]       Past Surgical History:   Procedure Laterality Date    HX CORONARY ARTERY BYPASS GRAFT      HX HEART CATHETERIZATION      HX ORTHOPAEDIC      right wrist carpal tunnel repair     No Known Allergies  Family History   Problem Relation Age of Onset    Heart Disease Father     Diabetes Father     Cancer Sister         BREAST      Social History     Tobacco Use    Smoking status: Former Smoker     Packs/day: 1.00     Years: 7.00     Pack years: 7.00     Quit date: 1991     Years since quittin.6    Smokeless tobacco: Former User   Substance Use Topics    Alcohol use: Yes     Comment: VERY RARE    Drug use: No           Medications:       Current Facility-Administered Medications   Medication Dose Route Frequency    hydrALAZINE (APRESOLINE) 20 mg/mL injection 20 mg  20 mg IntraVENous Q4H PRN    melatonin (rapid dissolve) tablet 5 mg  5 mg Oral QHS PRN    alum-mag hydroxide-simeth (MYLANTA) oral suspension 30 mL  30 mL Oral Q4H PRN    LORazepam (ATIVAN) injection 0.5 mg  0.5 mg IntraVENous Q6H PRN    oxyCODONE IR (ROXICODONE) tablet 5 mg  5 mg Oral Q4H PRN    morphine injection 2 mg  2 mg IntraVENous Q4H PRN    diphenhydrAMINE (BENADRYL) injection 25 mg  25 mg IntraVENous Q6H PRN    diphenhydrAMINE (BENADRYL) capsule 25 mg  25 mg Oral Q6H PRN    albuterol (PROVENTIL VENTOLIN) nebulizer solution 2.5 mg  2.5 mg Nebulization Q4H PRN    cefepime (MAXIPIME) 2 g in sterile water (preservative free) 10 mL IV syringe  2 g IntraVENous Q8H    vancomycin (VANCOCIN) 1500 mg in  ml infusion  1,500 mg IntraVENous Q24H    glucose chewable tablet 16 g  4 Tablet Oral PRN    dextrose (D50W) injection syrg 12.5-25 g  25-50 mL IntraVENous PRN    glucagon (GLUCAGEN) injection 1 mg  1 mg IntraMUSCular PRN    sodium chloride (NS) flush 5-40 mL  5-40 mL IntraVENous Q8H    sodium chloride (NS) flush 5-40 mL  5-40 mL IntraVENous PRN    acetaminophen (TYLENOL) tablet 650 mg  650 mg Oral Q6H PRN    Or    acetaminophen (TYLENOL) suppository 650 mg  650 mg Rectal Q6H PRN    polyethylene glycol (MIRALAX) packet 17 g  17 g Oral DAILY PRN    bisacodyL (DULCOLAX) suppository 10 mg  10 mg Rectal DAILY PRN    ondansetron (ZOFRAN) injection 4 mg  4 mg IntraVENous Q6H PRN    insulin lispro (HUMALOG) injection   SubCUTAneous Q6H    metroNIDAZOLE (FLAGYL) IVPB premix 500 mg  500 mg IntraVENous Q12H    carvediloL (COREG) tablet 3.125 mg  3.125 mg Oral BID WITH MEALS    sertraline (ZOLOFT) tablet 50 mg  50 mg Oral QHS    rosuvastatin (CRESTOR) tablet 20 mg  20 mg Oral QHS    nitroglycerin (NITROSTAT) tablet 0.4 mg  0.4 mg SubLINGual Q5MIN PRN    aspirin chewable tablet 81 mg  81 mg Oral DAILY     Current Outpatient Medications   Medication Sig    insulin glargine (Lantus Solostar U-100 Insulin) 100 unit/mL (3 mL) inpn 60 Units by SubCUTAneous route daily. omeprazole (PRILOSEC) 20 mg capsule take 1 capsule by mouth once daily    sertraline (ZOLOFT) 50 mg tablet Take 50 mg by mouth nightly. carvediloL (COREG) 3.125 mg tablet take 1 tablet by mouth twice a day AT 8 AM AND 6 PM    losartan-hydroCHLOROthiazide (HYZAAR) 100-25 mg per tablet take 1 tablet by mouth once daily    rosuvastatin (Crestor) 10 mg tablet Take 1 Tab by mouth nightly.     rivaroxaban (Xarelto) 20 mg tab tablet take 1 tablet by mouth once daily       Review of Systems:     As in HPI - all other 10 point ROS negative      Physical Exam:       Visit Vitals  BP (!) 170/76   Pulse 74   Temp 97.7 °F (36.5 °C)   Resp 20   Ht 5' 9\" (1.753 m)   Wt 315 lb (142.9 kg)   SpO2 100%   BMI 46.52 kg/m²         Telemetry: normal sinus rhythm    Gen: Well-developed, well-nourished, in no acute distress, morbidly obese  Neck: Supple, No Carotid Bruit,   Resp: No accessory muscle use, Clear breath sounds, No rales or rhonchi  Card: Regular Rate,Rythm,Normal S1, S2, No murmurs, rubs or gallop.    Abd:  Soft, obese,BS+,   MSK: No cyanosis  Skin: No rashes    Neuro: moving all four extremities , follows commands appropriately  Psych:  Good insight, oriented to person, place , alert, Nml Affect  LE: 2+ edema  L second toe - inflammed/swollen    EKG: SR/NSTT/LAD/PRWP        Cxray: Reviewed     LABS: Reviewed      Care Plan discussed with: Patient and Nursing Staff      Total time:      mins     Waleska Workman MD

## 2021-07-09 NOTE — PROGRESS NOTES
[address] Dosing Services: Antimicrobial Stewardship Daily Doc 7/8/2021    Consult for antibiotic dosing of Vancomycin by Dr. Misty Little  Indication: Osteomyelitis L toe; DFI  Day of Therapy 1    Ht Readings from Last 1 Encounters:   07/08/21 175.3 cm (69\")        Wt Readings from Last 1 Encounters:   07/08/21 142.9 kg (315 lb)      Vancomycin therapy:  Current maintenance dose: 2500 mg x 1 load  Dose calculated to approximate a therapeutic trough of 15-20 mcg/mL. Last trough level: Initial dosing  Plan for level / Adjustment in Therapy: Suspected CKD, unknown baseline versus PAULINA. Patient s/p vancomycin loading dose x 1 in ER. Limited utility baseline calculated kinetics given  Kg BMI >45 kg/m2. Will start 1500 mg Q24 hours. If creatinine improves in AM would consider Q12 hrs empirically however patient is at risk accumulation w/ repeat doses. Daily creatinine per protocol. Dose administration notes:   Doses given appropriately as scheduled    Date Dose & Interval Measured (mcg/mL) Extrapolated (mcg/mL)   ? ? ? ?   ? ? ? ?   ? ? ? ? Non-Kinetic Antimicrobial Dosing Regimen:   Current Regimen:  Cefepime 2 g Q8 hrs  Recommendation: Appropriate, indication OM and CrCl ~60 ml/min  Dose administration notes:   Doses given appropriately as scheduled    Other Antimicrobial   (not dosed by pharmacist) None   Cultures None   Serum Creatinine Lab Results   Component Value Date/Time    Creatinine 1.51 (H) 07/08/2021 06:38 PM    Creatinine (POC) 1.4 (H) 03/25/2016 02:15 AM         Creatinine Clearance Estimated Creatinine Clearance: 60.5 mL/min (A) (based on SCr of 1.51 mg/dL (H)).      Temp Temp: (!) 96 °F (35.6 °C)       WBC Lab Results   Component Value Date/Time    WBC 6.9 07/08/2021 06:38 PM        H/H Lab Results   Component Value Date/Time    HGB 11.1 (L) 07/08/2021 06:38 PM        Platelets    Lab Results   Component Value Date/Time    PLATELET 883 20/48/6530 06:38 PM          Thanks,  Pharmacist Jolly Marsh Cameron Contact information: [phone]

## 2021-07-09 NOTE — PROGRESS NOTES
Automatic dose adjustment per [facility] P&T protocol for Metronidazole (Flagyl) for this 77 y.o. for indication of: Osteomyelitis. Current regimen: Metronidazole 500 mg Q6 hrs  Recommended regimen: Metronidazole 500 mg Q12 hrs    Intervention:  1.  Pharmacy will automatically change the dose of metronidazole to 500 mg every 12 hours for all indications except for the following:   a. C. difficile infection   b. CNS infections   c. Non-anaerobic infections (giardiasis, trichomonas, H pylori, etc)     Thank you,  ERNST Moore , PHARMD Contact information: [phone]

## 2021-07-09 NOTE — PROGRESS NOTES
JOVITA attempted, patient just got transferred to the floor and being cleaned. Will attempt again later.

## 2021-07-09 NOTE — PROGRESS NOTES
[facility]: MED RECONCILIATION    Comments/Recommendations:   PTA medications reviewed with patient and daughter at bedside. Patient is a poor historian however provides that he takes lantus 60 units once daily and did have a dose this AM. Patient states he has not taken any of his oral medications since 7/7 PM.   Patient's daughter provides an updated medication list from home    Medications removed:    Potassium chloride 20 mEQ daily    Medications adjusted:    Lantus 60 units once daily- Pt took dose this AM  Sertraline @ bedtime  Xarelto confirmed in AM, pt missed today's dose    Information obtained from: Patient and daughter at bedside    Allergies: Patient has no known allergies. Prior to Admission Medications:     Medication Documentation Review Audit       Reviewed by Percival Kawasaki, FAIR[place]KS (Pharmacist) on 07/08/21 at 2023      Medication Sig Documenting Provider Last Dose Status Taking?   carvediloL (COREG) 3.125 mg tablet take 1 tablet by mouth twice a day AT 8 AM AND 6 PM Marcell Maya MD 7/7/2021 PM Active Yes   insulin glargine (Lantus Solostar U-100 Insulin) 100 unit/mL (3 mL) inpn 60 Units by SubCUTAneous route daily. Provider, Historical 7/8/2021 AM Active Yes   losartan-hydroCHLOROthiazide (HYZAAR) 100-25 mg per tablet take 1 tablet by mouth once daily Marcell Maya MD 7/7/2021 Active Yes   omeprazole (PRILOSEC) 20 mg capsule take 1 capsule by mouth once daily Provider, Historical 7/7/2021 Active Yes   rivaroxaban (Xarelto) 20 mg tab tablet take 1 tablet by mouth once daily Marcell Maya MD 7/7/2021 AM Active Yes   rosuvastatin (Crestor) 10 mg tablet Take 1 Tab by mouth nightly. Justa Miller MD 7/7/2021 PM Active Yes   sertraline (ZOLOFT) 50 mg tablet Take 50 mg by mouth nightly.  Provider, Historical 7/7/2021 Active Yes                  Sarah Dunbar, PHARMD   Contact: [phone]

## 2021-07-09 NOTE — PROGRESS NOTES
7/9/2021  Case Management Progress Note    11:58 AM  Patient is 77year old male admitted 7/8 for osteomyelitis  Patient's RUR is 20% yellow/moderate risk for readmission  Chart reviewed--patient new to the floor at this time  Noted that the plan is for him to get his metatarsal amputated tomorrow morning. Unsure of the plan following that, we will wait for cultures and see if he needs any skilled services. Transition of Care Plan  1. Continue medical management/treatment  2. Amputation tomorrow  3. Likely home when ready   4.  CM will continue to follow    KINZA Dasilva

## 2021-07-09 NOTE — ROUTINE PROCESS
Bedside shift change report given to [place], RN (oncoming nurse) by Amanda Ulrich RN (offgoing nurse).  Report included the following information SBAR, Kardex, Intake/Output, MAR and Accordion.

## 2021-07-09 NOTE — PROGRESS NOTES
[facility]    Medical Progress Note      NAME: Lonny Albrecht. :  1945  MRM:  [id number]    Date/Time of service 2021  9:09 AM          Assessment and Plan:     Osteomyelitis of second toe of left foot / Diabetic foot infection - POA. Xray suggests osteo. Consult podiatry. Check JOVITA. Awaiting cx. For now vanco and cefepine. CHF (congestive heart failure), NYHA class III, chronic, systolic / Peripheral edema / Dyspnea on exertion / Elevated brain natriuretic peptide (BNP) level - POA, last ECHO 25%. Consult cardiology to assess status and risk for toe surgery. Usually on BB, HCTZ, ARB, but not on loop diuretic    DM type 2 uncontrolled causing vascular, renal and neurological disease - Diabetic diet and counseling when eating. SSI per protocol. Resume home lantus. Check A1c. Coronary atherosclerosis of native coronary artery S/P CABG x 2 / Chest pain - Cardiology consulted. Troponin negative so far. Continue statin, coreg, HCTZ, losartan, ASA, holding xarelto. PAF (paroxysmal atrial fibrillation) / Chronic anticoagulation - Monitor remote tele. Usually on BB and xarelto, but holding that for potential surgery. Prolonged grief reaction / Anxiety and depression - POA, likely affects his compliance. Would benefit from counseling outpatient. Continue sertraline    Chronic kidney disease stage 3 - POA and likely stable. Monitor. Anemia - POA likely due to chronic disease. Check serologies. Morbid obesity - Advise weight loss, Needs outpatient [place] testing. Likely [place]. Monitor oxygen    Hyperlipidemia - Continue rosuvastatin    Hx Prostate cancer - Outpatient follow up    GERD (gastroesophageal reflux disease) - PPI       Subjective:     Chief Complaint:  Weak, leg pain    ROS:  (bold if positive, if negative)    Tolerating some PT  NPO        Objective:     Last 24hrs VS reviewed since prior progress note.  Most recent are:    Visit Vitals  BP (!) 170/76   Pulse 74   Temp 97.7 °F (36.5 °C)   Resp 20   Ht 5' 9\" (1.753 m)   Wt 142.9 kg (315 lb)   SpO2 100%   BMI 46.52 kg/m²     SpO2 Readings from Last 6 Encounters:   07/09/21 100%   09/03/20 98%   01/31/19 96%   11/12/18 99%   09/04/18 97%   10/31/17 95%            Intake/Output Summary (Last 24 hours) at 7/9/2021 8666  Last data filed at 7/9/2021 5947  Gross per 24 hour   Intake 20 ml   Output 1750 ml   Net -1730 ml        Physical Exam:    Gen:  Morbid obese, in no acute distress  HEENT:  Pink conjunctivae, PERRL, hearing intact to voice, moist mucous membranes  Neck:  Supple, without masses, thyroid non-tender  Resp:  No accessory muscle use, distant breath sounds without wheezes rales or rhonchi  Card:  No murmurs, normal S1, S2 without thrills, bruits, L>R LE peripheral edema  Abd:  Soft, non-tender, non-distended, normoactive bowel sounds are present, no mass  Lymph:  No cervical or inguinal adenopathy  Musc:  No cyanosis or clubbing  Skin:  L 2nd toe dark, skin turgor is good  Neuro:  Cranial nerves are grossly intact, general motor weakness, follows commands slowly  Psych:  Poor insight, oriented to person, place and time, flat, depressed    Telemetry reviewed:   normal sinus rhythm  __________________________________________________________________  Medications Reviewed: (see below)  Medications:     Current Facility-Administered Medications   Medication Dose Route Frequency    hydrALAZINE (APRESOLINE) 20 mg/mL injection 20 mg  20 mg IntraVENous Q4H PRN    melatonin (rapid dissolve) tablet 5 mg  5 mg Oral QHS PRN    alum-mag hydroxide-simeth (MYLANTA) oral suspension 30 mL  30 mL Oral Q4H PRN    LORazepam (ATIVAN) injection 0.5 mg  0.5 mg IntraVENous Q6H PRN    oxyCODONE IR (ROXICODONE) tablet 5 mg  5 mg Oral Q4H PRN    morphine injection 2 mg  2 mg IntraVENous Q4H PRN    diphenhydrAMINE (BENADRYL) capsule 25 mg  25 mg Oral Q6H PRN    albuterol (PROVENTIL VENTOLIN) nebulizer solution 2.5 mg  2.5 mg Nebulization Q4H PRN    cefepime (MAXIPIME) 2 g in sterile water (preservative free) 10 mL IV syringe  2 g IntraVENous Q8H    vancomycin (VANCOCIN) 1500 mg in  ml infusion  1,500 mg IntraVENous Q24H    glucose chewable tablet 16 g  4 Tablet Oral PRN    dextrose (D50W) injection syrg 12.5-25 g  25-50 mL IntraVENous PRN    glucagon (GLUCAGEN) injection 1 mg  1 mg IntraMUSCular PRN    sodium chloride (NS) flush 5-40 mL  5-40 mL IntraVENous Q8H    sodium chloride (NS) flush 5-40 mL  5-40 mL IntraVENous PRN    acetaminophen (TYLENOL) tablet 650 mg  650 mg Oral Q6H PRN    polyethylene glycol (MIRALAX) packet 17 g  17 g Oral DAILY PRN    bisacodyL (DULCOLAX) suppository 10 mg  10 mg Rectal DAILY PRN    ondansetron (ZOFRAN) injection 4 mg  4 mg IntraVENous Q6H PRN    insulin lispro (HUMALOG) injection   SubCUTAneous Q6H    metroNIDAZOLE (FLAGYL) IVPB premix 500 mg  500 mg IntraVENous Q12H    carvediloL (COREG) tablet 3.125 mg  3.125 mg Oral BID WITH MEALS    sertraline (ZOLOFT) tablet 50 mg  50 mg Oral QHS    rosuvastatin (CRESTOR) tablet 20 mg  20 mg Oral QHS    nitroglycerin (NITROSTAT) tablet 0.4 mg  0.4 mg SubLINGual Q5MIN PRN    aspirin chewable tablet 81 mg  81 mg Oral DAILY     Current Outpatient Medications   Medication Sig    insulin glargine (Lantus Solostar U-100 Insulin) 100 unit/mL (3 mL) inpn 60 Units by SubCUTAneous route daily.  omeprazole (PRILOSEC) 20 mg capsule take 1 capsule by mouth once daily    sertraline (ZOLOFT) 50 mg tablet Take 50 mg by mouth nightly.  carvediloL (COREG) 3.125 mg tablet take 1 tablet by mouth twice a day AT 8 AM AND 6 PM    losartan-hydroCHLOROthiazide (HYZAAR) 100-25 mg per tablet take 1 tablet by mouth once daily    rosuvastatin (Crestor) 10 mg tablet Take 1 Tab by mouth nightly.     rivaroxaban (Xarelto) 20 mg tab tablet take 1 tablet by mouth once daily        Lab Data Reviewed: (see below)  Lab Review:     Recent Labs     07/09/21  4747 07/08/21  1838   WBC 7.3 6.9   HGB 10.4* 11.1*   HCT 32.3* 34.2*    229     Recent Labs     07/09/21  0132 07/08/21  1838    137   K 4.2 4.9    104   CO2 28 29   * 289*   BUN 27* 27*   CREA 1.27 1.51*   CA 7.8* 8.3*   ALB 2.3* 2.5*   TBILI 0.5 0.7   ALT 13 14     Lab Results   Component Value Date/Time    Glucose (POC) 225 (H) 07/09/2021 06:07 AM    Glucose (POC) 273 (H) 07/09/2021 01:29 AM    Glucose (POC) 206 (H) 01/31/2019 04:26 PM    Glucose (POC) 173 (H) 01/31/2019 12:14 PM    Glucose (POC) 142 (H) 01/31/2019 09:57 AM     No results for input(s): PH, PCO2, PO2, HCO3, FIO2 in the last 72 hours. No results for input(s): INR, INREXT in the last 72 hours. All Micro Results     None          Other pertinent lab: none    Total time spent with patient: 30 Minutes I personally reviewed chart, notes, data and current medications in the medical record. I have personally examined and treated the patient at bedside during this period.                  Care Plan discussed with: Patient, Care Manager, Nursing Staff, Consultant/Specialist and >50% of time spent in counseling and coordination of care    Discussed:  Care Plan and D/C Planning    Prophylaxis:  H2B/PPI    Disposition:   PT, OT, RN           ___________________________________________________    Attending Physician: Eligio Zambrano MD

## 2021-07-09 NOTE — H&P
[address]  [phone]     Hospitalist Admission Note      NAME: Marci Thibodeaux. :  1945   MRN:  [id number]     Date/Time:  2021 8:49 PM    Patient PCP: Katrina Valencia MD    Emergency Contact:    Extended Emergency Contact Information  Primary Emergency Contact: [address] Phone: [phone]  Relation: Daughter      Code: Full Code     Isolation Precautions: There are currently no Active Isolations        Subjective:     CHIEF COMPLAINT:  \"Left foot swelling\"     HISTORY OF PRESENT ILLNESS:     Mr. Mendel Mary is a 77 y.o. male with history that includes DM 2 presents to ER because of increasing bilateral LE swelling and left second toe swelling with discoloration. He reports that he was cutting his toe nails and thinks he cut off a small portion of toe approximately 2-1/2 weeks ago. About a week later he started noticing swelling and color change to that second toe on the left foot but no pain. Had occasional drainage and bleeding. Appearance has significantly gotten worse but does not appear to have pain related to it but does have pain of feet bilaterally but mostly left ankle which he attributes to edema. In addition he has been experiencing dyspnea on exertion that per daughter has been going on for months and has progressively gotten worse along with brief intermittent nonradiating left-sided sometimes right-sided chest pain which can occur at rest.  Episodes usually mild but at times moderate in intensity. Last episode of chest pain early this morning. Also appears to have dyspnea on exertion going on for least a couple of months but seems to have worsened over the past 3 weeks per daughter. Echocardiogram 2019 showed severely decreased systolic function with EF of 25%.   Daughter Jen Heading at bedside helping provide some of the history as patient is somewhat of a challenging historian. In the ER x-ray of the foot consistent with osteomyelitis of the second toe of the left foot. Laboratory showed an elevated CRP, elevated BNP at 2882, and a normal troponin. No Known Allergies    Prior to Admission medications    Medication Sig Start Date End Date Taking? Authorizing Provider   insulin glargine (Lantus Solostar U-100 Insulin) 100 unit/mL (3 mL) inpn 60 Units by SubCUTAneous route daily. Yes Provider, Historical   omeprazole (PRILOSEC) 20 mg capsule take 1 capsule by mouth once daily 20  Yes Provider, Historical   sertraline (ZOLOFT) 50 mg tablet Take 50 mg by mouth nightly. 20  Yes Provider, Historical   carvediloL (COREG) 3.125 mg tablet take 1 tablet by mouth twice a day AT 8 AM AND 6 PM 9/3/20  Yes Shelly Maya MD   losartan-hydroCHLOROthiazide (HYZAAR) 100-25 mg per tablet take 1 tablet by mouth once daily 9/3/20  Yes Parish Cook MD   rosuvastatin (Crestor) 10 mg tablet Take 1 Tab by mouth nightly.  9/3/20  Yes Parish Cook MD   rivaroxaban (Xarelto) 20 mg tab tablet take 1 tablet by mouth once daily 9/3/20  Yes Parish Cook MD       Past Medical History:   Diagnosis Date    CAD (coronary artery disease)     [facility]estive [facility] failure) [facility]     Diabetes ([facility]     5yrs    GERD (gastroesophageal reflux disease)     Morbid obesity ([facility]     Prostate cancer ([facility]         Past Surgical History:   Procedure Laterality Date    HX CORONARY ARTERY BYPASS GRAFT      HX HEART CATHETERIZATION      HX ORTHOPAEDIC      right wrist carpal tunnel repair       Social History     Tobacco Use    Smoking status: Former Smoker     Packs/day: 1.00     Years: 7.00     Pack years: 7.00     Quit date: 1991     Years since quittin.6    Smokeless tobacco: Former User   Substance Use Topics    Alcohol use: Yes     Comment: VERY RARE        Family History   Problem Relation Age of Onset    Heart Disease Father     Diabetes Father     Cancer Sister         BREAST        Review of Systems (14 point ROS):    Gen:   fatigue  Eyes:  negative  ENT:    negative  Resp:  negative except for TALLEY  CVS:   chest pain, edema and TALLEY  GI:       negative  :     negative  Skin:   negative  Hem:   negative  MS:     left toe swelling  Edel:    negative   Psy:    negative  Endo:  negative  ALL:   negative         Objective:      Visit Vitals  BP (!) 155/90   Pulse 74   Temp (!) 96 °F (35.6 °C)   Resp 16   Ht 5' 9\" (1.753 m)   Wt 142.9 kg (315 lb)   SpO2 98%   BMI 46.52 kg/m²       Exam:     Physical Exam:    General: alert, no distress and morbidly obese  Head: Normocephalic, without obvious abnormality, atraumatic  Eyes: PERRL, EOMI, anicteric sclerae and conjuntiva clear  ENT: Lips, mucosa, and tongue normal.   Neck: normal, supple and no tenderness  Lungs: clear to auscultation with good breath sounds and normal respiratory effort  Heart: S1, S2 normal, regular rate and regular rhythm  Abd: obese, not distended, soft, nontender, BS present and normactive  Ext: bilateral lower extremity 3+ edema swelling left second toe. Skin: Discoloration of the second toe left foot. No drainage noted at this time. See picture above for more details. Neuro:  alert, oriented x 3, no defects noted in general exam.  Psych: not anxious, cooperative, appropriate affect    Labs:    Recent Labs     07/08/21  1838   WBC 6.9   HGB 11.1*   HCT 34.2*        Recent Labs     07/08/21  1838      K 4.9      CO2 29   *   BUN 27*   CREA 1.51*   CA 8.3*   ALB 2.5*   TBILI 0.7   ALT 14       Radiology and EKG reviewed:     ECG 7/8/2021:  NSR, rate 65, T wave inversion Lead I and aVL    XR CHEST PA LAT    Result Date: 7/8/2021  1. No radiographic evidence of acute cardiopulmonary disease. XR 2ND TOE LT MIN 2 V    Result Date: 7/8/2021  Second distal phalanx osteomyelitis.     I personally reviewed and interpreted the imaging studies and agree with official reading. I discussed the findings with patient and daughter. The chart, ER course, the above PMSFH, lab work, and radiological studies was reviewed by me on: July 8, 2021       Assessment/Plan:      Osteomyelitis of second toe of left foot ([address] (7/8/2021): Admit for workup and required IV antibiotic therapy with cefepime and vancomycin already started in the ER and will add Flagyl for anaerobic coverage. Pharmacy consult for dosing for vancomycin. Local wound care as needed. Doubtful that any procedure will be done at this time until he has cardiac clearance but will make him n.p.o. overnight just in case and for any Cardiologic intervention if needed. Will defer additional imaging to podiatry. Consult(s): Podiatry consult. Chest pain / CAD Overview: 90% MID LAD / S/P CABG x 2 (12/14/2011): Appears to be anginal pain but is CP free at this time. Will trend troponin, obtain echo. ASA. [rn NTG, continue coreg and losartan but not HCTZ for now. Cards consult for CP and clearance for possible podiatry surgery. Dyspnea on exertion / Peripheral edema / Elevated BNP level:  TALLEY likely multifactorial due to chronic deconditioning, morbid obesity, CAD, edema, and severely decreased systolic function 81%. Will give dose of lasix IV tonight and consider continued diuresis. Cards consult, echo, and meds as above BB and ARBs. DM (diabetes mellitus), type 2:  Monitor blood glucose levels with insulin sliding scale coverage. No basal coverage for now. Monitor for complications. A1C. Body mass index is 46.52 kg/m².:  40 or above Morbid obesity      Risk of deterioration: high      Total time: 79 Minutes **I personally saw and examined the patient during this time period**    Discussed:  Code Status and Care Plan discussed with: Patient, Family, ED Provider and RN    Prophylaxis:  Has been on Xarelto not ordered at this time due to possible surgery. Missed today's dose.     Probable disposition:  [facility]OSSIE PT, OT, RN    Date of service:    7/8/2021                ___________________________________________________    Admitting Physician: Celestino Reid MD

## 2021-07-09 NOTE — PROGRESS NOTES
[facility] Dosing Services: Antimicrobial Stewardship Daily Doc    Consult for antibiotic dosing of Vancomycin by Dr. Priscilla Luo  Indication: OM/DFI  Day of Therapy: 2    Vancomycin therapy:  Current maintenance dose: 1,500 mg IV every 24 hours   Dose calculated to approximate a therapeutic trough of 15-20 mcg/mL. Assessment/Plan: Afebrile; WBCs WNL; SCr decreased - appears to be at baseline. In light of improved SCr, Vancomycin changed to 1,500 mg IV every 12 hours. Trough ordered prior to 1100 dose on 7/10/2021. Dose administration notes:   Doses given appropriately as scheduled    Date Dose & Interval Measured (mcg/mL) Extrapolated (mcg/mL)   07/10/2021 1,500 mg IV every 12 hours TBD TBD                 Other Antimicrobial   (not dosed by pharmacist) Cefepime 2 grams IV every 8 hours  Metronidazole 500 mg IV every 12 hours   Cultures none   Serum Creatinine Lab Results   Component Value Date/Time    Creatinine 1.27 07/09/2021 01:32 AM    Creatinine (POC) 1.4 (H) 03/25/2016 02:15 AM      Creatinine Clearance Estimated Creatinine Clearance: 71.9 mL/min (based on SCr of 1.27 mg/dL).      Temp Temp: 97.7 °F (36.5 °C)       WBC Lab Results   Component Value Date/Time    WBC 7.3 07/09/2021 01:32 AM      H/H Lab Results   Component Value Date/Time    HGB 10.4 (L) 07/09/2021 01:32 AM      Platelets    Lab Results   Component Value Date/Time    PLATELET 747 16/17/1388 01:32 AM      Pharmacist Marisela Raymond

## 2021-07-09 NOTE — CONSULTS
Podiatry Consult    Subjective:         Date of Consultation: July 9, 2021     Referring Physician: Zhanna Queen    Patient is a 77 y.o.  male who is being seen for Left 2nd digit osteomyelitis. Workup has revealed osteomyelitis left 2nd digit. Patient states he does not see a podiatrist regularly.      Patient Active Problem List    Diagnosis Date Noted    DM type 2 causing vascular disease ([address]     DM type 2 causing renal disease ([address]     DM type 2 causing neurological disease ([address]     CHF (congestive heart failure), NYHA class III, chronic, systolic (HCC)     Prolonged grief reaction     Anxiety and depression     Peripheral edema 07/08/2021    Dyspnea on exertion 07/08/2021    Chest pain 07/08/2021    Osteomyelitis of second toe of left foot ([address] 07/08/2021    Elevated brain natriuretic peptide (BNP) level 07/08/2021    Prostate cancer ([address]     GERD (gastroesophageal reflux disease)     CAD (coronary artery disease)     Hyperglycemia 09/02/2018    PAF (paroxysmal atrial fibrillation) ([address] 03/27/2016    Anemia 03/25/2016    Hyperlipidemia 11/25/2014    PAULINA (acute kidney injury) ([address] 11/25/2014    S/P CABG x 2 12/14/2011    Coronary atherosclerosis of native coronary artery 11/21/2011    Morbid obesity ([address] 11/08/2011     Past Medical History:   Diagnosis Date    Anxiety and depression     CAD (coronary artery disease)     CHF (congestive heart failure), NYHA class III, chronic, systolic (HCC)     DM type 2 causing neurological disease ([address]     DM type 2 causing renal disease ([address]     DM type 2 causing vascular disease ([address]     GERD (gastroesophageal reflux disease)     Morbid obesity (HCC)     Prolonged grief reaction     Prostate cancer ([address]       Past Surgical History:   Procedure Laterality Date    HX CORONARY ARTERY BYPASS GRAFT      HX HEART CATHETERIZATION      HX ORTHOPAEDIC      right wrist carpal tunnel repair      Family History   Problem Relation Age of Onset    Heart Disease Father     Diabetes Father     Cancer Sister         BREAST      Social History     Tobacco Use    Smoking status: Former Smoker     Packs/day: 1.00     Years: 7.00     Pack years: 7.00     Quit date: 1991     Years since quittin.6    Smokeless tobacco: Former User   Substance Use Topics    Alcohol use: Yes     Comment: VERY RARE     Current Facility-Administered Medications   Medication Dose Route Frequency Provider Last Rate Last Admin    hydrALAZINE (APRESOLINE) 20 mg/mL injection 20 mg  20 mg IntraVENous Q4H PRN Amanda Morris MD   20 mg at 21 3259    melatonin (rapid dissolve) tablet 5 mg  5 mg Oral QHS PRN Amanda Morris MD        alum-mag [facility] oral suspension 30 mL  30 mL Oral Q4H PRN Amanda Morris MD        LORazepam (ATIVAN) injection 0.5 mg  0.5 mg IntraVENous Q6H PRN Amanda Morris MD        oxyCODONE IR (ROXICODONE) tablet 5 mg  5 mg Oral Q4H PRN Amanda Morris MD        morphine injection 2 mg  2 mg IntraVENous Q4H PRN Amanda Morris MD        diphenhydrAMINE (BENADRYL) capsule 25 mg  25 mg Oral Q6H PRN Amanda Morris MD        albuterol (PROVENTIL VENTOLIN) nebulizer solution 2.5 mg  2.5 mg Nebulization Q4H PRN Amanda Morris MD        vancomycin (VANCOCIN) 1500 mg in  ml infusion  1,500 mg IntraVENous Q12H Amanda Morris MD        [START ON 7/10/2021] Vancomycin Trough: please obtain at 1030 on 7/10 (obtain 30 min prior to 1100 dose on 7/10) Thank you!   1 Each Other ONCE Amanda Morris MD        cefepime (MAXIPIME) 2 g in sterile water (preservative free) 10 mL IV syringe  2 g IntraVENous Q8H Talia Rosas  mL/hr at 21 0427 2 g at 21 0427    glucose chewable tablet 16 g  4 Tablet Oral PRN Amanda Morris MD        dextrose (D50W) injection syrg 12.5-25 g  25-50 mL IntraVENous PRN Amanda Morris MD        glucagon [facility] [place] [facility]") injection 1 mg  1 mg IntraMUSCular PRN Elvira Thorpe MD        sodium chloride (NS) flush 5-40 mL  5-40 mL IntraVENous Q8H Elvira Thorpe MD   10 mL at 07/09/21 0602    sodium chloride (NS) flush 5-40 mL  5-40 mL IntraVENous PRN Elvira Thorpe MD        acetaminophen (TYLENOL) tablet 650 mg  650 mg Oral Q6H PRN Elvira Thorpe MD        polyethylene glycol [facility]) packet 17 g  17 g Oral DAILY PRN Elvira Thorpe MD        bisacodyL (DULCOLAX) suppository 10 mg  10 mg Rectal DAILY PRN Elvira Thorpe MD        [facility]etron [facility]) injection 4 mg  4 mg IntraVENous Q6H PRN Elvira Thorpe MD        insulin lispro (HUMALOG) injection   SubCUTAneous Q6H Elvira Thorpe MD   2 Units at 07/09/21 5451    metroNIDAZOLE (FLAGYL) IVPB premix 500 mg  500 mg IntraVENous Q12H Elvira Thorpe  mL/hr at 07/09/21 0816 500 mg at 07/09/21 0816    carvediloL (COREG) tablet 3.125 mg  3.125 mg Oral BID WITH MEALS Elvira Thorpe MD   3.125 mg at 07/09/21 0816    sertraline (ZOLOFT) tablet 50 mg  50 mg Oral QHS Elvira Thorpe MD   50 mg at 07/09/21 0130    rosuvastatin (CRESTOR) tablet 20 mg  20 mg Oral QHS Elvira Thorpe MD   20 mg at 07/09/21 0130    nitroglycerin (NITROSTAT) tablet 0.4 mg  0.4 mg SubLINGual Q5MIN PRN Elvira Thorpe MD        aspirin chewable tablet 81 mg  81 mg Oral DAILY Elvira Thorpe MD   81 mg at 07/09/21 9469      No Known Allergies     Review of Systems:  A comprehensive review of systems was negative except for that written in the History of Present Illness.     Objective:     Patient Vitals for the past 8 hrs:   BP Temp Pulse Resp SpO2   07/09/21 1038  97.4 °F (36.3 °C)      07/09/21 1028 136/71  63 18 100 %   07/09/21 0922 134/65  65 19 100 %   07/09/21 0800 (!) 170/76 97.7 °F (36.5 °C) 74 20 100 %   07/09/21 0700 (!) 149/77  75 18 100 %   07/09/21 0630 (!) 149/75  87 20 100 %   07/09/21 0620 (!) 150/59  70 15 100 %   07/09/21 6673 (!) 183/107  68     21 0600 (!) 183/107    99 %   21 0500 (!) 173/98       21 0400 (!) 169/92       21 0300 (!) 160/85    99 %     Temp (24hrs), Av °F (36.1 °C), Min:96 °F (35.6 °C), Max:97.7 °F (36.5 °C)    DP 2/4 left foot, PT 1/4 left foot. CRT< 3 seconds. Open wound left 2nd digit with distal necrosis and surrounding edema and erythema. Loss of protective sensation. Xrays with osteomyelitis. Lab Review:   Recent Results (from the past 24 hour(s))   CBC WITH AUTOMATED DIFF    Collection Time: 21  6:38 PM   Result Value Ref Range    WBC 6.9 4.1 - 11.1 K/uL    RBC 3.38 (L) 4.10 - 5.70 M/uL    HGB 11.1 (L) 12.1 - 17.0 g/dL    HCT 34.2 (L) 36.6 - 50.3 %    .2 (H) 80.0 - 99.0 FL    MCH 32.8 26.0 - 34.0 PG    MCHC 32.5 30.0 - 36.5 g/dL    RDW 13.1 11.5 - 14.5 %    PLATELET 736 336 - 953 K/uL    MPV 10.0 8.9 - 12.9 FL    NRBC 0.0 0  WBC    ABSOLUTE NRBC 0.00 0.00 - 0.01 K/uL    NEUTROPHILS 62 32 - 75 %    LYMPHOCYTES 27 12 - 49 %    MONOCYTES 9 5 - 13 %    EOSINOPHILS 2 0 - 7 %    BASOPHILS 0 0 - 1 %    IMMATURE GRANULOCYTES 0 0.0 - 0.5 %    ABS. NEUTROPHILS 4.2 1.8 - 8.0 K/UL    ABS. LYMPHOCYTES 1.9 0.8 - 3.5 K/UL    ABS. MONOCYTES 0.7 0.0 - 1.0 K/UL    ABS. EOSINOPHILS 0.2 0.0 - 0.4 K/UL    ABS. BASOPHILS 0.0 0.0 - 0.1 K/UL    ABS. IMM.  GRANS. 0.0 0.00 - 0.04 K/UL    DF AUTOMATED     METABOLIC PANEL, COMPREHENSIVE    Collection Time: 21  6:38 PM   Result Value Ref Range    Sodium 137 136 - 145 mmol/L    Potassium 4.9 3.5 - 5.1 mmol/L    Chloride 104 97 - 108 mmol/L    CO2 29 21 - 32 mmol/L    Anion gap 4 (L) 5 - 15 mmol/L    Glucose 289 (H) 65 - 100 mg/dL    BUN 27 (H) 6 - 20 MG/DL    Creatinine 1.51 (H) 0.70 - 1.30 MG/DL    BUN/Creatinine ratio 18 12  20      GFR est AA 55 (L) >60 ml/min/1.73m2    GFR est non-AA 45 (L) >60 ml/min/1.73m2    Calcium 8.3 (L) 8.5 - 10.1 MG/DL    Bilirubin, total 0.7 0.2 - 1.0 MG/DL    ALT (SGPT) 14  U/L    AST (SGOT) 20 15 - 37 U/L    Alk. phosphatase 70 45 - 117 U/L    Protein, total 7.7 6.4 - 8.2 g/dL    Albumin 2.5 (L) 3.5 - 5.0 g/dL    Globulin 5.2 (H) 2.0 - 4.0 g/dL    A-G Ratio 0.5 (L) 1.1 - 2.2     NT-PRO BNP    Collection Time: 07/08/21  6:38 PM   Result Value Ref Range    NT pro-BNP 2,882 (H) <125 PG/ML   TROPONIN I    Collection Time: 07/08/21  6:38 PM   Result Value Ref Range    Troponin-I, Qt. <0.05 <0.05 ng/mL   SED RATE (ESR)    Collection Time: 07/08/21  6:38 PM   Result Value Ref Range    Sed rate, automated 72 (H) 0 - 20 mm/hr   C REACTIVE PROTEIN, QT    Collection Time: 07/08/21  6:38 PM   Result Value Ref Range    C-Reactive protein 1.64 (H) 0.00 - 0.60 mg/dL   GLUCOSE, POC    Collection Time: 07/09/21  1:29 AM   Result Value Ref Range    Glucose (POC) 273 (H) 65 - 117 mg/dL    Performed by Evan Pope Ei    HEMOGLOBIN A1C WITH EAG    Collection Time: 07/09/21  1:32 AM   Result Value Ref Range    Hemoglobin A1c 8.0 (H) 4.0 - 5.6 %    Est. average glucose 183 mg/dL   TROPONIN I    Collection Time: 07/09/21  1:32 AM   Result Value Ref Range    Troponin-I, Qt. <0.05 <2.31 ng/mL   METABOLIC PANEL, COMPREHENSIVE    Collection Time: 07/09/21  1:32 AM   Result Value Ref Range    Sodium 138 136 - 145 mmol/L    Potassium 4.2 3.5 - 5.1 mmol/L    Chloride 106 97 - 108 mmol/L    CO2 28 21 - 32 mmol/L    Anion gap 4 (L) 5 - 15 mmol/L    Glucose 270 (H) 65 - 100 mg/dL    BUN 27 (H) 6 - 20 MG/DL    Creatinine 1.27 0.70 - 1.30 MG/DL    BUN/Creatinine ratio 21 (H) 12 - 20      GFR est AA >60 >60 ml/min/1.73m2    GFR est non-AA 55 (L) >60 ml/min/1.73m2    Calcium 7.8 (L) 8.5 - 10.1 MG/DL    Bilirubin, total 0.5 0.2 - 1.0 MG/DL    ALT (SGPT) 13 12 - 78 U/L    AST (SGOT) 16 15 - 37 U/L    Alk.  phosphatase 66 45 - 117 U/L    Protein, total 7.0 6.4 - 8.2 g/dL    Albumin 2.3 (L) 3.5 - 5.0 g/dL    Globulin 4.7 (H) 2.0 - 4.0 g/dL    A-G Ratio 0.5 (L) 1.1 - 2.2     CBC WITH AUTOMATED DIFF    Collection Time: 07/09/21 1:32 AM   Result Value Ref Range    WBC 7.3 4.1 - 11.1 K/uL    RBC 3.17 (L) 4.10 - 5.70 M/uL    HGB 10.4 (L) 12.1 - 17.0 g/dL    HCT 32.3 (L) 36.6 - 50.3 %    .9 (H) 80.0 - 99.0 FL    MCH 32.8 26.0 - 34.0 PG    MCHC 32.2 30.0 - 36.5 g/dL    RDW 12.6 11.5 - 14.5 %    PLATELET 844 673 - 732 K/uL    MPV 9.3 8.9 - 12.9 FL    NRBC 0.0 0  WBC    ABSOLUTE NRBC 0.00 0.00 - 0.01 K/uL    NEUTROPHILS 52 32 - 75 %    LYMPHOCYTES 36 12 - 49 %    MONOCYTES 9 5 - 13 %    EOSINOPHILS 3 0 - 7 %    BASOPHILS 0 0 - 1 %    IMMATURE GRANULOCYTES 0 0.0 - 0.5 %    ABS. NEUTROPHILS 3.8 1.8 - 8.0 K/UL    ABS. LYMPHOCYTES 2.6 0.8 - 3.5 K/UL    ABS. MONOCYTES 0.7 0.0 - 1.0 K/UL    ABS. EOSINOPHILS 0.2 0.0 - 0.4 K/UL    ABS. BASOPHILS 0.0 0.0 - 0.1 K/UL    ABS. IMM. GRANS. 0.0 0.00 - 0.04 K/UL    DF SMEAR SCANNED      RBC COMMENTS NORMOCYTIC, NORMOCHROMIC     GLUCOSE, POC    Collection Time: 07/09/21  6:07 AM   Result Value Ref Range    Glucose (POC) 225 (H) 65 - 117 mg/dL    Performed by Evan Pope Ei    GLUCOSE, POC    Collection Time: 07/09/21 10:24 AM   Result Value Ref Range    Glucose (POC) 181 (H) 65 - 117 mg/dL    Performed by Sherly Elizondo (CON)        Impression:     Osteomyeltitis    Recommendation:     Patient education for osteomyelitis. Discussed amputation of left 2nd digit. Patient agrees with plan. Plan for amputation Saturday am (7/10) under local anesthetic. **Please document patient is medical stable for procedure. .    Continue IV antibiotics. NWB left foot. DVT/TX ppx. I would like to thank Dr. Aureliano Venegas  and nursing staff for assistance in the team approach and care for the patient.

## 2021-07-09 NOTE — ED NOTES
TRANSFER - OUT REPORT:    Verbal report given to Annamaria RN (name) on Jenifer Parada.  being transferred to 5th floor 514 (unit) for routine progression of care       Report consisted of patients Situation, Background, Assessment and   Recommendations(SBAR). Information from the following report(s) SBAR, Kardex, ED Summary, Intake/Output, Recent Results and Cardiac Rhythm NSR was reviewed with the receiving nurse. Lines:   Peripheral IV 07/08/21 Left Forearm (Active)   Site Assessment Clean, dry, & intact 07/09/21 0800   Phlebitis Assessment 0 07/09/21 0800   Infiltration Assessment 0 07/09/21 0800   Dressing Status Clean, dry, & intact 07/09/21 0800   Dressing Type Transparent;Tape 07/09/21 0800   Hub Color/Line Status Blue; Infusing 07/09/21 0800   Action Taken Open ports on tubing capped 07/09/21 0800   Alcohol Cap Used Yes 07/09/21 0800        Opportunity for questions and clarification was provided.       Patient transported with:   Monitor  Belongings  Transporter

## 2021-07-10 ENCOUNTER — APPOINTMENT (OUTPATIENT)
Dept: NON INVASIVE DIAGNOSTICS | Age: 76
DRG: 616 | End: 2021-07-10
Attending: HOSPITALIST
Payer: MEDICARE

## 2021-07-10 ENCOUNTER — APPOINTMENT (OUTPATIENT)
Dept: GENERAL RADIOLOGY | Age: 76
DRG: 616 | End: 2021-07-10
Attending: PODIATRIST
Payer: MEDICARE

## 2021-07-10 LAB
ALBUMIN SERPL-MCNC: 2.3 G/DL (ref 3.5–5)
ALBUMIN/GLOB SERPL: 0.5 {RATIO} (ref 1.1–2.2)
ALP SERPL-CCNC: 63 U/L (ref 45–117)
ALT SERPL-CCNC: 11 U/L (ref 12–78)
ANION GAP SERPL CALC-SCNC: 7 MMOL/L (ref 5–15)
AST SERPL-CCNC: 12 U/L (ref 15–37)
BILIRUB SERPL-MCNC: 0.8 MG/DL (ref 0.2–1)
BUN SERPL-MCNC: 22 MG/DL (ref 6–20)
BUN/CREAT SERPL: 20 (ref 12–20)
CALCIUM SERPL-MCNC: 8.1 MG/DL (ref 8.5–10.1)
CHLORIDE SERPL-SCNC: 105 MMOL/L (ref 97–108)
CO2 SERPL-SCNC: 27 MMOL/L (ref 21–32)
CREAT SERPL-MCNC: 1.08 MG/DL (ref 0.7–1.3)
DATE LAST DOSE: ABNORMAL
ECHO AO ARCH DIAM: 3.17 CM
ECHO AO ASC DIAM: 3.13 CM
ECHO AO ROOT DIAM: 3.94 CM
ECHO LA MAJOR AXIS: 4.3 CM
ECHO LA MINOR AXIS: 1.71 CM
ECHO LV EDV A2C: 88.92 ML
ECHO LV EDV A4C: 209.29 ML
ECHO LV EDV BP: 147.93 ML (ref 67–155)
ECHO LV EDV INDEX A4C: 83.4 ML/M2
ECHO LV EDV INDEX BP: 58.9 ML/M2
ECHO LV EDV NDEX A2C: 35.4 ML/M2
ECHO LV EJECTION FRACTION A2C: 19 PERCENT
ECHO LV EJECTION FRACTION A4C: 41 PERCENT
ECHO LV EJECTION FRACTION BIPLANE: 29.8 PERCENT (ref 55–100)
ECHO LV ESV A2C: 71.91 ML
ECHO LV ESV A4C: 123.46 ML
ECHO LV ESV BP: 103.92 ML (ref 22–58)
ECHO LV ESV INDEX A2C: 28.6 ML/M2
ECHO LV ESV INDEX A4C: 49.2 ML/M2
ECHO LV ESV INDEX BP: 41.4 ML/M2
ECHO LV INTERNAL DIMENSION DIASTOLIC: 4.7 CM (ref 4.2–5.9)
ECHO LV INTERNAL DIMENSION SYSTOLIC: 4.04 CM
ECHO LV IVSD: 1.48 CM (ref 0.6–1)
ECHO LV MASS 2D: 272.3 G (ref 88–224)
ECHO LV MASS INDEX 2D: 108.5 G/M2 (ref 49–115)
ECHO LV POSTERIOR WALL DIASTOLIC: 1.37 CM (ref 0.6–1)
ECHO LVOT DIAM: 1.98 CM
ECHO PV MAX VELOCITY: 91.83 CM/S
ECHO PV PEAK INSTANTANEOUS GRADIENT SYSTOLIC: 3.37 MMHG
ERYTHROCYTE [DISTWIDTH] IN BLOOD BY AUTOMATED COUNT: 12.8 % (ref 11.5–14.5)
GLOBULIN SER CALC-MCNC: 4.9 G/DL (ref 2–4)
GLUCOSE BLD STRIP.AUTO-MCNC: 167 MG/DL (ref 65–117)
GLUCOSE BLD STRIP.AUTO-MCNC: 171 MG/DL (ref 65–117)
GLUCOSE BLD STRIP.AUTO-MCNC: 172 MG/DL (ref 65–117)
GLUCOSE BLD STRIP.AUTO-MCNC: 178 MG/DL (ref 65–117)
GLUCOSE BLD STRIP.AUTO-MCNC: 182 MG/DL (ref 65–117)
GLUCOSE BLD STRIP.AUTO-MCNC: 226 MG/DL (ref 65–117)
GLUCOSE SERPL-MCNC: 165 MG/DL (ref 65–100)
HCT VFR BLD AUTO: 33 % (ref 36.6–50.3)
HGB BLD-MCNC: 10.9 G/DL (ref 12.1–17)
MAGNESIUM SERPL-MCNC: 1.5 MG/DL (ref 1.6–2.4)
MCH RBC QN AUTO: 32.6 PG (ref 26–34)
MCHC RBC AUTO-ENTMCNC: 33 G/DL (ref 30–36.5)
MCV RBC AUTO: 98.8 FL (ref 80–99)
NRBC # BLD: 0 K/UL (ref 0–0.01)
NRBC BLD-RTO: 0 PER 100 WBC
PHOSPHATE SERPL-MCNC: 3.4 MG/DL (ref 2.6–4.7)
PLATELET # BLD AUTO: 206 K/UL (ref 150–400)
PMV BLD AUTO: 9.4 FL (ref 8.9–12.9)
POTASSIUM SERPL-SCNC: 3.3 MMOL/L (ref 3.5–5.1)
PROT SERPL-MCNC: 7.2 G/DL (ref 6.4–8.2)
RBC # BLD AUTO: 3.34 M/UL (ref 4.1–5.7)
REPORTED DOSE,DOSE: ABNORMAL UNITS
REPORTED DOSE/TIME,TMG: ABNORMAL
SERVICE CMNT-IMP: ABNORMAL
SODIUM SERPL-SCNC: 139 MMOL/L (ref 136–145)
VANCOMYCIN TROUGH SERPL-MCNC: 24.4 UG/ML (ref 5–10)
WBC # BLD AUTO: 5.1 K/UL (ref 4.1–11.1)

## 2021-07-10 PROCEDURE — 87186 SC STD MICRODIL/AGAR DIL: CPT

## 2021-07-10 PROCEDURE — 77030036687 HC SHOE PSTOP S2SG -A

## 2021-07-10 PROCEDURE — 2709999900 HC NON-CHARGEABLE SUPPLY: Performed by: PODIATRIST

## 2021-07-10 PROCEDURE — 74011250637 HC RX REV CODE- 250/637: Performed by: INTERNAL MEDICINE

## 2021-07-10 PROCEDURE — 87205 SMEAR GRAM STAIN: CPT

## 2021-07-10 PROCEDURE — 74011250636 HC RX REV CODE- 250/636: Performed by: EMERGENCY MEDICINE

## 2021-07-10 PROCEDURE — 74011000250 HC RX REV CODE- 250: Performed by: EMERGENCY MEDICINE

## 2021-07-10 PROCEDURE — 74011000250 HC RX REV CODE- 250: Performed by: PODIATRIST

## 2021-07-10 PROCEDURE — 77030039497 HC CST PAD STERILE CHCS -A: Performed by: PODIATRIST

## 2021-07-10 PROCEDURE — 74011250636 HC RX REV CODE- 250/636: Performed by: HOSPITALIST

## 2021-07-10 PROCEDURE — 36415 COLL VENOUS BLD VENIPUNCTURE: CPT

## 2021-07-10 PROCEDURE — 80053 COMPREHEN METABOLIC PANEL: CPT

## 2021-07-10 PROCEDURE — 84100 ASSAY OF PHOSPHORUS: CPT

## 2021-07-10 PROCEDURE — 93306 TTE W/DOPPLER COMPLETE: CPT

## 2021-07-10 PROCEDURE — 82962 GLUCOSE BLOOD TEST: CPT

## 2021-07-10 PROCEDURE — 0Y6S0Z0 DETACHMENT AT LEFT 2ND TOE, COMPLETE, OPEN APPROACH: ICD-10-PCS | Performed by: PODIATRIST

## 2021-07-10 PROCEDURE — 73620 X-RAY EXAM OF FOOT: CPT

## 2021-07-10 PROCEDURE — 77030000032 HC CUF TRNQT ZIMM -B: Performed by: PODIATRIST

## 2021-07-10 PROCEDURE — 74011636637 HC RX REV CODE- 636/637: Performed by: HOSPITALIST

## 2021-07-10 PROCEDURE — 80202 ASSAY OF VANCOMYCIN: CPT

## 2021-07-10 PROCEDURE — 77030031139 HC SUT VCRL2 J&J -A: Performed by: PODIATRIST

## 2021-07-10 PROCEDURE — 87075 CULTR BACTERIA EXCEPT BLOOD: CPT

## 2021-07-10 PROCEDURE — 99232 SBSQ HOSP IP/OBS MODERATE 35: CPT | Performed by: INTERNAL MEDICINE

## 2021-07-10 PROCEDURE — 76010000138 HC OR TIME 0.5 TO 1 HR: Performed by: PODIATRIST

## 2021-07-10 PROCEDURE — 83735 ASSAY OF MAGNESIUM: CPT

## 2021-07-10 PROCEDURE — 77030018836 HC SOL IRR NACL ICUM -A: Performed by: PODIATRIST

## 2021-07-10 PROCEDURE — 74011250637 HC RX REV CODE- 250/637: Performed by: HOSPITALIST

## 2021-07-10 PROCEDURE — 85027 COMPLETE CBC AUTOMATED: CPT

## 2021-07-10 PROCEDURE — 87077 CULTURE AEROBIC IDENTIFY: CPT

## 2021-07-10 PROCEDURE — 93306 TTE W/DOPPLER COMPLETE: CPT | Performed by: INTERNAL MEDICINE

## 2021-07-10 PROCEDURE — 65660000000 HC RM CCU STEPDOWN

## 2021-07-10 RX ADMIN — CEFEPIME HYDROCHLORIDE 2 G: 2 INJECTION, POWDER, FOR SOLUTION INTRAVENOUS at 04:07

## 2021-07-10 RX ADMIN — Medication 10 ML: at 21:25

## 2021-07-10 RX ADMIN — LOSARTAN POTASSIUM 100 MG: 50 TABLET, FILM COATED ORAL at 09:11

## 2021-07-10 RX ADMIN — SERTRALINE 50 MG: 50 TABLET, FILM COATED ORAL at 21:25

## 2021-07-10 RX ADMIN — METRONIDAZOLE 500 MG: 500 INJECTION, SOLUTION INTRAVENOUS at 09:11

## 2021-07-10 RX ADMIN — METRONIDAZOLE 500 MG: 500 INJECTION, SOLUTION INTRAVENOUS at 21:25

## 2021-07-10 RX ADMIN — OXYCODONE 5 MG: 5 TABLET ORAL at 23:14

## 2021-07-10 RX ADMIN — Medication 10 ML: at 11:18

## 2021-07-10 RX ADMIN — CEFEPIME HYDROCHLORIDE 2 G: 2 INJECTION, POWDER, FOR SOLUTION INTRAVENOUS at 12:33

## 2021-07-10 RX ADMIN — CARVEDILOL 3.12 MG: 3.12 TABLET, FILM COATED ORAL at 17:59

## 2021-07-10 RX ADMIN — INSULIN LISPRO 1 UNITS: 100 INJECTION, SOLUTION INTRAVENOUS; SUBCUTANEOUS at 23:10

## 2021-07-10 RX ADMIN — Medication 10 ML: at 05:15

## 2021-07-10 RX ADMIN — FUROSEMIDE 20 MG: 20 TABLET ORAL at 09:12

## 2021-07-10 RX ADMIN — CEFEPIME HYDROCHLORIDE 2 G: 2 INJECTION, POWDER, FOR SOLUTION INTRAVENOUS at 19:57

## 2021-07-10 RX ADMIN — CARVEDILOL 3.12 MG: 3.12 TABLET, FILM COATED ORAL at 09:11

## 2021-07-10 RX ADMIN — VANCOMYCIN HYDROCHLORIDE 1500 MG: 10 INJECTION, POWDER, LYOPHILIZED, FOR SOLUTION INTRAVENOUS at 11:18

## 2021-07-10 RX ADMIN — ROSUVASTATIN CALCIUM 20 MG: 10 TABLET, COATED ORAL at 21:25

## 2021-07-10 RX ADMIN — Medication 5 MG: at 23:14

## 2021-07-10 NOTE — PROGRESS NOTES
Bedside shift change report given to Ashley Milligan (oncoming nurse) by [employer] (offgoing nurse). Report included the following information SBAR, Kardex, MAR and Recent Results.

## 2021-07-10 NOTE — PROGRESS NOTES
[facility]    Medical Progress Note      NAME: Charmaine Dick. :  1945  MRM:  [id number]    Date/Time of service 7/10/2021  10:16 AM          Assessment and Plan:     Osteomyelitis of second toe of left foot / Diabetic foot infection - POA. Xray suggests osteo. Consulted podiatry. Plan is for amputation today. Unremarkable JOVITA. NGTD on cx. For now vanco and cefepine.      CHF (congestive heart failure), NYHA class III, acute on chronic, systolic / Peripheral edema / Dyspnea on exertion / Elevated brain natriuretic peptide (BNP) level - POA, last ECHO 25%. Consult cardiology to assess status and risk for toe surgery. Usually on BB, HCTZ, ARB, but not on loop diuretic. Cardiology ordered perioperative loop diuretics     DM type 2 uncontrolled causing vascular, renal and neurological disease - Diabetic diet and counseling when eating. SSI per protocol. Resumed home lantus. Check A1c.     Coronary atherosclerosis of native coronary artery S/P CABG x 2 / Chest pain - Cardiology consulted. Troponin negative so far. Continue statin, coreg, HCTZ, losartan, ASA, holding xarelto. Cardiology did risk stratification of patient.     PAF (paroxysmal atrial fibrillation) / Chronic anticoagulation - Monitor remote tele. Usually on BB and xarelto, but holding that for potential surgery.     Prolonged grief reaction / Anxiety and depression - POA, likely affects his compliance. Would benefit from counseling outpatient. Continue sertraline     PAULINA / Chronic kidney disease stage 3 - POA and actually improved after diuresis. Monitor. Anemia - POA likely due to chronic disease. Check serologies.     Morbid obesity - Advise weight loss, Needs outpatient [place] testing. Likely [place].   Monitor oxygen     Hyperlipidemia - Continue rosuvastatin     Hx Prostate cancer - Outpatient follow up     GERD (gastroesophageal reflux disease) - PPI       Subjective:     Chief Complaint:  Awaiting procedure, no pain    ROS:  (bold if positive, if negative)    Not Tolerating PT  NPO        Objective:     Last 24hrs VS reviewed since prior progress note.  Most recent are:    Visit Vitals  /62   Pulse 65   Temp 98.3 °F (36.8 °C)   Resp 18   Ht 5' 9\" (1.753 m)   Wt 142.9 kg (315 lb 0.6 oz)   SpO2 99%   BMI 46.52 kg/m²     SpO2 Readings from Last 6 Encounters:   07/10/21 99%   09/03/20 98%   01/31/19 96%   11/12/18 99%   09/04/18 97%   10/31/17 95%            Intake/Output Summary (Last 24 hours) at 7/10/2021 1016  Last data filed at 7/9/2021 2205  Gross per 24 hour   Intake 1790 ml   Output 1480 ml   Net 310 ml        Physical Exam:    Gen:  Morbid obese, in no acute distress  HEENT:  Pink conjunctivae, PERRL, hearing intact to voice, moist mucous membranes  Neck:  Supple, without masses, thyroid non-tender  Resp:  No accessory muscle use, clear breath sounds without wheezes rales or rhonchi  Card:  No murmurs, normal S1, S2 without thrills, bruits or peripheral edema  Abd:  Soft, non-tender, non-distended, normoactive bowel sounds are present, no mass  Lymph:  No cervical or inguinal adenopathy  Musc:  No cyanosis or clubbing  Skin:  Dark 2nd toe, skin turgor is good  Neuro:  Cranial nerves are grossly intact, general motor weakness, follows commands   Psych:  Poor insight, oriented to person, place and time, flat    Telemetry reviewed:   normal sinus rhythm  __________________________________________________________________  Medications Reviewed: (see below)  Medications:     Current Facility-Administered Medications   Medication Dose Route Frequency    hydrALAZINE (APRESOLINE) 20 mg/mL injection 20 mg  20 mg IntraVENous Q4H PRN    melatonin (rapid dissolve) tablet 5 mg  5 mg Oral QHS PRN    alum-mag hydroxide-simeth (MYLANTA) oral suspension 30 mL  30 mL Oral Q4H PRN    LORazepam (ATIVAN) injection 0.5 mg  0.5 mg IntraVENous Q6H PRN    oxyCODONE IR (ROXICODONE) tablet 5 mg  5 mg Oral Q4H PRN    morphine injection 2 mg  2 mg IntraVENous Q4H PRN    diphenhydrAMINE (BENADRYL) capsule 25 mg  25 mg Oral Q6H PRN    albuterol (PROVENTIL VENTOLIN) nebulizer solution 2.5 mg  2.5 mg Nebulization Q4H PRN    vancomycin (VANCOCIN) 1500 mg in  ml infusion  1,500 mg IntraVENous Q12H    Vancomycin Trough: please obtain at 1030 on 7/10 (obtain 30 min prior to 1100 dose on 7/10) Thank you!   1 Each Other ONCE    losartan (COZAAR) tablet 100 mg  100 mg Oral DAILY    furosemide (LASIX) tablet 20 mg  20 mg Oral DAILY    cefepime (MAXIPIME) 2 g in sterile water (preservative free) 10 mL IV syringe  2 g IntraVENous Q8H    glucose chewable tablet 16 g  4 Tablet Oral PRN    dextrose (D50W) injection syrg 12.5-25 g  25-50 mL IntraVENous PRN    glucagon (GLUCAGEN) injection 1 mg  1 mg IntraMUSCular PRN    sodium chloride (NS) flush 5-40 mL  5-40 mL IntraVENous Q8H    sodium chloride (NS) flush 5-40 mL  5-40 mL IntraVENous PRN    acetaminophen (TYLENOL) tablet 650 mg  650 mg Oral Q6H PRN    polyethylene glycol (MIRALAX) packet 17 g  17 g Oral DAILY PRN    bisacodyL (DULCOLAX) suppository 10 mg  10 mg Rectal DAILY PRN    ondansetron (ZOFRAN) injection 4 mg  4 mg IntraVENous Q6H PRN    insulin lispro (HUMALOG) injection   SubCUTAneous Q6H    metroNIDAZOLE (FLAGYL) IVPB premix 500 mg  500 mg IntraVENous Q12H    carvediloL (COREG) tablet 3.125 mg  3.125 mg Oral BID WITH MEALS    sertraline (ZOLOFT) tablet 50 mg  50 mg Oral QHS    rosuvastatin (CRESTOR) tablet 20 mg  20 mg Oral QHS    nitroglycerin (NITROSTAT) tablet 0.4 mg  0.4 mg SubLINGual Q5MIN PRN    aspirin chewable tablet 81 mg  81 mg Oral DAILY        Lab Data Reviewed: (see below)  Lab Review:     Recent Labs     07/10/21  0040 07/09/21  0132 07/08/21  1838   WBC 5.1 7.3 6.9   HGB 10.9* 10.4* 11.1*   HCT 33.0* 32.3* 34.2*    216 229     Recent Labs     07/10/21  0040 07/09/21  0132 07/08/21  1838    138 137   K 3.3* 4.2 4.9    106 104   CO2 27 28 29 * 270* 289*   BUN 22* 27* 27*   CREA 1.08 1.27 1.51*   CA 8.1* 7.8* 8.3*   MG 1.5*  --   --    PHOS 3.4  --   --    ALB 2.3* 2.3* 2.5*   TBILI 0.8 0.5 0.7   ALT 11* 13 14     Lab Results   Component Value Date/Time    Glucose (POC) 182 (H) 07/10/2021 07:07 AM    Glucose (POC) 172 (H) 07/10/2021 12:42 AM    Glucose (POC) 197 (H) 07/09/2021 09:04 PM    Glucose (POC) 210 (H) 07/09/2021 06:18 PM    Glucose (POC) 186 (H) 07/09/2021 11:53 AM     No results for input(s): PH, PCO2, PO2, HCO3, FIO2 in the last 72 hours. No results for input(s): INR, INREXT in the last 72 hours. All Micro Results     None          Other pertinent lab: none    Total time spent with patient: 30 Minutes I personally reviewed chart, notes, data and current medications in the medical record. I have personally examined and treated the patient at bedside during this period.                  Care Plan discussed with: Patient, Care Manager, Nursing Staff, Consultant/Specialist and >50% of time spent in counseling and coordination of care    Discussed:  Care Plan    Prophylaxis:  SCD's and H2B/PPI    Disposition:  Home w/Family           ___________________________________________________    Attending Physician: Brenda Andrew MD

## 2021-07-10 NOTE — PROGRESS NOTES
Mark Bernal MD      [address] [facility] [place], [address] [facility]    Office [phone],MUE [phone]           7/10/2021     Admit Date: 7/8/2021      Alcides Palacios is a 77 y.o. male admitted for Dyspnea on exertion [R06.00]  Chest pain on exertion [R07.9]  Peripheral edema [R60.9]  Acute osteomyelitis of left foot ([address] [M86.172]. Consult requested by Lambert Brooks MD       Plan was come back as to come back by and double check that he is ready for surgery he appears compensated at a discussion of his cardiac history but he has been relatively active recently cutting the grass walking as much as he is able considering his foot and he really has not had any worsening of his shortness of breath chest pain palpitations or any other concerning or new cardiac symptoms. So at this time he is stable to proceed with surgery as scheduled which I believe is for this afternoon    Assessment/Plan:    L toe osteomyelitis  Acute on chronic HFrEF  CAD-s/p CABG/chronic angina  PAF/chronic anticoagulation  Morbid obesity  DM  Hypertension  Hyperlipidemia    Plan:  Echocardiogram  Continue GDMT (guideline directed medical therapy) for cardiomyopathy. Continue aspirin, Coreg, Crestor,Losartan  IV Lasix 40 mg today / PO in am  Restart Xarelto post procedure  Class 3 risk  - 2 risk facotrs (10.1%)  from cardiac standpoint for surgery as per Bernardo's Revised cardiac risk  Index ( 30 day risk of death, MI, cardiac arrest). D/w pt. Patient understands the cardiac risk and wishes to proceed. Can proceed with surgery. Please do not hesitate to contact us with questions or concerns. See note below for details. Mark Bernal MD      Cardiac Testing/Procedures: A. Cardiac Cath/PCI:    B.ECHO/STEW:    C.StressNuclear/Stress ECHO/Stress test:    D.Vascular:    E. EP:    F. Miscellaneous:  1/29/19: LEXISCAN- Apicoinferior ischemia. Left ventricular ejection fraction is 30%.   1/28/19: ECHO- LVEF 25%, LV cavity size mild dil, LV global HK; LA cavity mod dil; RA mild dil; MR mild-mod; TR mild-mod     1/9/18 Echo: Hypo/akinesis of the septal segments. Mild LVH. EF 35-40%. Right ventricle mildly dilated. Systolic function reduced. Aortic valve mildly thickened leaflets. 10/4/17: ECHO- LVEF 45 %, mild diffuse HK, thickness was mildly increased; Mod concentric hypertrophy. 5/24/2017: CATH- Obstructive 1VD:LAD p80, m100, dist very small vessel; D1 patent. LCx p30 (co-dom). RCA patent (co-dom). 2/2 Grafts patent: LIMA-LAD patent.; SVG-D1 patent. 5/22/17: ECHO- Comparison was made 25-Nov-2014 and LV overall  function has decreased. LV wall hypokinesis, ventricle wall dilated, EF 35%;  SYSTEM MEASUREMENT TABLES  2D  LVOT Diam: 2.1 cm  Ao Diam: 3.2 cm  LA Diam: 4.6 cm  IVSd: 1.1 cm  LVIDd: 5.9 cm  LVIDs: 5.1 cm  LVPWd: 1 cm  SV(Teich): 53.9 ml     11/25/14: ECHO- TDS, - EF 45-50%. LVH, RV mild dilated - reduced RVEF   11/5/2012: ECHO- LVEF 50%, DD, Mild RV dysfuction. 11/10/11: CMR- . Normal left ventricular size by 3D volumetric assessment. Moderate left   ventricular systolic dysfunction. Severe hypokinesis of the inferior and   inferolateral wall. Mild hypokinesis of the anterior wall. LVEF 37%. 2. Normal right ventricular size and systolic function. 3. No significant valvular disease other than trace mitral and tricuspid   regurgitation. 4. Normal resting myocardial perfusion on first pass stress perfusion   imaging. 5. On LGE imaging, there is a very small focal area of small endocardial   infarct involving the basal inferolateral wall. The anterior, anteroseptal,   anterolateral, lateral, the entire inferior wall, the inferolateral wall,   inferoseptal wall and apex are completely viable. The LAD, LCx, and RCA   territories demonstrate significant viability for revascularization. 6. Large right-sided pleural effusion. Moderate left-sided pleural effusion.      11/8/2011: ECHO- LVEF 35-40%, LV- mod dilated, mod HK (basal-mid inferior/  basal-mid inferolateral walls) RV-dilated; mild LAE  2011: CATH- pLAD: 95%-> PCi-> 80% residual stenosis   2011: CABG- 2 V, LIMA -> LAD    History:     Patient  is a 77 y.o. male admitted for swelling LE/L second toe infection. Hx of chronic TALLEY/chronic intermitted precordial mild chest pain. Hx of CAD s/p CABG ( 2011). Hx of CMP ( EF 25% - )   Hx of DM/Morbid obesity/HTN/HLD/PAF/chronic anticoagulation - Xarelto  Trop <0.05  NTproBNP 2882  Cxray - No radiographic evidence of acute cardiopulmonary disease.     PMH/PSH/FH/Soc Hx:     Past Medical History:   Diagnosis Date    Anxiety and depression     CAD (coronary artery disease)     CHF (congestive heart failure), NYHA class III, chronic, systolic (HCC)     DM type 2 causing neurological disease ([address]     DM type 2 causing renal disease ([address]     DM type 2 causing vascular disease ([address]     GERD (gastroesophageal reflux disease)     Morbid obesity (HCC)     Prolonged grief reaction     Prostate cancer ([address]       Past Surgical History:   Procedure Laterality Date    HX CORONARY ARTERY BYPASS GRAFT      HX HEART CATHETERIZATION      HX ORTHOPAEDIC      right wrist carpal tunnel repair     No Known Allergies  Family History   Problem Relation Age of Onset    Heart Disease Father     Diabetes Father     Cancer Sister         BREAST      Social History     Tobacco Use    Smoking status: Former Smoker     Packs/day: 1.00     Years: 7.00     Pack years: 7.00     Quit date: 1991     Years since quittin.6    Smokeless tobacco: Former User   Substance Use Topics    Alcohol use: Yes     Comment: VERY RARE    Drug use: No           Medications:       Current Facility-Administered Medications   Medication Dose Route Frequency    [START ON 2021] vancomycin (VANCOCIN) 1,000 mg in 0.9% sodium chloride 250 mL (VIAL-MATE)  1,000 mg IntraVENous Q12H    bupivacaine (PF) 0.5 % (5 mg/mL) 20 mL, lidocaine (PF) 10 mg/mL (1 %) 20 mL solution    CONTINUOUS    hydrALAZINE (APRESOLINE) 20 mg/mL injection 20 mg  20 mg IntraVENous Q4H PRN    melatonin (rapid dissolve) tablet 5 mg  5 mg Oral QHS PRN    alum-mag hydroxide-simeth (MYLANTA) oral suspension 30 mL  30 mL Oral Q4H PRN    LORazepam (ATIVAN) injection 0.5 mg  0.5 mg IntraVENous Q6H PRN    oxyCODONE IR (ROXICODONE) tablet 5 mg  5 mg Oral Q4H PRN    morphine injection 2 mg  2 mg IntraVENous Q4H PRN    diphenhydrAMINE (BENADRYL) capsule 25 mg  25 mg Oral Q6H PRN    albuterol (PROVENTIL VENTOLIN) nebulizer solution 2.5 mg  2.5 mg Nebulization Q4H PRN    losartan (COZAAR) tablet 100 mg  100 mg Oral DAILY    furosemide (LASIX) tablet 20 mg  20 mg Oral DAILY    cefepime (MAXIPIME) 2 g in sterile water (preservative free) 10 mL IV syringe  2 g IntraVENous Q8H    glucose chewable tablet 16 g  4 Tablet Oral PRN    dextrose (D50W) injection syrg 12.5-25 g  25-50 mL IntraVENous PRN    glucagon (GLUCAGEN) injection 1 mg  1 mg IntraMUSCular PRN    sodium chloride (NS) flush 5-40 mL  5-40 mL IntraVENous Q8H    sodium chloride (NS) flush 5-40 mL  5-40 mL IntraVENous PRN    acetaminophen (TYLENOL) tablet 650 mg  650 mg Oral Q6H PRN    polyethylene glycol (MIRALAX) packet 17 g  17 g Oral DAILY PRN    bisacodyL (DULCOLAX) suppository 10 mg  10 mg Rectal DAILY PRN    ondansetron (ZOFRAN) injection 4 mg  4 mg IntraVENous Q6H PRN    insulin lispro (HUMALOG) injection   SubCUTAneous Q6H    metroNIDAZOLE (FLAGYL) IVPB premix 500 mg  500 mg IntraVENous Q12H    carvediloL (COREG) tablet 3.125 mg  3.125 mg Oral BID WITH MEALS    sertraline (ZOLOFT) tablet 50 mg  50 mg Oral QHS    rosuvastatin (CRESTOR) tablet 20 mg  20 mg Oral QHS    nitroglycerin (NITROSTAT) tablet 0.4 mg  0.4 mg SubLINGual Q5MIN PRN    aspirin chewable tablet 81 mg  81 mg Oral DAILY       Review of Systems:     As in HPI - all other 10 point ROS negative      Physical Exam: Visit Vitals  /68 (BP 1 Location: Left upper arm, BP Patient Position: At rest)   Pulse 68   Temp 98 °F (36.7 °C)   Resp 18   Ht 5' 9\" (1.753 m)   Wt 315 lb 0.6 oz (142.9 kg)   SpO2 99%   BMI 46.52 kg/m²         Telemetry: normal sinus rhythm    Gen: Well-developed, well-nourished, in no acute distress, morbidly obese  Neck: Supple, No Carotid Bruit,   Resp: No accessory muscle use, Clear breath sounds, No rales or rhonchi  Card: Regular Rate,Rythm,Normal S1, S2, No murmurs, rubs or gallop.    Abd:  Soft, obese,BS+,   MSK: No cyanosis  Skin: No rashes    Neuro: moving all four extremities , follows commands appropriately  Psych:  Good insight, oriented to person, place , alert, Nml Affect  LE: 2+ edema  L second toe - inflammed/swollen    EKG: SR/NSTT/LAD/PRWP        Cxray: Reviewed     LABS: Reviewed      Care Plan discussed with: Patient and Nursing Staff      Total time:      mins     Aida Cheng MD

## 2021-07-10 NOTE — PROGRESS NOTES
Problem: Falls - Risk of  Goal: *Absence of Falls  Description: Document Sparkle Philippe Fall Risk and appropriate interventions in the flowsheet. Outcome: Progressing Towards Goal  Note: Fall Risk Interventions:  Mobility Interventions: Communicate number of staff needed for ambulation/transfer, Patient to call before getting OOB         Medication Interventions: Teach patient to arise slowly, Bed/chair exit alarm    Elimination Interventions: Call light in reach, Urinal in reach, Patient to call for help with toileting needs              Problem: Pressure Injury - Risk of  Goal: *Prevention of pressure injury  Description: Document Shane Scale and appropriate interventions in the flowsheet. Outcome: Progressing Towards Goal  Note: Pressure Injury Interventions:  Sensory Interventions: Turn and reposition approx.  every two hours (pillows and wedges if needed), Keep linens dry and wrinkle-free, Minimize linen layers, Float heels    Moisture Interventions: Minimize layers    Activity Interventions: Pressure redistribution bed/mattress(bed type), PT/OT evaluation    Mobility Interventions: Pressure redistribution bed/mattress (bed type), PT/OT evaluation    Nutrition Interventions:  (npo)    Friction and Shear Interventions: Minimize layers, Lift team/patient mobility team

## 2021-07-10 NOTE — PROGRESS NOTES
Bedside and Verbal shift change report given to Yvonne Marquez RN (oncoming nurse) by Theodore Evans RN (offgoing nurse). Report included the following information SBAR, Kardex, ED Summary and Recent Results.

## 2021-07-10 NOTE — BRIEF OP NOTE
Brief Postoperative Note    Patient: Regla Varghese. YOB: 1945  MRN: [id number]    Date of Procedure: 7/10/2021     Pre-Op Diagnosis: OSTEOMYELITIS left 2nd digit. Post-Op Diagnosis: Same as preoperative diagnosis. Procedure(s):  LEFT 2ND DIGIT AMPUTATION (URGENT)    Surgeon(s):  Corey Fairbanks DPM    Surgical Assistant: Surg Asst-1: Jerson Vasques    Anesthesia: Local     Estimated Blood Loss (mL): Minimal    Complications: None    Specimens:   ID Type Source Tests Collected by Time Destination   1 : Left Second Digit Preservative Foot, left  Corey Fairbanks DPM 7/10/2021 1703 Pathology   2 : Left second digit, clean margin Preservative Foot, left  Corey Fairbanks DPM 7/10/2021 1707 Pathology   1 : Left second digit Tissue Foot, left ANAEROBIC/AEROBIC/GRAM STAIN Corey Fairbanks DPM 7/10/2021 1700 Microbiology        Implants: * No implants in log *    Drains: * No LDAs found *    Findings: Wound with bone infection.     Electronically Signed by Lisa Hobbs DPM on 7/10/2021 at 5:31 PM

## 2021-07-10 NOTE — H&P
Podiatry History and Physical    Subjective:         Date of Consultation:  July 10, 2021    Patient is a 77 y.o. male who is being seen for left 2nd digit osteomyelitis. Workup has revealed failure of conservative treatment. Patient cleared for procedure by cardiology.     Patient Active Problem List    Diagnosis Date Noted    DM type 2 causing vascular disease ([address]     DM type 2 causing renal disease ([address]     DM type 2 causing neurological disease ([address]     CHF (congestive heart failure), NYHA class III, chronic, systolic (HCC)     Prolonged grief reaction     Anxiety and depression     Peripheral edema 07/08/2021    Dyspnea on exertion 07/08/2021    Chest pain 07/08/2021    Osteomyelitis of second toe of left foot ([address] 07/08/2021    Elevated brain natriuretic peptide (BNP) level 07/08/2021    Prostate cancer ([address]     GERD (gastroesophageal reflux disease)     CAD (coronary artery disease)     Hyperglycemia 09/02/2018    PAF (paroxysmal atrial fibrillation) ([address] 03/27/2016    Anemia 03/25/2016    Hyperlipidemia 11/25/2014    PAULINA (acute kidney injury) ([address] 11/25/2014    S/P CABG x 2 12/14/2011    Coronary atherosclerosis of native coronary artery 11/21/2011    Morbid obesity ([address] 11/08/2011     Past Medical History:   Diagnosis Date    Anxiety and depression     CAD (coronary artery disease)     CHF (congestive heart failure), NYHA class III, chronic, systolic ([address]     DM type 2 causing neurological disease ([address]     DM type 2 causing renal disease ([address]     DM type 2 causing vascular disease ([address]     GERD (gastroesophageal reflux disease)     Morbid obesity (HCC)     Prolonged grief reaction     Prostate cancer ([address]       Family History   Problem Relation Age of Onset    Heart Disease Father     Diabetes Father     Cancer Sister         BREAST      Social History     Tobacco Use    Smoking status: Former Smoker     Packs/day: 1.00     Years: 7.00     Pack years: 7.00 Quit date: 1991     Years since quittin.6    Smokeless tobacco: Former User   Substance Use Topics    Alcohol use: Yes     Comment: VERY RARE     Past Surgical History:   Procedure Laterality Date    HX CORONARY ARTERY BYPASS GRAFT      HX HEART CATHETERIZATION      HX ORTHOPAEDIC      right wrist carpal tunnel repair      Prior to Admission medications    Medication Sig Start Date End Date Taking? Authorizing Provider   insulin glargine (Lantus Solostar U-100 Insulin) 100 unit/mL (3 mL) inpn 60 Units by SubCUTAneous route daily. Yes Provider, Historical   omeprazole (PRILOSEC) 20 mg capsule take 1 capsule by mouth once daily 20  Yes Provider, Historical   sertraline (ZOLOFT) 50 mg tablet Take 50 mg by mouth nightly. 20  Yes Provider, Historical   carvediloL (COREG) 3.125 mg tablet take 1 tablet by mouth twice a day AT 8 AM AND 6 PM 9/3/20  Yes Olinda Maya MD   losartan-hydroCHLOROthiazide (HYZAAR) 100-25 mg per tablet take 1 tablet by mouth once daily 9/3/20  Yes Kranthi Ford MD   rosuvastatin (Crestor) 10 mg tablet Take 1 Tab by mouth nightly. 9/3/20  Yes Kranthi Ford MD   rivaroxaban (Xarelto) 20 mg tab tablet take 1 tablet by mouth once daily 9/3/20  Yes Kranthi Ford MD     No Known Allergies     Review of Systems:  A comprehensive review of systems was negative except for that written in the HPI. Objective:     Patient Vitals for the past 8 hrs:   BP Temp Pulse Resp SpO2 Height Weight   07/10/21 1612 123/68 98 °F (36.7 °C) 68 18 99 %     07/10/21 1120 122/76 98.6 °F (37 °C) (!) 58 18 97 %     07/10/21 0949 125/62     5' 9\" (1.753 m) 142.9 kg (315 lb 0.6 oz)   07/10/21 0913 125/62 98.3 °F (36.8 °C) 65 18 99 %       Temp (24hrs), Av °F (36.7 °C), Min:97.3 °F (36.3 °C), Max:98.6 °F (37 °C)      DP 2/4 left foot, PT 1/4 left foot. CRT< 3 seconds. Open wound left 2nd digit with distal necrosis and surrounding edema and erythema.   Loss of protective sensation    Data Review:   Recent Results (from the past 24 hour(s))   GLUCOSE, POC    Collection Time: 07/09/21  6:18 PM   Result Value Ref Range    Glucose (POC) 210 (H) 65 - 117 mg/dL    Performed by Debra Shell    GLUCOSE, POC    Collection Time: 07/09/21  9:04 PM   Result Value Ref Range    Glucose (POC) 197 (H) 65 - 117 mg/dL    Performed by Rosalee Vargas    CBC W/O DIFF    Collection Time: 07/10/21 12:40 AM   Result Value Ref Range    WBC 5.1 4.1 - 11.1 K/uL    RBC 3.34 (L) 4.10 - 5.70 M/uL    HGB 10.9 (L) 12.1 - 17.0 g/dL    HCT 33.0 (L) 36.6 - 50.3 %    MCV 98.8 80.0 - 99.0 FL    MCH 32.6 26.0 - 34.0 PG    MCHC 33.0 30.0 - 36.5 g/dL    RDW 12.8 11.5 - 14.5 %    PLATELET 862 106 - 827 K/uL    MPV 9.4 8.9 - 12.9 FL    NRBC 0.0 0  WBC    ABSOLUTE NRBC 0.00 0.00 - 0.01 K/uL   MAGNESIUM    Collection Time: 07/10/21 12:40 AM   Result Value Ref Range    Magnesium 1.5 (L) 1.6 - 2.4 mg/dL   METABOLIC PANEL, COMPREHENSIVE    Collection Time: 07/10/21 12:40 AM   Result Value Ref Range    Sodium 139 136 - 145 mmol/L    Potassium 3.3 (L) 3.5 - 5.1 mmol/L    Chloride 105 97 - 108 mmol/L    CO2 27 21 - 32 mmol/L    Anion gap 7 5 - 15 mmol/L    Glucose 165 (H) 65 - 100 mg/dL    BUN 22 (H) 6 - 20 MG/DL    Creatinine 1.08 0.70 - 1.30 MG/DL    BUN/Creatinine ratio 20 12 - 20      GFR est AA >60 >60 ml/min/1.73m2    GFR est non-AA >60 >60 ml/min/1.73m2    Calcium 8.1 (L) 8.5 - 10.1 MG/DL    Bilirubin, total 0.8 0.2 - 1.0 MG/DL    ALT (SGPT) 11 (L) 12 - 78 U/L    AST (SGOT) 12 (L) 15 - 37 U/L    Alk.  phosphatase 63 45 - 117 U/L    Protein, total 7.2 6.4 - 8.2 g/dL    Albumin 2.3 (L) 3.5 - 5.0 g/dL    Globulin 4.9 (H) 2.0 - 4.0 g/dL    A-G Ratio 0.5 (L) 1.1 - 2.2     PHOSPHORUS    Collection Time: 07/10/21 12:40 AM   Result Value Ref Range    Phosphorus 3.4 2.6 - 4.7 MG/DL   GLUCOSE, POC    Collection Time: 07/10/21 12:42 AM   Result Value Ref Range    Glucose (POC) 172 (H) 65 - 117 mg/dL    Performed by Rosalee Vargas GLUCOSE, POC    Collection Time: 07/10/21  7:07 AM   Result Value Ref Range    Glucose (POC) 182 (H) 65 - 117 mg/dL    Performed by Aleksandar Montero    ECHO ADULT COMPLETE    Collection Time: 07/10/21  9:40 AM   Result Value Ref Range    IVSd 1.48 (A) 0.60 - 1.00 cm    LVIDd 4.70 4.20 - 5.90 cm    LVIDs 4.04 cm    LVOT d 1.98 cm    LVPWd 1.37 (A) 0.60 - 1.00 cm    BP EF 29.8 (A) 55.0 - 100.0 percent    LV Ejection Fraction MOD 2C 19 percent    LV Ejection Fraction MOD 4C 41 percent    LV ED Vol A2C 88.92 mL    LV ED Vol A4C 209.29 mL    LV ED Vol .93 67.0 - 155.0 mL    LV ES Vol A2C 71.91 mL    LV ES Vol A4C 123.46 mL    LV ES Vol .92 (A) 22.0 - 58.0 mL    Left Atrium Major Axis 4.30 cm    Pulmonic Valve Systolic Peak Instantaneous Gradient 3.37 mmHg    Pulmonic Valve Max Velocity 91.83 cm/s    AO ARCH D 3.17 cm    AO ASC D 3.13 cm    Ao Root D 3.94 cm    LV Mass .3 88.0 - 224.0 g    LV Mass AL Index 108.5 49.0 - 115.0 g/m2    LVES Vol Index BP 41.4 mL/m2    LVED Vol Index BP 58.9 mL/m2    Left Atrium Minor Axis 1.71 cm    LVED Vol Index A4C 83.4 mL/m2    LVED Vol Index A2C 35.4 mL/m2    LVES Vol Index A4C 49.2 mL/m2    LVES Vol Index A2C 28.6 mL/m2   VANCOMYCIN, TROUGH    Collection Time: 07/10/21 11:05 AM   Result Value Ref Range    Vancomycin,trough 24.4 (HH) 5.0 - 10.0 ug/mL    Reported dose date NOT PROVIDED      Reported dose time: NOT PROVIDED      Reported dose: NOT PROVIDED UNITS   GLUCOSE, POC    Collection Time: 07/10/21 11:18 AM   Result Value Ref Range    Glucose (POC) 171 (H) 65 - 117 mg/dL    Performed by [address] [place] [address], POC    Collection Time: 07/10/21  4:09 PM   Result Value Ref Range    Glucose (POC) 178 (H) 65 - 117 mg/dL    Performed by Ami Landry          Impression:       Osteomyelitis. Recommendation:   Patient education for Conditions. All risks, complications and benefits explained. No guarantees made to the outcome of the procedure.     NPO since midnight. Consent signed and in chart. To OR for Left 2nd digit amputation.

## 2021-07-10 NOTE — PROGRESS NOTES
Problem: Falls - Risk of  Goal: *Absence of Falls  Description: Document Aristides Baumann Fall Risk and appropriate interventions in the flowsheet.   Outcome: Progressing Towards Goal  Note: Fall Risk Interventions:  Mobility Interventions: Bed/chair exit alarm, Communicate number of staff needed for ambulation/transfer         Medication Interventions: Patient to call before getting OOB, Teach patient to arise slowly, Bed/chair exit alarm    Elimination Interventions: Patient to call for help with toileting needs, Call light in reach

## 2021-07-10 NOTE — PROGRESS NOTES
[facility] Dosing Services: Antimicrobial Stewardship Daily Doc    Consult for antibiotic dosing of Vancomycin by Dr. Manjit Gauthier  Indication: OM/DFI  Day of Therapy: 3    Vancomycin therapy:  Current maintenance dose: 1,500 mg IV every 12 hours   Dose calculated to approximate a therapeutic trough of 15-20 mcg/mL. - Vancomycin extrapolated trough 25.4 mcg/ml today before dose #4  - Decrease dose to 1000 mg IV  Q12h for an estimated trough 16.5 mcg/ml    Date Dose & Interval Measured (mcg/mL) Extrapolated (mcg/mL)   07/10/2021 1,500 mg IV every 12 hours 24.4 25.4                 Other Antimicrobial   (not dosed by pharmacist) Cefepime 2 grams IV every 8 hours  Metronidazole 500 mg IV every 12 hours   Cultures none   Serum Creatinine Lab Results   Component Value Date/Time    Creatinine 1.08 07/10/2021 12:40 AM    Creatinine (POC) 1.4 (H) 03/25/2016 02:15 AM      Creatinine Clearance Estimated Creatinine Clearance: 84.5 mL/min (based on SCr of 1.08 mg/dL).      Temp Temp: 98.6 °F (37 °C)       WBC Lab Results   Component Value Date/Time    WBC 5.1 07/10/2021 12:40 AM      H/H Lab Results   Component Value Date/Time    HGB 10.9 (L) 07/10/2021 12:40 AM      Platelets    Lab Results   Component Value Date/Time    PLATELET 048 44/97/0415 12:40 AM      Pharmacist [address] [place] [address], PHARMD

## 2021-07-11 LAB
ALBUMIN SERPL-MCNC: 2.1 G/DL (ref 3.5–5)
ALBUMIN/GLOB SERPL: 0.4 {RATIO} (ref 1.1–2.2)
ALP SERPL-CCNC: 57 U/L (ref 45–117)
ALT SERPL-CCNC: 12 U/L (ref 12–78)
ANION GAP SERPL CALC-SCNC: 3 MMOL/L (ref 5–15)
AST SERPL-CCNC: 26 U/L (ref 15–37)
BILIRUB SERPL-MCNC: 0.8 MG/DL (ref 0.2–1)
BUN SERPL-MCNC: 23 MG/DL (ref 6–20)
BUN/CREAT SERPL: 18 (ref 12–20)
CALCIUM SERPL-MCNC: 7.8 MG/DL (ref 8.5–10.1)
CHLORIDE SERPL-SCNC: 106 MMOL/L (ref 97–108)
CO2 SERPL-SCNC: 28 MMOL/L (ref 21–32)
COMMENT, HOLDF: NORMAL
CREAT SERPL-MCNC: 1.11 MG/DL (ref 0.7–1.3)
CREAT SERPL-MCNC: 1.27 MG/DL (ref 0.7–1.3)
ERYTHROCYTE [DISTWIDTH] IN BLOOD BY AUTOMATED COUNT: 13 % (ref 11.5–14.5)
GLOBULIN SER CALC-MCNC: 4.8 G/DL (ref 2–4)
GLUCOSE BLD STRIP.AUTO-MCNC: 199 MG/DL (ref 65–117)
GLUCOSE BLD STRIP.AUTO-MCNC: 212 MG/DL (ref 65–117)
GLUCOSE BLD STRIP.AUTO-MCNC: 238 MG/DL (ref 65–117)
GLUCOSE BLD STRIP.AUTO-MCNC: 253 MG/DL (ref 65–117)
GLUCOSE SERPL-MCNC: 249 MG/DL (ref 65–100)
HCT VFR BLD AUTO: 34.3 % (ref 36.6–50.3)
HGB BLD-MCNC: 11.1 G/DL (ref 12.1–17)
MAGNESIUM SERPL-MCNC: 1.8 MG/DL (ref 1.6–2.4)
MCH RBC QN AUTO: 32.6 PG (ref 26–34)
MCHC RBC AUTO-ENTMCNC: 32.4 G/DL (ref 30–36.5)
MCV RBC AUTO: 100.6 FL (ref 80–99)
NRBC # BLD: 0 K/UL (ref 0–0.01)
NRBC BLD-RTO: 0 PER 100 WBC
PHOSPHATE SERPL-MCNC: 3.6 MG/DL (ref 2.6–4.7)
PLATELET # BLD AUTO: 193 K/UL (ref 150–400)
PMV BLD AUTO: 9.7 FL (ref 8.9–12.9)
POTASSIUM SERPL-SCNC: 4.8 MMOL/L (ref 3.5–5.1)
PROT SERPL-MCNC: 6.9 G/DL (ref 6.4–8.2)
RBC # BLD AUTO: 3.41 M/UL (ref 4.1–5.7)
SAMPLES BEING HELD,HOLD: NORMAL
SERVICE CMNT-IMP: ABNORMAL
SODIUM SERPL-SCNC: 137 MMOL/L (ref 136–145)
VANCOMYCIN SERPL-MCNC: 27.8 UG/ML
WBC # BLD AUTO: 5.7 K/UL (ref 4.1–11.1)

## 2021-07-11 PROCEDURE — 36415 COLL VENOUS BLD VENIPUNCTURE: CPT

## 2021-07-11 PROCEDURE — 74011250637 HC RX REV CODE- 250/637: Performed by: HOSPITALIST

## 2021-07-11 PROCEDURE — 74011250636 HC RX REV CODE- 250/636: Performed by: EMERGENCY MEDICINE

## 2021-07-11 PROCEDURE — 74011636637 HC RX REV CODE- 636/637: Performed by: INTERNAL MEDICINE

## 2021-07-11 PROCEDURE — 83735 ASSAY OF MAGNESIUM: CPT

## 2021-07-11 PROCEDURE — 74011250636 HC RX REV CODE- 250/636: Performed by: HOSPITALIST

## 2021-07-11 PROCEDURE — 65660000000 HC RM CCU STEPDOWN

## 2021-07-11 PROCEDURE — 77030027138 HC INCENT SPIROMETER -A

## 2021-07-11 PROCEDURE — 74011000250 HC RX REV CODE- 250: Performed by: EMERGENCY MEDICINE

## 2021-07-11 PROCEDURE — 80202 ASSAY OF VANCOMYCIN: CPT

## 2021-07-11 PROCEDURE — 85027 COMPLETE CBC AUTOMATED: CPT

## 2021-07-11 PROCEDURE — 84100 ASSAY OF PHOSPHORUS: CPT

## 2021-07-11 PROCEDURE — 80053 COMPREHEN METABOLIC PANEL: CPT

## 2021-07-11 PROCEDURE — 82962 GLUCOSE BLOOD TEST: CPT

## 2021-07-11 RX ORDER — INSULIN LISPRO 100 [IU]/ML
INJECTION, SOLUTION INTRAVENOUS; SUBCUTANEOUS
Status: DISCONTINUED | OUTPATIENT
Start: 2021-07-11 | End: 2021-07-17 | Stop reason: HOSPADM

## 2021-07-11 RX ORDER — INSULIN GLARGINE 100 [IU]/ML
30 INJECTION, SOLUTION SUBCUTANEOUS DAILY
Status: DISCONTINUED | OUTPATIENT
Start: 2021-07-11 | End: 2021-07-17 | Stop reason: HOSPADM

## 2021-07-11 RX ADMIN — INSULIN LISPRO 3 UNITS: 100 INJECTION, SOLUTION INTRAVENOUS; SUBCUTANEOUS at 11:37

## 2021-07-11 RX ADMIN — INSULIN LISPRO 2 UNITS: 100 INJECTION, SOLUTION INTRAVENOUS; SUBCUTANEOUS at 16:40

## 2021-07-11 RX ADMIN — ROSUVASTATIN CALCIUM 20 MG: 10 TABLET, COATED ORAL at 21:39

## 2021-07-11 RX ADMIN — METRONIDAZOLE 500 MG: 500 INJECTION, SOLUTION INTRAVENOUS at 20:55

## 2021-07-11 RX ADMIN — CEFEPIME HYDROCHLORIDE 2 G: 2 INJECTION, POWDER, FOR SOLUTION INTRAVENOUS at 11:38

## 2021-07-11 RX ADMIN — CEFEPIME HYDROCHLORIDE 2 G: 2 INJECTION, POWDER, FOR SOLUTION INTRAVENOUS at 20:54

## 2021-07-11 RX ADMIN — CEFEPIME HYDROCHLORIDE 2 G: 2 INJECTION, POWDER, FOR SOLUTION INTRAVENOUS at 03:03

## 2021-07-11 RX ADMIN — OXYCODONE 5 MG: 5 TABLET ORAL at 08:50

## 2021-07-11 RX ADMIN — VANCOMYCIN HYDROCHLORIDE 1000 MG: 1 INJECTION, POWDER, LYOPHILIZED, FOR SOLUTION INTRAVENOUS at 03:03

## 2021-07-11 RX ADMIN — POLYETHYLENE GLYCOL 3350 17 G: 17 POWDER, FOR SOLUTION ORAL at 21:39

## 2021-07-11 RX ADMIN — OXYCODONE 5 MG: 5 TABLET ORAL at 12:51

## 2021-07-11 RX ADMIN — SERTRALINE 50 MG: 50 TABLET, FILM COATED ORAL at 20:55

## 2021-07-11 RX ADMIN — INSULIN GLARGINE 30 UNITS: 100 INJECTION, SOLUTION SUBCUTANEOUS at 12:51

## 2021-07-11 RX ADMIN — ASPIRIN 81 MG: 81 TABLET, CHEWABLE ORAL at 08:40

## 2021-07-11 RX ADMIN — Medication 10 ML: at 08:40

## 2021-07-11 RX ADMIN — METRONIDAZOLE 500 MG: 500 INJECTION, SOLUTION INTRAVENOUS at 08:40

## 2021-07-11 RX ADMIN — Medication 10 ML: at 21:40

## 2021-07-11 NOTE — PROGRESS NOTES
[facility] Dosing Services: Antimicrobial Stewardship Daily Doc    Consult for antibiotic dosing of Vancomycin by Dr. Priscilla Luo  Indication: OM/DFI  Day of Therapy: 4    Vancomycin therapy:  MD: 1000 mg IV q12h  Goal trough: 15-20 mcg/ml    - Scr increased from 1.08 to 1.27. Urine output decreased by half. Patient is hypotensive.   - Extrapolated rough was 25.4 mcg/ml yesterday from 1500 mg Q12h dosing  - Dose was decreased to 1000 mg IV Q12h and patient has only received one dose of reduced regimen.  - Not sure if patient is clearing drug. Will change to dose based on level for now. - Check random level at 1500 today which will be 12 hours from the last dose      Date Dose & Interval Measured (mcg/mL) Extrapolated (mcg/mL)   07/10/2021 1,500 mg IV every 12 hours 24.4 25.4                 Other Antimicrobial   (not dosed by pharmacist) Cefepime 2 grams IV every 8 hours  Metronidazole 500 mg IV every 12 hours   Cultures 7/10 - Left foot second digit - pending    Serum Creatinine Lab Results   Component Value Date/Time    Creatinine 1.27 07/11/2021 09:04 AM    Creatinine (POC) 1.4 (H) 03/25/2016 02:15 AM      Creatinine Clearance Estimated Creatinine Clearance: 71.9 mL/min (based on SCr of 1.27 mg/dL).      Temp Temp: 98.4 °F (36.9 °C)       WBC Lab Results   Component Value Date/Time    WBC 5.7 07/11/2021 09:04 AM      H/H Lab Results   Component Value Date/Time    HGB 11.1 (L) 07/11/2021 09:04 AM      Platelets    Lab Results   Component Value Date/Time    PLATELET 256 39/16/1576 09:04 AM      Pharmacist [facility], PHARMD

## 2021-07-11 NOTE — PROGRESS NOTES
Problem: Pressure Injury - Risk of  Goal: *Prevention of pressure injury  Description: Document Shane Scale and appropriate interventions in the flowsheet. Outcome: Progressing Towards Goal  Note: Pressure Injury Interventions:  Sensory Interventions: Turn and reposition approx. every two hours (pillows and wedges if needed), Minimize linen layers, Keep linens dry and wrinkle-free    Moisture Interventions: Minimize layers    Activity Interventions: Pressure redistribution bed/mattress(bed type), PT/OT evaluation    Mobility Interventions: Pressure redistribution bed/mattress (bed type), Turn and reposition approx.  every two hours(pillow and wedges)    Nutrition Interventions: Document food/fluid/supplement intake, Offer support with meals,snacks and hydration    Friction and Shear Interventions: Minimize layers, Lift team/patient mobility team                Problem: Pain  Goal: *Control of Pain  Outcome: Progressing Towards Goal

## 2021-07-11 NOTE — PROGRESS NOTES
Bedside shift change report given to Corrie Castano (oncoming nurse) by Efren Ta (offgoing nurse). Report included the following information SBAR, Kardex, MAR and Recent Results.

## 2021-07-11 NOTE — PROGRESS NOTES
Consult for antibiotic dosing of Vancomycin by Dr. Misty Little  Indication: OM/DFI  Day of Therapy: 4    Vancomycin Therapy:  Pharmacy dosing by level due to SCR and decrease in urine output. Last dose of vancomycin 1000 mg 7/11 3 am  A random level obtained 11.5 hours from last dose was supra-therapeutic at 27.8. Continue to hold vancomycin. Attending has ordered BMP for tomorrow am.  Continue to monitor scr. A random level has been ordered for 7/12 with am labs.     Date Dose & Interval Measured (mcg/mL) Extrapolated (mcg/mL)   07/10/2021 1,500 mg IV every 12 hours 24.4 25.4   07/11/2021  Dose by level  27.8         --               Thank you,      Yuridia Griffin, PharmD, BCPS

## 2021-07-11 NOTE — PROGRESS NOTES
Problem: Diabetes Self-Management  Goal: *Disease process and treatment process  Description: Define diabetes and identify own type of diabetes; list 3 options for treating diabetes. Outcome: Progressing Towards Goal  Goal: *Incorporating nutritional management into lifestyle  Description: Describe effect of type, amount and timing of food on blood glucose; list 3 methods for planning meals. Outcome: Progressing Towards Goal     Problem: Pressure Injury - Risk of  Goal: *Prevention of pressure injury  Description: Document Shane Scale and appropriate interventions in the flowsheet. Outcome: Progressing Towards Goal  Note: Pressure Injury Interventions:  Sensory Interventions: Avoid rigorous massage over bony prominences, Check visual cues for pain, Discuss PT/OT consult with provider, Float heels, Keep linens dry and wrinkle-free, Maintain/enhance activity level, Pressure redistribution bed/mattress (bed type), Sit a 90-degree angle/use footstool if needed    Moisture Interventions: Absorbent underpads, Apply protective barrier, creams and emollients, Check for incontinence Q2 hours and as needed, Contain wound drainage, Maintain skin hydration (lotion/cream), Moisture barrier, Offer toileting Q_hr    Activity Interventions: Increase time out of bed, Pressure redistribution bed/mattress(bed type), PT/OT evaluation    Mobility Interventions: HOB 30 degrees or less, Pressure redistribution bed/mattress (bed type), PT/OT evaluation, Turn and reposition approx.  every two hours(pillow and wedges)    Nutrition Interventions: Document food/fluid/supplement intake, Discuss nutritional consult with provider, Offer support with meals,snacks and hydration    Friction and Shear Interventions: Apply protective barrier, creams and emollients, HOB 30 degrees or less, Lift team/patient mobility team, Sit at 90-degree angle, Transferring/repositioning devices                Problem: Patient Education: Go to Patient Education Activity  Goal: Patient/Family Education  Outcome: Progressing Towards Goal

## 2021-07-11 NOTE — PROGRESS NOTES
0813-Chart accessed for review due to elevated MEWS 3. Pt s/p digit amputation for OM on the L foot. Blood pressure mildly low at 99/51 with bradycardia (HR 50). No scheduled antihypertensives this AM; not on fluids but has CHF hx. Will follow. Nothing to do acutely.  Rupert RN

## 2021-07-11 NOTE — PROGRESS NOTES
[facility]    Medical Progress Note      NAME: Kel Camarillo. :  1945  MRM:  [id number]    Date/Time of service 2021  8:22 AM          Assessment and Plan:     Osteomyelitis of second toe of left foot / Diabetic foot infection - POA. Xray suggests osteo. Consulted podiatry. He had digit amputation 7/10. Unremarkable JOVITA. NGTD on cx. For now vanco, flagyl and cefepine. Awaiting path     CHF (congestive heart failure), NYHA class III, acute on chronic, systolic / Peripheral edema / Dyspnea on exertion / Elevated brain natriuretic peptide (BNP) level - POA, last ECHO 25%, and that was now also noted on ECHO 7/10. Consulted cardiology who documented status and risk for toe surgery. Usually on BB, HCTZ, ARB, but not on loop diuretic. Cardiology ordered perioperative loop diuretics, but I'll hold that due to hypotension     DM type 2 uncontrolled causing vascular, renal and neurological disease - Diabetic diet and counseling when eating. SSI per protocol. Resumed home lantus. Check A1c.     Coronary atherosclerosis of native coronary artery S/P CABG x 2 / Chest pain / Hypotension / Bradycardia - Cardiology consulted. Troponin negative so far. Continue statin, but post op hold coreg, HCTZ, losartan (due to loren, hypotension) and hold ASA, xarelto (bleeding risk).    PAF (paroxysmal atrial fibrillation) / Chronic anticoagulation - Monitor remote tele. Usually on BB and xarelto, but held that for surgery. Resume post op, once okay with podiatry     Prolonged grief reaction / Anxiety and depression - POA, likely affects his compliance. Would benefit from counseling outpatient. Continue sertraline     PAULINA / Chronic kidney disease stage 3 - POA and actually improved after diuresis. Monitor. Anemia - POA likely due to chronic disease. Stable. Checking serologies.     Morbid obesity - Advise weight loss, Needs outpatient [place] testing. Likely [place].   Monitor oxygen     Hyperlipidemia - Continue rosuvastatin     Hx Prostate cancer - Outpatient follow up     GERD (gastroesophageal reflux disease) - PPI       Subjective:     Chief Complaint:  Tolerated procedure    ROS:  (bold if positive, if negative)    Not Tolerating PT  Tolerating diet        Objective:     Last 24hrs VS reviewed since prior progress note.  Most recent are:    Visit Vitals  BP (!) 99/51 (BP 1 Location: Left upper arm, BP Patient Position: At rest)   Pulse (!) 50   Temp 98.4 °F (36.9 °C)   Resp 18   Ht 5' 9\" (1.753 m)   Wt 142.9 kg (315 lb 0.6 oz)   SpO2 97%   BMI 46.52 kg/m²     SpO2 Readings from Last 6 Encounters:   07/11/21 97%   09/03/20 98%   01/31/19 96%   11/12/18 99%   09/04/18 97%   10/31/17 95%            Intake/Output Summary (Last 24 hours) at 7/11/2021 8263  Last data filed at 7/11/2021 0308  Gross per 24 hour   Intake 1030 ml   Output 900 ml   Net 130 ml        Physical Exam:    Gen:  Morbid obese, in no acute distress  HEENT:  Pink conjunctivae, PERRL, hearing intact to voice, moist mucous membranes  Neck:  Supple, without masses, thyroid non-tender  Resp:  No accessory muscle use, clear breath sounds without wheezes rales or rhonchi  Card:  No murmurs, bradycardic S1, S2 without thrills, bruits or peripheral edema  Abd:  Soft, non-tender, non-distended, normoactive bowel sounds are present, no mass  Lymph:  No cervical or inguinal adenopathy  Musc:  No cyanosis or clubbing  Skin:  Dark 2nd toe, skin turgor is good  Neuro:  Cranial nerves are grossly intact, general motor weakness, follows commands   Psych:  Poor insight, oriented to person, place and time, flat    Telemetry reviewed:   normal sinus rhythm  __________________________________________________________________  Medications Reviewed: (see below)  Medications:     Current Facility-Administered Medications   Medication Dose Route Frequency    insulin lispro (HUMALOG) injection   SubCUTAneous AC&HS    vancomycin (VANCOCIN) 1,000 mg in 0.9% sodium chloride 250 mL (VIAL-MATE)  1,000 mg IntraVENous Q12H    hydrALAZINE (APRESOLINE) 20 mg/mL injection 20 mg  20 mg IntraVENous Q4H PRN    melatonin (rapid dissolve) tablet 5 mg  5 mg Oral QHS PRN    alum-mag hydroxide-simeth (MYLANTA) oral suspension 30 mL  30 mL Oral Q4H PRN    LORazepam (ATIVAN) injection 0.5 mg  0.5 mg IntraVENous Q6H PRN    oxyCODONE IR (ROXICODONE) tablet 5 mg  5 mg Oral Q4H PRN    morphine injection 2 mg  2 mg IntraVENous Q4H PRN    diphenhydrAMINE (BENADRYL) capsule 25 mg  25 mg Oral Q6H PRN    albuterol (PROVENTIL VENTOLIN) nebulizer solution 2.5 mg  2.5 mg Nebulization Q4H PRN    losartan (COZAAR) tablet 100 mg  100 mg Oral DAILY    furosemide (LASIX) tablet 20 mg  20 mg Oral DAILY    cefepime (MAXIPIME) 2 g in sterile water (preservative free) 10 mL IV syringe  2 g IntraVENous Q8H    glucose chewable tablet 16 g  4 Tablet Oral PRN    dextrose (D50W) injection syrg 12.5-25 g  25-50 mL IntraVENous PRN    glucagon (GLUCAGEN) injection 1 mg  1 mg IntraMUSCular PRN    sodium chloride (NS) flush 5-40 mL  5-40 mL IntraVENous Q8H    sodium chloride (NS) flush 5-40 mL  5-40 mL IntraVENous PRN    acetaminophen (TYLENOL) tablet 650 mg  650 mg Oral Q6H PRN    polyethylene glycol (MIRALAX) packet 17 g  17 g Oral DAILY PRN    bisacodyL (DULCOLAX) suppository 10 mg  10 mg Rectal DAILY PRN    ondansetron (ZOFRAN) injection 4 mg  4 mg IntraVENous Q6H PRN    metroNIDAZOLE (FLAGYL) IVPB premix 500 mg  500 mg IntraVENous Q12H    carvediloL (COREG) tablet 3.125 mg  3.125 mg Oral BID WITH MEALS    sertraline (ZOLOFT) tablet 50 mg  50 mg Oral QHS    rosuvastatin (CRESTOR) tablet 20 mg  20 mg Oral QHS    nitroglycerin (NITROSTAT) tablet 0.4 mg  0.4 mg SubLINGual Q5MIN PRN    aspirin chewable tablet 81 mg  81 mg Oral DAILY        Lab Data Reviewed: (see below)  Lab Review:     Recent Labs     07/10/21  0040 07/09/21  0132 07/08/21  1838   WBC 5.1 7.3 6.9   HGB 10.9* 10.4* 11.1*   HCT 33.0* 32.3* 34.2*    216 229     Recent Labs     07/11/21  0203 07/10/21  0040 07/09/21  0132 07/08/21  1838 07/08/21  1838   NA  --  139 138  --  137   K  --  3.3* 4.2  --  4.9   CL  --  105 106  --  104   CO2  --  27 28  --  29   GLU  --  165* 270*  --  289*   BUN  --  22* 27*  --  27*   CREA 1.11 1.08 1.27   < > 1.51*   CA  --  8.1* 7.8*  --  8.3*   MG  --  1.5*  --   --   --    PHOS  --  3.4  --   --   --    ALB  --  2.3* 2.3*  --  2.5*   TBILI  --  0.8 0.5  --  0.7   ALT  --  11* 13  --  14    < > = values in this interval not displayed. Lab Results   Component Value Date/Time    Glucose (POC) 212 (H) 07/11/2021 06:56 AM    Glucose (POC) 226 (H) 07/10/2021 09:34 PM    Glucose (POC) 167 (H) 07/10/2021 05:53 PM    Glucose (POC) 178 (H) 07/10/2021 04:09 PM    Glucose (POC) 171 (H) 07/10/2021 11:18 AM     No results for input(s): PH, PCO2, PO2, HCO3, FIO2 in the last 72 hours. No results for input(s): INR, INREXT, INREXT in the last 72 hours. All Micro Results     Procedure Component Value Units Date/Time    CULTURE, TISSUE Roscoe Orris STAIN [[id number]] Collected: 07/10/21 1700    Order Status: Completed Specimen: Foot, left Updated: 07/10/21 2331     Special Requests: LEFT FOOT SECOND DIGIT     GRAM STAIN RARE WBCS SEEN         NO ORGANISMS SEEN        Culture result: PENDING    CULTURE, ANAEROBIC [[id number]] Collected: 07/10/21 1700    Order Status: Completed Updated: 07/10/21 2226          Other pertinent lab: none    Total time spent with patient: 30 Minutes I personally reviewed chart, notes, data and current medications in the medical record. I have personally examined and treated the patient at bedside during this period.                  Care Plan discussed with: Patient, Care Manager, Nursing Staff, Consultant/Specialist and >50% of time spent in counseling and coordination of care    Discussed:  Care Plan    Prophylaxis:  SCD's and H2B/PPI    Disposition:  Home w/Family           ___________________________________________________    Attending Physician: Tania Palencia MD

## 2021-07-12 LAB
ALBUMIN SERPL-MCNC: 2.2 G/DL (ref 3.5–5)
ALBUMIN/GLOB SERPL: 0.5 {RATIO} (ref 1.1–2.2)
ALP SERPL-CCNC: 58 U/L (ref 45–117)
ALT SERPL-CCNC: 11 U/L (ref 12–78)
ANION GAP SERPL CALC-SCNC: 6 MMOL/L (ref 5–15)
AST SERPL-CCNC: 16 U/L (ref 15–37)
BACTERIA SPEC CULT: NORMAL
BILIRUB SERPL-MCNC: 0.6 MG/DL (ref 0.2–1)
BUN SERPL-MCNC: 30 MG/DL (ref 6–20)
BUN/CREAT SERPL: 21 (ref 12–20)
CALCIUM SERPL-MCNC: 7.8 MG/DL (ref 8.5–10.1)
CHLORIDE SERPL-SCNC: 106 MMOL/L (ref 97–108)
CO2 SERPL-SCNC: 27 MMOL/L (ref 21–32)
CREAT SERPL-MCNC: 1.44 MG/DL (ref 0.7–1.3)
ERYTHROCYTE [DISTWIDTH] IN BLOOD BY AUTOMATED COUNT: 12.6 % (ref 11.5–14.5)
GLOBULIN SER CALC-MCNC: 4.6 G/DL (ref 2–4)
GLUCOSE BLD STRIP.AUTO-MCNC: 161 MG/DL (ref 65–117)
GLUCOSE BLD STRIP.AUTO-MCNC: 204 MG/DL (ref 65–117)
GLUCOSE BLD STRIP.AUTO-MCNC: 216 MG/DL (ref 65–117)
GLUCOSE BLD STRIP.AUTO-MCNC: 223 MG/DL (ref 65–117)
GLUCOSE SERPL-MCNC: 187 MG/DL (ref 65–100)
HCT VFR BLD AUTO: 33 % (ref 36.6–50.3)
HGB BLD-MCNC: 10.5 G/DL (ref 12.1–17)
MAGNESIUM SERPL-MCNC: 1.7 MG/DL (ref 1.6–2.4)
MCH RBC QN AUTO: 32.2 PG (ref 26–34)
MCHC RBC AUTO-ENTMCNC: 31.8 G/DL (ref 30–36.5)
MCV RBC AUTO: 101.2 FL (ref 80–99)
NRBC # BLD: 0 K/UL (ref 0–0.01)
NRBC BLD-RTO: 0 PER 100 WBC
PHOSPHATE SERPL-MCNC: 3.3 MG/DL (ref 2.6–4.7)
PLATELET # BLD AUTO: 195 K/UL (ref 150–400)
PMV BLD AUTO: 9.3 FL (ref 8.9–12.9)
POTASSIUM SERPL-SCNC: 3.8 MMOL/L (ref 3.5–5.1)
PROT SERPL-MCNC: 6.8 G/DL (ref 6.4–8.2)
RBC # BLD AUTO: 3.26 M/UL (ref 4.1–5.7)
SERVICE CMNT-IMP: ABNORMAL
SERVICE CMNT-IMP: NORMAL
SODIUM SERPL-SCNC: 139 MMOL/L (ref 136–145)
VANCOMYCIN SERPL-MCNC: 24.8 UG/ML
WBC # BLD AUTO: 6.5 K/UL (ref 4.1–11.1)

## 2021-07-12 PROCEDURE — 74011000250 HC RX REV CODE- 250: Performed by: EMERGENCY MEDICINE

## 2021-07-12 PROCEDURE — 74011250636 HC RX REV CODE- 250/636: Performed by: HOSPITALIST

## 2021-07-12 PROCEDURE — 97116 GAIT TRAINING THERAPY: CPT

## 2021-07-12 PROCEDURE — 85027 COMPLETE CBC AUTOMATED: CPT

## 2021-07-12 PROCEDURE — 80202 ASSAY OF VANCOMYCIN: CPT

## 2021-07-12 PROCEDURE — 82962 GLUCOSE BLOOD TEST: CPT

## 2021-07-12 PROCEDURE — 74011250636 HC RX REV CODE- 250/636: Performed by: INTERNAL MEDICINE

## 2021-07-12 PROCEDURE — 88305 TISSUE EXAM BY PATHOLOGIST: CPT

## 2021-07-12 PROCEDURE — 65660000000 HC RM CCU STEPDOWN

## 2021-07-12 PROCEDURE — 74011250636 HC RX REV CODE- 250/636: Performed by: EMERGENCY MEDICINE

## 2021-07-12 PROCEDURE — 36415 COLL VENOUS BLD VENIPUNCTURE: CPT

## 2021-07-12 PROCEDURE — 88311 DECALCIFY TISSUE: CPT

## 2021-07-12 PROCEDURE — 97166 OT EVAL MOD COMPLEX 45 MIN: CPT

## 2021-07-12 PROCEDURE — 83735 ASSAY OF MAGNESIUM: CPT

## 2021-07-12 PROCEDURE — 74011000250 HC RX REV CODE- 250: Performed by: INTERNAL MEDICINE

## 2021-07-12 PROCEDURE — 97535 SELF CARE MNGMENT TRAINING: CPT

## 2021-07-12 PROCEDURE — 74011250637 HC RX REV CODE- 250/637: Performed by: HOSPITALIST

## 2021-07-12 PROCEDURE — 80053 COMPREHEN METABOLIC PANEL: CPT

## 2021-07-12 PROCEDURE — 97162 PT EVAL MOD COMPLEX 30 MIN: CPT

## 2021-07-12 PROCEDURE — 84100 ASSAY OF PHOSPHORUS: CPT

## 2021-07-12 PROCEDURE — 74011636637 HC RX REV CODE- 636/637: Performed by: INTERNAL MEDICINE

## 2021-07-12 RX ADMIN — INSULIN LISPRO 2 UNITS: 100 INJECTION, SOLUTION INTRAVENOUS; SUBCUTANEOUS at 18:04

## 2021-07-12 RX ADMIN — SERTRALINE 50 MG: 50 TABLET, FILM COATED ORAL at 20:18

## 2021-07-12 RX ADMIN — Medication 10 ML: at 22:11

## 2021-07-12 RX ADMIN — Medication 10 ML: at 07:49

## 2021-07-12 RX ADMIN — INSULIN LISPRO 2 UNITS: 100 INJECTION, SOLUTION INTRAVENOUS; SUBCUTANEOUS at 12:03

## 2021-07-12 RX ADMIN — INSULIN LISPRO 1 UNITS: 100 INJECTION, SOLUTION INTRAVENOUS; SUBCUTANEOUS at 22:09

## 2021-07-12 RX ADMIN — INSULIN GLARGINE 30 UNITS: 100 INJECTION, SOLUTION SUBCUTANEOUS at 08:35

## 2021-07-12 RX ADMIN — CEFEPIME HYDROCHLORIDE 2 G: 2 INJECTION, POWDER, FOR SOLUTION INTRAVENOUS at 12:00

## 2021-07-12 RX ADMIN — OXYCODONE 5 MG: 5 TABLET ORAL at 12:00

## 2021-07-12 RX ADMIN — METRONIDAZOLE 500 MG: 500 INJECTION, SOLUTION INTRAVENOUS at 20:18

## 2021-07-12 RX ADMIN — CEFEPIME HYDROCHLORIDE 2 G: 2 INJECTION, POWDER, FOR SOLUTION INTRAVENOUS at 20:18

## 2021-07-12 RX ADMIN — CEFEPIME HYDROCHLORIDE 2 G: 2 INJECTION, POWDER, FOR SOLUTION INTRAVENOUS at 03:35

## 2021-07-12 RX ADMIN — POLYETHYLENE GLYCOL 3350 17 G: 17 POWDER, FOR SOLUTION ORAL at 23:13

## 2021-07-12 RX ADMIN — ASPIRIN 81 MG: 81 TABLET, CHEWABLE ORAL at 08:35

## 2021-07-12 RX ADMIN — Medication 10 ML: at 18:04

## 2021-07-12 RX ADMIN — METRONIDAZOLE 500 MG: 500 INJECTION, SOLUTION INTRAVENOUS at 08:35

## 2021-07-12 RX ADMIN — SODIUM CHLORIDE 850 MG: 9 INJECTION, SOLUTION INTRAMUSCULAR; INTRAVENOUS; SUBCUTANEOUS at 18:05

## 2021-07-12 NOTE — PROGRESS NOTES
Podiatry History and Physical    Subjective:         Date of Consultation:  2021    Patient is a 77 y.o. male who is being seen  Post op left 2nd digit amputation.     Patient Active Problem List    Diagnosis Date Noted    DM type 2 causing vascular disease ([address]     DM type 2 causing renal disease ([address]     DM type 2 causing neurological disease ([address]     CHF (congestive heart failure), NYHA class III, chronic, systolic (HCC)     Prolonged grief reaction     Anxiety and depression     Peripheral edema 2021    Dyspnea on exertion 2021    Chest pain 2021    Osteomyelitis of second toe of left foot ([address] 2021    Elevated brain natriuretic peptide (BNP) level 2021    Prostate cancer ([address]     GERD (gastroesophageal reflux disease)     CAD (coronary artery disease)     Hyperglycemia 2018    PAF (paroxysmal atrial fibrillation) ([address] 2016    Anemia 2016    Hyperlipidemia 2014    PAULINA (acute kidney injury) ([address] 2014    S/P CABG x 2 2011    Coronary atherosclerosis of native coronary artery 2011    Morbid obesity ([address] 2011     Past Medical History:   Diagnosis Date    Anxiety and depression     CAD (coronary artery disease)     CHF (congestive heart failure), NYHA class III, chronic, systolic ([address]     DM type 2 causing neurological disease ([address]     DM type 2 causing renal disease ([address]     DM type 2 causing vascular disease ([address]     GERD (gastroesophageal reflux disease)     Morbid obesity (HCC)     Prolonged grief reaction     Prostate cancer ([address]       Family History   Problem Relation Age of Onset    Heart Disease Father     Diabetes Father     Cancer Sister         BREAST      Social History     Tobacco Use    Smoking status: Former Smoker     Packs/day: 1.00     Years: 7.00     Pack years: 7.00     Quit date: 1991     Years since quittin.6    Smokeless tobacco: Former User Substance Use Topics    Alcohol use: Yes     Comment: VERY RARE     Past Surgical History:   Procedure Laterality Date    HX CORONARY ARTERY BYPASS GRAFT      HX HEART CATHETERIZATION      HX ORTHOPAEDIC      right wrist carpal tunnel repair      Prior to Admission medications    Medication Sig Start Date End Date Taking? Authorizing Provider   insulin glargine (Lantus Solostar U-100 Insulin) 100 unit/mL (3 mL) inpn 60 Units by SubCUTAneous route daily. Yes Provider, Historical   omeprazole (PRILOSEC) 20 mg capsule take 1 capsule by mouth once daily 20  Yes Provider, Historical   sertraline (ZOLOFT) 50 mg tablet Take 50 mg by mouth nightly. 20  Yes Provider, Historical   carvediloL (COREG) 3.125 mg tablet take 1 tablet by mouth twice a day AT 8 AM AND 6 PM 9/3/20  Yes Michell Maya MD   losartan-hydroCHLOROthiazide (HYZAAR) 100-25 mg per tablet take 1 tablet by mouth once daily 9/3/20  Yes Riki Singh MD   rosuvastatin (Crestor) 10 mg tablet Take 1 Tab by mouth nightly. 9/3/20  Yes Riki Singh MD   rivaroxaban (Xarelto) 20 mg tab tablet take 1 tablet by mouth once daily 9/3/20  Yes Riki Singh MD     No Known Allergies     Review of Systems:  A comprehensive review of systems was negative except for that written in the HPI. Objective:     Patient Vitals for the past 8 hrs:   BP Temp Pulse Resp SpO2   21 1514 124/72 98.1 °F (36.7 °C) 60 17 98 %   21 1500   66     21 1354 (!) 162/94  72  99 %     Temp (24hrs), Av °F (36.7 °C), Min:97.9 °F (36.6 °C), Max:98.2 °F (36.8 °C)      DP 2/4 left foot, PT 1/4 left foot. CRT< 3 seconds. Stable incision site with sutures. No edema, erythema or drainage.     Data Review:   Recent Results (from the past 24 hour(s))   GLUCOSE, POC    Collection Time: 21  8:52 PM   Result Value Ref Range    Glucose (POC) 199 (H) 65 - 117 mg/dL    Performed by Eulis  (PCT)    PHOSPHORUS    Collection Time: 21  2:17 AM Result Value Ref Range    Phosphorus 3.3 2.6 - 4.7 MG/DL   METABOLIC PANEL, COMPREHENSIVE    Collection Time: 07/12/21  2:17 AM   Result Value Ref Range    Sodium 139 136 - 145 mmol/L    Potassium 3.8 3.5 - 5.1 mmol/L    Chloride 106 97 - 108 mmol/L    CO2 27 21 - 32 mmol/L    Anion gap 6 5 - 15 mmol/L    Glucose 187 (H) 65 - 100 mg/dL    BUN 30 (H) 6 - 20 MG/DL    Creatinine 1.44 (H) 0.70 - 1.30 MG/DL    BUN/Creatinine ratio 21 (H) 12 - 20      GFR est AA 58 (L) >60 ml/min/1.73m2    GFR est non-AA 48 (L) >60 ml/min/1.73m2    Calcium 7.8 (L) 8.5 - 10.1 MG/DL    Bilirubin, total 0.6 0.2 - 1.0 MG/DL    ALT (SGPT) 11 (L) 12 - 78 U/L    AST (SGOT) 16 15 - 37 U/L    Alk.  phosphatase 58 45 - 117 U/L    Protein, total 6.8 6.4 - 8.2 g/dL    Albumin 2.2 (L) 3.5 - 5.0 g/dL    Globulin 4.6 (H) 2.0 - 4.0 g/dL    A-G Ratio 0.5 (L) 1.1 - 2.2     MAGNESIUM    Collection Time: 07/12/21  2:17 AM   Result Value Ref Range    Magnesium 1.7 1.6 - 2.4 mg/dL   CBC W/O DIFF    Collection Time: 07/12/21  2:17 AM   Result Value Ref Range    WBC 6.5 4.1 - 11.1 K/uL    RBC 3.26 (L) 4.10 - 5.70 M/uL    HGB 10.5 (L) 12.1 - 17.0 g/dL    HCT 33.0 (L) 36.6 - 50.3 %    .2 (H) 80.0 - 99.0 FL    MCH 32.2 26.0 - 34.0 PG    MCHC 31.8 30.0 - 36.5 g/dL    RDW 12.6 11.5 - 14.5 %    PLATELET 429 725 - 558 K/uL    MPV 9.3 8.9 - 12.9 FL    NRBC 0.0 0  WBC    ABSOLUTE NRBC 0.00 0.00 - 0.01 K/uL   VANCOMYCIN, RANDOM    Collection Time: 07/12/21  2:17 AM   Result Value Ref Range    Vancomycin, random 24.8 UG/ML   GLUCOSE, POC    Collection Time: 07/12/21  7:00 AM   Result Value Ref Range    Glucose (POC) 161 (H) 65 - 117 mg/dL    Performed by Bea Anaya (PCT)    GLUCOSE, POC    Collection Time: 07/12/21 11:04 AM   Result Value Ref Range    Glucose (POC) 216 (H) 65 - 117 mg/dL    Performed by Honey Baptiste (CON)    GLUCOSE, POC    Collection Time: 07/12/21  3:58 PM   Result Value Ref Range    Glucose (POC) 204 (H) 65 - 117 mg/dL    Performed by Jose De Jesus BARKER (CON)          Impression:       Osteomyelitis. Recommendation:   Changed dressings of surgical site. Ok to be discharged after final ID recommendations. F/u final micro and path. Continue antibiotics. Cont partial weight bearing left foot. Cont dvt/pe ppx. F/u outpatient in 1 week.

## 2021-07-12 NOTE — ADT AUTH CERT NOTES
Osteomyelitis - Care Day 5 (7/12/2021) by Nilsa Webster RN       Review Status Review Entered   Completed 7/12/2021 12:30      Criteria Review      Care Day: 5 Care Date: 7/12/2021 Level of Care: Telemetry    Guideline Day 4    Clinical Status    (X) * Hemodynamic stability    7/12/2021 12:30:36 EDT by Angel Frias      P  68   /75  R 17 99 % RA    cardiac monitoring continues    (X) * Afebrile    7/12/2021 12:30:36 EDT by Lisa Edmonds 97.9    ( ) * Most recent blood culture negative    7/12/2021 12:30:36 EDT by Angel Frias      Culture result: Abnormal  Preliminary  FEW POSSIBLE STAPHYLOCOCCUS AUREUS    ( ) * Home antibiotic regimen arranged    7/12/2021 12:30:36 EDT by Angel Frias      Infectious Disease consult ordered, note follows    ( ) * Need for inpatient surgical management absent    ( ) * Discharge plans and education understood    Activity    ( ) * Ambulatory or acceptable for next level of care    7/12/2021 12:30:36 EDT by Lisa Edmonds PT order noted    Routes    (X) * Oral hydration    7/12/2021 12:30:36 EDT by Lisa Edmonds reg diet, no IVF noted    ( ) * Oral medications or regimen acceptable for next level of care    (X) * Oral diet or acceptable for next level of care    Interventions    (X) Possible physical therapy    7/12/2021 12:30:36 EDT by Angel Frias      pending    ( ) ESR, C-reactive protein    7/12/2021 12:30:37 EDT by Angel Frias      WBC 6.5  Hgb 10.5  BUN 30  Crea 1.44  CA 7.8    Medications    ( ) Parenteral antibiotics    7/12/2021 12:30:37 EDT by Angel Frias      cefepime 2 g IV q8h continues from 7/8  metronidazole 500 mg IV q12h continues from 7/8  daptomycin 850 mg IV q24h    * Milestone   Additional Notes   Infectious Disease  07/12/21 1140        ID. Christina Hmuphrey reviewed.  Awaiting pathology and final culture results.  Can change vancomycin to daptomycin due to elevated creatinine            Medical Progress Note  7/12/2021  8:03 AM        Assessment and Plan:       Osteomyelitis of second toe of left foot / Diabetic foot infection - POA. Xray suggests osteo. Consulted podiatry. He had 2nd digit amputation 7/10.  Unremarkable JOVITA.  Rare staph on cx so far.  For now vanco, flagyl and cefepine, but Cr rising and may have to stop vanco.  Consult ID. Awaiting path       CHF (congestive heart failure), NYHA class III, acute on chronic, systolic / Peripheral edema / Dyspnea on exertion / Elevated brain natriuretic peptide (BNP) level - POA, last ECHO 25%, and that was now also noted on ECHO 7/10.  Consulted cardiology who documented status and risk for toe surgery.  Usually on BB, HCTZ, ARB, but not on loop diuretic. Cardiology ordered perioperative loop diuretics, but I held that due to hypotension and worsening Cr       DM type 2 uncontrolled causing vascular, renal and neurological disease - Diabetic diet and counseling when eating.  SSI per protocol.  Resumed home lantus. Check A1c.       Coronary atherosclerosis of native coronary artery S/P CABG x 2 / Chest pain / Hypotension / Bradycardia - Cardiology consulted. Troponin negative so far. Continue statin, but post op hold coreg, HCTZ, losartan (due to loren, hypotension) and hold ASA, xarelto (bleeding risk).     PAF (paroxysmal atrial fibrillation) / Chronic anticoagulation - Monitor remote tele. Usually on BB and xarelto, but held that for surgery.  Resume post op, once okay with podiatry       Prolonged grief reaction / Anxiety and depression - POA, likely affects his compliance. Would benefit from counseling outpatient.  Continue sertraline       PAULINA / Chronic kidney disease stage 3 - POA and actually improved after diuresis. Monitor.       Anemia - POA likely due to chronic disease. Stable.  Checking serologies.       Morbid obesity - Advise weight loss, Needs outpatient [place] testing.  Likely [place].  Monitor oxygen       Hyperlipidemia - Continue rosuvastatin       Hx Prostate cancer - Outpatient follow up       GERD (gastroesophageal reflux disease) - PPI      Chief Complaint:  Tolerated procedure, no new complaints.  Flat affect       Not Tolerating PT                  Tolerating some diet          Osteomyelitis - Care Day 4 (7/11/2021) by Parvez Hensley RN       Review Status Review Entered   Completed 7/12/2021 12:22      Criteria Review      Care Day: 4 Care Date: 7/11/2021 Level of Care: Telemetry    Guideline Day 3    Level Of Care    (X) Floor    7/12/2021 12:22:28 EDT by Tim Buck      remote tele    Clinical Status    (X) * Hemodynamic stability    7/12/2021 12:22:28 EDT by Liliana Heller  98.4 °F (36.9 °C)   P 50   BP 99/51, 151/73  R 18 97 %    Activity    (X) As tolerated    Routes    (X) Oral hydration    (X) Parenteral medications    (X) Usual diet    7/12/2021 12:22:28 EDT by Tim Buck      regular    Interventions    (X) Possible physical therapy    7/12/2021 12:22:28 EDT by Tim Buck      order noted    (X) WBC, ESR, C-reactive protein    7/12/2021 12:22:28 EDT by Tim Buck      WBC 5.7  Hgb 11.1  anion gap 3    BUN 23  Crea 1.27  CA 7.8  Prelim Tissue cx: report 7/11  Culture result: Preliminary  FEW POSSIBLE STAPHYLOCOCCUS AUREUS    Medications    (X) Parenteral antibiotics    7/12/2021 12:22:28 EDT by Tim Buck      cefepime 2 g IV q8h continues from 7/8  metronidazole 500 mg IV q12h continues from 7/8  Vancomycin 1000 mg IV q12h per rx dosing    * Milestone   Additional Notes   Medical Progress Note       7/11/2021  8:22 AM              Assessment and Plan:       Osteomyelitis of second toe of left foot / Diabetic foot infection - POA. Xray suggests osteo. Consulted podiatry. He had digit amputation 7/10.  Unremarkable JOVITA.  NGTD on cx.  For now vanco, flagyl and cefepine.  Awaiting path       CHF (congestive heart failure), NYHA class III, acute on chronic, systolic / Peripheral edema / Dyspnea on exertion / Elevated brain natriuretic peptide (BNP) level - POA, last ECHO 25%, and that was now also noted on ECHO 7/10.  Consulted cardiology who documented status and risk for toe surgery.  Usually on BB, HCTZ, ARB, but not on loop diuretic. Cardiology ordered perioperative loop diuretics, but I'll hold that due to hypotension       DM type 2 uncontrolled causing vascular, renal and neurological disease - Diabetic diet and counseling when eating.  SSI per protocol.  Resumed home lantus. Check A1c.       Coronary atherosclerosis of native coronary artery S/P CABG x 2 / Chest pain / Hypotension / Bradycardia - Cardiology consulted. Troponin negative so far. Continue statin, but post op hold coreg, HCTZ, losartan (due to loren, hypotension) and hold ASA, xarelto (bleeding risk).     PAF (paroxysmal atrial fibrillation) / Chronic anticoagulation - Monitor remote tele. Usually on BB and xarelto, but held that for surgery.  Resume post op, once okay with podiatry       Prolonged grief reaction / Anxiety and depression - POA, likely affects his compliance. Would benefit from counseling outpatient.  Continue sertraline       PAULINA / Chronic kidney disease stage 3 - POA and actually improved after diuresis.  Monitor.       Anemia - POA likely due to chronic disease. Stable. Checking serologies.       Morbid obesity - Advise weight loss, Needs outpatient [place] testing.  Likely [place].  Monitor oxygen       Hyperlipidemia - Continue rosuvastatin       Hx Prostate cancer - Outpatient follow up       GERD (gastroesophageal reflux disease) - PPI           Subjective:       Chief Complaint:  Tolerated procedure      Not Tolerating PT                  Tolerating diet          Osteomyelitis - Care Day 3 (7/10/2021) by Giovanny Crespo RN       Review Status Review Entered   Completed 7/12/2021 12:08      Criteria Review      Care Day: 3 Care Date: 7/10/2021 Level of Care: Telemetry    Guideline Day 2    Level Of Care    (X) Floor    7/12/2021 12:08:38 EDT by Consuelo Abdullahi Juanito Nettles      tx to remote tele 7/9    Clinical Status    (X) * Afebrile or fever improved    7/12/2021 12:08:38 EDT by Angel Frias      T 98.6  P 58, 79  /86, 186/91  R 18  97% RA    ( ) * Oral intake tolerated    Activity    (X) As tolerated    7/12/2021 12:08:38 EDT by Leyda  w/assist, apply post op shoe  Cardiac monitoring   Crutches w/instructions    Routes    (X) * Oral hydration    (X) Parenteral medications    7/12/2021 12:08:38 EDT by Angel Frias      IV abx  losartan 100 mg po daily  lasix 20 mg po daily  coreg 3.125 mg po bid  oxycodone 5 mg po q4h prn x 1    (X) Usual diet    7/12/2021 12:08:38 EDT by Angel Frias      regular diet    Interventions    (X) Possible WBC, ESR, C-reactive protein    7/12/2021 12:08:38 EDT by Angel Frias      WBC 5.1  Hgb 10.9  K 3.3    Crea 1.08, 1.11  BUN 22  CA 8.1  Mg 1.6    Medications    (X) Parenteral antibiotics    7/12/2021 12:08:38 EDT by Angel Frias      cefepime 2 g IV q8h continues from 7/8  metronidazole 500 mg IV q12h continues from 7/8  Vancomycin 1500 mg IV q12h per rx dosing    * Milestone   Additional Notes   Brief Postoperative Note  Date of Procedure: 7/10/2021        Pre-Op Diagnosis: OSTEOMYELITIS left 2nd digit.        Post-Op Diagnosis: Same as preoperative diagnosis.          Procedure(s):   LEFT 2ND DIGIT AMPUTATION (URGENT)      Anesthesia: Local        Estimated Blood Loss (mL): Minimal       Complications: None      Findings: Wound with bone infection. Cardiology    Assessment/Plan:       L toe osteomyelitis   Acute on chronic HFrEF   CAD-s/p CABG/chronic angina   PAF/chronic anticoagulation   Morbid obesity   DM   Hypertension   Hyperlipidemia       Plan:   Echocardiogram   Continue GDMT (guideline directed medical therapy) for cardiomyopathy.    Continue aspirin, Coreg, Crestor,Losartan   IV Lasix 40 mg today / PO in am   Restart Xarelto post procedure   Class 3 risk  - 2 risk facotrs (10.1%)  from cardiac standpoint for surgery as per Bernardo's Revised cardiac risk  Index ( 30 day risk of death, MI, cardiac arrest). D/w pt. Patient understands the cardiac risk and wishes to proceed. Can proceed with surgery. Podiatry  Date of Consultation:  July 10, 2021       Patient is a 68 y. o. male who is being seen for left 2nd digit osteomyelitis.   Workup has revealed failure of conservative treatment. Patient cleared for procedure by cardiology. DP 2/4 left foot, PT 1/4 left foot. CRT< 3 seconds. Open wound left 2nd digit with distal necrosis and surrounding edema and erythema. Loss of protective sensation      Impression:           Osteomyelitis.       Recommendation:   Patient education for Conditions.       All risks, complications and benefits explained.       No guarantees made to the outcome of the procedure.       NPO since midnight.       Consent signed and in chart.       To OR for Left 2nd digit amputation.             Medical Progress Note  7/10/2021  10:16 AM       Assessment and Plan:       Osteomyelitis of second toe of left foot / Diabetic foot infection - POA. Xray suggests osteo. Consulted podiatry. Plan is for amputation today.  Unremarkable JOVITA.  NGTD on cx.  For now vanco and cefepine.        CHF (congestive heart failure), NYHA class III, acute on chronic, systolic / Peripheral edema / Dyspnea on exertion / Elevated brain natriuretic peptide (BNP) level - POA, last ECHO 25%.  Consult cardiology to assess status and risk for toe surgery.  Usually on BB, HCTZ, ARB, but not on loop diuretic. Cardiology ordered perioperative loop diuretics       DM type 2 uncontrolled causing vascular, renal and neurological disease - Diabetic diet and counseling when eating.  SSI per protocol.  Resumed home lantus. Check A1c.       Coronary atherosclerosis of native coronary artery S/P CABG x 2 / Chest pain - Cardiology consulted. Troponin negative so far.  Continue statin, coreg, HCTZ, losartan, ASA, holding xarelto.  Cardiology did risk stratification of patient.       PAF (paroxysmal atrial fibrillation) / Chronic anticoagulation - Monitor remote tele. Usually on BB and xarelto, but holding that for potential surgery.       Prolonged grief reaction / Anxiety and depression - POA, likely affects his compliance. Would benefit from counseling outpatient.  Continue sertraline       PAULINA / Chronic kidney disease stage 3 - POA and actually improved after diuresis. Monitor.       Anemia - POA likely due to chronic disease. Check serologies.       Morbid obesity - Advise weight loss, Needs outpatient [place] testing.  Likely [place].  Monitor oxygen       Hyperlipidemia - Continue rosuvastatin       Hx Prostate cancer - Outpatient follow up       GERD (gastroesophageal reflux disease) - PPI   Subjective:       Chief Complaint:  Awaiting procedure, no pain      Not Tolerating PT                  NPO       ECHO ADULT COMPLETE       Result status: Final result    Echo study was limited due to patient's condition. LV: Estimated LVEF is 25 - 30%. Visually measured ejection fraction. Normal cavity size. Mild concentric hypertrophy. Severely reduced systolic function. Left ventricular diastolic dysfunction. MV: Mitral valve non-specific thickening. LA: Mildly dilated left atrium. TV: Pulmonary hypertension not suggested by Doppler findings. RV: Reduced systolic function.       7/10/2021       XR FOOT LT AP/LAT

## 2021-07-12 NOTE — NURSE NAVIGATOR
Chart reviewed by Heart Failure Nurse Navigator. Heart Failure database completed. Patient admitted with osteomyelitis of the second toe of L foot with amputation of toe on 7/10/21. Patient with history of systolic HF with Dr. Theodora Mosqueda as OP Cardiologist.    EF:  25 to 30%     ACEi/ARB/ARNi: PTA was on Losartan 100 mg daily currently held while hypotension post op. BB: PTA was on Coreg 3.125 mg BID currently held while hypotension post op. Aldosterone Antagonist: **    Obstructive Sleep Apnea Screening:   STOP-BANG score:   Referred to Sleep Medicine:     CRT **. NYHA Functional Class III. Heart Failure Teach Back in Patient Education. Heart Failure Avoiding Triggers on Discharge Instructions. Cardiologist: Dr. Aleja Guardado discharge follow up phone call to be made within 48-72 hours of discharge. normal...

## 2021-07-12 NOTE — PROGRESS NOTES
[facility] Dosing Services: Antimicrobial Stewardship Daily Doc    Consult for antibiotic dosing of Vancomycin by Dr. Misty Little  Indication: OM/DFI  Day of Therapy: 5    Vancomycin Therapy:  MD: Pharmacy dosing by level   Goal trough: 15-20 mcg/ml  Pharmacy dosing by level due to SCr and decrease in urine output. SCr continues to rise (1.44, up from 1.27 yesterday)  Last dose of vancomycin 1000 mg 7/11 0303  A random level obtained ~23 hours from last dose remains supra-therapeutic at 24.8 mcg/mL (down from 27.8 mcg/mL after 11.5 hours). Continue to hold vancomycin. A random level has been ordered for 7/13 with am labs. Date Dose & Interval Measured (mcg/mL) Extrapolated (mcg/mL)   07/10/2021 1,500 mg IV every 12 hours 24.4 25.4   07/11/2021  Dose by level  27.8         --    07/12/2021  Dose by level  24.8        --    07/13/2021  Dose by level       Other Antimicrobial   (not dosed by pharmacist) Cefepime 2 grams IV every 8 hours  Metronidazole 500 mg IV every 12 hours   Cultures 7/10 - Left foot second digit - few possible staph (pending)    Serum Creatinine Lab Results   Component Value Date/Time    Creatinine 1.44 (H) 07/12/2021 02:17 AM    Creatinine (POC) 1.4 (H) 03/25/2016 02:15 AM      Creatinine Clearance Estimated Creatinine Clearance: 64 mL/min (A) (based on SCr of 1.44 mg/dL (H)).      Temp Temp: 97.9 °F (36.6 °C)       WBC Lab Results   Component Value Date/Time    WBC 6.5 07/12/2021 02:17 AM      H/H Lab Results   Component Value Date/Time    HGB 10.5 (L) 07/12/2021 02:17 AM      Platelets    Lab Results   Component Value Date/Time    PLATELET 846 78/23/7257 02:17 AM        Thank you,  Pharmacist  LESLEY [phone]

## 2021-07-12 NOTE — PROGRESS NOTES
[facility]    Medical Progress Note      NAME: Devang Moore. :  1945  MRM:  [id number]    Date/Time of service 2021  8:03 AM          Assessment and Plan:     Osteomyelitis of second toe of left foot / Diabetic foot infection - POA. Xray suggests osteo. Consulted podiatry. He had 2nd digit amputation 7/10. Unremarkable JOVITA. Rare staph on cx so far. For now vanco, flagyl and cefepine, but Cr rising and may have to stop vanco.  Consult ID. Awaiting path     CHF (congestive heart failure), NYHA class III, acute on chronic, systolic / Peripheral edema / Dyspnea on exertion / Elevated brain natriuretic peptide (BNP) level - POA, last ECHO 25%, and that was now also noted on ECHO 7/10. Consulted cardiology who documented status and risk for toe surgery. Usually on BB, HCTZ, ARB, but not on loop diuretic. Cardiology ordered perioperative loop diuretics, but I held that due to hypotension and worsening Cr     DM type 2 uncontrolled causing vascular, renal and neurological disease - Diabetic diet and counseling when eating. SSI per protocol. Resumed home lantus. Check A1c.     Coronary atherosclerosis of native coronary artery S/P CABG x 2 / Chest pain / Hypotension / Bradycardia - Cardiology consulted. Troponin negative so far. Continue statin, but post op hold coreg, HCTZ, losartan (due to loren, hypotension) and hold ASA, xarelto (bleeding risk).    PAF (paroxysmal atrial fibrillation) / Chronic anticoagulation - Monitor remote tele. Usually on BB and xarelto, but held that for surgery. Resume post op, once okay with podiatry     Prolonged grief reaction / Anxiety and depression - POA, likely affects his compliance. Would benefit from counseling outpatient. Continue sertraline     PAULINA / Chronic kidney disease stage 3 - POA and actually improved after diuresis. Monitor. Anemia - POA likely due to chronic disease. Stable.  Checking serologies.     Morbid obesity - Advise weight loss, Needs outpatient [place] testing. Likely [place]. Monitor oxygen     Hyperlipidemia - Continue rosuvastatin     Hx Prostate cancer - Outpatient follow up     GERD (gastroesophageal reflux disease) - PPI       Subjective:     Chief Complaint:  Tolerated procedure, no new complaints. Flat affect    ROS:  (bold if positive, if negative)    Not Tolerating PT  Tolerating some diet        Objective:     Last 24hrs VS reviewed since prior progress note.  Most recent are:    Visit Vitals  /62 (BP 1 Location: Right upper arm, BP Patient Position: At rest)   Pulse 60   Temp 97.9 °F (36.6 °C)   Resp 18   Ht 5' 9\" (1.753 m)   Wt 145.1 kg (319 lb 14.2 oz)   SpO2 98%   BMI 47.24 kg/m²     SpO2 Readings from Last 6 Encounters:   07/12/21 98%   09/03/20 98%   01/31/19 96%   11/12/18 99%   09/04/18 97%   10/31/17 95%            Intake/Output Summary (Last 24 hours) at 7/12/2021 0803  Last data filed at 7/12/2021 8875  Gross per 24 hour   Intake 550 ml   Output 500 ml   Net 50 ml        Physical Exam:    Gen:  Morbid obese, in no acute distress  HEENT:  Pink conjunctivae, PERRL, hearing intact to voice, moist mucous membranes  Neck:  Supple, without masses, thyroid non-tender  Resp:  No accessory muscle use, clear breath sounds without wheezes rales or rhonchi  Card:  No murmurs, bradycardic S1, S2 without thrills, bruits or peripheral edema  Abd:  Soft, non-tender, non-distended, normoactive bowel sounds are present, no mass  Lymph:  No cervical or inguinal adenopathy  Musc:  No cyanosis or clubbing  Skin:  Dark 2nd toe, skin turgor is good  Neuro:  Cranial nerves are grossly intact, general motor weakness, follows commands   Psych:  Poor insight, oriented to person, place and time, flat    Telemetry reviewed:   normal sinus rhythm  __________________________________________________________________  Medications Reviewed: (see below)  Medications:     Current Facility-Administered Medications   Medication Dose Route Frequency    insulin lispro (HUMALOG) injection   SubCUTAneous AC&HS    Vancomycin - Pharmacist to dose    Other Rx Dosing/Monitoring    insulin glargine (LANTUS) injection 30 Units  30 Units SubCUTAneous DAILY    hydrALAZINE (APRESOLINE) 20 mg/mL injection 20 mg  20 mg IntraVENous Q4H PRN    melatonin (rapid dissolve) tablet 5 mg  5 mg Oral QHS PRN    alum-mag hydroxide-simeth (MYLANTA) oral suspension 30 mL  30 mL Oral Q4H PRN    LORazepam (ATIVAN) injection 0.5 mg  0.5 mg IntraVENous Q6H PRN    oxyCODONE IR (ROXICODONE) tablet 5 mg  5 mg Oral Q4H PRN    morphine injection 2 mg  2 mg IntraVENous Q4H PRN    diphenhydrAMINE (BENADRYL) capsule 25 mg  25 mg Oral Q6H PRN    albuterol (PROVENTIL VENTOLIN) nebulizer solution 2.5 mg  2.5 mg Nebulization Q4H PRN    cefepime (MAXIPIME) 2 g in sterile water (preservative free) 10 mL IV syringe  2 g IntraVENous Q8H    glucose chewable tablet 16 g  4 Tablet Oral PRN    dextrose (D50W) injection syrg 12.5-25 g  25-50 mL IntraVENous PRN    glucagon (GLUCAGEN) injection 1 mg  1 mg IntraMUSCular PRN    sodium chloride (NS) flush 5-40 mL  5-40 mL IntraVENous Q8H    sodium chloride (NS) flush 5-40 mL  5-40 mL IntraVENous PRN    acetaminophen (TYLENOL) tablet 650 mg  650 mg Oral Q6H PRN    polyethylene glycol (MIRALAX) packet 17 g  17 g Oral DAILY PRN    bisacodyL (DULCOLAX) suppository 10 mg  10 mg Rectal DAILY PRN    ondansetron (ZOFRAN) injection 4 mg  4 mg IntraVENous Q6H PRN    metroNIDAZOLE (FLAGYL) IVPB premix 500 mg  500 mg IntraVENous Q12H    sertraline (ZOLOFT) tablet 50 mg  50 mg Oral QHS    rosuvastatin (CRESTOR) tablet 20 mg  20 mg Oral QHS    nitroglycerin (NITROSTAT) tablet 0.4 mg  0.4 mg SubLINGual Q5MIN PRN    aspirin chewable tablet 81 mg  81 mg Oral DAILY        Lab Data Reviewed: (see below)  Lab Review:     Recent Labs     07/12/21  0217 07/11/21  0904 07/10/21  0040   WBC 6.5 5.7 5.1   HGB 10.5* 11.1* 10.9*   HCT 33.0* 34.3* 33.0*   PLT 195 193 206     Recent Labs     07/12/21  0217 07/11/21  0904 07/11/21  0203 07/10/21  0040 07/10/21  0040    137  --   --  139   K 3.8 4.8  --   --  3.3*    106  --   --  105   CO2 27 28  --   --  27   * 249*  --   --  165*   BUN 30* 23*  --   --  22*   CREA 1.44* 1.27 1.11   < > 1.08   CA 7.8* 7.8*  --   --  8.1*   MG 1.7 1.8  --   --  1.5*   PHOS 3.3 3.6  --   --  3.4   ALB 2.2* 2.1*  --   --  2.3*   TBILI 0.6 0.8  --   --  0.8   ALT 11* 12  --   --  11*    < > = values in this interval not displayed. Lab Results   Component Value Date/Time    Glucose (POC) 161 (H) 07/12/2021 07:00 AM    Glucose (POC) 199 (H) 07/11/2021 08:52 PM    Glucose (POC) 238 (H) 07/11/2021 04:07 PM    Glucose (POC) 253 (H) 07/11/2021 11:05 AM    Glucose (POC) 212 (H) 07/11/2021 06:56 AM     No results for input(s): PH, PCO2, PO2, HCO3, FIO2 in the last 72 hours. No results for input(s): INR, INREXT, INREXT in the last 72 hours. All Micro Results     Procedure Component Value Units Date/Time    CULTURE, TISSUE Robertha Masters STAIN [[id number]]  (Abnormal) Collected: 07/10/21 1700    Order Status: Completed Specimen: Foot, left Updated: 07/11/21 1345     Special Requests: LEFT FOOT SECOND DIGIT     GRAM STAIN RARE WBCS SEEN         NO ORGANISMS SEEN        Culture result:       FEW POSSIBLE STAPHYLOCOCCUS AUREUS          CULTURE, ANAEROBIC [[id number]] Collected: 07/10/21 1700    Order Status: Completed Updated: 07/10/21 2226          Other pertinent lab: none    Total time spent with patient: 30 Minutes I personally reviewed chart, notes, data and current medications in the medical record. I have personally examined and treated the patient at bedside during this period.                  Care Plan discussed with: Patient, Care Manager, Nursing Staff, Consultant/Specialist and >50% of time spent in counseling and coordination of care    Discussed:  Care Plan    Prophylaxis:  SCD's and H2B/PPI    Disposition:  Home w/Family           ___________________________________________________    Attending Physician: Ariel Ann MD

## 2021-07-12 NOTE — PROGRESS NOTES
Problem: Falls - Risk of  Goal: *Absence of Falls  Description: Document Matias Montano Fall Risk and appropriate interventions in the flowsheet. Outcome: Progressing Towards Goal  Note: Fall Risk Interventions:  Mobility Interventions: Bed/chair exit alarm         Medication Interventions: Patient to call before getting OOB    Elimination Interventions: Bed/chair exit alarm, Call light in reach, Urinal in reach              Problem: Patient Education: Go to Patient Education Activity  Goal: Patient/Family Education  Outcome: Progressing Towards Goal     Problem: Diabetes Self-Management  Goal: *Disease process and treatment process  Description: Define diabetes and identify own type of diabetes; list 3 options for treating diabetes. Outcome: Progressing Towards Goal  Goal: *Incorporating nutritional management into lifestyle  Description: Describe effect of type, amount and timing of food on blood glucose; list 3 methods for planning meals. Outcome: Progressing Towards Goal  Goal: *Incorporating physical activity into lifestyle  Description: State effect of exercise on blood glucose levels. Outcome: Progressing Towards Goal  Goal: *Developing strategies to promote health/change behavior  Description: Define the ABC's of diabetes; identify appropriate screenings, schedule and personal plan for screenings. Outcome: Progressing Towards Goal  Goal: *Using medications safely  Description: State effect of diabetes medications on diabetes; name diabetes medication taking, action and side effects. Outcome: Progressing Towards Goal  Goal: *Monitoring blood glucose, interpreting and using results  Description: Identify recommended blood glucose targets  and personal targets. Outcome: Progressing Towards Goal  Goal: *Prevention, detection, treatment of acute complications  Description: List symptoms of hyper- and hypoglycemia; describe how to treat low blood sugar and actions for lowering  high blood glucose level.   Outcome: Progressing Towards Goal  Goal: *Prevention, detection and treatment of chronic complications  Description: Define the natural course of diabetes and describe the relationship of blood glucose levels to long term complications of diabetes. Outcome: Progressing Towards Goal  Goal: *Developing strategies to address psychosocial issues  Description: Describe feelings about living with diabetes; identify support needed and support network  Outcome: Progressing Towards Goal  Goal: *Insulin pump training  Outcome: Progressing Towards Goal  Goal: *Sick day guidelines  Outcome: Progressing Towards Goal  Goal: *Patient Specific Goal (EDIT GOAL, INSERT TEXT)  Outcome: Progressing Towards Goal     Problem: Patient Education: Go to Patient Education Activity  Goal: Patient/Family Education  Outcome: Progressing Towards Goal     Problem: Pressure Injury - Risk of  Goal: *Prevention of pressure injury  Description: Document Shane Scale and appropriate interventions in the flowsheet. Outcome: Progressing Towards Goal  Note: Pressure Injury Interventions:  Sensory Interventions: Check visual cues for pain, Keep linens dry and wrinkle-free, Monitor skin under medical devices, Turn and reposition approx.  every two hours (pillows and wedges if needed)    Moisture Interventions: Minimize layers    Activity Interventions: Increase time out of bed, PT/OT evaluation    Mobility Interventions: PT/OT evaluation    Nutrition Interventions: Offer support with meals,snacks and hydration    Friction and Shear Interventions: HOB 30 degrees or less, Lift team/patient mobility team, Minimize layers                Problem: Patient Education: Go to Patient Education Activity  Goal: Patient/Family Education  Outcome: Progressing Towards Goal     Problem: Pain  Goal: *Control of Pain  Outcome: Progressing Towards Goal  Goal: *PALLIATIVE CARE:  Alleviation of Pain  Outcome: Progressing Towards Goal     Problem: Patient Education: Go to Patient Education Activity  Goal: Patient/Family Education  Outcome: Progressing Towards Goal     Problem: Patient Education: Go to Patient Education Activity  Goal: Patient/Family Education  Outcome: Progressing Towards Goal     Problem: Patient Education: Go to Patient Education Activity  Goal: Patient/Family Education  Outcome: Progressing Towards Goal

## 2021-07-12 NOTE — PROGRESS NOTES
Verbal shift change report given to [facility] (oncoming nurse) by Annmarie Blakely (offgoing nurse). Report included the following information SBAR, Kardex and MAR.

## 2021-07-12 NOTE — PROGRESS NOTES
Problem: Self Care Deficits Care Plan (Adult)  Goal: *Therapy Goal (Edit Goal, Insert Text)  Description: FUNCTIONAL STATUS PRIOR TO ADMISSION: Patient was modified independent during ADL tasks (with occasional assistance to manage footwear), and using a 3 wheeled rollator for functional mobility. HOME SUPPORT: The patient lived with daughter who provided assistance as needed. Daughter works during the day and patient is home alone    Initiated 7/12/2021  1. Patient will perform grooming in stand with modified independence within 7 day(s). 2.  Patient will perform bathing with modified independence within 7 day(s). 3.  Patient will perform lower body dressing with modified independence within 7 day(s). 4.  Patient will perform toilet transfers with modified independence within 7 day(s). 5.  Patient will perform all aspects of toileting with modified independence within 7 day(s). 6.  Patient will participate in upper extremity therapeutic exercise/activities with modified independence for 10 minutes within 7 day(s). 7.  Patient will utilize energy conservation techniques during functional activities with verbal cues within 7 day(s). Outcome: Not Met  OCCUPATIONAL THERAPY EVALUATION  Patient: Kizzy Turk (72 y.o. male)  Date: 7/12/2021  Primary Diagnosis: Dyspnea on exertion [R06.00]  Chest pain on exertion [R07.9]  Peripheral edema [R60.9]  Acute osteomyelitis of left foot (HCC) [M86.172]  Procedure(s) (LRB):  LEFT 2ND DIGIT AMPUTATION (URGENT) (Left) 2 Days Post-Op   Precautions:   Fall, PWB (with post op shoe; LLE)    ASSESSMENT  Based on the objective data described below, the patient presents with decline in ADL and mobility performance. Patient is limited by new LLE weight bearing restriction, L foot pain, impaired standing tolerance, impaired standing balance, decreased activity endurance. Patient finds LB ADL tasks taxing at [facility]- will benefit from AE training.  Patient is home alone during the day while daughter works, she has meals prepped for re-heating. At this time patient is unsafe to manage ADL/IADL alone. Patient may benefit from SNF pending progress and home support at discharge. If patient progresses and is safe to return home, [facility] OT recommended    Current Level of Function Impacting Discharge (ADLs/self-care): mod to max assist for LB ADL, Min to Mod A toileting, Min-Mod- transfers    Functional Outcome Measure: The patient scored Total: 60/100 on the Barthel Index outcome measure which is indicative of 40% impaired ability to care for basic self needs/dependency on others; inferred 100% dependency on others for instrumental ADLs. Other factors to consider for discharge: patient is home alone while daughter works     Patient will benefit from skilled therapy intervention to address the above noted impairments. PLAN :  Recommendations and Planned Interventions: self care training, functional mobility training, therapeutic exercise, balance training, therapeutic activities, endurance activities, patient education, home safety training, and family training/education    Frequency/Duration: Patient will be followed by occupational therapy 5 times a week to address goals.     Recommendation for discharge: (in order for the patient to meet his/her long term goals)  Therapy up to 5 days/week in SNF setting or an intensive home health therapy program    This discharge recommendation:  Has been made in collaboration with the attending provider and/or case management    IF patient discharges home will need the following DME: walker: rolling       SUBJECTIVE:   Patient pleasant and cooperative     OBJECTIVE DATA SUMMARY:   HISTORY:   Past Medical History:   Diagnosis Date    Anxiety and depression     CAD (coronary artery disease)     CHF (congestive heart failure), NYHA class III, chronic, systolic (HCC)     DM type 2 causing neurological disease [address] [address]     DM type 2 causing renal disease ([facility] [address]     DM type 2 causing vascular disease ([facility] [address]     GERD (gastroesophageal reflux disease)     Morbid obesity ([facility] [address]     Prolonged grief reaction     Prostate cancer ([facility] [address]      Past Surgical History:   Procedure Laterality Date    HX CORONARY ARTERY BYPASS GRAFT      HX HEART CATHETERIZATION      HX ORTHOPAEDIC      right wrist carpal tunnel repair       Expanded or extensive additional review of patient history:     Home Situation  Home Environment: Private residence  Wheelchair Ramp: Yes  One/Two Story Residence: One story  Living Alone: No  Support Systems: Friends \ neighbors  Patient Expects to be Discharged to[de-identified] [employer]  Current DME Used/Available at Home: Lynn Moore, rollator, [employer] chair  Tub or Shower Type: Shower    Hand dominance: Right    EXAMINATION OF PERFORMANCE DEFICITS:  Cognitive/Behavioral Status:  Neurologic State: Alert  Orientation Level: Oriented X4  Cognition: Follows commands           Hearing: Auditory  Auditory Impairment: Hard of hearing, bilateral    Vision/Perceptual:                                Corrective Lenses: Glasses    Range of Motion:    AROM: Generally decreased, functional                         Strength:    Strength: Generally decreased, functional                Coordination:  Coordination: Within functional limits  Fine Motor Skills-Upper: Left Intact; Right Intact    Gross Motor Skills-Upper: Left Intact; Right Intact    Tone & Sensation:    Tone: Normal  Sensation: Impaired (bilateral feet)                      Balance:  Sitting: Intact    Functional Mobility and Transfers for ADLs:  Bed Mobility:  Supine to Sit: Contact guard assistance  Scooting: Contact guard assistance    Transfers:  Sit to Stand: Moderate assistance;Assist x1  Stand to Sit: Contact guard assistance  Bed to Chair: Minimum assistance    ADL Assessment:  Feeding: Independent    Oral Facial Hygiene/Grooming: Supervision    Bathing:  Moderate assistance    Upper Body Dressing: Minimum assistance    Lower Body Dressing: Moderate assistance    Toileting: Moderate assistance                ADL Intervention and task modifications:     Briefly instructed in AE (verbal instruction) on use of AE for LB ADL. Educated daughter on increased assistance required at this time   Instructed in safe RW mgmt during ADL related transfers     Functional Measure:  Barthel Index:    Bathin  Bladder: 10  Bowels: 10  Groomin  Dressin  Feeding: 10  Mobility: 5  Stairs: 0  Toilet Use: 5  Transfer (Bed to Chair and Back): 10  Total: 60/100        The Barthel ADL Index: Guidelines  1. The index should be used as a record of what a patient does, not as a record of what a patient could do. 2. The main aim is to establish degree of independence from any help, physical or verbal, however minor and for whatever reason. 3. The need for supervision renders the patient not independent. 4. A patient's performance should be established using the best available evidence. Asking the patient, friends/relatives and nurses are the usual sources, but direct observation and common sense are also important. However direct testing is not needed. 5. Usually the patient's performance over the preceding 24-48 hours is important, but occasionally longer periods will be relevant. 6. Middle categories imply that the patient supplies over 50 per cent of the effort. 7. Use of aids to be independent is allowed. Lisbeth Gray., Barthel, D.W. (0306). Functional evaluation: the Barthel Index. [address] [facility] (14)2. [facility] CRISTINO Fonseca, Saira Marinelli., [facility] Virgil., [place], [address] [facility] (). Measuring the change indisability after inpatient rehabilitation; comparison of the responsiveness of the Barthel Index and Functional [place] Measure. Journal of Neurology, Neurosurgery, and Psychiatry, 66(4), 533-070.   Ganesh Medina, N.J.A, MIKI Ramirez.NOLA, & Baldemar Munoz MFedeA. (2004.) Assessment of post-stroke quality of life in cost-effectiveness studies: The usefulness of the Barthel Index and the EuroQoL-5D. Quality of Life Research, 15, 375-75         Occupational Therapy Evaluation Charge Determination   History Examination Decision-Making   MEDIUM Complexity : Expanded review of history including physical, cognitive and psychosocial  history  MEDIUM Complexity : 3-5 performance deficits relating to physical, cognitive , or psychosocial skils that result in activity limitations and / or participation restrictions MEDIUM Complexity : Patient may present with comorbidities that affect occupational performnce. Miniml to moderate modification of tasks or assistance (eg, physical or verbal ) with assesment(s) is necessary to enable patient to complete evaluation       Based on the above components, the patient evaluation is determined to be of the following complexity level: MEDIUM  Pain Rating:  L foot pain with standing     Activity Tolerance:   Fair and requires rest breaks    After treatment patient left in no apparent distress:    Supine in bed, Call bell within reach, Bed / chair alarm activated, Caregiver / family present, and Side rails x 3    COMMUNICATION/EDUCATION:   The patients plan of care was discussed with: Physical therapist and Registered nurse. Home safety education was provided and the patient/caregiver indicated understanding. and Patient/family have participated as able in goal setting and plan of care. This patients plan of care is appropriate for delegation to [facility].     Thank you for this referral.  Douglas Awad OT  Time Calculation: 33 mins

## 2021-07-12 NOTE — PROGRESS NOTES
0730: Bedside and Verbal shift change report given to Bernard Mittal (oncoming nurse) by Kelly Martin RN (offgoing nurse). Report included the following information SBAR, Kardex, Intake/Output, MAR, Accordion and Recent Results.

## 2021-07-12 NOTE — PROGRESS NOTES
ID.  Chart reviewed. Awaiting pathology and final culture results.   Can change vancomycin to daptomycin due to elevated creatinine

## 2021-07-12 NOTE — PROGRESS NOTES
7/12/2021  Case Management Progress Note    11:49 AM  Patient is 77year old male admitted 7/8 for osteomyelitis  Patient's RUR is 20% yellow/moderate risk for readmission  Chart reviewed--Patient discussed at IDR rounds this morning  Per rounds we are waiting on the pathology for this patient, noted that ID changed his antibiotics this morning due to his creatinine levels. No noted needs at this time, will continue to follow as discharge needs, if any, become more clear. Transition of Care Plan  1. Continue medical management  2. Waiting on pathology   3. No skilled needs noted as of yet  4. Family will transport  5.  CM will continue to follow    KINZA Elam

## 2021-07-12 NOTE — PROGRESS NOTES
Problem: Mobility Impaired (Adult and Pediatric)  Goal: *Acute Goals and Plan of Care (Insert Text)  Description: FUNCTIONAL STATUS PRIOR TO ADMISSION: Patient was modified independent using a 3 wheeled rollator for functional mobility. HOME SUPPORT PRIOR TO ADMISSION: The patient lived with his daughter who works full time. Physical Therapy Goals  Initiated 7/12/2021  1. Patient will move from supine to sit and sit to supine  in bed with independence within 7 day(s). 2.  Patient will transfer from bed to chair and chair to bed with modified independence using the least restrictive device within 7 day(s). 3.  Patient will perform sit to stand with modified independence within 7 day(s). 4.  Patient will ambulate with modified independence for 50 feet with the least restrictive device within 7 day(s). Outcome: Progressing Towards Goal   PHYSICAL THERAPY EVALUATION  Patient: Yrn Hartley. (72 y.o. male)  Date: 7/12/2021  Primary Diagnosis: Dyspnea on exertion [R06.00]  Chest pain on exertion [R07.9]  Peripheral edema [R60.9]  Acute osteomyelitis of left foot (HCC) [M86.172]  Procedure(s) (LRB):  LEFT 2ND DIGIT AMPUTATION (URGENT) (Left) 2 Days Post-Op   Precautions:   Fall, PWB (with post op shoe; LLE)      ASSESSMENT  Based on the objective data described below, the patient presents with reduced strength, balance, endurance, sensation and functional mobility s/p L 2nd digit amputation POD2. Patient today with marked shortness of breath with mobility at edge of bed, however SpO2 stable on RA. He is well motivated to participate in PT, today tolerating a few steps laterally and anteriorly/posteriorly with RW, Georgiana for walker management and PWB (through heel). Stepping with RW requiring increased effort from patient. Patient with some truncal sway when seated edge of bed, CGA maintained. He will require a RW if D/cing home as 3-wheeled rollator not adequate to maintain PWB restrictions.  Recommend HHPT vs. Rehab pending patient performance in therapy tomorrow. Current Level of Function Impacting Discharge (mobility/balance): modA x 1 sit > stand    Functional Outcome Measure: The patient scored Total: 55/100 on the Barthel Index which is indicative of 45% impaired ability to care for basic self needs/dependency on others. Other factors to consider for discharge: Patient's daughter whom he lives with works full time out of the home     Patient will benefit from skilled therapy intervention to address the above noted impairments. PLAN :  Recommendations and Planned Interventions: bed mobility training, transfer training, gait training, therapeutic exercises, edema management/control, patient and family training/education, and therapeutic activities      Frequency/Duration: Patient will be followed by physical therapy:  5 times a week to address goals. Recommendation for discharge: (in order for the patient to meet his/her long term goals)  To be determined: SNF vs. HHPT    This discharge recommendation:  Has not yet been discussed the attending provider and/or case management    IF patient discharges home will need the following DME: rolling walker         SUBJECTIVE:   Patient stated I understand.  re: when reviewing WB restrictions    OBJECTIVE DATA SUMMARY:   HISTORY:    Past Medical History:   Diagnosis Date    Anxiety and depression     CAD (coronary artery disease)     CHF (congestive heart failure), NYHA class III, chronic, systolic (HCC)     DM type 2 causing neurological disease ([address]     DM type 2 causing renal disease ([address]     DM type 2 causing vascular disease ([address]     GERD (gastroesophageal reflux disease)     Morbid obesity (HCC)     Prolonged grief reaction     Prostate cancer ([address]      Past Surgical History:   Procedure Laterality Date    HX CORONARY ARTERY BYPASS GRAFT      HX HEART CATHETERIZATION      HX ORTHOPAEDIC      right wrist carpal tunnel repair Personal factors and/or comorbidities impacting plan of care: none additional    Home Situation  Home Environment: Private residence  Wheelchair Ramp: Yes  One/Two Story Residence: One story  Living Alone: No  Support Systems: Friends \ neighbors  Patient Expects to be Discharged to[de-identified] [employer]  Current DME Used/Available at Home: Hedwig Blotter, rollator, Shower chair  Tub or Shower Type: Shower    EXAMINATION/PRESENTATION/DECISION MAKING:   Patient received supine in bed and was agreeable to participate in PT session. Daughter present and active in session. Critical Behavior:  Neurologic State: Alert  Orientation Level: Oriented X4  Cognition: Follows commands     Hearing: Auditory  Auditory Impairment: Hard of hearing, bilateral  Skin:  All observed intact; surgical shoe donned  Edema: none observed  Range Of Motion:  AROM: Generally decreased, functional                       Strength:    Strength: Generally decreased, functional                    Tone & Sensation:   Tone: Normal              Sensation: Impaired (bilateral feet)               Coordination:  Coordination: Within functional limits  Vision:   Corrective Lenses: Glasses  Functional Mobility:  Bed Mobility:     Supine to Sit: Contact guard assistance  Sit to Supine: Contact guard assistance  Scooting: Contact guard assistance  Transfers:  Sit to Stand: Moderate assistance;Assist x1  Stand to Sit: Contact guard assistance                Balance:   Sitting: Impaired; Without support  Sitting - Static: Fair (occasional)  Sitting - Dynamic: Fair (occasional)  Standing: Intact; With support  Ambulation/Gait Training:   Patient laterally stepping 2 ft to the R, and 2 feet posterior & anterior with RW and Georgiana for RW management. Cueing for PWB through heel. Increased effort from patient.                                                   Functional Measure:  Barthel Index:    Bathin  Bladder: 10  Bowels: 10  Groomin  Dressin  Feeding: 10  Mobility: 0  Stairs: 0  Toilet Use: 5  Transfer (Bed to Chair and Back): 10  Total: 55/100       The Barthel ADL Index: Guidelines  1. The index should be used as a record of what a patient does, not as a record of what a patient could do. 2. The main aim is to establish degree of independence from any help, physical or verbal, however minor and for whatever reason. 3. The need for supervision renders the patient not independent. 4. A patient's performance should be established using the best available evidence. Asking the patient, friends/relatives and nurses are the usual sources, but direct observation and common sense are also important. However direct testing is not needed. 5. Usually the patient's performance over the preceding 24-48 hours is important, but occasionally longer periods will be relevant. 6. Middle categories imply that the patient supplies over 50 per cent of the effort. 7. Use of aids to be independent is allowed. Baldomero Pimentel., Barthel, D.W. (8268). Functional evaluation: the Barthel Index. [address] [facility] (14)2. Paula Birmingham paul CRISTINO Fonseca, Ava Alcala., Toney Martínez., [place], [address] [place] [address] (1999). Measuring the change indisability after inpatient rehabilitation; comparison of the responsiveness of the Barthel Index and Functional [place] Measure. Journal of Neurology, Neurosurgery, and Psychiatry, 66(4), 120-704. PAMELA Santiago, SURAJ Ramirez, & Shiv Lynn MARGENTINA. (2004.) Assessment of post-stroke quality of life in cost-effectiveness studies: The usefulness of the Barthel Index and the EuroQoL-5D.  Quality of Life Research, 15, 115-71        Physical Therapy Evaluation Charge Determination   History Examination Presentation Decision-Making   MEDIUM  Complexity : 1-2 comorbidities / personal factors will impact the outcome/ POC  MEDIUM Complexity : 3 Standardized tests and measures addressing body structure, function, activity limitation and / or participation in recreation  MEDIUM Complexity : Evolving with changing characteristics  MEDIUM Complexity : FOTO score of 26-74      Based on the above components, the patient evaluation is determined to be of the following complexity level: MEDIUM    Pain Rating:  Patient without complaints of pain during Pt session    Activity Tolerance:   Fair, requires rest breaks and observed SOB with activity    After treatment patient left in no apparent distress:   Supine in bed, Call bell within reach, Bed / chair alarm activated, Caregiver / family present and Side rails x 3    COMMUNICATION/EDUCATION:   The patients plan of care was discussed with: Occupational therapist and Registered nurse. Fall prevention education was provided and the patient/caregiver indicated understanding. and Patient/family have participated as able in goal setting and plan of care.     Thank you for this referral.  Ike Burrell PT, DPT   Time Calculation: 31 mins

## 2021-07-13 ENCOUNTER — APPOINTMENT (OUTPATIENT)
Dept: GENERAL RADIOLOGY | Age: 76
DRG: 616 | End: 2021-07-13
Attending: INTERNAL MEDICINE
Payer: MEDICARE

## 2021-07-13 LAB
ALBUMIN SERPL-MCNC: 2.2 G/DL (ref 3.5–5)
ALBUMIN/GLOB SERPL: 0.5 {RATIO} (ref 1.1–2.2)
ALP SERPL-CCNC: 62 U/L (ref 45–117)
ALT SERPL-CCNC: 13 U/L (ref 12–78)
ANION GAP SERPL CALC-SCNC: 7 MMOL/L (ref 5–15)
AST SERPL-CCNC: 21 U/L (ref 15–37)
BACTERIA SPEC CULT: ABNORMAL
BILIRUB SERPL-MCNC: 0.8 MG/DL (ref 0.2–1)
BUN SERPL-MCNC: 29 MG/DL (ref 6–20)
BUN/CREAT SERPL: 22 (ref 12–20)
CALCIUM SERPL-MCNC: 7.9 MG/DL (ref 8.5–10.1)
CHLORIDE SERPL-SCNC: 107 MMOL/L (ref 97–108)
CO2 SERPL-SCNC: 25 MMOL/L (ref 21–32)
CREAT SERPL-MCNC: 1.32 MG/DL (ref 0.7–1.3)
ERYTHROCYTE [DISTWIDTH] IN BLOOD BY AUTOMATED COUNT: 12.5 % (ref 11.5–14.5)
FERRITIN SERPL-MCNC: 309 NG/ML (ref 26–388)
FOLATE SERPL-MCNC: 9.7 NG/ML (ref 5–21)
GLOBULIN SER CALC-MCNC: 4.7 G/DL (ref 2–4)
GLUCOSE BLD STRIP.AUTO-MCNC: 177 MG/DL (ref 65–117)
GLUCOSE BLD STRIP.AUTO-MCNC: 185 MG/DL (ref 65–117)
GLUCOSE BLD STRIP.AUTO-MCNC: 216 MG/DL (ref 65–117)
GLUCOSE BLD STRIP.AUTO-MCNC: 250 MG/DL (ref 65–117)
GLUCOSE BLD STRIP.AUTO-MCNC: 309 MG/DL (ref 65–117)
GLUCOSE SERPL-MCNC: 169 MG/DL (ref 65–100)
GRAM STN SPEC: ABNORMAL
GRAM STN SPEC: ABNORMAL
HAPTOGLOB SERPL-MCNC: 256 MG/DL (ref 30–200)
HCT VFR BLD AUTO: 33.3 % (ref 36.6–50.3)
HGB BLD-MCNC: 10.5 G/DL (ref 12.1–17)
IRON SATN MFR SERPL: 25 % (ref 20–50)
IRON SERPL-MCNC: 55 UG/DL (ref 35–150)
LDH SERPL L TO P-CCNC: 188 U/L (ref 85–241)
MAGNESIUM SERPL-MCNC: 1.8 MG/DL (ref 1.6–2.4)
MCH RBC QN AUTO: 32.1 PG (ref 26–34)
MCHC RBC AUTO-ENTMCNC: 31.5 G/DL (ref 30–36.5)
MCV RBC AUTO: 101.8 FL (ref 80–99)
NRBC # BLD: 0 K/UL (ref 0–0.01)
NRBC BLD-RTO: 0 PER 100 WBC
PHOSPHATE SERPL-MCNC: 2.8 MG/DL (ref 2.6–4.7)
PLATELET # BLD AUTO: 196 K/UL (ref 150–400)
PMV BLD AUTO: 9.5 FL (ref 8.9–12.9)
POTASSIUM SERPL-SCNC: 3.8 MMOL/L (ref 3.5–5.1)
PROT SERPL-MCNC: 6.9 G/DL (ref 6.4–8.2)
RBC # BLD AUTO: 3.27 M/UL (ref 4.1–5.7)
RETICS # AUTO: 0.08 M/UL (ref 0.03–0.1)
RETICS/RBC NFR AUTO: 2.6 % (ref 0.7–2.1)
SERVICE CMNT-IMP: ABNORMAL
SODIUM SERPL-SCNC: 139 MMOL/L (ref 136–145)
TIBC SERPL-MCNC: 219 UG/DL (ref 250–450)
VIT B12 SERPL-MCNC: 1122 PG/ML (ref 193–986)
WBC # BLD AUTO: 6.4 K/UL (ref 4.1–11.1)

## 2021-07-13 PROCEDURE — 74011250636 HC RX REV CODE- 250/636: Performed by: EMERGENCY MEDICINE

## 2021-07-13 PROCEDURE — 74018 RADEX ABDOMEN 1 VIEW: CPT

## 2021-07-13 PROCEDURE — 2709999900 HC NON-CHARGEABLE SUPPLY

## 2021-07-13 PROCEDURE — 74011000250 HC RX REV CODE- 250: Performed by: EMERGENCY MEDICINE

## 2021-07-13 PROCEDURE — 74011000250 HC RX REV CODE- 250: Performed by: INTERNAL MEDICINE

## 2021-07-13 PROCEDURE — 83615 LACTATE (LD) (LDH) ENZYME: CPT

## 2021-07-13 PROCEDURE — 74011250636 HC RX REV CODE- 250/636: Performed by: INTERNAL MEDICINE

## 2021-07-13 PROCEDURE — 82607 VITAMIN B-12: CPT

## 2021-07-13 PROCEDURE — 83735 ASSAY OF MAGNESIUM: CPT

## 2021-07-13 PROCEDURE — 65660000000 HC RM CCU STEPDOWN

## 2021-07-13 PROCEDURE — 97116 GAIT TRAINING THERAPY: CPT

## 2021-07-13 PROCEDURE — 80053 COMPREHEN METABOLIC PANEL: CPT

## 2021-07-13 PROCEDURE — 85027 COMPLETE CBC AUTOMATED: CPT

## 2021-07-13 PROCEDURE — 82962 GLUCOSE BLOOD TEST: CPT

## 2021-07-13 PROCEDURE — 82728 ASSAY OF FERRITIN: CPT

## 2021-07-13 PROCEDURE — 84100 ASSAY OF PHOSPHORUS: CPT

## 2021-07-13 PROCEDURE — 83540 ASSAY OF IRON: CPT

## 2021-07-13 PROCEDURE — 97535 SELF CARE MNGMENT TRAINING: CPT

## 2021-07-13 PROCEDURE — 74011250637 HC RX REV CODE- 250/637: Performed by: INTERNAL MEDICINE

## 2021-07-13 PROCEDURE — 36415 COLL VENOUS BLD VENIPUNCTURE: CPT

## 2021-07-13 PROCEDURE — 74011636637 HC RX REV CODE- 636/637: Performed by: INTERNAL MEDICINE

## 2021-07-13 PROCEDURE — 82746 ASSAY OF FOLIC ACID SERUM: CPT

## 2021-07-13 PROCEDURE — 97530 THERAPEUTIC ACTIVITIES: CPT

## 2021-07-13 PROCEDURE — 85045 AUTOMATED RETICULOCYTE COUNT: CPT

## 2021-07-13 PROCEDURE — 74011250636 HC RX REV CODE- 250/636: Performed by: HOSPITALIST

## 2021-07-13 PROCEDURE — 74011250637 HC RX REV CODE- 250/637: Performed by: HOSPITALIST

## 2021-07-13 PROCEDURE — 83010 ASSAY OF HAPTOGLOBIN QUANT: CPT

## 2021-07-13 RX ORDER — CARVEDILOL 3.12 MG/1
3.12 TABLET ORAL 2 TIMES DAILY WITH MEALS
Status: DISCONTINUED | OUTPATIENT
Start: 2021-07-13 | End: 2021-07-17 | Stop reason: HOSPADM

## 2021-07-13 RX ADMIN — SODIUM CHLORIDE 850 MG: 9 INJECTION, SOLUTION INTRAMUSCULAR; INTRAVENOUS; SUBCUTANEOUS at 18:24

## 2021-07-13 RX ADMIN — CEFEPIME HYDROCHLORIDE 2 G: 2 INJECTION, POWDER, FOR SOLUTION INTRAVENOUS at 04:19

## 2021-07-13 RX ADMIN — SERTRALINE 50 MG: 50 TABLET, FILM COATED ORAL at 21:24

## 2021-07-13 RX ADMIN — ONDANSETRON 4 MG: 2 INJECTION INTRAMUSCULAR; INTRAVENOUS at 05:33

## 2021-07-13 RX ADMIN — INSULIN LISPRO 2 UNITS: 100 INJECTION, SOLUTION INTRAVENOUS; SUBCUTANEOUS at 08:23

## 2021-07-13 RX ADMIN — RIVAROXABAN 20 MG: 20 TABLET, FILM COATED ORAL at 09:43

## 2021-07-13 RX ADMIN — INSULIN GLARGINE 30 UNITS: 100 INJECTION, SOLUTION SUBCUTANEOUS at 08:23

## 2021-07-13 RX ADMIN — METRONIDAZOLE 500 MG: 500 INJECTION, SOLUTION INTRAVENOUS at 08:23

## 2021-07-13 RX ADMIN — CEFEPIME HYDROCHLORIDE 2 G: 2 INJECTION, POWDER, FOR SOLUTION INTRAVENOUS at 11:39

## 2021-07-13 RX ADMIN — METRONIDAZOLE 500 MG: 500 INJECTION, SOLUTION INTRAVENOUS at 21:26

## 2021-07-13 RX ADMIN — CARVEDILOL 3.12 MG: 3.12 TABLET, FILM COATED ORAL at 17:34

## 2021-07-13 RX ADMIN — INSULIN LISPRO 2 UNITS: 100 INJECTION, SOLUTION INTRAVENOUS; SUBCUTANEOUS at 21:25

## 2021-07-13 RX ADMIN — INSULIN LISPRO 4 UNITS: 100 INJECTION, SOLUTION INTRAVENOUS; SUBCUTANEOUS at 17:34

## 2021-07-13 RX ADMIN — CEFEPIME HYDROCHLORIDE 2 G: 2 INJECTION, POWDER, FOR SOLUTION INTRAVENOUS at 21:25

## 2021-07-13 RX ADMIN — ASPIRIN 81 MG: 81 TABLET, CHEWABLE ORAL at 08:23

## 2021-07-13 RX ADMIN — Medication 10 ML: at 05:12

## 2021-07-13 RX ADMIN — CARVEDILOL 3.12 MG: 3.12 TABLET, FILM COATED ORAL at 09:43

## 2021-07-13 RX ADMIN — Medication 10 ML: at 17:34

## 2021-07-13 RX ADMIN — Medication 10 ML: at 21:25

## 2021-07-13 NOTE — PROGRESS NOTES
Danilo Curry [facility] [place] [address]  [address] [facility], [place], [id number] [place] [address]  [phone]      Medical Progress Note      NAME:         Marci Thibodeaux. :        1945  MRM:        [id number]    Date of service: 2021      Subjective: Patient has been seen and examined as a follow up for multiple medical issues. Chart, labs, diagnostics reviewed. He says he has aching discomfort left foot and has generalized weakness. No fever or chills. No nausea or vomiting. No chest discomfort or SOB     Objective:    Vital Signs:    Visit Vitals  /65 (BP 1 Location: Right lower arm, BP Patient Position: At rest)   Pulse 60   Temp 98.3 °F (36.8 °C)   Resp 18   Ht 5' 9\" (1.753 m)   Wt 145.1 kg (319 lb 14.2 oz)   SpO2 97%   BMI 47.24 kg/m²          Intake/Output Summary (Last 24 hours) at 2021 0844  Last data filed at 2021 0453  Gross per 24 hour   Intake 150 ml   Output 650 ml   Net -500 ml        Physical Examination:    General:   Weak looking male patient, not in distress but frail   Eyes:   pink conjunctivae, PERRLA with no discharge. ENT:   no ottorrhea or rhinorrhea with dry mucous membranes  Neck: no masses, thyroid non-tender and trachea central.  Pulm:  no accessory muscle use, generalized decreased breath sounds without crackles or wheezes  Card:  no JVD or murmurs, has regular and normal S1, S2 without thrills, bruits. + peripheral edema  Abd:  Soft, non-tender, obese, non-distended, normoactive bowel sounds with no palpable organomegaly  Musc:  No cyanosis, clubbing, atrophy or deformities. Skin:  No rashes, bruising or ulcers. Neuro: Awake and alert but lapses to sleep. Frail No focal weakness.  Follows commands appropriately  Psych:  Has little insight to his illness     Current Facility-Administered Medications   Medication Dose Route Frequency    DAPTOmycin (CUBICIN) 850 mg in 0.9% sodium chloride 17 mL IV Syringe  850 mg IntraVENous Q24H    insulin lispro (HUMALOG) injection   SubCUTAneous AC&HS    insulin glargine (LANTUS) injection 30 Units  30 Units SubCUTAneous DAILY    hydrALAZINE (APRESOLINE) 20 mg/mL injection 20 mg  20 mg IntraVENous Q4H PRN    melatonin (rapid dissolve) tablet 5 mg  5 mg Oral QHS PRN    alum-mag hydroxide-simeth (MYLANTA) oral suspension 30 mL  30 mL Oral Q4H PRN    LORazepam (ATIVAN) injection 0.5 mg  0.5 mg IntraVENous Q6H PRN    oxyCODONE IR (ROXICODONE) tablet 5 mg  5 mg Oral Q4H PRN    morphine injection 2 mg  2 mg IntraVENous Q4H PRN    diphenhydrAMINE (BENADRYL) capsule 25 mg  25 mg Oral Q6H PRN    albuterol (PROVENTIL VENTOLIN) nebulizer solution 2.5 mg  2.5 mg Nebulization Q4H PRN    cefepime (MAXIPIME) 2 g in sterile water (preservative free) 10 mL IV syringe  2 g IntraVENous Q8H    glucose chewable tablet 16 g  4 Tablet Oral PRN    dextrose (D50W) injection syrg 12.5-25 g  25-50 mL IntraVENous PRN    glucagon (GLUCAGEN) injection 1 mg  1 mg IntraMUSCular PRN    sodium chloride (NS) flush 5-40 mL  5-40 mL IntraVENous Q8H    sodium chloride (NS) flush 5-40 mL  5-40 mL IntraVENous PRN    acetaminophen (TYLENOL) tablet 650 mg  650 mg Oral Q6H PRN    polyethylene glycol (MIRALAX) packet 17 g  17 g Oral DAILY PRN    bisacodyL (DULCOLAX) suppository 10 mg  10 mg Rectal DAILY PRN    ondansetron (ZOFRAN) injection 4 mg  4 mg IntraVENous Q6H PRN    metroNIDAZOLE (FLAGYL) IVPB premix 500 mg  500 mg IntraVENous Q12H    sertraline (ZOLOFT) tablet 50 mg  50 mg Oral QHS    nitroglycerin (NITROSTAT) tablet 0.4 mg  0.4 mg SubLINGual Q5MIN PRN    aspirin chewable tablet 81 mg  81 mg Oral DAILY        Laboratory data and review:    Recent Labs     07/12/21  0217 07/11/21  0904   WBC 6.5 5.7   HGB 10.5* 11.1*   HCT 33.0* 34.3*    193     Recent Labs     07/13/21  0509 07/12/21  0217 07/11/21  0904    139 137   K 3.8 3.8 4.8    106 106   CO2 25 27 28   GLU 169* 187* 249*   BUN 29* 30* 23*   CREA 1.32* 1.44* 1.27   CA 7.9* 7.8* 7.8*   MG 1.8 1.7 1.8   PHOS 2.8 3.3 3.6   ALB 2.2* 2.2* 2.1*   ALT 13 11* 12     No components found for: [place]    Diagnostics: Imaging studies have been reviewed    Telemetry reviewed by me:   normal sinus rhythm    Assessment and Plan:    Osteomyelitis of second toe of left foot / Diabetic foot infection POA: Xray showed a distal phalanx osteo. JOVITA showed no evidence of PAD. Seen and followed by podiatry and he is sp 2nd digit amputation 7/10. Tissue cultures showing few Staph aureus. Path pending. Continue IV Cefepime, Daptomycin and Metronidazole. ID following and awaiting final cultures and path    CHF (congestive heart failure), NYHA class III, acute on chronic, systolic / Peripheral edema / Dyspnea on exertion / Elevated brain natriuretic peptide (BNP) level POA: ECHO showed EF 25-30%. Seen by cardiology pre-operatively who noted his risk for surgery. On medical management. Continue Coreg. Currently, diuretics, ARB due to Paulina. Monitor closely. DM type 2 uncontrolled causing vascular, renal and neurological disease POA: A1c 8.0. Blood glucose stable. Continue DM diet, Lantus, SSi per protocol. Monitor blood glucose. Coronary atherosclerosis of native coronary artery S/P CABG x 2 / Hyperlipidemia    POA: currently without chest pain. Troponin negative. Echo as noted above. Seen by cardiology. Continue Asprin, Coreg. Crestor on hold given he is on daptomycin. PAF (paroxysmal atrial fibrillation) / Chronic anticoagulation POA: rate controlled. Continue Coreg and resume Xarelto     Prolonged grief reaction / Anxiety and depression POA: Continue Sertraline     PAULINA / Chronic kidney disease stage 3 POA: SCr stable. Continue to monitor renal function. Anemia - POA likely due to chronic disease. Stable.  Monitor     Morbid obesity - Advise weight loss, Needs outpatient [place] testing.  Likely [place].  Monitor oxygen     Hx Prostate cancer - Outpatient follow up     GERD (gastroesophageal reflux disease) POA: Continue PPi     Total time spent for the patient's care: [address] discussed with: Patient, Care Manager and Nursing Staff    Discussed:  Care Plan and D/C Planning    Prophylaxis:  H2B/PPI and Xarelto    Anticipated Disposition:   PT, OT, RN vs SNF           ___________________________________________________    Attending Physician:   Pebbles Bush MD

## 2021-07-13 NOTE — PROGRESS NOTES
Problem: Mobility Impaired (Adult and Pediatric)  Goal: *Acute Goals and Plan of Care (Insert Text)  Description: FUNCTIONAL STATUS PRIOR TO ADMISSION: Patient was modified independent using a 3 wheeled rollator for functional mobility. HOME SUPPORT PRIOR TO ADMISSION: The patient lived with his daughter who works full time. Physical Therapy Goals  Initiated 7/12/2021  1. Patient will move from supine to sit and sit to supine  in bed with independence within 7 day(s). 2.  Patient will transfer from bed to chair and chair to bed with modified independence using the least restrictive device within 7 day(s). 3.  Patient will perform sit to stand with modified independence within 7 day(s). 4.  Patient will ambulate with modified independence for 50 feet with the least restrictive device within 7 day(s). Outcome: Progressing Towards Goal   PHYSICAL THERAPY TREATMENT  Patient: Shy Lopez (72 y.o. male)  Date: 7/13/2021  Diagnosis: Dyspnea on exertion [R06.00]  Chest pain on exertion [R07.9]  Peripheral edema [R60.9]  Acute osteomyelitis of left foot (HCC) [M86.172] <principal problem not specified>  Procedure(s) (LRB):  LEFT 2ND DIGIT AMPUTATION (URGENT) (Left) 3 Days Post-Op  Precautions: Fall, PWB (with post op shoe; LLE)  Chart, physical therapy assessment, plan of care and goals were reviewed. ASSESSMENT  Patient continues with skilled PT services and is progressing towards goals. Patient this afternoon with improved tolerance to PT session, however still with limited ambulation within the room with CGA and RW. Patient with difficulty today maintaining WBing precautions while ambulating through the heel despite frequent reminders. Patient with foot flat through stance on several occasions. Do not recommend crutch training for this patient secondary to coordination & sequencing deficits. Will continue to emphasize WB restrictions with mobility and further train patient with RW.  Patient will require rehab upon discharge as he is significantly below his functional baseline & has no daytime support at home. Current Level of Function Impacting Discharge (mobility/balance): modA x 2 sit > stand    Other factors to consider for discharge: Communicated rehab recommendation to the patient with patient verbalizing understanding          PLAN :  Patient continues to benefit from skilled intervention to address the above impairments. Continue treatment per established plan of care. to address goals. Recommendation for discharge: (in order for the patient to meet his/her long term goals)  Therapy up to 5 days/week in SNF setting    This discharge recommendation:  Has not yet been discussed the attending provider and/or case management    IF patient discharges home will need the following DME: to be determined (TBD)       SUBJECTIVE:   Patient stated I am afraid to fall.     OBJECTIVE DATA SUMMARY:   Patient received supine in bed and was agreeable to participate in PT session. Visit Vitals  /66 (BP 1 Location: Right upper arm, BP Patient Position: Sitting)   Pulse 64   Temp 98.1 °F (36.7 °C)   Resp 18   Ht 5' 9\" (1.753 m)   Wt 145.1 kg (319 lb 14.2 oz)   SpO2 99%   BMI 47.24 kg/m²   Comment: post ambulation      Critical Behavior:  Neurologic State: Alert  Orientation Level: Oriented X4  Cognition: Appropriate decision making     Functional Mobility Training:  Bed Mobility:     Supine to Sit: Supervision; Additional time     Scooting: Supervision; Additional time        Transfers:  Sit to Stand: Moderate assistance;Assist x2; Additional time  Stand to Sit: Contact guard assistance; Other (comment) (Cueing to slow down and reach back for the chair)        Bed to Chair: Contact guard assistance                    Balance:  Sitting: Impaired; Without support  Sitting - Static: Fair (occasional)  Sitting - Dynamic: Fair (occasional)  Standing: Intact; With support  Ambulation/Gait Training:  Distance (ft): 20 Feet (ft)  Assistive Device: Walker, rolling;Gait belt (Post op shoe)  Ambulation - Level of Assistance: Contact guard assistance        Gait Abnormalities: Antalgic;Trunk sway increased     Left Side Weight Bearing: Partial (%) (Through heel)  Base of Support: Widened  Stance: Left decreased  Speed/Akanksha: Pace decreased (<100 feet/min)  Step Length: Right shortened                        Pain Rating:  Patient with minimal complaints of pain during PT session. Activity Tolerance:   Poor and requires rest breaks    After treatment patient left in no apparent distress:   Sitting in chair, Heels elevated for pressure relief, Call bell within reach, and Nursing notified    COMMUNICATION/COLLABORATION:   The patients plan of care was discussed with: Occupational therapy assistant and Registered nurse.      Raimundo Bailey PT, DPT   Time Calculation: 31 mins

## 2021-07-13 NOTE — PROGRESS NOTES
Occupational Therapy Note: Pt declining OT this AM but willing to try this afternoon. Will return as able.

## 2021-07-13 NOTE — PROGRESS NOTES
Problem: Falls - Risk of  Goal: *Absence of Falls  Description: Document Emily Lopes Fall Risk and appropriate interventions in the flowsheet. Outcome: Progressing Towards Goal  Note: Fall Risk Interventions:  Mobility Interventions: Bed/chair exit alarm, Communicate number of staff needed for ambulation/transfer, OT consult for ADLs, PT Consult for mobility concerns, Strengthening exercises (ROM-active/passive)         Medication Interventions: Bed/chair exit alarm, Evaluate medications/consider consulting pharmacy, Patient to call before getting OOB, Teach patient to arise slowly    Elimination Interventions: Bed/chair exit alarm, Call light in reach, Urinal in reach              Problem: Patient Education: Go to Patient Education Activity  Goal: Patient/Family Education  Outcome: Progressing Towards Goal     Problem: Diabetes Self-Management  Goal: *Disease process and treatment process  Description: Define diabetes and identify own type of diabetes; list 3 options for treating diabetes. Outcome: Progressing Towards Goal  Goal: *Incorporating nutritional management into lifestyle  Description: Describe effect of type, amount and timing of food on blood glucose; list 3 methods for planning meals. Outcome: Progressing Towards Goal  Goal: *Incorporating physical activity into lifestyle  Description: State effect of exercise on blood glucose levels. Outcome: Progressing Towards Goal  Goal: *Developing strategies to promote health/change behavior  Description: Define the ABC's of diabetes; identify appropriate screenings, schedule and personal plan for screenings. Outcome: Progressing Towards Goal  Goal: *Using medications safely  Description: State effect of diabetes medications on diabetes; name diabetes medication taking, action and side effects.   Outcome: Progressing Towards Goal  Goal: *Monitoring blood glucose, interpreting and using results  Description: Identify recommended blood glucose targets  and personal targets. Outcome: Progressing Towards Goal  Goal: *Prevention, detection, treatment of acute complications  Description: List symptoms of hyper- and hypoglycemia; describe how to treat low blood sugar and actions for lowering  high blood glucose level. Outcome: Progressing Towards Goal  Goal: *Prevention, detection and treatment of chronic complications  Description: Define the natural course of diabetes and describe the relationship of blood glucose levels to long term complications of diabetes. Outcome: Progressing Towards Goal  Goal: *Developing strategies to address psychosocial issues  Description: Describe feelings about living with diabetes; identify support needed and support network  Outcome: Progressing Towards Goal  Goal: *Insulin pump training  Outcome: Progressing Towards Goal  Goal: *Sick day guidelines  Outcome: Progressing Towards Goal  Goal: *Patient Specific Goal (EDIT GOAL, INSERT TEXT)  Outcome: Progressing Towards Goal     Problem: Patient Education: Go to Patient Education Activity  Goal: Patient/Family Education  Outcome: Progressing Towards Goal     Problem: Pressure Injury - Risk of  Goal: *Prevention of pressure injury  Description: Document Shane Scale and appropriate interventions in the flowsheet.   Outcome: Progressing Towards Goal  Note: Pressure Injury Interventions:  Sensory Interventions: Assess changes in LOC    Moisture Interventions: Apply protective barrier, creams and emollients    Activity Interventions: Increase time out of bed    Mobility Interventions: PT/OT evaluation    Nutrition Interventions: Document food/fluid/supplement intake    Friction and Shear Interventions: Apply protective barrier, creams and emollients                Problem: Patient Education: Go to Patient Education Activity  Goal: Patient/Family Education  Outcome: Progressing Towards Goal     Problem: Pain  Goal: *Control of Pain  Outcome: Progressing Towards Goal  Goal: *PALLIATIVE CARE: Alleviation of Pain  Outcome: Progressing Towards Goal     Problem: Patient Education: Go to Patient Education Activity  Goal: Patient/Family Education  Outcome: Progressing Towards Goal     Problem: Patient Education: Go to Patient Education Activity  Goal: Patient/Family Education  Outcome: Progressing Towards Goal     Problem: Patient Education: Go to Patient Education Activity  Goal: Patient/Family Education  Outcome: Progressing Towards Goal

## 2021-07-13 NOTE — PROGRESS NOTES
7/13/2021  Case Management Progress Note    11:35 AM  Patient is 77year old male admitted 7/8 for osteomyelitis  Patient's RUR is 24% yellow/moderate risk for readmission  Chart reviewed--Patient discussed at IDR rounds this morning  Still waiting on pathology for this patient. No noted needs at this time, will continue to follow as needs become more clear. Transition of Care Plan  1. Continue medical management  2. Waiting on pathology  3. No Cm needs noted at this time  4. Family will transport at discharge  5.  CM will continue to follow    KINZA Blackwood

## 2021-07-13 NOTE — PROGRESS NOTES
Notified MD that patient is having abdominal discomfort, frequent belching and abdominal distention. KUB ordered.

## 2021-07-13 NOTE — PROGRESS NOTES
Problem: Self Care Deficits Care Plan (Adult)  Goal: *Therapy Goal (Edit Goal, Insert Text)  Description: FUNCTIONAL STATUS PRIOR TO ADMISSION: Patient was modified independent during ADL tasks (with occasional assistance to manage footwear), and using a 3 wheeled rollator for functional mobility. HOME SUPPORT: The patient lived with daughter who provided assistance as needed. Daughter works during the day and patient is home alone    Initiated 7/12/2021  1. Patient will perform grooming in stand with modified independence within 7 day(s). 2.  Patient will perform bathing with modified independence within 7 day(s). 3.  Patient will perform lower body dressing with modified independence within 7 day(s). 4.  Patient will perform toilet transfers with modified independence within 7 day(s). 5.  Patient will perform all aspects of toileting with modified independence within 7 day(s). 6.  Patient will participate in upper extremity therapeutic exercise/activities with modified independence for 10 minutes within 7 day(s). 7.  Patient will utilize energy conservation techniques during functional activities with verbal cues within 7 day(s). Outcome: Progressing Towards Goal   OCCUPATIONAL THERAPY TREATMENT  Patient: Charmaine Avelar (72 y.o. male)  Date: 7/13/2021  Diagnosis: Dyspnea on exertion [R06.00]  Chest pain on exertion [R07.9]  Peripheral edema [R60.9]  Acute osteomyelitis of left foot (HCC) [M86.172] <principal problem not specified>  Procedure(s) (LRB):  LEFT 2ND DIGIT AMPUTATION (URGENT) (Left) 3 Days Post-Op  Precautions: Fall, PWB (with post op shoe; LLE)  Chart, occupational therapy assessment, plan of care, and goals were reviewed. ASSESSMENT  Patient continues with skilled OT services and is progressing towards goals. Pt agreeable to activity this PM. He needed assist to don post op shoe and sock,  talked about reacher to assist with task which he has at home.  Pt needed assist x 2 for sit to stand and to transfer to chair for ADl's. Pt washed his face, upper body moderate assist as well as LE's. Pt wanted to wait til he returned to bed to bathe further. Encouraged pt to be out of bed at least 30 min. Pt appears to need increased time processing information. Current Level of Function Impacting Discharge (ADLs): max assist LB bathe/dress, moderate assist UB care    Other factors to consider for discharge:          PLAN :  Patient continues to benefit from skilled intervention to address the above impairments. Continue treatment per established plan of care to address goals. Recommend with staff: out of bed to chair for ADL's, there ex, there act, meals    Recommend next OT session: cont towards goals    Recommendation for discharge: (in order for the patient to meet his/her long term goals)  Therapy up to 5 days/week in SNF setting    This discharge recommendation:  Has not yet been discussed the attending provider and/or case management    IF patient discharges home will need the following DME:       SUBJECTIVE:   Patient stated I hear you but I don't understand you.     OBJECTIVE DATA SUMMARY:   Cognitive/Behavioral Status:  Neurologic State: Alert  Orientation Level: Oriented X4  Cognition: Appropriate decision making             Functional Mobility and Transfers for ADLs:  Bed Mobility:   Min assist x  1 supine to sit    Transfers:    Moderate assist x 2 sit to stand          Balance:Intact sitting balance       ADL Intervention:       Grooming  Position Performed: Seated in chair  Washing Face: Set-up                   Lower Body Dressing Assistance  Dressing Assistance: Maximum assistance  Socks: Maximum assistance  Shoes with Velcro: Maximum assistance (post op shoe)         Activity Tolerance:   Fair    After treatment patient left in no apparent distress:   Sitting in chair and Call bell within reach    COMMUNICATION/COLLABORATION:   The patients plan of care was discussed with: Physical therapist, Occupational therapist, and Registered nurse.      SINA Shelton/L  Time Calculation: 35 mins

## 2021-07-14 LAB
CREAT SERPL-MCNC: 1.45 MG/DL (ref 0.7–1.3)
GLUCOSE BLD STRIP.AUTO-MCNC: 146 MG/DL (ref 65–117)
GLUCOSE BLD STRIP.AUTO-MCNC: 198 MG/DL (ref 65–117)
GLUCOSE BLD STRIP.AUTO-MCNC: 222 MG/DL (ref 65–117)
GLUCOSE BLD STRIP.AUTO-MCNC: 273 MG/DL (ref 65–117)
SARS-COV-2, COV2: NORMAL
SERVICE CMNT-IMP: ABNORMAL

## 2021-07-14 PROCEDURE — 74011000250 HC RX REV CODE- 250: Performed by: EMERGENCY MEDICINE

## 2021-07-14 PROCEDURE — 99232 SBSQ HOSP IP/OBS MODERATE 35: CPT | Performed by: INTERNAL MEDICINE

## 2021-07-14 PROCEDURE — 65660000000 HC RM CCU STEPDOWN

## 2021-07-14 PROCEDURE — 74011636637 HC RX REV CODE- 636/637: Performed by: INTERNAL MEDICINE

## 2021-07-14 PROCEDURE — 74011250636 HC RX REV CODE- 250/636: Performed by: INTERNAL MEDICINE

## 2021-07-14 PROCEDURE — 36415 COLL VENOUS BLD VENIPUNCTURE: CPT

## 2021-07-14 PROCEDURE — 74011250637 HC RX REV CODE- 250/637: Performed by: INTERNAL MEDICINE

## 2021-07-14 PROCEDURE — 82962 GLUCOSE BLOOD TEST: CPT

## 2021-07-14 PROCEDURE — 74011250637 HC RX REV CODE- 250/637: Performed by: HOSPITALIST

## 2021-07-14 PROCEDURE — 74011000258 HC RX REV CODE- 258: Performed by: INTERNAL MEDICINE

## 2021-07-14 PROCEDURE — 97535 SELF CARE MNGMENT TRAINING: CPT

## 2021-07-14 PROCEDURE — 82565 ASSAY OF CREATININE: CPT

## 2021-07-14 PROCEDURE — 74011250636 HC RX REV CODE- 250/636: Performed by: HOSPITALIST

## 2021-07-14 PROCEDURE — 74011250636 HC RX REV CODE- 250/636: Performed by: EMERGENCY MEDICINE

## 2021-07-14 PROCEDURE — 97116 GAIT TRAINING THERAPY: CPT

## 2021-07-14 PROCEDURE — 97110 THERAPEUTIC EXERCISES: CPT

## 2021-07-14 PROCEDURE — 97530 THERAPEUTIC ACTIVITIES: CPT

## 2021-07-14 PROCEDURE — U0005 INFEC AGEN DETEC AMPLI PROBE: HCPCS

## 2021-07-14 RX ADMIN — ASPIRIN 81 MG: 81 TABLET, CHEWABLE ORAL at 08:49

## 2021-07-14 RX ADMIN — SODIUM CHLORIDE 3 G: 900 INJECTION INTRAVENOUS at 22:36

## 2021-07-14 RX ADMIN — Medication 10 ML: at 22:38

## 2021-07-14 RX ADMIN — Medication 10 ML: at 13:32

## 2021-07-14 RX ADMIN — CEFEPIME HYDROCHLORIDE 2 G: 2 INJECTION, POWDER, FOR SOLUTION INTRAVENOUS at 03:15

## 2021-07-14 RX ADMIN — POLYETHYLENE GLYCOL 3350 17 G: 17 POWDER, FOR SOLUTION ORAL at 22:35

## 2021-07-14 RX ADMIN — SODIUM CHLORIDE 3 G: 900 INJECTION INTRAVENOUS at 11:50

## 2021-07-14 RX ADMIN — CARVEDILOL 3.12 MG: 3.12 TABLET, FILM COATED ORAL at 08:49

## 2021-07-14 RX ADMIN — POLYETHYLENE GLYCOL 3350 17 G: 17 POWDER, FOR SOLUTION ORAL at 11:50

## 2021-07-14 RX ADMIN — INSULIN GLARGINE 30 UNITS: 100 INJECTION, SOLUTION SUBCUTANEOUS at 08:49

## 2021-07-14 RX ADMIN — SODIUM CHLORIDE 3 G: 900 INJECTION INTRAVENOUS at 17:18

## 2021-07-14 RX ADMIN — METRONIDAZOLE 500 MG: 500 INJECTION, SOLUTION INTRAVENOUS at 08:49

## 2021-07-14 RX ADMIN — SERTRALINE 50 MG: 50 TABLET, FILM COATED ORAL at 22:35

## 2021-07-14 RX ADMIN — INSULIN LISPRO 1 UNITS: 100 INJECTION, SOLUTION INTRAVENOUS; SUBCUTANEOUS at 22:35

## 2021-07-14 RX ADMIN — INSULIN LISPRO 3 UNITS: 100 INJECTION, SOLUTION INTRAVENOUS; SUBCUTANEOUS at 17:18

## 2021-07-14 RX ADMIN — RIVAROXABAN 20 MG: 20 TABLET, FILM COATED ORAL at 08:49

## 2021-07-14 NOTE — PROGRESS NOTES
Bedside and Verbal shift change report given to Roni Rosas RN (oncoming nurse) by Shakeel Mc (offgoing nurse). Report included the following information SBAR, Kardex, Intake/Output, MAR, Accordion and Recent Results.

## 2021-07-14 NOTE — PROGRESS NOTES
0700: Bedside shift change report given to [address] [place] [address] (oncoming nurse) by Adrian Washington (offgoing nurse). Report included the following information SBAR, Kardex, Procedure Summary, Intake/Output, MAR and Accordion. This patient was assisted with Intentional Toileting every 2 hours during this shift as appropriate. Documentation of ambulation and output reflected on Flowsheet as appropriate. Purposeful hourly rounding was completed using AIDET and 5Ps. Outcomes of PHR documented as they occurred. Bed alarm in use as appropriate. Dual Suction and ambubag in place.

## 2021-07-14 NOTE — PROGRESS NOTES
Danilo [facility] [address]  [address] [facility], [address] [place] [address]  [phone]      Medical Progress Note      NAME:         Jenifer Parada. :        1945  MRM:        [id number]    Date of service: 2021      Subjective: Patient has been seen and examined as a follow up for multiple medical issues. Chart, labs, diagnostics reviewed. He remains weak but not in distress. Afebrile. No nausea or vomiting. No chest discomfort or SOB     Objective:    Vital Signs:    Visit Vitals  BP (P) 133/69 (BP 1 Location: Left upper arm, BP Patient Position: Sitting)   Pulse (P) 67   Temp 98.2 °F (36.8 °C)   Resp 22   Ht 5' 9\" (1.753 m)   Wt 145.1 kg (319 lb 14.2 oz)   SpO2 (P) 98%   BMI 47.24 kg/m²          Intake/Output Summary (Last 24 hours) at 2021 1338  Last data filed at 2021 0324  Gross per 24 hour   Intake 320 ml   Output 400 ml   Net -80 ml        Physical Examination:    General:   Weak looking male patient, not in distress but frail   Eyes:   pink conjunctivae, PERRLA with no discharge. ENT:   no ottorrhea or rhinorrhea with dry mucous membranes  Pulm:  generalized decreased breath sounds without crackles or wheezes  Card:  no JVD or murmurs, has regular and normal S1, S2 without thrills, bruits. + peripheral edema  Abd:  Soft, non-tender, obese, non-distended, normoactive bowel sounds   Musc:  No cyanosis, clubbing, atrophy or deformities. Skin:  No rashes, bruising or ulcers. Neuro: Awake and alert but lapses to sleep. Frail No focal weakness.   Psych:  Has little insight to his illness     Current Facility-Administered Medications   Medication Dose Route Frequency    ampicillin-sulbactam (UNASYN) 3 g in 0.9% sodium chloride (MBP/ADV) 100 mL MBP  3 g IntraVENous Q6H    carvediloL (COREG) tablet 3.125 mg  3.125 mg Oral BID WITH MEALS    rivaroxaban (XARELTO) tablet 20 mg  20 mg Oral DAILY    insulin lispro (HUMALOG) injection   SubCUTAneous AC&HS    insulin glargine (LANTUS) injection 30 Units  30 Units SubCUTAneous DAILY    hydrALAZINE (APRESOLINE) 20 mg/mL injection 20 mg  20 mg IntraVENous Q4H PRN    melatonin (rapid dissolve) tablet 5 mg  5 mg Oral QHS PRN    alum-mag hydroxide-simeth (MYLANTA) oral suspension 30 mL  30 mL Oral Q4H PRN    LORazepam (ATIVAN) injection 0.5 mg  0.5 mg IntraVENous Q6H PRN    oxyCODONE IR (ROXICODONE) tablet 5 mg  5 mg Oral Q4H PRN    morphine injection 2 mg  2 mg IntraVENous Q4H PRN    diphenhydrAMINE (BENADRYL) capsule 25 mg  25 mg Oral Q6H PRN    albuterol (PROVENTIL VENTOLIN) nebulizer solution 2.5 mg  2.5 mg Nebulization Q4H PRN    glucose chewable tablet 16 g  4 Tablet Oral PRN    dextrose (D50W) injection syrg 12.5-25 g  25-50 mL IntraVENous PRN    glucagon (GLUCAGEN) injection 1 mg  1 mg IntraMUSCular PRN    sodium chloride (NS) flush 5-40 mL  5-40 mL IntraVENous Q8H    sodium chloride (NS) flush 5-40 mL  5-40 mL IntraVENous PRN    acetaminophen (TYLENOL) tablet 650 mg  650 mg Oral Q6H PRN    polyethylene glycol (MIRALAX) packet 17 g  17 g Oral DAILY PRN    bisacodyL (DULCOLAX) suppository 10 mg  10 mg Rectal DAILY PRN    ondansetron (ZOFRAN) injection 4 mg  4 mg IntraVENous Q6H PRN    sertraline (ZOLOFT) tablet 50 mg  50 mg Oral QHS    nitroglycerin (NITROSTAT) tablet 0.4 mg  0.4 mg SubLINGual Q5MIN PRN    aspirin chewable tablet 81 mg  81 mg Oral DAILY        Laboratory data and review:    Recent Labs     07/13/21  0846 07/12/21 0217   WBC 6.4 6.5   HGB 10.5* 10.5*   HCT 33.3* 33.0*    195     Recent Labs     07/14/21  0319 07/13/21  0509 07/12/21  0217   NA  --  139 139   K  --  3.8 3.8   CL  --  107 106   CO2  --  25 27   GLU  --  169* 187*   BUN  --  29* 30*   CREA 1.45* 1.32* 1.44*   CA  --  7.9* 7.8*   MG  --  1.8 1.7   PHOS  --  2.8 3.3   ALB  --  2.2* 2.2*   ALT  --  13 11*     No components found for: [place]    Diagnostics: Imaging studies have been reviewed    Telemetry reviewed by me:   normal sinus rhythm    Assessment and Plan:    Osteomyelitis of second toe of left foot / Diabetic foot infection POA: Xray showed a distal phalanx osteo. JOVITA showed no evidence of PAD. Seen and followed by podiatry and he is sp 2nd digit amputation 7/10. Tissue cultures isolated MSSA and path showed clean margins. Has been on IV Cefepime, Daptomycin and Metronidazole. Seen by ID and changed to IV Unasyn with plan for oral Augmentin at discharge. CM for discharge planning. Continue wound care     CHF (congestive heart failure), NYHA class III, acute on chronic, systolic / Peripheral edema / Dyspnea on exertion / Elevated brain natriuretic peptide (BNP) level POA: ECHO showed EF 25-30%. Seen by cardiology pre-operatively who noted his risk for surgery. On medical management. He remains stable. Continue Coreg. Currently, diuretics, ARB due to Elvi. Monitor closely. DM type 2 uncontrolled causing vascular, renal and neurological disease POA: A1c 8.0. Blood glucose stable. Continue DM diet, Lantus, SSi per protocol. Monitor blood glucose. Coronary atherosclerosis of native coronary artery S/P CABG x 2 / Hyperlipidemia POA: currently without chest pain. Troponin negative. Echo as noted above. Seen by cardiology. Continue Asprin, Coreg. Resume Crestor at discharge. PAF (paroxysmal atrial fibrillation) / Chronic anticoagulation POA: rate controlled. Continue Coreg and Xarelto     Prolonged grief reaction / Anxiety and depression POA: Continue Sertraline     ELVI / Chronic kidney disease stage 3 POA: SCr stable. Continue to monitor renal function. Anemia - POA likely due to chronic disease. Stable.  Monitor     Morbid obesity - Advise weight loss, Needs outpatient [place] testing.  Likely [place].  Monitor oxygen     Hx Prostate cancer - Outpatient follow up     GERD (gastroesophageal reflux disease) POA: Continue PPi     Total time spent for the patient's care: 30 Minutes                  Care Plan discussed with: Patient, Care Manager and Nursing Staff and consultants    Discussed:  Care Plan and D/C Planning    Prophylaxis:  H2B/PPI and Xarelto    Anticipated Disposition: SNF           ___________________________________________________    Attending Physician:   Chema Perez MD

## 2021-07-14 NOTE — PROGRESS NOTES
Problem: Mobility Impaired (Adult and Pediatric)  Goal: *Acute Goals and Plan of Care (Insert Text)  Description: FUNCTIONAL STATUS PRIOR TO ADMISSION: Patient was modified independent using a 3 wheeled rollator for functional mobility. HOME SUPPORT PRIOR TO ADMISSION: The patient lived with his daughter who works full time. Physical Therapy Goals  Initiated 7/12/2021  1. Patient will move from supine to sit and sit to supine  in bed with independence within 7 day(s). 2.  Patient will transfer from bed to chair and chair to bed with modified independence using the least restrictive device within 7 day(s). 3.  Patient will perform sit to stand with modified independence within 7 day(s). 4.  Patient will ambulate with modified independence for 50 feet with the least restrictive device within 7 day(s). Outcome: Progressing Towards Goal     PHYSICAL THERAPY TREATMENT  Patient: Brie Kerr (72 y.o. male)  Date: 7/14/2021  Diagnosis: Dyspnea on exertion [R06.00]  Chest pain on exertion [R07.9]  Peripheral edema [R60.9]  Acute osteomyelitis of left foot (HCC) [M86.172] <principal problem not specified>  Procedure(s) (LRB):  LEFT 2ND DIGIT AMPUTATION (URGENT) (Left) 4 Days Post-Op  Precautions: Fall, PWB (with post op shoe; LLE)  Chart, physical therapy assessment, plan of care and goals were reviewed. ASSESSMENT  Patient continues with skilled PT services and is progressing towards goals. Patient this morning with much improved maintenance of WB precautions with ambulation. He reported some lightheadedness upon supine > sit transfer which resolved with time. No lightheadedness with sit > stand or ambulation. For sit > stand, patient continues to require modAx2 or this afternoon, modAx1 but with bed elevated. Patient tolerated in room ambulation, with mild SOB. He is agreeable to d/c recommendation of rehab.      Current Level of Function Impacting Discharge (mobility/balance): modAx2 sit > stand     Other factors to consider for discharge: none additional         PLAN :  Patient continues to benefit from skilled intervention to address the above impairments. Continue treatment per established plan of care. to address goals. Recommendation for discharge: (in order for the patient to meet his/her long term goals)  Therapy up to 5 days/week in SNF setting    This discharge recommendation:  Has not yet been discussed the attending provider and/or case management    IF patient discharges home will need the following DME: to be determined (TBD)       SUBJECTIVE:   Patient stated I'd like to sit up in the chair.     OBJECTIVE DATA SUMMARY:   Patient received supine in bed and was agreeable to participate in PT session. Cleared by nursing to participate. Critical Behavior:  Neurologic State: Alert  Orientation Level: Oriented X4  Cognition: Follows commands     Functional Mobility Training:  Bed Mobility:     Supine to Sit: Supervision; Additional time     Scooting: Supervision        Transfers:  Sit to Stand: Moderate assistance;Assist x1 (bed elevated)  Stand to Sit: Contact guard assistance        Bed to Chair: Contact guard assistance;Assist x1                    Balance:  Sitting: Impaired; Without support  Sitting - Static: Fair (occasional)  Sitting - Dynamic: Fair (occasional)  Ambulation/Gait Training:  Distance (ft): 12 Feet (ft)  Assistive Device: Walker, rolling;Gait belt (surgical shoe)  Ambulation - Level of Assistance: Contact guard assistance        Gait Abnormalities: Antalgic;Decreased step clearance     Left Side Weight Bearing: Partial (%) (through heel)  Base of Support: Widened  Stance: Left decreased  Speed/Akanksha: Pace decreased (<100 feet/min)  Step Length: Right shortened                  Pain Rating:  Patient without complaints of pain during PT session    Activity Tolerance:   Fair, requires rest breaks and observed SOB with activity    After treatment patient left in no apparent distress:   Sitting in chair, Call bell within reach, Bed / chair alarm activated and ANDINO present    COMMUNICATION/COLLABORATION:   The patients plan of care was discussed with: Occupational therapy assistant and Registered nurse.      Juve Camarena PT, DPT   Time Calculation: 26 mins

## 2021-07-14 NOTE — PROGRESS NOTES
Problem: Self Care Deficits Care Plan (Adult)  Goal: *Therapy Goal (Edit Goal, Insert Text)  Description: FUNCTIONAL STATUS PRIOR TO ADMISSION: Patient was modified independent during ADL tasks (with occasional assistance to manage footwear), and using a 3 wheeled rollator for functional mobility. HOME SUPPORT: The patient lived with daughter who provided assistance as needed. Daughter works during the day and patient is home alone    Initiated 7/12/2021  1. Patient will perform grooming in stand with modified independence within 7 day(s). 2.  Patient will perform bathing with modified independence within 7 day(s). 3.  Patient will perform lower body dressing with modified independence within 7 day(s). 4.  Patient will perform toilet transfers with modified independence within 7 day(s). 5.  Patient will perform all aspects of toileting with modified independence within 7 day(s). 6.  Patient will participate in upper extremity therapeutic exercise/activities with modified independence for 10 minutes within 7 day(s). 7.  Patient will utilize energy conservation techniques during functional activities with verbal cues within 7 day(s). Outcome: Progressing Towards Goal   OCCUPATIONAL THERAPY TREATMENT  Patient: Nicolette Pisano (72 y.o. male)  Date: 7/14/2021  Diagnosis: Dyspnea on exertion [R06.00]  Chest pain on exertion [R07.9]  Peripheral edema [R60.9]  Acute osteomyelitis of left foot (HCC) [M86.172] <principal problem not specified>  Procedure(s) (LRB):  LEFT 2ND DIGIT AMPUTATION (URGENT) (Left) 4 Days Post-Op  Precautions: Fall, PWB (with post op shoe; LLE)  Chart, occupational therapy assessment, plan of care, and goals were reviewed. ASSESSMENT  Patient continues with skilled OT services and is progressing towards goals. Pt engaged with UE exercises seated in chair (see below).  Sit to stand assist x 1 to ambulate to restroom, practiced transfer to [facility] over commode but not having to void. Pt needs verbal cueing to [facility] with post op shoe L LE. Current Level of Function Impacting Discharge (ADLs): min assist x 1 sit to stand, min assist transfer to [facility] over commode    Other factors to consider for discharge:          PLAN :  Patient continues to benefit from skilled intervention to address the above impairments. Continue treatment per established plan of care to address goals. Recommend with staff: out of bed to chair for ADl's, there ex, there act, meals    Recommend next OT session: cont towards goals    Recommendation for discharge: (in order for the patient to meet his/her long term goals)  Therapy up to 5 days/week in SNF setting    This discharge recommendation:  Has not yet been discussed the attending provider and/or case management    IF patient discharges home will need the following DME:        SUBJECTIVE:   Patient stated Ludin Velasco will do whatever it is you need me to\"  .     OBJECTIVE DATA SUMMARY:   Cognitive/Behavioral Status:  Neurologic State: Alert  Orientation Level: Oriented X4  Cognition: Follows commands             Functional Mobility and Transfers for ADLs:  Bed Mobility: not tested as pt already out of bed       Transfers:  Sit to Stand: Minimum assistance  Functional Transfers  Toilet Transfer : Minimum assistance  Adaptive Equipment: Bedside commode;Walker (comment)       Balance:sitting intact       ADL Intervention:     Moderate assist bathe, dress LB, min assist toileting             Therapeutic Exercises:     EXERCISE   Sets   Reps   Active Active Assist   Passive   Comments   Shoulder flex/ext 1 10 [x]           []           []              Chest presses 1 10 [x]           []           []              Forearm supination/pronation 1 10 [x]           []           []                 []           []           []                 []           []           []                 []           []           []                 []           []           []                 [] []           []                 []           []           []                 []           []           []                 []           []           []                   Activity Tolerance:   Fair    After treatment patient left in no apparent distress:   Sitting in chair    COMMUNICATION/COLLABORATION:   The patients plan of care was discussed with: Physical therapist and Occupational therapist.     BON James  Time Calculation: 25 mins

## 2021-07-14 NOTE — PROGRESS NOTES
7/14/2021  Case Management Progress Note    4:50 PM  Patient's daughter emailed me back, chose the [facility] of [address] of [facility], and [facility]errals sent in [facility] and [facility] respectively. Await reply. KINZA Pierce    1:11 PM  Per MD, patient has not yet made a choice and prefers for us to speak to his daughter Hannah Crocker. I have called her and explained, and she is in agreement with rehab placement. I emailed her the list at: [email or web address]. Explained the process and provided my phone number or let her know she could email back with any questions. KINZA Pierce    10:55 AM  Patient is 77year old male admitted 7/8 for osteomyelitis  Patient's RUR is 25% yellow/moderate risk for readmission  Chart reviewed--Patient discussed at IDR rounds this morning  Noted that PT/OT recommend SNF for this patient. I discussed with him this morning and he is amenable. Provided SNF list and will check in later. Saw Dr. Marsha Jacobsen after rounds and he will be able to discharge on PO antibiotics. Patient is [facility] and will need auth once accepted to a SNF. Transition of Care Plan  1. Continue medical management  2. SNF list given early, will get choice today   3. Family still should be able to transport if appropriate  4.  CM will continue to follow    KINZA Pierce

## 2021-07-15 LAB
CREAT SERPL-MCNC: 1.4 MG/DL (ref 0.7–1.3)
GLUCOSE BLD STRIP.AUTO-MCNC: 155 MG/DL (ref 65–117)
GLUCOSE BLD STRIP.AUTO-MCNC: 166 MG/DL (ref 65–117)
GLUCOSE BLD STRIP.AUTO-MCNC: 182 MG/DL (ref 65–117)
GLUCOSE BLD STRIP.AUTO-MCNC: 193 MG/DL (ref 65–117)
SARS-COV-2, XPLCVT: NOT DETECTED
SERVICE CMNT-IMP: ABNORMAL
SOURCE, COVRS: NORMAL

## 2021-07-15 PROCEDURE — 74011250636 HC RX REV CODE- 250/636: Performed by: INTERNAL MEDICINE

## 2021-07-15 PROCEDURE — 74011000258 HC RX REV CODE- 258: Performed by: INTERNAL MEDICINE

## 2021-07-15 PROCEDURE — 82565 ASSAY OF CREATININE: CPT

## 2021-07-15 PROCEDURE — 65660000000 HC RM CCU STEPDOWN

## 2021-07-15 PROCEDURE — 74011636637 HC RX REV CODE- 636/637: Performed by: INTERNAL MEDICINE

## 2021-07-15 PROCEDURE — 74011250637 HC RX REV CODE- 250/637: Performed by: HOSPITALIST

## 2021-07-15 PROCEDURE — 82962 GLUCOSE BLOOD TEST: CPT

## 2021-07-15 PROCEDURE — 74011250637 HC RX REV CODE- 250/637: Performed by: INTERNAL MEDICINE

## 2021-07-15 PROCEDURE — 74011250636 HC RX REV CODE- 250/636: Performed by: HOSPITALIST

## 2021-07-15 PROCEDURE — 94760 N-INVAS EAR/PLS OXIMETRY 1: CPT

## 2021-07-15 PROCEDURE — 97110 THERAPEUTIC EXERCISES: CPT

## 2021-07-15 RX ADMIN — CARVEDILOL 3.12 MG: 3.12 TABLET, FILM COATED ORAL at 17:39

## 2021-07-15 RX ADMIN — SODIUM CHLORIDE 3 G: 900 INJECTION INTRAVENOUS at 06:04

## 2021-07-15 RX ADMIN — ASPIRIN 81 MG: 81 TABLET, CHEWABLE ORAL at 09:15

## 2021-07-15 RX ADMIN — SODIUM CHLORIDE 3 G: 900 INJECTION INTRAVENOUS at 17:40

## 2021-07-15 RX ADMIN — CARVEDILOL 3.12 MG: 3.12 TABLET, FILM COATED ORAL at 09:15

## 2021-07-15 RX ADMIN — Medication 10 ML: at 22:28

## 2021-07-15 RX ADMIN — ONDANSETRON 4 MG: 2 INJECTION INTRAMUSCULAR; INTRAVENOUS at 09:19

## 2021-07-15 RX ADMIN — RIVAROXABAN 20 MG: 20 TABLET, FILM COATED ORAL at 09:15

## 2021-07-15 RX ADMIN — SODIUM CHLORIDE 3 G: 900 INJECTION INTRAVENOUS at 11:56

## 2021-07-15 RX ADMIN — SERTRALINE 50 MG: 50 TABLET, FILM COATED ORAL at 22:28

## 2021-07-15 RX ADMIN — Medication 10 ML: at 06:04

## 2021-07-15 RX ADMIN — INSULIN GLARGINE 30 UNITS: 100 INJECTION, SOLUTION SUBCUTANEOUS at 09:15

## 2021-07-15 NOTE — PROGRESS NOTES
7/15/2021  Case Management Progress Note    12:30 PM  Documents sent to [phone]  [phone] for KINZA Damon    12:19 PM  [facility] can accept, I will start the auth right now. KINZA Sauer    11:27 AM  Patient is 77year old male admitted 7/8 for osteomyelitis  Patient's RUR is 25% yellow/moderate risk for readmission  Chart reviewed--Patient discussed at IDR rounds this morning   Referrals sent last night to the [facility] and [facility] as well as [employer] Followed up with phone call to the [address] [facility], left message for Kait. Patient will need auth once approved. Transition of Care Plan  1. Continue medical management  2. Pursuing SNF placement  3. Left message for Jackelyn at the [address] [facility]   4.  CM will continue to follow    KINZA Sauer

## 2021-07-15 NOTE — OP NOTES
Danilo [facility]  OPERATIVE REPORT    Name:  Gilbert Hatch  MR#:  [id number]  :  1945  ACCOUNT #:  [de-identified]  DATE OF SERVICE:  07/10/2021    PREOPERATIVE DIAGNOSIS:  Left second digit osteomyelitis. POSTOPERATIVE DIAGNOSIS:  Left second digit osteomyelitis. PROCEDURE PERFORMED:  Left second digit partial amputation. SURGEON:  Ehsan Laguerre DPM    ASSISTANT:  None. ANESTHESIA:  4 mL of 1:1 mixture of 1% lidocaine plain and 0.5% Marcaine plain. COMPLICATIONS:  None. PATHOLOGY SENT:  Left second digit and left second digit clean margin. SPECIMENS REMOVED:  Listed above. IMPLANTS:  None. ESTIMATED BLOOD LOSS:  Minimal.    HEMOSTASIS:  Achieved with anatomic dissection. MATERIALS USED:  4-0 Prolene. INJECTABLES:  4 mL of 0.5% Marcaine plain. CONDITION:  Stable. INDICATION FOR PROCEDURE:  The patient is a 79-year-old male who presented to the preoperative holding area for a left second digit wound with bone exposed and osteomyelitis on x-ray. Surgical procedure was discussed in detail along with postoperative course. All risks, complications, and benefits were explained. No guarantees were made to the outcome of the procedure. The patient would like to continue the procedure. PROCEDURE:  The patient was identified in the preoperative holding area. Site and side were marked. Consent was signed. The patient was taken back to the operating room and his left foot was sterilely prepped and draped. A 4 mL of 1:1 mixture of 1% lidocaine plain and 0.5% Marcaine plain was injected proximal to the surgical site of the left second digit. A 15-blade was used to make the incision to resect the distal phalanx and the portion of the middle phalanx of the left second digit. The skin on the toe was damaged almost to the base of the middle phalanx, so a large amount of skin was needed to be resected for viable skin and closure.   The distal phalanx was then resected and majority of the second phalanx was also resected using a sagittal saw. The area was irrigated with Invanz solution and the incision site was closed with 4-0 Prolene. The patient tolerated the procedure well and was transferred to the PACU with stable vitals and neurovascular status intact. The left foot was dressed with Betadine-soaked Adaptic, dry sterile dressing and Coban for compression. He was transferred back to the surgical floor.       Ifeoma Ladd DPM      [id number]  D:  07/14/2021 22:25  T:  07/15/2021 7:44  JOB #:  [id number]  CC:  Tres Guerin DPM

## 2021-07-15 NOTE — PROGRESS NOTES
Problem: Falls - Risk of  Goal: *Absence of Falls  Description: Document Kay Harevy Fall Risk and appropriate interventions in the flowsheet.   Outcome: Progressing Towards Goal  Note: Fall Risk Interventions:  Mobility Interventions: Bed/chair exit alarm         Medication Interventions: Patient to call before getting OOB    Elimination Interventions: Call light in reach              Problem: Pain  Goal: *Control of Pain  Outcome: Progressing Towards Goal

## 2021-07-15 NOTE — PROGRESS NOTES
[facility]ilo [facility]  [address] [facility], [address] [place] [address]  [phone]      Medical Progress Note      NAME:         Yrn Hartley. :        1945  MRM:        [id number]    Date of service: 7/15/2021      Subjective: Patient has been seen and examined as a follow up for multiple medical issues. Chart, labs, diagnostics reviewed. He remains weak but without any new symptoms. No fever or chills. Denies any chest pain or SOB. Objective:    Vital Signs:    Visit Vitals  BP (!) 148/69 (BP 1 Location: Right upper arm, BP Patient Position: At rest)   Pulse 68   Temp 98.2 °F (36.8 °C)   Resp 18   Ht 5' 9\" (1.753 m)   Wt 145.1 kg (319 lb 14.2 oz)   SpO2 98%   BMI 47.24 kg/m²          Intake/Output Summary (Last 24 hours) at 7/15/2021 1001  Last data filed at 7/15/2021 1638  Gross per 24 hour   Intake    Output 1450 ml   Net -1450 ml        Physical Examination:    General:   Weak looking male patient, not in distress but frail   Eyes:   pink conjunctivae, PERRLA with no discharge. ENT:   no ottorrhea or rhinorrhea with dry mucous membranes  Pulm:  decreased breath sounds without crackles or wheezes  Card:  has regular and normal S1, S2 without thrills, bruits. + peripheral edema  Abd:  Soft, non-tender, obese, non-distended, normoactive bowel sounds   Musc:  No cyanosis, clubbing, atrophy or deformities. Skin:  No rashes, bruising or ulcers. Neuro: Awake and alert but lapses to sleep.  Non focal exam   Psych:  Has little insight to his illness     Current Facility-Administered Medications   Medication Dose Route Frequency    ampicillin-sulbactam (UNASYN) 3 g in 0.9% sodium chloride (MBP/ADV) 100 mL MBP  3 g IntraVENous Q6H    carvediloL (COREG) tablet 3.125 mg  3.125 mg Oral BID WITH MEALS    rivaroxaban (XARELTO) tablet 20 mg  20 mg Oral DAILY    insulin lispro (HUMALOG) injection   SubCUTAneous AC&HS    insulin glargine (LANTUS) injection 30 Units  30 Units SubCUTAneous DAILY    hydrALAZINE (APRESOLINE) 20 mg/mL injection 20 mg  20 mg IntraVENous Q4H PRN    melatonin (rapid dissolve) tablet 5 mg  5 mg Oral QHS PRN    alum-mag hydroxide-simeth (MYLANTA) oral suspension 30 mL  30 mL Oral Q4H PRN    LORazepam (ATIVAN) injection 0.5 mg  0.5 mg IntraVENous Q6H PRN    oxyCODONE IR (ROXICODONE) tablet 5 mg  5 mg Oral Q4H PRN    morphine injection 2 mg  2 mg IntraVENous Q4H PRN    diphenhydrAMINE (BENADRYL) capsule 25 mg  25 mg Oral Q6H PRN    albuterol (PROVENTIL VENTOLIN) nebulizer solution 2.5 mg  2.5 mg Nebulization Q4H PRN    glucose chewable tablet 16 g  4 Tablet Oral PRN    dextrose (D50W) injection syrg 12.5-25 g  25-50 mL IntraVENous PRN    glucagon (GLUCAGEN) injection 1 mg  1 mg IntraMUSCular PRN    sodium chloride (NS) flush 5-40 mL  5-40 mL IntraVENous Q8H    sodium chloride (NS) flush 5-40 mL  5-40 mL IntraVENous PRN    acetaminophen (TYLENOL) tablet 650 mg  650 mg Oral Q6H PRN    polyethylene glycol (MIRALAX) packet 17 g  17 g Oral DAILY PRN    bisacodyL (DULCOLAX) suppository 10 mg  10 mg Rectal DAILY PRN    ondansetron (ZOFRAN) injection 4 mg  4 mg IntraVENous Q6H PRN    sertraline (ZOLOFT) tablet 50 mg  50 mg Oral QHS    nitroglycerin (NITROSTAT) tablet 0.4 mg  0.4 mg SubLINGual Q5MIN PRN    aspirin chewable tablet 81 mg  81 mg Oral DAILY        Laboratory data and review:    Recent Labs     07/13/21  0846   WBC 6.4   HGB 10.5*   HCT 33.3*        Recent Labs     07/15/21  0418 07/14/21  0319 07/13/21  0509   NA  --   --  139   K  --   --  3.8   CL  --   --  107   CO2  --   --  25   GLU  --   --  169*   BUN  --   --  29*   CREA 1.40* 1.45* 1.32*   CA  --   --  7.9*   MG  --   --  1.8   PHOS  --   --  2.8   ALB  --   --  2.2*   ALT  --   --  13     No components found for: [place]    Diagnostics: Imaging studies have been reviewed    Telemetry reviewed by me:   normal sinus rhythm    Assessment and Plan:    Osteomyelitis of second toe of left foot / Diabetic foot infection POA: Xray showed a distal phalanx osteo. JOVITA showed no evidence of PAD. Seen and followed by podiatry and he is sp 2nd digit amputation 7/10. Tissue cultures isolated MSSA and path showed clean margins. Has been on IV Cefepime, Daptomycin and Metronidazole. Seen by ID and changed to IV Unasyn with plan for oral Augmentin at discharge. Wound care. Awaiting placement. CHF (congestive heart failure), NYHA class III, acute on chronic, systolic / Peripheral edema / Dyspnea on exertion / Elevated brain natriuretic peptide (BNP) level POA: ECHO showed EF 25-30%. Seen by cardiology pre-operatively and remains on medical management. He remains stable. Continue Coreg. Currently not on diuretics, ARB due to Elvi. Follow clinically. DM type 2 uncontrolled causing vascular, renal and neurological disease POA: A1c 8.0. Blood glucose well controlled. Continue DM diet, Lantus, SSi per protocol. Follow blood glucose. Coronary atherosclerosis of native coronary artery S/P CABG x 2 / Hyperlipidemia POA: currently without chest pain. Troponin negative. Echo as noted above. Seen by cardiology. Continue Asprin, Coreg. Resume Crestor at discharge. PAF (paroxysmal atrial fibrillation) / Chronic anticoagulation POA: rate controlled. Continue Coreg and Xarelto     Prolonged grief reaction / Anxiety and depression POA: Stable. Continue Sertraline     ELVI / Chronic kidney disease stage 3 POA: SCr stable. Continue to monitor renal function. Anemia - POA likely due to chronic disease. Stable.  Monitor     Morbid obesity - Advise weight loss, Needs outpatient [place] testing.  Likely [place].  Monitor oxygen PRN     Hx Prostate cancer - Outpatient follow up     GERD (gastroesophageal reflux disease) POA: Continue PPi     Total time spent for the patient's care: [facility] discussed with: Patient, Care Manager and Nursing Staff and consultants    Discussed:  Care Plan and D/C Planning    Prophylaxis:  H2B/PPI and Xarelto    Anticipated Disposition: SNF           ___________________________________________________    Attending Physician:   Dary Strickland MD

## 2021-07-15 NOTE — PROGRESS NOTES
Chart reviewed in prep for OT session. Patient reports nausea, declines OOB activity at this time. Will follow up as able.  Zoey Gallardo, OTR/L

## 2021-07-15 NOTE — PROGRESS NOTES
Bedside and Verbal shift change report given to Daquan Holland RN (oncoming nurse) by Tatiana Wood RN (offgoing nurse). Report included the following information SBAR, Kardex, MAR, Accordion, Recent Results and Med Rec Status.

## 2021-07-15 NOTE — PROGRESS NOTES
PHYSICAL THERAPY:Pt encouraged to mobilize out of bed to chair but declined reporting nausea and LE edema. PT will follow later today as time allows.

## 2021-07-15 NOTE — PROGRESS NOTES
Problem: Self Care Deficits Care Plan (Adult)  Goal: *Therapy Goal (Edit Goal, Insert Text)  Description: FUNCTIONAL STATUS PRIOR TO ADMISSION: Patient was modified independent during ADL tasks (with occasional assistance to manage footwear), and using a 3 wheeled rollator for functional mobility. HOME SUPPORT: The patient lived with daughter who provided assistance as needed. Daughter works during the day and patient is home alone    Initiated 7/12/2021  1. Patient will perform grooming in stand with modified independence within 7 day(s). 2.  Patient will perform bathing with modified independence within 7 day(s). 3.  Patient will perform lower body dressing with modified independence within 7 day(s). 4.  Patient will perform toilet transfers with modified independence within 7 day(s). 5.  Patient will perform all aspects of toileting with modified independence within 7 day(s). 6.  Patient will participate in upper extremity therapeutic exercise/activities with modified independence for 10 minutes within 7 day(s). 7.  Patient will utilize energy conservation techniques during functional activities with verbal cues within 7 day(s). Outcome: Progressing Towards Goal  OCCUPATIONAL THERAPY TREATMENT  Patient: Joe Weathers (72 y.o. male)  Date: 7/15/2021  Diagnosis: Dyspnea on exertion [R06.00]  Chest pain on exertion [R07.9]  Peripheral edema [R60.9]  Acute osteomyelitis of left foot (HCC) [M86.172] <principal problem not specified>  Procedure(s) (LRB):  LEFT 2ND DIGIT AMPUTATION (URGENT) (Left) 5 Days Post-Op  Precautions: Fall, PWB (with post op shoe; LLE)  Chart, occupational therapy assessment, plan of care, and goals were reviewed. ASSESSMENT  Patient continues with skilled OT services and is progressing towards goals. Patient continues to be limited by impaired standing balance, impaired standing tolerance, general weakness. Patient maintains good motivation to improve performance . SNF level rehab continues to be recommended     Current Level of Function Impacting Discharge (ADLs): up to max assist for LB ADL, min to mod A for toileting     Other factors to consider for discharge: Patient is home alone during the day time         PLAN :  Patient continues to benefit from skilled intervention to address the above impairments. Continue treatment per established plan of care to address goals. Recommend with staff: Liza Owens for all meals     Recommend next OT session: progress POC     Recommendation for discharge: (in order for the patient to meet his/her long term goals)  Therapy up to 5 days/week in SNF setting    This discharge recommendation:  Has been made in collaboration with the attending provider and/or case management    IF patient discharges home will need the following DME: patient owns DME required for discharge       SUBJECTIVE:   Patient pleasant and cooperative     OBJECTIVE DATA SUMMARY:   Cognitive/Behavioral Status:  Neurologic State: Alert  Orientation Level: Oriented X4  Cognition: Follows commands             Functional Mobility and Transfers for ADLs:  Bed Mobility:  Supine to Sit: Supervision    Transfers:  Sit to Stand: Minimum assistance  Functional Transfers  Adaptive Equipment: Walker (comment)  Bed to Chair: Minimum assistance    Balance:  Sitting: Intact; Without support  Standing: Impaired; With support  Standing - Static: Constant support; Fair  Standing - Dynamic : Constant support; Fair    ADL Intervention:                  Therapeutic Exercises:   Patient instructed in seated AROM exercise- UE/LE. Shoulder press, chest press, marching, leg kicks. 2 sets 10 reps. Pain:  None reported    Activity Tolerance:   Good    After treatment patient left in no apparent distress:   Sitting in chair, Call bell within reach, and Bed / chair alarm activated    COMMUNICATION/COLLABORATION:   The patients plan of care was discussed with: Registered nurse.      Paul Vergara OT  Time Calculation: 19 mins

## 2021-07-15 NOTE — PROGRESS NOTES
Comprehensive Nutrition Assessment      Type and Reason for Visit: Initial, RD nutrition re-screen/LOS    Nutrition Recommendations/Plan:   1. Continue 4 carb choice   2. Monitor BG, weight, PO intake. 3. Added Jamison x 1 per day for toe wound    Nutrition Assessment:       Pt admitted for Dyspnea on exertion [R06.00]  Chest pain on exertion [R07.9]  Peripheral edema [R60.9]  Acute osteomyelitis of left foot ([address] [M86.172]. Pt  has a past medical history of Anxiety and depression, CAD, CHF, NYHA class III, DM type 2, GERD, Morbid obesity, Prolonged grief reaction, and Prostate cancer. Pt screened for LOS. Pt eating well currently. Awaiting SNF placement. PO intake ~75% on average. Intakes were better several days ago. Currently eating ~50% but was eating 100% several days ago. Pt wt stable over last 4 yrs. Pt with osteo on left second toe- s/p amputation 7/10/2021. Encourage outpt RD and/or DM management. A1c 8.0. Fasting BG ~169-249 mg/dL. No fasting BG taken last 2 days. POC checks between 155-273 mg/dL. Wt Readings from Last 10 Encounters:   07/11/21 145.1 kg (319 lb 14.2 oz)   09/03/20 143.1 kg (315 lb 6.4 oz)   01/31/19 135.6 kg (299 lb)   11/12/18 138.3 kg (305 lb)   09/03/18 137.9 kg (304 lb)   02/05/18 146.1 kg (322 lb)   10/31/17 145.6 kg (321 lb)   09/22/17 148.3 kg (327 lb)   06/01/17 (!) 159.3 kg (351 lb 3.2 oz)   05/25/17 (!) 161 kg (355 lb)       Malnutrition Assessment:  Malnutrition Status:  No malnutrition      Estimated Daily Nutrient Needs:  Energy (kcal): 5661; Weight Used for Energy Requirements: Current  Protein (g): 105 (ideal x 1.5);  Weight Used for Protein Requirements: Ideal  Fluid (ml/day): 2350; Method Used for Fluid Requirements: 1 ml/kcal    Documented meal intake:   Patient Vitals for the past 168 hrs:   % Diet Eaten   07/13/21 1838 26 - 50%   07/13/21 1139 26 - 50%   07/12/21 0945 76 - 100%   07/11/21 1743 76 - 100%   07/11/21 1253 76 - 100%   07/10/21 1915 76 - 100% 07/10/21 1539 0%   07/10/21 1432 0%   07/10/21 0930 0%   07/09/21 1310 51 - 75%   07/09/21 0824 0%       Documented Supplement intake:  No data found. Nutrition Related Findings:     Last BM 7/15, hard constipation. Edema 1+ pitting BLE. Nutritionally Significant Medications:   Dulcolax, Benadryl, Zofran, Roxicodone, Miralax.        Wounds:    Surgical incision       Current Nutrition Therapies:  ADULT DIET Regular; 4 carb choices (60 gm/meal)    Anthropometric Measures:  · Height:  5' 9.02\" (175.3 cm)  · Current Body Wt:  145.1 kg (319 lb 14.2 oz)   · Admission Body Wt:   319 lbs    · Usual Body Wt:   300-315 lbs     · Ideal Body Wt:  160 lbs:  199.9 %   · BMI Category:  Obese class 3 (BMI 40.0 or greater)       Nutrition Diagnosis:   · Inadequate protein-energy intake related to increased demand for energy/nutrients as evidenced by wounds, BMI (large stature, increase protein needs for wound)      Nutrition Interventions:   Food and/or Nutrient Delivery: Continue current diet, Start oral nutrition supplement  Nutrition Education and Counseling: No recommendations at this time  Coordination of Nutrition Care: Continue to monitor while inpatient, Interdisciplinary rounds    Goals:  PO >50% of meals and 1-2 ONS per day within 5-7 days       Nutrition Monitoring and Evaluation:   Behavioral-Environmental Outcomes: None identified  Food/Nutrient Intake Outcomes: Food and nutrient intake, IVF intake  Physical Signs/Symptoms Outcomes: Biochemical data, Weight, Skin    Discharge Planning:    Continue oral nutrition supplement, Continue current diet     Electronically signed by Marj Vail RD     Contact: [phone]

## 2021-07-15 NOTE — PROGRESS NOTES
Spiritual Care Assessment/Progress Note  [address] [place] [address] Dr      NAME: Madi Loredo MRN: [id number]  AGE: 77 y.o.  SEX: male  Yarsanism Affiliation: Latter-day   Language: English     7/15/2021     Total Time (in minutes): 15     Spiritual Assessment begun in [facility] 5M1 MED SURG 1 through conversation with:         [x]Patient        [] Family    [] Friend(s)        Reason for Consult: Initial/Spiritual assessment, patient floor     Spiritual beliefs: (Please include comment if needed)     [x] Identifies with a chante tradition:   Zoroastrianism       [] Supported by a chante community:            [] Claims no spiritual orientation:           [] Seeking spiritual identity:                [] Adheres to an individual form of spirituality:           [] Not able to assess:                           Identified resources for coping:      [x] Prayer                               [] Music                  [] Guided Imagery     [] Family/friends                 [] Pet visits     [] Devotional reading                         [] Unknown     [] Other: Chante                               Interventions offered during this visit: (See comments for more details)    Patient Interventions: Affirmation of emotions/emotional suffering, Affirmation of chante, Iconic (affirming the presence of God/Higher Power), Initial/Spiritual assessment, patient floor, Prayer (assurance of), Prayer (actual)           Plan of Care:     [] Support spiritual and/or cultural needs    [] Support AMD and/or advance care planning process      [] Support grieving process   [] Coordinate Rites and/or Rituals    [] Coordination with community clergy   [] No spiritual needs identified at this time   [] Detailed Plan of Care below (See Comments)  [] Make referral to Music Therapy  [] Make referral to Pet Therapy     [] Make referral to Addiction services  [] Make referral to [facility]  [] Make referral to Spiritual Care Partner  [] No future visits requested        [x] Follow up upon further referrals     Comments: Initial spiritual assessment in 5 Med Surg. Mr. Patti Gonzalez appeared to be subdued in his response. He has a daughter as support, he is not having a good day, he is doing a bit better than when he first came in. His voice became more clear and firmer when he affirmed his elke in God. Expressing he prays all the time and though he no longer belongs to a Baptism, he relies on God's strength. He asked for prayer for strength. Provided spiritual presence and prayer. He sang a bit of harmonyl with me when I sang AMEN. He thanked me for my visit. Affirmed him, his elke and his future. Contact Spiritual Care for any further referrals.   Visited by: Rick Resendiz., MS., [address] ([id number])

## 2021-07-16 LAB
GLUCOSE BLD STRIP.AUTO-MCNC: 101 MG/DL (ref 65–117)
GLUCOSE BLD STRIP.AUTO-MCNC: 147 MG/DL (ref 65–117)
GLUCOSE BLD STRIP.AUTO-MCNC: 177 MG/DL (ref 65–117)
GLUCOSE BLD STRIP.AUTO-MCNC: 241 MG/DL (ref 65–117)
SERVICE CMNT-IMP: ABNORMAL
SERVICE CMNT-IMP: NORMAL

## 2021-07-16 PROCEDURE — 82962 GLUCOSE BLOOD TEST: CPT

## 2021-07-16 PROCEDURE — 74011250637 HC RX REV CODE- 250/637: Performed by: HOSPITALIST

## 2021-07-16 PROCEDURE — 94760 N-INVAS EAR/PLS OXIMETRY 1: CPT

## 2021-07-16 PROCEDURE — 74011636637 HC RX REV CODE- 636/637: Performed by: INTERNAL MEDICINE

## 2021-07-16 PROCEDURE — 97116 GAIT TRAINING THERAPY: CPT

## 2021-07-16 PROCEDURE — 65660000000 HC RM CCU STEPDOWN

## 2021-07-16 PROCEDURE — 74011250636 HC RX REV CODE- 250/636: Performed by: INTERNAL MEDICINE

## 2021-07-16 PROCEDURE — 97530 THERAPEUTIC ACTIVITIES: CPT

## 2021-07-16 PROCEDURE — 74011000258 HC RX REV CODE- 258: Performed by: INTERNAL MEDICINE

## 2021-07-16 PROCEDURE — 74011250637 HC RX REV CODE- 250/637: Performed by: INTERNAL MEDICINE

## 2021-07-16 RX ADMIN — SODIUM CHLORIDE 3 G: 900 INJECTION INTRAVENOUS at 18:52

## 2021-07-16 RX ADMIN — SERTRALINE 50 MG: 50 TABLET, FILM COATED ORAL at 20:40

## 2021-07-16 RX ADMIN — Medication 10 ML: at 05:21

## 2021-07-16 RX ADMIN — INSULIN GLARGINE 30 UNITS: 100 INJECTION, SOLUTION SUBCUTANEOUS at 08:13

## 2021-07-16 RX ADMIN — ASPIRIN 81 MG: 81 TABLET, CHEWABLE ORAL at 08:13

## 2021-07-16 RX ADMIN — SODIUM CHLORIDE 3 G: 900 INJECTION INTRAVENOUS at 00:17

## 2021-07-16 RX ADMIN — Medication 10 ML: at 22:36

## 2021-07-16 RX ADMIN — RIVAROXABAN 20 MG: 20 TABLET, FILM COATED ORAL at 08:13

## 2021-07-16 RX ADMIN — SODIUM CHLORIDE 3 G: 900 INJECTION INTRAVENOUS at 05:20

## 2021-07-16 RX ADMIN — CARVEDILOL 3.12 MG: 3.12 TABLET, FILM COATED ORAL at 18:51

## 2021-07-16 RX ADMIN — SODIUM CHLORIDE 3 G: 900 INJECTION INTRAVENOUS at 11:29

## 2021-07-16 RX ADMIN — CARVEDILOL 3.12 MG: 3.12 TABLET, FILM COATED ORAL at 08:13

## 2021-07-16 NOTE — PROGRESS NOTES
Problem: Mobility Impaired (Adult and Pediatric)  Goal: *Acute Goals and Plan of Care (Insert Text)  Description: FUNCTIONAL STATUS PRIOR TO ADMISSION: Patient was modified independent using a 3 wheeled rollator for functional mobility. HOME SUPPORT PRIOR TO ADMISSION: The patient lived with his daughter who works full time. Physical Therapy Goals  Initiated 7/12/2021  1. Patient will move from supine to sit and sit to supine  in bed with independence within 7 day(s). 2.  Patient will transfer from bed to chair and chair to bed with modified independence using the least restrictive device within 7 day(s). 3.  Patient will perform sit to stand with modified independence within 7 day(s). 4.  Patient will ambulate with modified independence for 50 feet with the least restrictive device within 7 day(s). Note:   PHYSICAL THERAPY TREATMENT  Patient: Yrn Prakash (72 y.o. male)  Date: 7/16/2021  Diagnosis: Dyspnea on exertion [R06.00]  Chest pain on exertion [R07.9]  Peripheral edema [R60.9]  Acute osteomyelitis of left foot (HCC) [M86.172] <principal problem not specified>  Procedure(s) (LRB):  LEFT 2ND DIGIT AMPUTATION (URGENT) (Left) 6 Days Post-Op  Precautions: Fall, PWB (with post op shoe; LLE)  Chart, physical therapy assessment, plan of care and goals were reviewed. ASSESSMENT  Patient continues with skilled PT services. Pt supine to sit with mod assist.Pt sit to stand with bed elevated min assist.Pt ambulated 30ft with RW CGA with off load shoe on LLE. Pt left sitting in chair with legs elevated. Pt progressing slowly. Continue goals. PLAN :  Patient continues to benefit from skilled intervention to address the above impairments. Continue treatment per established plan of care. to address goals.     Recommendation for discharge: (in order for the patient to meet his/her long term goals)  Therapy up to 5 days/week in SNF setting    This discharge recommendation:  Has been made in collaboration with the attending provider and/or case management    IF patient discharges home will need the following DME: rolling walker       SUBJECTIVE:       OBJECTIVE DATA SUMMARY:   Critical Behavior:  Neurologic State: Alert  Orientation Level: Oriented X4  Cognition: Follows commands     Functional Mobility Training:  Bed Mobility:     Supine to Sit: (P) Moderate assistance     Scooting: (P) Minimum assistance        Transfers:  Sit to Stand: Contact guard assistance;Minimum assistance                Balance:  Sitting: (P) Intact  Sitting - Static: (P) Good (unsupported)  Standing: (P) With support  Standing - Static: (P) Fair  Ambulation/Gait Training:  Distance (ft): 30 Feet (ft)  Assistive Device: Gait belt;Walker, rolling  Ambulation - Level of Assistance: Contact guard assistance        Gait Abnormalities: Decreased step clearance        Base of Support: Narrowed     Speed/Akanksha: Pace decreased (<100 feet/min)  Step Length: Right shortened;Left shortened            Activity Tolerance:   Fair    After treatment patient left in no apparent distress:   Sitting in chair    COMMUNICATION/COLLABORATION:   The patients plan of care was discussed with: Physical therapist.     Vinita Delgado PTA   Time Calculation: 23 mins

## 2021-07-16 NOTE — PROGRESS NOTES
Danilo Curry [address]  [address] [facility], [address] [place] [address]  [phone]      Medical Progress Note      NAME:         Jason Peerz. :        1945  MRM:        [id number]    Date of service: 2021      Subjective: Patient has been seen and examined as a follow up for multiple medical issues. Chart, labs, diagnostics reviewed. He remains stable. No new symptoms. Afebrile. No nausea or vomiting. Chest pain or SOB. Objective:    Vital Signs:    Visit Vitals  /66 (BP 1 Location: Right upper arm, BP Patient Position: At rest)   Pulse 67   Temp 98 °F (36.7 °C)   Resp 18   Ht 5' 9.02\" (1.753 m)   Wt 147.1 kg (324 lb 4.8 oz)   SpO2 96%   BMI 47.87 kg/m²          Intake/Output Summary (Last 24 hours) at 2021 1413  Last data filed at 2021 0813  Gross per 24 hour   Intake 120 ml   Output 625 ml   Net -505 ml        Physical Examination:    General:   Weak looking male patient, not in distress but frail   Eyes:   pink conjunctivae, PERRLA with no discharge. Pulm:  decreased breath sounds without crackles or wheezes  Card:  has regular and normal S1, S2 without thrills, bruits. + peripheral edema  Abd:  Soft, non-tender, obese, non-distended, normoactive bowel sounds   Musc:  No cyanosis, clubbing, atrophy or deformities. Skin:  No rashes, bruising or ulcers.    Neuro: Awake and alert, Non focal exam   Psych:  Has little insight to his illness     Current Facility-Administered Medications   Medication Dose Route Frequency    ampicillin-sulbactam (UNASYN) 3 g in 0.9% sodium chloride (MBP/ADV) 100 mL MBP  3 g IntraVENous Q6H    carvediloL (COREG) tablet 3.125 mg  3.125 mg Oral BID WITH MEALS    rivaroxaban (XARELTO) tablet 20 mg  20 mg Oral DAILY    insulin lispro (HUMALOG) injection   SubCUTAneous AC&HS    insulin glargine (LANTUS) injection 30 Units  30 Units SubCUTAneous DAILY    hydrALAZINE (APRESOLINE) 20 mg/mL injection 20 mg  20 mg IntraVENous Q4H PRN    melatonin (rapid dissolve) tablet 5 mg  5 mg Oral QHS PRN    alum-mag hydroxide-simeth (MYLANTA) oral suspension 30 mL  30 mL Oral Q4H PRN    LORazepam (ATIVAN) injection 0.5 mg  0.5 mg IntraVENous Q6H PRN    oxyCODONE IR (ROXICODONE) tablet 5 mg  5 mg Oral Q4H PRN    morphine injection 2 mg  2 mg IntraVENous Q4H PRN    diphenhydrAMINE (BENADRYL) capsule 25 mg  25 mg Oral Q6H PRN    albuterol (PROVENTIL VENTOLIN) nebulizer solution 2.5 mg  2.5 mg Nebulization Q4H PRN    glucose chewable tablet 16 g  4 Tablet Oral PRN    dextrose (D50W) injection syrg 12.5-25 g  25-50 mL IntraVENous PRN    glucagon (GLUCAGEN) injection 1 mg  1 mg IntraMUSCular PRN    sodium chloride (NS) flush 5-40 mL  5-40 mL IntraVENous Q8H    sodium chloride (NS) flush 5-40 mL  5-40 mL IntraVENous PRN    acetaminophen (TYLENOL) tablet 650 mg  650 mg Oral Q6H PRN    polyethylene glycol (MIRALAX) packet 17 g  17 g Oral DAILY PRN    bisacodyL (DULCOLAX) suppository 10 mg  10 mg Rectal DAILY PRN    ondansetron (ZOFRAN) injection 4 mg  4 mg IntraVENous Q6H PRN    sertraline (ZOLOFT) tablet 50 mg  50 mg Oral QHS    nitroglycerin (NITROSTAT) tablet 0.4 mg  0.4 mg SubLINGual Q5MIN PRN    aspirin chewable tablet 81 mg  81 mg Oral DAILY        Laboratory data and review:    No results for input(s): WBC, HGB, HCT, PLT, HGBEXT, HCTEXT, PLTEXT, HGBEXT, HCTEXT, PLTEXT in the last 72 hours. Recent Labs     07/15/21  0418 07/14/21  0319   CREA 1.40* 1.45*     No components found for: [place]    Diagnostics: Imaging studies have been reviewed    Telemetry reviewed by me:   normal sinus rhythm    Assessment and Plan:    Osteomyelitis of second toe of left foot / Diabetic foot infection POA: Xray showed a distal phalanx osteo. JOVITA showed no evidence of PAD. Seen and followed by podiatry and he is sp 2nd digit amputation 7/10.  Tissue cultures isolated MSSA and path showed clean margins. Has been on IV Cefepime, Daptomycin and Metronidazole. Seen by ID and changed to IV Unasyn with plan for oral Augmentin at discharge x 7 days from 7/14. Continue wound care. Still awaiting placement. CHF (congestive heart failure), NYHA class III, acute on chronic, systolic / Peripheral edema / Dyspnea on exertion / Elevated brain natriuretic peptide (BNP) level POA: ECHO showed EF 25-30%. Seen by cardiology pre-operatively and remains on medical management. He has remained stable with no symptoms. Continue Coreg. Currently not on diuretics, ARB due to Elvi. Monitor       DM type 2 uncontrolled causing vascular, renal and neurological disease POA: A1c 8.0. Blood glucose stable. Continue DM diet, Lantus, SSi per protocol. Follow blood glucose. Coronary atherosclerosis of native coronary artery S/P CABG x 2 / Hyperlipidemia POA: currently without chest pain. Troponin negative. Echo as noted above. Seen by cardiology. Continue Asprin, Coreg. Resume Crestor at discharge. PAF (paroxysmal atrial fibrillation) / Chronic anticoagulation POA: rate controlled. Continue Coreg and Xarelto     Prolonged grief reaction / Anxiety and depression POA: Stable. Continue Sertraline     ELVI / Chronic kidney disease stage 3 POA: SCr stable. Continue to monitor renal function. Anemia - POA likely due to chronic disease. Stable.  Monitor     Morbid obesity - Advise weight loss, Needs outpatient [place]IR[place] testing.  Likely V[place].  Monitor oxygen PRN     Hx Prostate cancer - Outpatient follow up     GERD (gastroesophageal reflux disease) POA: Continue PPi     Total time spent for the patient's care: 30  [facility] discussed with: Patient, Care Manager and Nursing Staff and consultants    Discussed:  Care Plan and D/C Planning    Prophylaxis:  H2B/PPI and Xarelto    Anticipated Disposition: SNF           ___________________________________________________    Attending Physician:   Karmen Fernando MD

## 2021-07-16 NOTE — PROGRESS NOTES
7/16/2021  11:04 AM  Medicare pt has received, reviewed, and signed 2nd IM letter informing them of their right to appeal the discharge. Signed copied has been placed on pt bedside chart.   RUSSEL Gruber

## 2021-07-16 NOTE — ROUTINE PROCESS
Bedside shift change report given to Ronni Lang (oncoming nurse) by Tiffani Nelson RN (offgoing nurse). Report included the following information SBAR, Kardex, Intake/Output, MAR, Accordion, Recent Results and Med Rec Status.

## 2021-07-16 NOTE — PROGRESS NOTES
Bedside shift change report given to HANNAH Hannah (oncoming nurse) by [employer] nurse). Report included the following information SBAR, Kardex, Intake/Output, MAR and Recent Results.

## 2021-07-16 NOTE — PROGRESS NOTES
7/16/2021  Case Management Progress Note    10:52 AM  Patient is 77year old male admitted 7/8 for osteomyelitis  Patient's RUR is 18% green/low risk for readmission  Chart reviewed--Patient discussed at IDR rounds this morning  Patient is medically ready for discharge we are just waiting on [facility] authorization for him to go over to the [address] [facility] [place]. I called [facility] this morning and they confirmed they have all the documents and are reviewing. Patient's daughter emailed me to check the status and I let her know we are just waiting on insurance. Will continue to follow and assist with discharge planning. Transition of Care Plan  1. Continue medical management  2. Discharge to the [address] [facility] [place] once Tha Davila received. 3. Transportation tbd  4.  CM will continue to follow    KINZA Briceño

## 2021-07-17 VITALS
WEIGHT: 315 LBS | SYSTOLIC BLOOD PRESSURE: 141 MMHG | HEIGHT: 69 IN | RESPIRATION RATE: 18 BRPM | BODY MASS INDEX: 46.65 KG/M2 | TEMPERATURE: 97.5 F | HEART RATE: 68 BPM | OXYGEN SATURATION: 98 % | DIASTOLIC BLOOD PRESSURE: 80 MMHG

## 2021-07-17 LAB
GLUCOSE BLD STRIP.AUTO-MCNC: 114 MG/DL (ref 65–117)
GLUCOSE BLD STRIP.AUTO-MCNC: 148 MG/DL (ref 65–117)
SERVICE CMNT-IMP: ABNORMAL
SERVICE CMNT-IMP: NORMAL

## 2021-07-17 PROCEDURE — 74011250636 HC RX REV CODE- 250/636: Performed by: INTERNAL MEDICINE

## 2021-07-17 PROCEDURE — 74011250637 HC RX REV CODE- 250/637: Performed by: INTERNAL MEDICINE

## 2021-07-17 PROCEDURE — 82962 GLUCOSE BLOOD TEST: CPT

## 2021-07-17 PROCEDURE — 94760 N-INVAS EAR/PLS OXIMETRY 1: CPT

## 2021-07-17 PROCEDURE — 74011636637 HC RX REV CODE- 636/637: Performed by: INTERNAL MEDICINE

## 2021-07-17 PROCEDURE — 74011000258 HC RX REV CODE- 258: Performed by: INTERNAL MEDICINE

## 2021-07-17 PROCEDURE — 74011250637 HC RX REV CODE- 250/637: Performed by: HOSPITALIST

## 2021-07-17 RX ORDER — NALOXONE HYDROCHLORIDE 4 MG/.1ML
SPRAY NASAL
Qty: 2 EACH | Refills: 0 | Status: SHIPPED | OUTPATIENT
Start: 2021-07-17

## 2021-07-17 RX ORDER — DOCUSATE SODIUM 100 MG/1
100 CAPSULE, LIQUID FILLED ORAL DAILY
Qty: 30 CAPSULE | Refills: 0 | Status: SHIPPED
Start: 2021-07-17 | End: 2021-10-15

## 2021-07-17 RX ORDER — AMOXICILLIN AND CLAVULANATE POTASSIUM 875; 125 MG/1; MG/1
1 TABLET, FILM COATED ORAL 2 TIMES DAILY
Qty: 6 TABLET | Refills: 0 | Status: SHIPPED
Start: 2021-07-18

## 2021-07-17 RX ORDER — OXYCODONE HYDROCHLORIDE 5 MG/1
5 TABLET ORAL
Qty: 12 TABLET | Refills: 0 | Status: SHIPPED | OUTPATIENT
Start: 2021-07-17 | End: 2021-07-20

## 2021-07-17 RX ORDER — AMLODIPINE BESYLATE 5 MG/1
5 TABLET ORAL DAILY
Status: DISCONTINUED | OUTPATIENT
Start: 2021-07-17 | End: 2021-07-17

## 2021-07-17 RX ORDER — GUAIFENESIN 100 MG/5ML
81 LIQUID (ML) ORAL DAILY
Qty: 30 TABLET | Refills: 0 | Status: SHIPPED
Start: 2021-07-18

## 2021-07-17 RX ORDER — AMLODIPINE BESYLATE 2.5 MG/1
2.5 TABLET ORAL DAILY
Status: DISCONTINUED | OUTPATIENT
Start: 2021-07-17 | End: 2021-07-17 | Stop reason: HOSPADM

## 2021-07-17 RX ORDER — ACETAMINOPHEN 325 MG/1
650 TABLET ORAL
Qty: 30 TABLET | Refills: 0 | Status: SHIPPED
Start: 2021-07-17

## 2021-07-17 RX ORDER — INSULIN GLARGINE 100 [IU]/ML
30 INJECTION, SOLUTION SUBCUTANEOUS DAILY
Qty: 1 PEN | Refills: 0 | Status: SHIPPED
Start: 2021-07-17

## 2021-07-17 RX ORDER — AMLODIPINE BESYLATE 2.5 MG/1
2.5 TABLET ORAL DAILY
Qty: 30 TABLET | Refills: 0 | Status: SHIPPED
Start: 2021-07-18

## 2021-07-17 RX ADMIN — SODIUM CHLORIDE 3 G: 900 INJECTION INTRAVENOUS at 00:55

## 2021-07-17 RX ADMIN — SODIUM CHLORIDE 3 G: 900 INJECTION INTRAVENOUS at 06:59

## 2021-07-17 RX ADMIN — INSULIN GLARGINE 30 UNITS: 100 INJECTION, SOLUTION SUBCUTANEOUS at 08:17

## 2021-07-17 RX ADMIN — Medication 10 ML: at 07:00

## 2021-07-17 RX ADMIN — AMLODIPINE BESYLATE 2.5 MG: 2.5 TABLET ORAL at 15:30

## 2021-07-17 RX ADMIN — RIVAROXABAN 20 MG: 20 TABLET, FILM COATED ORAL at 08:17

## 2021-07-17 RX ADMIN — CARVEDILOL 3.12 MG: 3.12 TABLET, FILM COATED ORAL at 08:17

## 2021-07-17 RX ADMIN — SODIUM CHLORIDE 3 G: 900 INJECTION INTRAVENOUS at 11:30

## 2021-07-17 RX ADMIN — ASPIRIN 81 MG: 81 TABLET, CHEWABLE ORAL at 08:17

## 2021-07-17 NOTE — ROUTINE PROCESS
TRANSFER - OUT REPORT:    Verbal report given to Miracle Torres LPN(name) on Charlee Fournier.  being transferred to [facility], room 605(unit) for routine progression of care       Report consisted of patients Situation, Background, Assessment and   Recommendations(SBAR). Information from the following report(s) SBAR, Kardex, Procedure Summary, Intake/Output, MAR, Accordion, Recent Results and Med Rec Status was reviewed with the receiving nurse. Lines:       Opportunity for questions and clarification was provided. Patient given a copy of AVS and signed paper copy. Packet with RX to accompany patient to [facility]. IV removed with tip intact. Patient dressed and taken to [facility] by daughter.

## 2021-07-17 NOTE — PROGRESS NOTES
7/17/2021  1:59 PM    [employer] authorization acquired: #[id number]. Jackelyn @ [facility] notified. Bed available today (Rm 605). CM notified pt's dtr, Negro Robert, and daughter plans to transport pt to SNF today at 16:00.  HANNAH Sanabriap notified of plan.     Jordan Wood RN

## 2021-07-17 NOTE — PROGRESS NOTES
Danilo Curry [facility]  [address] [facility], [address] [place] [address]  [phone]      Medical Progress Note      NAME: Idalia Myrick :  1945  MRM:  [id number]    Date/Time of service: 2021  10:28 AM       Subjective:     Chief Complaint:  Patient was personally seen and examined by me during this time period. Chart reviewed. F/u OM. Patient denies CP, Sob or f/c. Pain controlled. Objective:       Vitals:       Last 24hrs VS reviewed since prior progress note.  Most recent are:    Visit Vitals  /71 (BP 1 Location: Right upper arm, BP Patient Position: At rest)   Pulse 64   Temp 98 °F (36.7 °C)   Resp 18   Ht 5' 9.02\" (1.753 m)   Wt 147.1 kg (324 lb 4.8 oz)   SpO2 97%   BMI 47.87 kg/m²     SpO2 Readings from Last 6 Encounters:   21 97%   20 98%   19 96%   18 99%   18 97%   10/31/17 95%            Intake/Output Summary (Last 24 hours) at 2021 1028  Last data filed at 2021 5284  Gross per 24 hour   Intake 880 ml   Output 375 ml   Net 505 ml        Exam:     Physical Exam:    Gen:  frail, in no acute distress  HEENT:  Pink conjunctivae, PERRL, hearing intact to voice  Resp:  No accessory muscle use, clear breath sounds without wheezes rales or rhonchi  Card:  RRR, No murmurs, normal S1, S2, no peripheral edema  Abd:  Soft, non-tender, non-distended, normoactive bowel sounds are present, no palpable organomegaly   Musc:  No cyanosis or clubbing, left foot dressing c/d/i   Skin:  skin turgor is good  Neuro:  Cranial nerves 3-12 are grossly intact,follows commands appropriately  Psych:  Oriented to person, place, and time, poor insight      Medications Reviewed: (see below)    Lab Data Reviewed: (see below)    ______________________________________________________________________    Medications:     Current Facility-Administered Medications   Medication Dose Route Frequency    ampicillin-sulbactam (UNASYN) 3 g in 0.9% sodium chloride (MBP/ADV) 100 mL MBP  3 g IntraVENous Q6H    carvediloL (COREG) tablet 3.125 mg  3.125 mg Oral BID WITH MEALS    rivaroxaban (XARELTO) tablet 20 mg  20 mg Oral DAILY    insulin lispro (HUMALOG) injection   SubCUTAneous AC&HS    insulin glargine (LANTUS) injection 30 Units  30 Units SubCUTAneous DAILY    hydrALAZINE (APRESOLINE) 20 mg/mL injection 20 mg  20 mg IntraVENous Q4H PRN    melatonin (rapid dissolve) tablet 5 mg  5 mg Oral QHS PRN    alum-mag hydroxide-simeth (MYLANTA) oral suspension 30 mL  30 mL Oral Q4H PRN    LORazepam (ATIVAN) injection 0.5 mg  0.5 mg IntraVENous Q6H PRN    oxyCODONE IR (ROXICODONE) tablet 5 mg  5 mg Oral Q4H PRN    morphine injection 2 mg  2 mg IntraVENous Q4H PRN    diphenhydrAMINE (BENADRYL) capsule 25 mg  25 mg Oral Q6H PRN    albuterol (PROVENTIL VENTOLIN) nebulizer solution 2.5 mg  2.5 mg Nebulization Q4H PRN    glucose chewable tablet 16 g  4 Tablet Oral PRN    dextrose (D50W) injection syrg 12.5-25 g  25-50 mL IntraVENous PRN    glucagon (GLUCAGEN) injection 1 mg  1 mg IntraMUSCular PRN    sodium chloride (NS) flush 5-40 mL  5-40 mL IntraVENous Q8H    sodium chloride (NS) flush 5-40 mL  5-40 mL IntraVENous PRN    acetaminophen (TYLENOL) tablet 650 mg  650 mg Oral Q6H PRN    polyethylene glycol (MIRALAX) packet 17 g  17 g Oral DAILY PRN    bisacodyL (DULCOLAX) suppository 10 mg  10 mg Rectal DAILY PRN    ondansetron (ZOFRAN) injection 4 mg  4 mg IntraVENous Q6H PRN    sertraline (ZOLOFT) tablet 50 mg  50 mg Oral QHS    nitroglycerin (NITROSTAT) tablet 0.4 mg  0.4 mg SubLINGual Q5MIN PRN    aspirin chewable tablet 81 mg  81 mg Oral DAILY          Lab Review:     No results for input(s): WBC, HGB, HCT, PLT, HGBEXT, HCTEXT, PLTEXT in the last 72 hours.   Recent Labs     07/15/21  0418   CREA 1.40*     Lab Results   Component Value Date/Time    Glucose (POC) 114 07/17/2021 06:49 AM    Glucose (POC) 241 (H) 07/16/2021 09:23 PM    Glucose (POC) 177 (H) 07/16/2021 04:33 PM    Glucose (POC) 147 (H) 07/16/2021 10:54 AM    Glucose (POC) 101 07/16/2021 07:11 AM          Assessment / Plan:     Osteomyelitis of second toe of left foot / Diabetic foot infection POA: Xray showed a distal phalanx osteo. JOVITA showed no evidence of PAD. Seen and followed by podiatry and he is sp 2nd digit amputation 7/10. Tissue cultures isolated MSSA and path showed clean margins. S/p IV Cefepime, Daptomycin and Metronidazole which was de-esculated to IV Unasyn with plan for oral Augmentin at discharge x 7 days from 7/14. Continue wound care. Still awaiting placement.       CHF (congestive heart failure), NYHA class III, acute on chronic, systolic / Peripheral edema / Dyspnea on exertion / Elevated brain natriuretic peptide (BNP) level POA: ECHO showed EF 25-30%. Seen by cardiology pre-operatively and remains on medical management. He has remained stable with no symptoms. Continue Coreg. Currently not on diuretics, ARB due to Elvi; repeat renal function in morning.         DM type 2 uncontrolled causing vascular, renal and neurological disease POA: A1c 8.0. Blood glucose stable. Continue DM diet, Lantus, SSi per protocol. Follow blood glucose.      Coronary atherosclerosis of native coronary artery S/P CABG x 2 / Hyperlipidemia POA: currently without chest pain. Troponin negative. Echo as noted above. Seen by cardiology. Continue Asprin, Coreg. Resume Crestor at discharge.       PAF (paroxysmal atrial fibrillation) / Chronic anticoagulation POA: rate controlled. Continue Coreg and Xarelto      Prolonged grief reaction / Anxiety and depression POA: Stable. Continue Sertraline     ELVI / Chronic kidney disease stage 3 POA: SCr stable. Continue to monitor renal function.      Anemia - POA likely due to chronic disease. Stable.  Monitor      Morbid obesity - Advise weight loss, Needs outpatient [place] testing.  Likely [place].  Monitor oxygen PRN     Hx Prostate cancer - Outpatient follow up     GERD (gastroesophageal reflux disease) POA: Continue PPi       Total time spent with patient: 27 Minutes **I personally saw and examined the patient during this time period**                 Care Plan discussed with: Patient    Discussed:  Care Plan    Prophylaxis:  Xarelto    Disposition:  SNF/LTC           ___________________________________________________    Attending Physician: Parminder Forbes DO

## 2021-07-17 NOTE — PROGRESS NOTES
Transition of Care Plan to SNF/Rehab    SNF/Rehab Transition:  Patient has been accepted to [facility] and meets criteria for admission. Patient will transported by her daughter, Amie Ruggiero, and expected to leave at 1600. Communication to Patient/Family:  Spoke with Amie Ruggiero (identified care giver) and they are agreeable to the transition plan. Communication to SNF/Rehab:  Bedside RN, Kirstie Jorge, has been notified to update the transition plan to the facility and call report (phone number [phone]). Room #605. Discharge information has been updated on the AVS.     Discharge instructions to be fax'd to facility at [facility] # [phone]). Nursing Please include all hard scripts for controlled substances, med rec and dc summary, and AVS in packet. Reviewed and confirmed with facility, [facility], can manage the patient care needs for the following:     Neo File with (X) only those applicable:    Medication:  [x]  Medications will be available at the facility  []  IV Antibiotics   [x]  Controlled Substance - hard copy to be sent with patient   [x]  Weekly Labs   Documents:  [x] Hard RX  [x] MAR  [] Kardex  [x] AVS  []Transfer Summary  [x]Discharge   Equipment:  []  CPAP/BiPAP   []  Wound Vacuum  []  [place] or Urinary Device  []  PICC/Central Line  []  Nebulizer  []  Ventilator   Treatment:  []Isolation (for MRSA, VRE, etc.)  []Surgical Drain Management  []Tracheostomy Care  [x]Dressing Changes  []Dialysis with transportation and chair time. []PEG Care  []Oxygen  [x]Daily Weights for Heart Failure   Dietary:  []Any diet limitations  []Tube Feedings   []Total Parenteral Management (TPN)   Eligible for Medicaid Long Term Services and Supports  Yes:  [] Eligible for medical assistance or will become eligible within 180 days and UAI completed. [] Provider/Patient and/or support system has requested screening.   [] UAI copy provided to patient or responsible party.  [] UAI unavailable at discharge will send once processed to SNF provider. [] UAI unavailable at discharged mailed to patient  No:   [] Private pay and is not financially eligible for Medicaid within the next 180 days. [] Reside out-of-state.   [] A residents of a state owned/operated facility that is licensed  by [place] [address] [employer] or [facility]  [] Enrollment in [facility] hospice services  [] [address] [place] [address]  [] Patient /Family declines to have screening completed or provide financial information for screening     Financial Resources:  Medicaid    [] Initiated and application pending   [] Full coverage     Advanced Care Plan:  []Surrogate Decision Maker of Care  []POA  [x]Communicated Code Status - FULL   Other     Elvira Dickey RN

## 2021-07-17 NOTE — DISCHARGE SUMMARY
Danilo Curry [facility] 79  [address] [facility], [address] [place] [address]  [phone]    Physician Discharge Summary     Patient ID:  Terri Ho  [id number]  77 y.o.  1945    Admit date: 7/8/2021    Discharge date and time: 7/17/2021 3:08 PM    Admission Diagnoses: Dyspnea on exertion [R06.00]  Chest pain on exertion [R07.9]  Peripheral edema [R60.9]  Acute osteomyelitis of left foot [facility]) [M86.172]    Discharge Diagnoses:  Principal Diagnosis <principal problem not specified>                                            Active Problems: Morbid obesity ([facility] (11/8/2011)      Coronary atherosclerosis of native coronary artery (11/21/2011)      Overview: 90% MID LAD. S/P CABG x 2 (12/14/2011)      Hyperlipidemia (11/25/2014)      PAULINA (acute kidney injury) ([facility] (11/25/2014)      Anemia (3/25/2016)      PAF (paroxysmal atrial fibrillation) ([facility] (3/27/2016)      Hyperglycemia (9/2/2018)      Prostate cancer (HCC) ()      GERD (gastroesophageal reflux disease) ()      CAD (coronary artery disease) ()      Peripheral edema (7/8/2021)      Dyspnea on exertion (7/8/2021)      Chest pain (7/8/2021)      Osteomyelitis of second toe of left foot ([facility] (7/8/2021)      Elevated brain natriuretic peptide (BNP) level (7/8/2021)      DM type 2 causing vascular disease (HCC) ()      DM type 2 causing renal disease (HCC) ()      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 JOVITA showed no evidence of PAD. Seen and followed by podiatry and he is sp 2nd digit amputation 7/10. Tissue cultures isolated MSSA and path showed clean margins.  S/p IV Cefepime, Daptomycin and Metronidazole which was de-esculated to IV Unasyn then given oral Augmentin at discharge to complete course.  Continue wound care. Sc to rehab at [address] [facility].      CHF (congestive heart failure), NYHA class III, acute on chronic, systolic / Peripheral edema / Dyspnea on exertion / Elevated brain natriuretic peptide (BNP) level POA: ECHO showed EF 25-30%. Seen by cardiology pre-operatively and remains on medical management. He has remained stable with no symptoms. Continue Coreg. Currently not on diuretics, ARB due to PAULINA. Follow Up PCP and cardiology OP.        DM type 2 uncontrolled causing vascular, renal and neurological disease POA: A1c 8.0. Continue DM diet, Lantus.      Coronary atherosclerosis of native coronary artery S/P CABG x 2 / Hyperlipidemia POA: no chest pain. Troponin negative. Echo as noted above. Seen by cardiology. Continue Asprin, Coreg. Resume Crestor at discharge.  F/u cardio OP.      PAF (paroxysmal atrial fibrillation) / Chronic anticoagulation POA: rate controlled. Continue Coreg and Xarelto      Prolonged grief reaction / Anxiety and depression POA: Stable. Continue Sertraline     PAULINA / Chronic kidney disease stage 3 POA: SCr stable. Continue to monitor renal function.      Anemia - POA likely due to chronic disease. Stable. Monitor      Morbid obesity -  Needs outpatient [place] testing.  Likely [place].       Hx Prostate cancer - Outpatient follow up     GERD (gastroesophageal reflux disease) POA: Continue PPi     PCP: Peggy Weir MD     Consults: ID and and Podiatry    Significant Diagnostic Studies:     XR 2nd toe   IMPRESSION     Second distal phalanx osteomyelitis.     Discharge Exam:  Physical Exam:    Gen:  frail, in no acute distress  HEENT:  Pink conjunctivae, PERRL, hearing intact to voice  Resp:  No accessory muscle use, clear breath sounds without wheezes rales or rhonchi  Card:  RRR, No murmurs, normal S1, S2, no peripheral edema  Abd:  Soft, non-tender, non-distended, normoactive bowel sounds are present, no palpable organomegaly   Musc: No cyanosis or clubbing, left foot dressing c/d/i   Skin:  skin turgor is good  Neuro:  Cranial nerves 3-12 are grossly intact,follows commands appropriately  Psych:  Oriented to person, place, and time, poor insight       Disposition: SNF  Discharge Condition: Stable    Patient Instructions:   Current Discharge Medication List      START taking these medications    Details   oxyCODONE IR (ROXICODONE) 5 mg immediate release tablet Take 1 Tablet by mouth every six (6) hours as needed for Pain for up to 3 days. Max Daily Amount: 20 mg.  Qty: 12 Tablet, Refills: 0    Associated Diagnoses: Osteomyelitis of second toe of left foot (HCC)      aspirin 81 mg chewable tablet Take 1 Tablet by mouth daily. Qty: 30 Tablet, Refills: 0      acetaminophen (TYLENOL) 325 mg tablet Take 2 Tablets by mouth every six (6) hours as needed for Pain. Qty: 30 Tablet, Refills: 0      amoxicillin-clavulanate (Augmentin) 875-125 mg per tablet Take 1 Tablet by mouth two (2) times a day. Qty: 6 Tablet, Refills: 0      naloxone (Narcan) 4 mg/actuation nasal spray Use 1 spray intranasally, then discard. Repeat with new spray every 2 min as needed for opioid overdose symptoms, alternating nostrils. Qty: 2 Each, Refills: 0      amLODIPine (NORVASC) 2.5 mg tablet Take 1 Tablet by mouth daily. Qty: 30 Tablet, Refills: 0      docusate sodium (Colace) 100 mg capsule Take 1 Capsule by mouth daily for 90 days. Qty: 30 Capsule, Refills: 0         CONTINUE these medications which have CHANGED    Details   insulin glargine (Lantus Solostar U-100 Insulin) 100 unit/mL (3 mL) inpn 30 Units by SubCUTAneous route daily. Qty: 1 Pen, Refills: 0         CONTINUE these medications which have NOT CHANGED    Details   omeprazole (PRILOSEC) 20 mg capsule take 1 capsule by mouth once daily      sertraline (ZOLOFT) 50 mg tablet Take 50 mg by mouth nightly.       carvediloL (COREG) 3.125 mg tablet take 1 tablet by mouth twice a day AT 8 AM AND 6 PM  Qty: 180 Tab, Refills: 1      rosuvastatin (Crestor) 10 mg tablet Take 1 Tab by mouth nightly. Qty: 90 Tab, Refills: 1      rivaroxaban (Xarelto) 20 mg tab tablet take 1 tablet by mouth once daily  Qty: 90 Tab, Refills: 1         STOP taking these medications       losartan-hydroCHLOROthiazide (HYZAAR) 100-25 mg per tablet Comments:   Reason for Stopping:                 Activity: per podiatry   Diet: Diabetic Diet  Wound Care: Keep wound clean and dry    Follow-up with  Follow-up Information     Follow up With Specialties Details Why Contact Info    Santiago Calvo DPM Podiatry Schedule an appointment as soon as possible for a visit in 1 week Follow Up  [facility] [address] [facility]      [address] [place] [address]    Rio Drew MD Internal Medicine Schedule an appointment as soon as possible for a visit in 1 week [facility] Follow Up  [address] [place] [address] [facility]      Shameka Mehta MD Cardiology Schedule an appointment as soon as possible for a visit in 2 weeks Follow Up  [place] [address]  [address] [facility]  [phone]            Follow-up tests/labs as above.      Signed:  Gustavo Giang DO  7/17/2021  3:08 PM  **I personally spent 37 min on discharge**

## 2021-07-17 NOTE — DISCHARGE INSTRUCTIONS
HOSPITALIST DISCHARGE INSTRUCTIONS  NAME: Issa Farmer. :  1945   MRN:  [id number]     Date/Time:  2021 2:54 PM    ADMIT DATE: 2021     DISCHARGE DATE: 2021     ADMITTING DIAGNOSIS:  Osteomyelitis     DISCHARGE DIAGNOSIS:  Osteomyelitis     Patient Education        Osteomyelitis: Care Instructions  Your Care Instructions  Osteomyelitis (say \"bw-mvba-cq-gm-to-HD-tus\") is a bone infection. It is caused by bacteria. The bacteria can infect the bone where it has been injured, or they can be carried through the blood from another area in the body. Osteomyelitis can be a short- or long-term problem. It is treated with antibiotics. You may get the antibiotics as pills or through a needle in a vein (IV). You will probably get treatment in the hospital at first. The type of treatment depends on the type of bacteria causing the infection, the bones affected, and how bad the infection is. Sometimes people need surgery to drain pus from bone or to fix damaged bone. Short-term osteomyelitis that is treated right away usually can be cured. But the long-term form sometimes comes back after treatment. You can help your chances of stopping the infection by taking your medicines as directed. Follow-up care is a key part of your treatment and safety. Be sure to make and go to all appointments, and call your doctor if you are having problems. It's also a good idea to know your test results and keep a list of the medicines you take. How can you care for yourself at home? · Take your antibiotics as directed. Do not stop taking them just because you feel better. You need to take the full course of antibiotics. · Take pain medicines exactly as directed. ? If the doctor gave you a prescription medicine for pain, take it as prescribed. ? If you are not taking a prescription pain medicine, ask your doctor if you can take an over-the-counter medicine.   · Do mild exercise and stretching if your doctor says it is okay. This can help keep your bones and muscles healthy. Avoid strenuous work or exercise until your doctor says you can do it. · Consider physical therapy if your doctor suggests it. Physical therapy may help you have a normal range of movement. · Do not smoke. Smoking can slow healing of the infection. If you need help quitting, talk to your doctor about stop-smoking programs and medicines. These can increase your chances of quitting for good. When should you call for help? Call 911 anytime you think you may need emergency care. For example, call if:    · You have severe bone pain. Call your doctor now or seek immediate medical care if:    · You continue to have bone pain.     · You have signs of infection, such as:  ? Increased pain, swelling, warmth, or redness. ? Red streaks leading from a wound. ? Pus draining from a wound. ? A fever. Watch closely for changes in your health, and be sure to contact your doctor if:    · You do not get better as expected. Where can you learn more? Go to http://www.gray.com/  Enter B364 in the search box to learn more about \"Osteomyelitis: Care Instructions. \"  Current as of: March 4, 2020               Content Version: 12.8  © 5829-7236 [employer]. Care instructions adapted under license by [employer] (which disclaims liability or warranty for this information). If you have questions about a medical condition or this instruction, always ask your healthcare professional. [place] [address] any warranty or liability for your use of this information. [place], [place]. [address]  OFFICE [phone]  CELL    [phone]    You have just had surgery on your foot and/or ankle. Proper care during the post-operative period is an integral part of your surgical treatment program.  It is imperative that these instructions are followed to insure proper healing and to obtain the best results.     1. GO directly home and keep your feet elevated on the way. 2. DRESSING OR CAST - Keep your dressing or cast clean and dry. DO NOT remove the bandage or inspect the wound. A small amount of blood on the bandage is normal.  If the dressing falls off or gets wet, call the office immediately. 3. ELEVATION - Place two pillows under the knee and leg. Make sure to support underneath your knees. You should elevate your leg whenever you are sitting or lying down. This includes mealtime and when retiring for the night. 4. ICE - Apply 1 or 2 small bags of ice close to, BUT NOT DIRECTLY OVER, the surgical site. The ice should be ON FOR TWENTY MINUTES, THEN OFF FOR ONE HALF HOUR. Continue this sequence throughout the day. Remember to keep the dressing or cast dry. Double-bagging the ice will help. 5. Limited pain and swelling is expected. 6. Exercise your legs frequently by bending your knees to stimulate circulation and speed healing. 7. You are to be:   · PARTIAL WEIGHT BEARING - allowed to walk or stand with the aid of crutches or a walker to tolerance. 8. If you have a surgical shoe - it should be worn whenever you are standing or walking. 9. MEALS - Your first meal at home should be a light one such as toast or soup. If nausea and vomiting develop call the office immediately. Drink plenty of fluid and resume a well balanced diet. 10. Sponge baths are recommended until your first post-operative appointment. 11. PRESCRIPTIONS - Please fill and take as directed. If you have any difficulties or experience any side effects after taking this medication please discontinue and call the office and/or go to your closest Emergency Room immediately. Call our office to make your next appointment;  Also, if you have any of the following:  · Temperature above 100.5 degrees  · Your bandage becomes overly stained, falls off or gets wet  · You bump or injure the surgical site  · Your medication does not stop discomfort    WE WANT YOUR SURGERY AND RECOVERY TO BE SUCCESSFUL AND AS COMFORTABLE AS POSSIBLE. THANK YOU FOR FOLLOWING THESE INSTRUCTIONS. IF YOU HAVE ANY PROBLEMS OR QUESTIONS PLEASE CALL OUR OFFICE. MEDICATIONS:    · It is important that you take the medication exactly as they are prescribed. · Keep your medication in the bottles provided by the pharmacist and keep a list of the medication names, dosages, and times to be taken in your wallet. · Do not take other medications without consulting your doctor     Pain Management: per above medications    What to do at Home    Recommended diet:  Diabetic Diet    Recommended activity: per podiatry as below     1) Return to the hospital if you feel worse    2) If you experience any of the following symptoms then please call your primary care physician or return to the emergency room if you cannot get hold of your doctor:  Fever, chills, nausea, vomiting, diarrhea, change in mentation, falling, bleeding, shortness of breath, chest pain, severe headache, severe abdominal pain. Follow Up: Follow-up Information     Follow up With Specialties Details Why Contact Info    Stanley Weston DPM Podiatry Schedule an appointment as soon as possible for a visit in 1 week Follow Up  [address]  [phone]    Dinora Cortez MD Internal Medicine Schedule an appointment as soon as possible for a visit in 1 week [facility] [place]  [address] [place]  [phone]              Information obtained by :  I understand that if any problems occur once I am at home I am to contact my physician. I understand and acknowledge receipt of the instructions indicated above. Physician's or R.N.'s Signature                                                                  Date/Time                                                                                                                                              Patient or Representative Signature                                                          Date/Time

## 2021-08-12 ENCOUNTER — HOSPITAL ENCOUNTER (EMERGENCY)
Age: 76
Discharge: HOME | End: 2021-08-13
Attending: EMERGENCY MEDICINE
Payer: MEDICARE

## 2021-08-12 ENCOUNTER — APPOINTMENT (OUTPATIENT)
Dept: GENERAL RADIOLOGY | Age: 76
End: 2021-08-12
Attending: NURSE PRACTITIONER
Payer: MEDICARE

## 2021-08-12 DIAGNOSIS — I50.23 ACUTE ON CHRONIC SYSTOLIC CONGESTIVE HEART FAILURE (HCC): Primary | ICD-10-CM

## 2021-08-12 DIAGNOSIS — N39.0 URINARY TRACT INFECTION WITHOUT HEMATURIA, SITE UNSPECIFIED: ICD-10-CM

## 2021-08-12 LAB
ALBUMIN SERPL-MCNC: 2.5 G/DL (ref 3.5–5)
ALBUMIN/GLOB SERPL: 0.5 {RATIO} (ref 1.1–2.2)
ALP SERPL-CCNC: 74 U/L (ref 45–117)
ALT SERPL-CCNC: 36 U/L (ref 12–78)
ANION GAP SERPL CALC-SCNC: 5 MMOL/L (ref 5–15)
ARTERIAL PATENCY WRIST A: YES
AST SERPL-CCNC: 28 U/L (ref 15–37)
BASE EXCESS BLDA CALC-SCNC: 1.8 MMOL/L
BASOPHILS # BLD: 0 K/UL (ref 0–0.1)
BASOPHILS NFR BLD: 1 % (ref 0–1)
BDY SITE: ABNORMAL
BILIRUB SERPL-MCNC: 0.6 MG/DL (ref 0.2–1)
BNP SERPL-MCNC: 5917 PG/ML
BUN SERPL-MCNC: 25 MG/DL (ref 6–20)
BUN/CREAT SERPL: 16 (ref 12–20)
CALCIUM SERPL-MCNC: 8.4 MG/DL (ref 8.5–10.1)
CHLORIDE SERPL-SCNC: 111 MMOL/L (ref 97–108)
CO2 SERPL-SCNC: 27 MMOL/L (ref 21–32)
COMMENT, HOLDF: NORMAL
CREAT SERPL-MCNC: 1.6 MG/DL (ref 0.7–1.3)
D DIMER PPP FEU-MCNC: 0.52 MG/L FEU (ref 0–0.65)
DIFFERENTIAL METHOD BLD: ABNORMAL
EOSINOPHIL # BLD: 0.2 K/UL (ref 0–0.4)
EOSINOPHIL NFR BLD: 3 % (ref 0–7)
ERYTHROCYTE [DISTWIDTH] IN BLOOD BY AUTOMATED COUNT: 13.2 % (ref 11.5–14.5)
GLOBULIN SER CALC-MCNC: 4.9 G/DL (ref 2–4)
GLUCOSE SERPL-MCNC: 156 MG/DL (ref 65–100)
HCO3 BLDA-SCNC: 26 MMOL/L (ref 22–26)
HCT VFR BLD AUTO: 32.7 % (ref 36.6–50.3)
HGB BLD-MCNC: 10.3 G/DL (ref 12.1–17)
IMM GRANULOCYTES # BLD AUTO: 0 K/UL (ref 0–0.04)
IMM GRANULOCYTES NFR BLD AUTO: 0 % (ref 0–0.5)
LYMPHOCYTES # BLD: 1.9 K/UL (ref 0.8–3.5)
LYMPHOCYTES NFR BLD: 30 % (ref 12–49)
MAGNESIUM SERPL-MCNC: 2.4 MG/DL (ref 1.6–2.4)
MCH RBC QN AUTO: 32.6 PG (ref 26–34)
MCHC RBC AUTO-ENTMCNC: 31.5 G/DL (ref 30–36.5)
MCV RBC AUTO: 103.5 FL (ref 80–99)
MONOCYTES # BLD: 0.7 K/UL (ref 0–1)
MONOCYTES NFR BLD: 11 % (ref 5–13)
NEUTS SEG # BLD: 3.6 K/UL (ref 1.8–8)
NEUTS SEG NFR BLD: 55 % (ref 32–75)
NRBC # BLD: 0 K/UL (ref 0–0.01)
NRBC BLD-RTO: 0 PER 100 WBC
PCO2 BLDA: 38 MMHG (ref 35–45)
PH BLDA: 7.45 [PH] (ref 7.35–7.45)
PLATELET # BLD AUTO: 232 K/UL (ref 150–400)
PMV BLD AUTO: 8.9 FL (ref 8.9–12.9)
PO2 BLDA: 77 MMHG (ref 80–100)
POTASSIUM SERPL-SCNC: 3.9 MMOL/L (ref 3.5–5.1)
PROT SERPL-MCNC: 7.4 G/DL (ref 6.4–8.2)
RBC # BLD AUTO: 3.16 M/UL (ref 4.1–5.7)
SAMPLES BEING HELD,HOLD: NORMAL
SAO2 % BLD: 96 % (ref 92–97)
SAO2% DEVICE SAO2% SENSOR NAME: ABNORMAL
SODIUM SERPL-SCNC: 143 MMOL/L (ref 136–145)
SPECIMEN SITE: ABNORMAL
TROPONIN I SERPL-MCNC: <0.05 NG/ML
WBC # BLD AUTO: 6.4 K/UL (ref 4.1–11.1)

## 2021-08-12 PROCEDURE — 85379 FIBRIN DEGRADATION QUANT: CPT

## 2021-08-12 PROCEDURE — 84484 ASSAY OF TROPONIN QUANT: CPT

## 2021-08-12 PROCEDURE — 71046 X-RAY EXAM CHEST 2 VIEWS: CPT

## 2021-08-12 PROCEDURE — 83880 ASSAY OF NATRIURETIC PEPTIDE: CPT

## 2021-08-12 PROCEDURE — 36600 WITHDRAWAL OF ARTERIAL BLOOD: CPT

## 2021-08-12 PROCEDURE — 82803 BLOOD GASES ANY COMBINATION: CPT

## 2021-08-12 PROCEDURE — 74011250636 HC RX REV CODE- 250/636: Performed by: EMERGENCY MEDICINE

## 2021-08-12 PROCEDURE — 36415 COLL VENOUS BLD VENIPUNCTURE: CPT

## 2021-08-12 PROCEDURE — 93005 ELECTROCARDIOGRAM TRACING: CPT

## 2021-08-12 PROCEDURE — 85025 COMPLETE CBC W/AUTO DIFF WBC: CPT

## 2021-08-12 PROCEDURE — 83735 ASSAY OF MAGNESIUM: CPT

## 2021-08-12 PROCEDURE — 74011250637 HC RX REV CODE- 250/637: Performed by: NURSE PRACTITIONER

## 2021-08-12 PROCEDURE — 96374 THER/PROPH/DIAG INJ IV PUSH: CPT

## 2021-08-12 PROCEDURE — 80053 COMPREHEN METABOLIC PANEL: CPT

## 2021-08-12 PROCEDURE — 82550 ASSAY OF CK (CPK): CPT

## 2021-08-12 PROCEDURE — 84100 ASSAY OF PHOSPHORUS: CPT

## 2021-08-12 PROCEDURE — 99285 EMERGENCY DEPT VISIT HI MDM: CPT

## 2021-08-12 RX ORDER — FUROSEMIDE 10 MG/ML
100 INJECTION INTRAMUSCULAR; INTRAVENOUS
Status: COMPLETED | OUTPATIENT
Start: 2021-08-12 | End: 2021-08-12

## 2021-08-12 RX ORDER — GUAIFENESIN 100 MG/5ML
324 LIQUID (ML) ORAL
Status: COMPLETED | OUTPATIENT
Start: 2021-08-12 | End: 2021-08-12

## 2021-08-12 RX ORDER — GUAIFENESIN 100 MG/5ML
324 LIQUID (ML) ORAL DAILY
Status: DISCONTINUED | OUTPATIENT
Start: 2021-08-13 | End: 2021-08-12

## 2021-08-12 RX ADMIN — FUROSEMIDE 100 MG: 10 INJECTION, SOLUTION INTRAMUSCULAR; INTRAVENOUS at 23:09

## 2021-08-12 RX ADMIN — ASPIRIN 324 MG: 81 TABLET, CHEWABLE ORAL at 22:52

## 2021-08-13 VITALS
TEMPERATURE: 97.3 F | RESPIRATION RATE: 22 BRPM | OXYGEN SATURATION: 97 % | HEART RATE: 81 BPM | HEIGHT: 69 IN | WEIGHT: 315 LBS | BODY MASS INDEX: 46.65 KG/M2 | SYSTOLIC BLOOD PRESSURE: 153 MMHG | DIASTOLIC BLOOD PRESSURE: 85 MMHG

## 2021-08-13 LAB
APPEARANCE UR: ABNORMAL
BACTERIA URNS QL MICRO: ABNORMAL /HPF
BILIRUB UR QL: NEGATIVE
CALCULATED R AXIS, ECG10: -43 DEGREES
CALCULATED T AXIS, ECG11: 123 DEGREES
CK SERPL-CCNC: 174 U/L (ref 39–308)
COLOR UR: ABNORMAL
DIAGNOSIS, 93000: NORMAL
EPITH CASTS URNS QL MICRO: ABNORMAL /LPF
GLUCOSE UR STRIP.AUTO-MCNC: NEGATIVE MG/DL
HGB UR QL STRIP: ABNORMAL
HYALINE CASTS URNS QL MICRO: ABNORMAL /LPF (ref 0–5)
KETONES UR QL STRIP.AUTO: NEGATIVE MG/DL
LEUKOCYTE ESTERASE UR QL STRIP.AUTO: ABNORMAL
NITRITE UR QL STRIP.AUTO: NEGATIVE
PH UR STRIP: 8 [PH] (ref 5–8)
PHOSPHATE SERPL-MCNC: 3.6 MG/DL (ref 2.6–4.7)
PROT UR STRIP-MCNC: 100 MG/DL
Q-T INTERVAL, ECG07: 408 MS
QRS DURATION, ECG06: 122 MS
QTC CALCULATION (BEZET), ECG08: 459 MS
RBC #/AREA URNS HPF: ABNORMAL /HPF (ref 0–5)
SP GR UR REFRACTOMETRY: 1.01 (ref 1–1.03)
UR CULT HOLD, URHOLD: NORMAL
UROBILINOGEN UR QL STRIP.AUTO: 1 EU/DL (ref 0.2–1)
VENTRICULAR RATE, ECG03: 76 BPM
WBC URNS QL MICRO: ABNORMAL /HPF (ref 0–4)

## 2021-08-13 PROCEDURE — 81001 URINALYSIS AUTO W/SCOPE: CPT

## 2021-08-13 RX ORDER — FUROSEMIDE 20 MG/1
20 TABLET ORAL DAILY
Qty: 7 TABLET | Refills: 0 | Status: SHIPPED | OUTPATIENT
Start: 2021-08-13 | End: 2021-08-20

## 2021-08-13 RX ORDER — CEPHALEXIN 500 MG/1
500 CAPSULE ORAL 3 TIMES DAILY
Qty: 21 CAPSULE | Refills: 0 | Status: SHIPPED | OUTPATIENT
Start: 2021-08-13 | End: 2021-08-20

## 2021-08-13 NOTE — ED PROVIDER NOTES
80-year-old male with a past medical history significant for anxiety, coronary disease, CHF, type 2 diabetes, GERD, morbid obesity, allergic reaction, prostate cancer, osteomyelitis who presents to the ED with his wife at the bedside with a complaint of increased shortness of breath exertion over the past 2 days. They deny any fever, sore throat, cough or congestion, headache, neck and back pain, nausea, vomiting, diarrhea, constipation, dysuria, dizziness, extremity weakness or numbness, sick contact, skin rash and recent travel. The patient was admitted for similar symptoms approximately a month ago. The patient is currently not on oxygen and not taking any diuretic as well.                Past Medical History:   Diagnosis Date    Anxiety and depression     CAD (coronary artery disease)     CHF (congestive heart failure), NYHA class III, chronic, systolic (HCC)     DM type 2 causing neurological disease ([facility]     DM type 2 causing renal disease ([facility]     DM type 2 causing vascular disease (HCC)     GERD (gastroesophageal reflux disease)     Morbid obesity (HCC)     Prolonged grief reaction     Prostate cancer ([facility]        Past Surgical History:   Procedure Laterality Date    HX CORONARY ARTERY BYPASS GRAFT      HX HEART CATHETERIZATION      HX ORTHOPAEDIC      right wrist carpal tunnel repair         Family History:   Problem Relation Age of Onset    Heart Disease Father     Diabetes Father     Cancer Sister         BREAST       Social History     Socioeconomic History    Marital status:      Spouse name: Not on file    Number of children: Not on file    Years of education: Not on file    Highest education level: Not on file   Occupational History    Not on file   Tobacco Use    Smoking status: Former Smoker     Packs/day: 1.00     Years: 7.00     Pack years: 7.00     Quit date: 1991     Years since quittin.7    Smokeless tobacco: Former User   Substance and Sexual Activity    Alcohol use: Yes     Comment: VERY RARE    Drug use: No    Sexual activity: Not on file   Other Topics Concern    Not on file   Social History Narrative    Not on file     Social Determinants of Health     Financial Resource Strain:     Difficulty of Paying Living Expenses:    Food Insecurity:     Worried About Running Out of Food in the Last Year:     [address] in the Last Year:    Transportation Needs:     Lack of Transportation (Medical):  Lack of Transportation (Non-Medical):    Physical Activity:     Days of Exercise per Week:     Minutes of Exercise per Session:    Stress:     Feeling of Stress :    Social Connections:     Frequency of Communication with Friends and Family:     Frequency of Social Gatherings with Friends and Family:     Attends Church Services:     Active Member of Clubs or Organizations:     Attends Club or Organization Meetings:     Marital Status:    Intimate Partner Violence:     Fear of Current or Ex-Partner:     Emotionally Abused:     Physically Abused:     Sexually Abused: ALLERGIES: Patient has no known allergies. Review of Systems   All other systems reviewed and are negative. Vitals:    08/12/21 2116   BP: (!) 168/76   Pulse: 80   Resp: 30   Temp: 97.3 °F (36.3 °C)   SpO2: 100%   Weight: 147.1 kg (324 lb 4.8 oz)   Height: 5' 9\" (1.753 m)            Physical Exam  Vitals and nursing note reviewed. CONSTITUTIONAL: Well-appearing; well-nourished; in no apparent distress  HEAD: Normocephalic; atraumatic  EYES: PERRL; EOM intact; conjunctiva and sclera are clear bilaterally. ENT: No rhinorrhea; normal pharynx with no tonsillar hypertrophy; mucous membranes pink/moist, no erythema, no exudate. NECK: Supple; non-tender; no cervical lymphadenopathy  CARD: Normal S1, S2; no murmurs, rubs, or gallops. Regular rate and rhythm.   RESP: Normal respiratory effort; breath sounds clear and equal bilaterally; no wheezes, rhonchi, or rales. ABD: Normal bowel sounds; non-distended; non-tender; no palpable organomegaly, no masses, no bruits. Back Exam: Normal inspection; no vertebral point tenderness, no CVA tenderness. Normal range of motion. EXT: Normal ROM in all four extremities; non-tender to palpation; no swelling or deformity; distal pulses are normal, 2+ pitting edema bilaterally. SKIN: Warm; dry; no rash. NEURO:Alert and oriented x 3, coherent, AJAY-XII grossly intact, sensory and motor are non-focal.        MDM  Number of Diagnoses or Management Options  Diagnosis management comments: Assessment: Suspect acute decompensation of congestive heart failure rule out ACS, VTE, pneumonia and electrolyte abnormality. The patient appears hemodynamically stable and well. Plan: Lab/EKG/chest x-ray/education, reassurance, symptomatic treatment /Lasix/serial exam/ Monitor and Reevaluate. Amount and/or Complexity of Data Reviewed  Clinical lab tests: ordered and reviewed  Tests in the radiology section of CPT®: ordered and reviewed  Tests in the medicine section of CPT®: reviewed and ordered  Discussion of test results with the performing providers: yes  Decide to obtain previous medical records or to obtain history from someone other than the patient: yes  Obtain history from someone other than the patient: yes  Review and summarize past medical records: yes  Discuss the patient with other providers: yes  Independent visualization of images, tracings, or specimens: yes    Risk of Complications, Morbidity, and/or Mortality  Presenting problems: moderate  Diagnostic procedures: moderate  Management options: moderate  General comments: Total critical care time spent exclusive of procedures:  45 minutes    Patient Progress  Patient progress: stable         Procedures    ED EKG interpretation:  Rhythm: atrial fib; and regular .  Rate (approx.): 76; Axis: left axis deviation; P wave: normal; QRS interval: normal ; ST/T wave: non-specific changes; in  Lead: Diffusely; Other findings: abnormal ekg. This EKG was interpreted by Trish Kebede MD,ED Provider. XRAY INTERPRETATION (ED MD)  Chest Xray  No acute process seen. Normal heart size. No bony abnormalities. No infiltrate. Ever Posadas MD 2:04 AM    Progress Note:   Pt has been reexamined by Trish Kebede MD. Pt is feeling much better. Symptoms have improved. All available results have been reviewed with pt and any available family. The patient was able to diurese and put out approximately 1 L of urinary output. Pt understands sx, dx, and tx in ED. Care plan has been outlined and questions have been answered. Pt is ready to go home. Will send home on acute exacerbation of CHF and UTI instruction. Prescription of Lasix 20 mg for 7 days outpatient referral with PCP/cardiologist for reevaluation and further treatment as needed. Written by Trish Kebede MD,6:00 AM    .   .

## 2021-08-13 NOTE — ED TRIAGE NOTES
Pt reports increased SOB for the past few days accompanied by leg swelling. Worse with exertion. Hx of CHF. Takes a diuretic. Denies fevers. Hx of CABG. Tachypneic in triage. Denies CP.

## 2022-03-18 PROBLEM — M86.9 OSTEOMYELITIS OF SECOND TOE OF LEFT FOOT (HCC): Status: ACTIVE | Noted: 2021-07-08

## 2022-03-18 PROBLEM — R07.9 CHEST PAIN: Status: ACTIVE | Noted: 2021-07-08

## 2022-03-19 PROBLEM — R73.9 HYPERGLYCEMIA: Status: ACTIVE | Noted: 2018-09-02

## 2022-03-19 PROBLEM — R60.9 PERIPHERAL EDEMA: Status: ACTIVE | Noted: 2021-07-08

## 2022-03-19 PROBLEM — R06.09 DYSPNEA ON EXERTION: Status: ACTIVE | Noted: 2021-07-08

## 2022-03-20 PROBLEM — R79.89 ELEVATED BRAIN NATRIURETIC PEPTIDE (BNP) LEVEL: Status: ACTIVE | Noted: 2021-07-08

## 2022-03-22 ENCOUNTER — APPOINTMENT (RX ONLY)
Dept: URBAN - METROPOLITAN AREA CLINIC 339 | Facility: CLINIC | Age: 77
Setting detail: DERMATOLOGY
End: 2022-03-22

## 2022-03-22 DIAGNOSIS — L57.8 OTHER SKIN CHANGES DUE TO CHRONIC EXPOSURE TO NONIONIZING RADIATION: ICD-10-CM

## 2022-03-22 DIAGNOSIS — L82.1 OTHER SEBORRHEIC KERATOSIS: ICD-10-CM

## 2022-03-22 DIAGNOSIS — D22 MELANOCYTIC NEVI: ICD-10-CM

## 2022-03-22 DIAGNOSIS — L90.5 SCAR CONDITIONS AND FIBROSIS OF SKIN: ICD-10-CM

## 2022-03-22 DIAGNOSIS — L81.4 OTHER MELANIN HYPERPIGMENTATION: ICD-10-CM

## 2022-03-22 DIAGNOSIS — D18.0 HEMANGIOMA: ICD-10-CM

## 2022-03-22 DIAGNOSIS — L91.8 OTHER HYPERTROPHIC DISORDERS OF THE SKIN: ICD-10-CM

## 2022-03-22 PROBLEM — D22.5 MELANOCYTIC NEVI OF TRUNK: Status: ACTIVE | Noted: 2022-03-22

## 2022-03-22 PROBLEM — D18.01 HEMANGIOMA OF SKIN AND SUBCUTANEOUS TISSUE: Status: ACTIVE | Noted: 2022-03-22

## 2022-03-22 PROCEDURE — ? LIQUID NITROGEN (COSMETIC)

## 2022-03-22 PROCEDURE — ? ADDITIONAL NOTES

## 2022-03-22 PROCEDURE — ? TREATMENT REGIMEN

## 2022-03-22 PROCEDURE — 99213 OFFICE O/P EST LOW 20 MIN: CPT

## 2022-03-22 PROCEDURE — ? COUNSELING

## 2022-03-22 PROCEDURE — ? FULL BODY SKIN EXAM

## 2022-03-22 ASSESSMENT — LOCATION ZONE DERM
LOCATION ZONE: TRUNK
LOCATION ZONE: ARM
LOCATION ZONE: FACE

## 2022-03-22 ASSESSMENT — LOCATION DETAILED DESCRIPTION DERM
LOCATION DETAILED: LEFT ANTERIOR PROXIMAL UPPER ARM
LOCATION DETAILED: RIGHT INFERIOR UPPER BACK
LOCATION DETAILED: LEFT INFERIOR CENTRAL MALAR CHEEK
LOCATION DETAILED: LEFT MEDIAL UPPER BACK
LOCATION DETAILED: LEFT INFERIOR MEDIAL UPPER BACK
LOCATION DETAILED: RIGHT MID-UPPER BACK
LOCATION DETAILED: RIGHT MEDIAL UPPER BACK

## 2022-03-22 ASSESSMENT — LOCATION SIMPLE DESCRIPTION DERM
LOCATION SIMPLE: RIGHT UPPER BACK
LOCATION SIMPLE: LEFT UPPER ARM
LOCATION SIMPLE: LEFT UPPER BACK
LOCATION SIMPLE: LEFT CHEEK

## 2022-03-22 NOTE — PROCEDURE: LIQUID NITROGEN (COSMETIC)
Consent: The patient's consent was obtained including but not limited to risks of crusting, scabbing, blistering, scarring, darker or lighter pigmentary change, recurrence, incomplete removal and infection. The patient understands that the procedure is cosmetic in nature and is not covered by insurance.
Detail Level: Detailed
Billing Information: Bill by Static Price
Render Post-Care Instructions In Note?: no
Post-Care Instructions: I reviewed with the patient in detail post-care instructions. Patient is to wear sunprotection, and avoid picking at any of the treated lesions. Pt may apply Vaseline to crusted or scabbing areas.
Spray Paint Text: The liquid nitrogen was applied to the skin utilizing a spray paint frosting technique.
Price (Use Numbers Only, No Special Characters Or $): 75
Show Spray Paint Technique Variable?: Yes

## 2022-03-22 NOTE — HPI: EVALUATION OF SKIN LESION(S)
What Type Of Note Output Would You Prefer (Optional)?: Bullet Format
Hpi Title: Evaluation of Skin Lesions
Additional History: Pt would like cosmetic ln2 of ?sk’s of the Lt cheek.

## 2022-08-18 ENCOUNTER — APPOINTMENT (RX ONLY)
Dept: URBAN - METROPOLITAN AREA CLINIC 339 | Facility: CLINIC | Age: 77
Setting detail: DERMATOLOGY
End: 2022-08-18

## 2022-08-18 DIAGNOSIS — L57.8 OTHER SKIN CHANGES DUE TO CHRONIC EXPOSURE TO NONIONIZING RADIATION: ICD-10-CM

## 2022-08-18 DIAGNOSIS — D22 MELANOCYTIC NEVI: ICD-10-CM

## 2022-08-18 DIAGNOSIS — Z85.828 PERSONAL HISTORY OF OTHER MALIGNANT NEOPLASM OF SKIN: ICD-10-CM

## 2022-08-18 DIAGNOSIS — L57.0 ACTINIC KERATOSIS: ICD-10-CM

## 2022-08-18 DIAGNOSIS — L81.4 OTHER MELANIN HYPERPIGMENTATION: ICD-10-CM

## 2022-08-18 DIAGNOSIS — L30.9 DERMATITIS, UNSPECIFIED: ICD-10-CM

## 2022-08-18 DIAGNOSIS — L82.1 OTHER SEBORRHEIC KERATOSIS: ICD-10-CM

## 2022-08-18 DIAGNOSIS — D18.0 HEMANGIOMA: ICD-10-CM

## 2022-08-18 PROBLEM — D48.5 NEOPLASM OF UNCERTAIN BEHAVIOR OF SKIN: Status: ACTIVE | Noted: 2022-08-18

## 2022-08-18 PROBLEM — D22.5 MELANOCYTIC NEVI OF TRUNK: Status: ACTIVE | Noted: 2022-08-18

## 2022-08-18 PROBLEM — D18.01 HEMANGIOMA OF SKIN AND SUBCUTANEOUS TISSUE: Status: ACTIVE | Noted: 2022-08-18

## 2022-08-18 PROCEDURE — 99213 OFFICE O/P EST LOW 20 MIN: CPT | Mod: 25

## 2022-08-18 PROCEDURE — 17000 DESTRUCT PREMALG LESION: CPT | Mod: 59

## 2022-08-18 PROCEDURE — ? TREATMENT REGIMEN

## 2022-08-18 PROCEDURE — ? FULL BODY SKIN EXAM

## 2022-08-18 PROCEDURE — ? PRESCRIPTION MEDICATION MANAGEMENT

## 2022-08-18 PROCEDURE — 11102 TANGNTL BX SKIN SINGLE LES: CPT

## 2022-08-18 PROCEDURE — ? BIOPSY BY SHAVE METHOD

## 2022-08-18 PROCEDURE — ? LIQUID NITROGEN

## 2022-08-18 PROCEDURE — ? COUNSELING

## 2022-08-18 ASSESSMENT — LOCATION SIMPLE DESCRIPTION DERM
LOCATION SIMPLE: LEFT EAR
LOCATION SIMPLE: LEFT UPPER ARM
LOCATION SIMPLE: LEFT UPPER BACK
LOCATION SIMPLE: RIGHT UPPER BACK
LOCATION SIMPLE: RIGHT EAR

## 2022-08-18 ASSESSMENT — LOCATION DETAILED DESCRIPTION DERM
LOCATION DETAILED: RIGHT INFERIOR UPPER BACK
LOCATION DETAILED: RIGHT SUPERIOR HELIX
LOCATION DETAILED: LEFT MEDIAL UPPER BACK
LOCATION DETAILED: LEFT ANTERIOR MEDIAL PROXIMAL UPPER ARM
LOCATION DETAILED: RIGHT MEDIAL UPPER BACK
LOCATION DETAILED: RIGHT MID-UPPER BACK
LOCATION DETAILED: LEFT SUPERIOR HELIX
LOCATION DETAILED: RIGHT SUPERIOR MEDIAL UPPER BACK
LOCATION DETAILED: LEFT INFERIOR MEDIAL UPPER BACK

## 2022-08-18 ASSESSMENT — LOCATION ZONE DERM
LOCATION ZONE: ARM
LOCATION ZONE: EAR
LOCATION ZONE: TRUNK

## 2022-08-18 NOTE — HPI: SKIN LESION
What Type Of Note Output Would You Prefer (Optional)?: Bullet Format
Is This A New Presentation, Or A Follow-Up?: Growth
Additional History: PCP gave him TAC to use bid. He has so x 2 mos

## 2022-08-18 NOTE — HPI: RASH
What Type Of Note Output Would You Prefer (Optional)?: Bullet Format
Is This A New Presentation, Or A Follow-Up?: Rash
Additional History: Pt had COVID in July and this is when the rash started.  It is mostly gone.

## 2023-05-11 RX ORDER — ASPIRIN 81 MG/1
1 TABLET, CHEWABLE ORAL DAILY
COMMUNITY
Start: 2021-07-18

## 2023-05-11 RX ORDER — CARVEDILOL 3.12 MG/1
TABLET ORAL
COMMUNITY
Start: 2020-09-03

## 2023-05-11 RX ORDER — AMLODIPINE BESYLATE 2.5 MG/1
1 TABLET ORAL DAILY
COMMUNITY
Start: 2021-07-18

## 2023-05-11 RX ORDER — ROSUVASTATIN CALCIUM 10 MG/1
TABLET, COATED ORAL
COMMUNITY
Start: 2020-09-03

## 2023-05-11 RX ORDER — NALOXONE HYDROCHLORIDE 4 MG/.1ML
SPRAY NASAL
COMMUNITY
Start: 2021-07-17

## 2023-05-11 RX ORDER — ACETAMINOPHEN 325 MG/1
TABLET ORAL EVERY 6 HOURS PRN
COMMUNITY
Start: 2021-07-17

## 2023-05-11 RX ORDER — OMEPRAZOLE 20 MG/1
1 CAPSULE, DELAYED RELEASE ORAL DAILY
COMMUNITY
Start: 2020-06-01

## 2023-05-11 RX ORDER — INSULIN GLARGINE 100 [IU]/ML
INJECTION, SOLUTION SUBCUTANEOUS DAILY
COMMUNITY
Start: 2021-07-17

## 2023-05-11 RX ORDER — AMOXICILLIN AND CLAVULANATE POTASSIUM 875; 125 MG/1; MG/1
1 TABLET, FILM COATED ORAL 2 TIMES DAILY
COMMUNITY
Start: 2021-07-18

## 2023-06-09 NOTE — PROGRESS NOTES
"Pt arrives ambulatory to triage desk via PV.    Pt states \"Diarrhea for 3 days. And vomiting that started last night. And my left side feels like I pulled a muscle.\"  " Chief Complaint   Patient presents with   [facility] Follow Up    Irregular Heart Beat    CHF     Visit Vitals  /68 (BP 1 Location: Right arm, BP Patient Position: Sitting)   Pulse 66   Resp 20   Ht 5' 9\" (1.753 m)   Wt 305 lb (138.3 kg)   SpO2 99%   BMI 45.04 kg/m²     Pt presents in office w/o cardiac complaint. Patient recently at [address] [place] [address] on 11/2- 11/12 for AFib. No refills needed at this time. Medication changes made per VO of Dr. Autumn iWnters Pt to start taking Losartan 25mg daily.

## 2023-08-17 ENCOUNTER — APPOINTMENT (RX ONLY)
Dept: URBAN - METROPOLITAN AREA CLINIC 339 | Facility: CLINIC | Age: 78
Setting detail: DERMATOLOGY
End: 2023-08-17

## 2023-08-17 DIAGNOSIS — L82.1 OTHER SEBORRHEIC KERATOSIS: ICD-10-CM

## 2023-08-17 DIAGNOSIS — L57.8 OTHER SKIN CHANGES DUE TO CHRONIC EXPOSURE TO NONIONIZING RADIATION: ICD-10-CM

## 2023-08-17 DIAGNOSIS — Z85.828 PERSONAL HISTORY OF OTHER MALIGNANT NEOPLASM OF SKIN: ICD-10-CM

## 2023-08-17 DIAGNOSIS — L91.8 OTHER HYPERTROPHIC DISORDERS OF THE SKIN: ICD-10-CM

## 2023-08-17 DIAGNOSIS — L81.4 OTHER MELANIN HYPERPIGMENTATION: ICD-10-CM

## 2023-08-17 DIAGNOSIS — D22 MELANOCYTIC NEVI: ICD-10-CM

## 2023-08-17 DIAGNOSIS — L57.0 ACTINIC KERATOSIS: ICD-10-CM

## 2023-08-17 DIAGNOSIS — D18.0 HEMANGIOMA: ICD-10-CM

## 2023-08-17 PROBLEM — D18.01 HEMANGIOMA OF SKIN AND SUBCUTANEOUS TISSUE: Status: ACTIVE | Noted: 2023-08-17

## 2023-08-17 PROBLEM — D22.5 MELANOCYTIC NEVI OF TRUNK: Status: ACTIVE | Noted: 2023-08-17

## 2023-08-17 PROCEDURE — ? FULL BODY SKIN EXAM

## 2023-08-17 PROCEDURE — ? LIQUID NITROGEN (COSMETIC)

## 2023-08-17 PROCEDURE — ? COUNSELING

## 2023-08-17 PROCEDURE — 17003 DESTRUCT PREMALG LES 2-14: CPT

## 2023-08-17 PROCEDURE — ? TREATMENT REGIMEN

## 2023-08-17 PROCEDURE — 99213 OFFICE O/P EST LOW 20 MIN: CPT | Mod: 25

## 2023-08-17 PROCEDURE — 17000 DESTRUCT PREMALG LESION: CPT

## 2023-08-17 PROCEDURE — ? LIQUID NITROGEN

## 2023-08-17 ASSESSMENT — LOCATION SIMPLE DESCRIPTION DERM
LOCATION SIMPLE: POSTERIOR SCALP
LOCATION SIMPLE: RIGHT UPPER BACK
LOCATION SIMPLE: LEFT FOREHEAD
LOCATION SIMPLE: RIGHT ZYGOMA
LOCATION SIMPLE: LEFT UPPER BACK
LOCATION SIMPLE: NECK
LOCATION SIMPLE: RIGHT TEMPLE

## 2023-08-17 ASSESSMENT — LOCATION DETAILED DESCRIPTION DERM
LOCATION DETAILED: RIGHT SUPERIOR LATERAL UPPER BACK
LOCATION DETAILED: LEFT INFERIOR MEDIAL UPPER BACK
LOCATION DETAILED: RIGHT MEDIAL UPPER BACK
LOCATION DETAILED: LEFT CENTRAL LATERAL NECK
LOCATION DETAILED: RIGHT LATERAL UPPER BACK
LOCATION DETAILED: RIGHT INFERIOR UPPER BACK
LOCATION DETAILED: RIGHT CENTRAL ZYGOMA
LOCATION DETAILED: LEFT MEDIAL UPPER BACK
LOCATION DETAILED: RIGHT LATERAL TEMPLE
LOCATION DETAILED: POSTERIOR MID-PARIETAL SCALP
LOCATION DETAILED: RIGHT MID-UPPER BACK
LOCATION DETAILED: LEFT INFERIOR UPPER BACK
LOCATION DETAILED: LEFT SUPERIOR FOREHEAD

## 2023-08-17 ASSESSMENT — LOCATION ZONE DERM
LOCATION ZONE: SCALP
LOCATION ZONE: TRUNK
LOCATION ZONE: FACE
LOCATION ZONE: NECK

## 2023-08-17 NOTE — PROCEDURE: LIQUID NITROGEN
Number Of Freeze-Thaw Cycles: 1 freeze-thaw cycle
Detail Level: Detailed
Post-Care Instructions: I reviewed with the patient in detail post-care instructions. Patient is to wear sunprotection, and avoid picking at any of the treated lesions. Pt may apply Vaseline to crusted or scabbing areas.
Duration Of Freeze Thaw-Cycle (Seconds): 0
Render Note In Bullet Format When Appropriate: No
Consent: The patient's consent was obtained including but not limited to risks of crusting, scabbing, blistering, scarring, darker or lighter pigmentary change, recurrence, incomplete removal and infection.
Show Applicator Variable?: Yes

## 2023-08-17 NOTE — HPI: EVALUATION OF SKIN LESION(S)
What Type Of Note Output Would You Prefer (Optional)?: Bullet Format
Hpi Title: Evaluation of Skin Lesions
Additional History: Pt would like cosmetic removal of lesions of the back.

## 2023-08-17 NOTE — PROCEDURE: LIQUID NITROGEN (COSMETIC)
Price (Use Numbers Only, No Special Characters Or $): 125
Consent: The patient's consent was obtained including but not limited to risks of crusting, scabbing, blistering, scarring, darker or lighter pigmentary change, recurrence, incomplete removal and infection. The patient understands that the procedure is cosmetic in nature and is not covered by insurance.
Spray Paint Technique: No
Spray Paint Text: The liquid nitrogen was applied to the skin utilizing a spray paint frosting technique.
Post-Care Instructions: I reviewed with the patient in detail post-care instructions. Patient is to wear sunprotection, and avoid picking at any of the treated lesions. Pt may apply Vaseline to crusted or scabbing areas.
Billing Information: Bill by Static Price
Detail Level: Detailed
Show Spray Paint Technique Variable?: Yes

## 2024-08-21 ENCOUNTER — APPOINTMENT (RX ONLY)
Dept: URBAN - METROPOLITAN AREA CLINIC 339 | Facility: CLINIC | Age: 79
Setting detail: DERMATOLOGY
End: 2024-08-21

## 2024-08-21 DIAGNOSIS — Z85.828 PERSONAL HISTORY OF OTHER MALIGNANT NEOPLASM OF SKIN: ICD-10-CM

## 2024-08-21 DIAGNOSIS — L57.8 OTHER SKIN CHANGES DUE TO CHRONIC EXPOSURE TO NONIONIZING RADIATION: ICD-10-CM

## 2024-08-21 DIAGNOSIS — L81.4 OTHER MELANIN HYPERPIGMENTATION: ICD-10-CM

## 2024-08-21 DIAGNOSIS — D18.0 HEMANGIOMA: ICD-10-CM

## 2024-08-21 DIAGNOSIS — L82.1 OTHER SEBORRHEIC KERATOSIS: ICD-10-CM

## 2024-08-21 DIAGNOSIS — L57.0 ACTINIC KERATOSIS: ICD-10-CM

## 2024-08-21 DIAGNOSIS — D22 MELANOCYTIC NEVI: ICD-10-CM

## 2024-08-21 PROBLEM — D48.5 NEOPLASM OF UNCERTAIN BEHAVIOR OF SKIN: Status: ACTIVE | Noted: 2024-08-21

## 2024-08-21 PROBLEM — D18.01 HEMANGIOMA OF SKIN AND SUBCUTANEOUS TISSUE: Status: ACTIVE | Noted: 2024-08-21

## 2024-08-21 PROBLEM — D22.5 MELANOCYTIC NEVI OF TRUNK: Status: ACTIVE | Noted: 2024-08-21

## 2024-08-21 PROCEDURE — ? TREATMENT REGIMEN

## 2024-08-21 PROCEDURE — ? BIOPSY BY SHAVE METHOD

## 2024-08-21 PROCEDURE — ? COUNSELING

## 2024-08-21 PROCEDURE — 17003 DESTRUCT PREMALG LES 2-14: CPT

## 2024-08-21 PROCEDURE — 17000 DESTRUCT PREMALG LESION: CPT | Mod: 59

## 2024-08-21 PROCEDURE — 99213 OFFICE O/P EST LOW 20 MIN: CPT | Mod: 25

## 2024-08-21 PROCEDURE — 11102 TANGNTL BX SKIN SINGLE LES: CPT

## 2024-08-21 PROCEDURE — ? LIQUID NITROGEN

## 2024-08-21 PROCEDURE — ? FULL BODY SKIN EXAM

## 2024-08-21 ASSESSMENT — LOCATION ZONE DERM
LOCATION ZONE: FACE
LOCATION ZONE: SCALP
LOCATION ZONE: TRUNK
LOCATION ZONE: NECK

## 2024-08-21 ASSESSMENT — LOCATION SIMPLE DESCRIPTION DERM
LOCATION SIMPLE: NECK
LOCATION SIMPLE: LEFT UPPER BACK
LOCATION SIMPLE: RIGHT FOREHEAD
LOCATION SIMPLE: SCALP
LOCATION SIMPLE: RIGHT UPPER BACK
LOCATION SIMPLE: LEFT FOREHEAD
LOCATION SIMPLE: RIGHT TEMPLE

## 2024-08-21 ASSESSMENT — LOCATION DETAILED DESCRIPTION DERM
LOCATION DETAILED: RIGHT INFERIOR MEDIAL FOREHEAD
LOCATION DETAILED: LEFT CENTRAL POSTAURICULAR SKIN
LOCATION DETAILED: RIGHT MEDIAL UPPER BACK
LOCATION DETAILED: RIGHT CENTRAL TEMPLE
LOCATION DETAILED: LEFT MEDIAL UPPER BACK
LOCATION DETAILED: RIGHT MID-UPPER BACK
LOCATION DETAILED: RIGHT INFERIOR UPPER BACK
LOCATION DETAILED: LEFT INFERIOR MEDIAL UPPER BACK
LOCATION DETAILED: LEFT CENTRAL LATERAL NECK
LOCATION DETAILED: LEFT SUPERIOR FOREHEAD

## 2024-08-21 NOTE — PROCEDURE: LIQUID NITROGEN
Show Applicator Variable?: Yes
Render Note In Bullet Format When Appropriate: No
Consent: The patient's consent was obtained including but not limited to risks of crusting, scabbing, blistering, scarring, darker or lighter pigmentary change, recurrence, incomplete removal and infection.
Detail Level: Detailed
Post-Care Instructions: I reviewed with the patient in detail post-care instructions. Patient is to wear sunprotection, and avoid picking at any of the treated lesions. Pt may apply Vaseline to crusted or scabbing areas.
Number Of Freeze-Thaw Cycles: 1 freeze-thaw cycle
Duration Of Freeze Thaw-Cycle (Seconds): 0

## (undated) DEVICE — SPONGE GZ W4XL4IN COT RADPQ HIGHLY ABSRB

## (undated) DEVICE — TUBING SUCT 10FR MAL ALUM SHFT FN CAP VENT UNIV CONN W/ OBT

## (undated) DEVICE — NEEDLE HYPO 18GA L1.5IN PNK S STL HUB POLYPR SHLD REG BVL

## (undated) DEVICE — SUTURE COAT VCRL SZ 4-0 L18IN ABSRB UD L16MM PC-3 3/8 CIR J845G

## (undated) DEVICE — SYRINGE IRRIG 60ML SFT PLIABLE BLB EZ TO GRP 1 HND USE W/

## (undated) DEVICE — PADDING CAST 4 INX5 YD STRL

## (undated) DEVICE — BNDG ELAS HK LOOP 4X5YD NS -- MATRIX

## (undated) DEVICE — ZIMMER® STERILE DISPOSABLE TOURNIQUET CUFF WITH PROTECTIVE SLEEVE AND PLC, DUAL PORT, SINGLE BLADDER, 18 IN. (46 CM)

## (undated) DEVICE — BANDAGE COBAN 4 IN COMPR W4INXL5YD FOAM COHESIVE QUIK STK SELF ADH SFT

## (undated) DEVICE — SOLUTION SURG PREP 26 CC PURPREP

## (undated) DEVICE — PACK,BASIC,SIRUS,V: Brand: MEDLINE

## (undated) DEVICE — EXTREMITY-SFMCASU: Brand: MEDLINE INDUSTRIES, INC.

## (undated) DEVICE — BASIN EMSIS 16OZ GRAPHITE PLAS KID SHP MOLD GRAD FOR ORAL

## (undated) DEVICE — GAUZE,SPONGE,FLUFF,6"X6.75",STRL,5/TRAY: Brand: MEDLINE

## (undated) DEVICE — SOL IRRIGATION INJ NACL 0.9% 500ML BTL